# Patient Record
Sex: FEMALE | Race: BLACK OR AFRICAN AMERICAN | Employment: OTHER | URBAN - METROPOLITAN AREA
[De-identification: names, ages, dates, MRNs, and addresses within clinical notes are randomized per-mention and may not be internally consistent; named-entity substitution may affect disease eponyms.]

---

## 2017-03-31 RX ORDER — CHLORTHALIDONE 25 MG/1
25 TABLET ORAL DAILY
Qty: 90 TAB | Refills: 3 | Status: SHIPPED | OUTPATIENT
Start: 2017-03-31 | End: 2019-01-31 | Stop reason: ALTCHOICE

## 2017-04-04 ENCOUNTER — OFFICE VISIT (OUTPATIENT)
Dept: INTERNAL MEDICINE CLINIC | Age: 62
End: 2017-04-04

## 2017-04-04 VITALS
WEIGHT: 218 LBS | HEIGHT: 63 IN | HEART RATE: 58 BPM | BODY MASS INDEX: 38.62 KG/M2 | SYSTOLIC BLOOD PRESSURE: 146 MMHG | RESPIRATION RATE: 17 BRPM | OXYGEN SATURATION: 98 % | DIASTOLIC BLOOD PRESSURE: 74 MMHG | TEMPERATURE: 98.2 F

## 2017-04-04 DIAGNOSIS — Z11.59 NEED FOR HEPATITIS C SCREENING TEST: ICD-10-CM

## 2017-04-04 DIAGNOSIS — E03.9 ACQUIRED HYPOTHYROIDISM: ICD-10-CM

## 2017-04-04 DIAGNOSIS — Z71.89 ADVANCE DIRECTIVE DISCUSSED WITH PATIENT: ICD-10-CM

## 2017-04-04 DIAGNOSIS — I10 ESSENTIAL HYPERTENSION: Primary | ICD-10-CM

## 2017-04-04 DIAGNOSIS — S80.01XA CONTUSION OF RIGHT KNEE, INITIAL ENCOUNTER: ICD-10-CM

## 2017-04-04 DIAGNOSIS — K51.90 ULCERATIVE COLITIS WITHOUT COMPLICATIONS, UNSPECIFIED LOCATION (HCC): ICD-10-CM

## 2017-04-04 RX ORDER — CYCLOBENZAPRINE HCL 10 MG
10 TABLET ORAL
Qty: 30 TAB | Refills: 2 | Status: CANCELLED | OUTPATIENT
Start: 2017-04-04

## 2017-04-04 NOTE — PROGRESS NOTES
Subjective:     Morrison Curling is a 57 y.o. female who presents for follow up of hypertension and hypothyroidism. .    Diet and Lifestyle: generally follows a low sodium diet  Home BP Monitoring: is well controlled at home, ranging 120's/70-80's    Cardiovascular ROS: taking medications as instructed, no medication side effects noted, no TIA's, no chest pain on exertion, no dyspnea on exertion, no swelling of ankles, no orthostatic dizziness or lightheadedness, no palpitations. New concerns:   Tripped in [employer] lot 4/1 fell onto left hand, right knee and right foot. Has been icing and taking advil - 2 tabs every 4-6 hours. Right knee is swollen and sore to touch but slightly improved. UC is pretty well controlled. Still with some diarrhea. Has run out of her Colazol and diarrhea has been worse since. Sees Dr. Jude Shah. Had Odansetron to take as needed for vomiting spells. Increased allergy symptoms. Not taking any medications. Bladder has improved. Had been seeing Dr. Donald Brown. Stopped the [facility]. Currently no urgency or frequency. Has f/u appointment with Dr. Jeny Mason for m spasm in back and left knee oa. Has upcoming left TKR on 5/31. Continues on tramadol for the back, hip, and left knee pain. Had an injection in back by Dr. Jeny Mason a couple of weeks ago. Also taking diclofenac as needed as well. GERD symptoms with chest discomfort post eaying. Occurring 2 times a week. Taking tums without improvement. Toenail fungus - using otc terbanifine cream.      Current Outpatient Prescriptions   Medication Sig Dispense Refill    chlorthalidone (HYGROTEN) 25 mg tablet Take 1 Tab by mouth daily. 90 Tab 3    clotrimazole-betamethasone (LOTRISONE) topical cream Apply  to affected area two (2) times a day.  30 g 1    valsartan (DIOVAN) 160 mg tablet TAKE ONE TABLET BY MOUTH EVERY DAY 90 Tab 3    diclofenac EC (VOLTAREN) 50 mg EC tablet Take 50 mg by mouth as needed for Pain.      estradiol (ESTROGEL) 1.25 gram/actuation glpm Apply 1 pump (0.75mg estradiol). daily 50 g 6    levothyroxine (SYNTHROID) 125 mcg tablet Take 1 Tab by mouth Daily (before breakfast). 90 Tab 3    hydrocortisone (PROCTOZONE-HC) 2.5 % rectal cream Insert  into rectum four (4) times daily. As needed for hemorrhoids 30 g 2    cyclobenzaprine (FLEXERIL) 10 mg tablet Take 1 Tab by mouth nightly as needed for Muscle Spasm(s). 30 Tab 2    ALPRAZolam (XANAX) 0.5 mg tablet Take 1 Tab by mouth two (2) times daily as needed. 30 Tab 2    balsalazide (COLAZAL) 750 mg capsule Take 3 Caps by mouth three (3) times daily. 270 Cap 1    ketorolac (ACULAR) 0.5 % ophthalmic solution Administer 1 Drop to both eyes four (4) times daily as needed. 1 Bottle 0    traMADol-acetaminophen (ULTRACET) 37.5-325 mg per tablet Take 1 Tab by mouth every eight (8) hours as needed for Pain. 60 Tab 2    Blood Pressure Kit-Extra Large kit 1 Each by Does Not Apply route daily. Dx: 401.9 1 Each 0    loperamide (IMODIUM) 2 mg capsule Take 2 mg by mouth as needed.  fluticasone (FLONASE) 50 mcg/actuation nasal spray 2 Sprays by Both Nostrils route daily. 1 Bottle 6        Lab Results  Component Value Date/Time   GFR est AA 83 06/03/2015 09:57 AM   GFR est non-AA 72 06/03/2015 09:57 AM   Creatinine 0.87 06/03/2015 09:57 AM   BUN 16 06/03/2015 09:57 AM   Sodium 140 06/03/2015 09:57 AM   Potassium 3.7 06/03/2015 09:57 AM   Chloride 99 06/03/2015 09:57 AM   CO2 28 06/03/2015 09:57 AM      Lab Results  Component Value Date/Time   TSH 1.370 06/03/2015 09:57 AM   T4, Free 1.2 11/11/2010 04:10 PM         Review of Systems, additional:  Pertinent items are noted in HPI.     Objective:     Visit Vitals    /74 (BP 1 Location: Left arm, BP Patient Position: Sitting)    Pulse (!) 58    Temp 98.2 °F (36.8 °C) (Oral)    Resp 17    Ht 5' 3\" (1.6 m)    Wt 218 lb (98.9 kg)    SpO2 98%    BMI 38.62 kg/m2     Appearance: alert, well appearing, and in no distress and oriented to person, place, and time. General exam:   . NECK: supple, no lad, no bruit, no tm  LUNGS: cta bilat  CV rrr, no m/g/r  ABD: soft, nt, nd, nabs  EXT: no c/c/e  Left hand - mild tenderness cmc joint but full rom. Right knee - tenderness and mild superficial swelling. No effusion. Ok rom  Left knee - + crepitance and limited rom  Right foot - mild tenderness laterally but no swelling and full rom at ankle. Toenails thickened rajesh bilat great toes    Assessment/Plan:     hypertension well controlled. current treatment plan is effective, no change in therapy. Hypothyroid - clinically euthyroid  Continue same    Contusion left hand, right knee and right foot - elevation and ice prn. Continue diclofenac.      gerd - increased. Trial zantac    OA left knee - agree with TKR end of May. UC - controlled, f/u with GI    HM - screen hep C, advanced directive discussed with pt and written materials provided. Orders Placed This Encounter    METABOLIC PANEL, COMPREHENSIVE    LIPID PANEL    TSH 3RD GENERATION    T4, FREE    HEPATITIS C AB     Follow-up Disposition:  Return in about 4 weeks (around 5/2/2017) for preop left TKR.

## 2017-04-04 NOTE — PROGRESS NOTES
Reviewed record in preparation for visit and have obtained necessary documentation. Identified pt with two pt identifiers(name and ). Health Maintenance Due   Topic    Hepatitis C Screening     PAP AKA CERVICAL CYTOLOGY     INFLUENZA AGE 9 TO ADULT          Chief Complaint   Patient presents with    Medication Evaluation     fell the other night and hit knees and left hand    Nail Problem     ? fungus        Wt Readings from Last 3 Encounters:   17 218 lb (98.9 kg)   16 227 lb (103 kg)   16 227 lb 6.4 oz (103.1 kg)     Temp Readings from Last 3 Encounters:   17 98.2 °F (36.8 °C) (Oral)   16 97.9 °F (36.6 °C)   16 98.2 °F (36.8 °C) (Oral)     BP Readings from Last 3 Encounters:   17 146/74   16 150/65   16 132/82     Pulse Readings from Last 3 Encounters:   17 (!) 58   16 67   16 (!) 53           Learning Assessment:  :     Learning Assessment 2017   PRIMARY LEARNER Patient Patient   HIGHEST LEVEL OF EDUCATION - PRIMARY LEARNER  SOME COLLEGE 2 YEARS OF COLLEGE   BARRIERS PRIMARY LEARNER NONE NONE   CO-LEARNER CAREGIVER No No   PRIMARY LANGUAGE ENGLISH ENGLISH    NEED No No   LEARNER PREFERENCE PRIMARY LISTENING LISTENING   ANSWERED BY patient patient   RELATIONSHIP SELF SELF       Depression Screening:  :     PHQ 2 / 9, over the last two weeks 2017   Little interest or pleasure in doing things Not at all   Feeling down, depressed or hopeless Not at all   Total Score PHQ 2 0       Fall Risk Assessment:  :     No flowsheet data found. Abuse Screening:  :     Abuse Screening Questionnaire 2017 6/3/2015 2014   Do you ever feel afraid of your partner? N N N   Are you in a relationship with someone who physically or mentally threatens you? N N N   Is it safe for you to go home?  Marlyn Rizo       Coordination of Care Questionnaire:  :     1) Have you been to an emergency room, urgent care clinic since your last visit? no   Hospitalized since your last visit? no             2) Have you seen or consulted any other health care providers outside of [address] [place] [address] since your last visit? no  (Include any pap smears or colon screenings in this section.)    3) Do you have an Advance Directive on file? no    4) Are you interested in receiving information on Advance Directives? YES      Patient is accompanied by self I have received verbal consent from Mariam Turcios to discuss any/all medical information while they are present in the room.

## 2017-04-04 NOTE — MR AVS SNAPSHOT
Visit Information Date & Time Provider Department Dept. Phone Encounter #  
 4/4/2017 10:20 AM Steve Green MD [facility] [phone] [id number] Follow-up Instructions Return in about 4 weeks (around 5/2/2017) for preop left TKR. Upcoming Health Maintenance Date Due Hepatitis C Screening 1955 PAP AKA CERVICAL CYTOLOGY 5/16/1975 BREAST CANCER SCRN MAMMOGRAM 12/21/2018 COLONOSCOPY 5/25/2019 DTaP/Tdap/Td series (2 - Td) 1/16/2025 Allergies as of 4/4/2017  Review Complete On: 4/4/2017 By: Steve Green MD  
  
 Severity Noted Reaction Type Reactions Adhesive Tape-silicones  65/50/9055    Itching Blisters, bumps. -long term application Lidoderm [Lidocaine]  03/29/2013    Rash Patch-adhesive patch. Allergic to the adhesive - long term use. Not allergic to lidoderm. Other Medication  07/24/2014    Rash  
 dermabond - blisters Current Immunizations  Reviewed on 5/11/2016 Name Date Tdap 1/16/2015 Zoster Vaccine, Live 5/15/2015 Not reviewed this visit You Were Diagnosed With   
  
 Codes Comments Essential hypertension    -  Primary ICD-10-CM: I10 
ICD-9-CM: 401.9 Acquired hypothyroidism     ICD-10-CM: E03.9 ICD-9-CM: 244.9 Ulcerative colitis without complications, unspecified location [facility])     ICD-10-CM: K51.90 ICD-9-CM: 556.9 Contusion of right knee, initial encounter     ICD-10-CM: S80.01XA ICD-9-CM: 924.11 Need for hepatitis C screening test     ICD-10-CM: Z11.59 
ICD-9-CM: V73.89 Advance directive discussed with patient     ICD-10-CM: Z71.89 ICD-9-CM: V65.49 Vitals BP Pulse Temp Resp Height(growth percentile) Weight(growth percentile) 146/74 (BP 1 Location: Left arm, BP Patient Position: Sitting) (!) 58 98.2 °F (36.8 °C) (Oral) 17 5' 3\" (1.6 m) 218 lb (98.9 kg) SpO2 BMI OB Status Smoking Status 98% 38.62 kg/m2 Hysterectomy Never Smoker BMI and BSA Data Body Mass Index Body Surface Area  
 38.62 kg/m 2 2.1 m 2 [facility] [address] [phone] Your Updated Medication List  
  
   
This list is accurate as of: 4/4/17 10:56 AM.  Always use your most recent med list.  
  
  
  
  
 ALPRAZolam 0.5 mg tablet Commonly known as:  Kathlyne Medley Take 1 Tab by mouth two (2) times daily as needed. balsalazide 750 mg capsule Commonly known as:  Sukumar Seats Take 3 Caps by mouth three (3) times daily. Blood Pressure Kit-Extra Large Kit 1 Each by Does Not Apply route daily. Dx: 401.9 chlorthalidone 25 mg tablet Commonly known as:  Cristela Garcia Take 1 Tab by mouth daily. clotrimazole-betamethasone topical cream  
Commonly known as:  Dave Loudsharon Apply  to affected area two (2) times a day. cyclobenzaprine 10 mg tablet Commonly known as:  FLEXERIL Take 1 Tab by mouth nightly as needed for Muscle Spasm(s). diclofenac EC 50 mg EC tablet Commonly known as:  VOLTAREN Take 50 mg by mouth as needed for Pain.  
  
 estradiol 1.25 gram/actuation Glpm  
Commonly known as:  ESTROGEL Apply 1 pump (0.75mg estradiol). daily  
  
 fluticasone 50 mcg/actuation nasal spray Commonly known as:  Ruperto Bumpers 2 Sprays by Both Nostrils route daily. hydrocortisone 2.5 % rectal cream  
Commonly known as:  PROCTOZONE-HC Insert  into rectum four (4) times daily. As needed for hemorrhoids  
  
 ketorolac 0.5 % ophthalmic solution Commonly known as:  Orpha Ee Administer 1 Drop to both eyes four (4) times daily as needed. levothyroxine 125 mcg tablet Commonly known as:  SYNTHROID Take 1 Tab by mouth Daily (before breakfast). loperamide 2 mg capsule Commonly known as:  IMODIUM Take 2 mg by mouth as needed. traMADol-acetaminophen 37.5-325 mg per tablet Commonly known as:  ULTRACET  
 Take 1 Tab by mouth every eight (8) hours as needed for Pain.  
  
 valsartan 160 mg tablet Commonly known as:  DIOVAN  
TAKE ONE TABLET BY MOUTH EVERY DAY We Performed the Following HEPATITIS C AB [29867 CPT(R)] LIPID PANEL [92513 CPT(R)] METABOLIC PANEL, COMPREHENSIVE [41142 CPT(R)] T4, FREE [id number] CPT(R)] TSH 3RD GENERATION [23911 CPT(R)] Follow-up Instructions Return in about 4 weeks (around 5/2/2017) for preop left TKR. Patient Instructions For allergies: Zyrtec and Flonase nasal spray For toenails - VICKS Vaporub nightly for 6-9 months. Introducing [facility] SERVICES! Dear prettysecrets: Thank you for requesting a Torbit account. Our records indicate that you already have an active Torbit account. You can access your account anytime at [email or web address] Did you know that you can access your hospital and ER discharge instructions at any time in Torbit? You can also review all of your test results from your hospital stay or ER visit. Additional Information If you have questions, please visit the Frequently Asked Questions section of the Torbit website at [email or web address] Remember, Torbit is NOT to be used for urgent needs. For medical emergencies, dial 911. Now available from your iPhone and Android! Please provide this summary of care documentation to your next provider. Your primary care clinician is listed as LANEY LEWIS. If you have any questions after today's visit, please call [phone].

## 2017-05-02 ENCOUNTER — HOSPITAL ENCOUNTER (EMERGENCY)
Age: 62
Discharge: HOME | End: 2017-05-02
Attending: EMERGENCY MEDICINE
Payer: COMMERCIAL

## 2017-05-02 VITALS
BODY MASS INDEX: 37.05 KG/M2 | DIASTOLIC BLOOD PRESSURE: 67 MMHG | OXYGEN SATURATION: 99 % | RESPIRATION RATE: 18 BRPM | TEMPERATURE: 98.3 F | HEIGHT: 64 IN | HEART RATE: 62 BPM | SYSTOLIC BLOOD PRESSURE: 135 MMHG | WEIGHT: 217 LBS

## 2017-05-02 DIAGNOSIS — R00.2 PALPITATIONS: Primary | ICD-10-CM

## 2017-05-02 LAB
ALBUMIN SERPL BCP-MCNC: 3.6 G/DL (ref 3.5–5)
ALBUMIN/GLOB SERPL: 1.1 {RATIO} (ref 1.1–2.2)
ALP SERPL-CCNC: 101 U/L (ref 45–117)
ALT SERPL-CCNC: 24 U/L (ref 12–78)
ANION GAP BLD CALC-SCNC: 7 MMOL/L (ref 5–15)
AST SERPL W P-5'-P-CCNC: 15 U/L (ref 15–37)
BASOPHILS # BLD AUTO: 0.1 K/UL (ref 0–0.1)
BASOPHILS # BLD: 1 % (ref 0–1)
BILIRUB SERPL-MCNC: 0.6 MG/DL (ref 0.2–1)
BUN SERPL-MCNC: 23 MG/DL (ref 6–20)
BUN/CREAT SERPL: 30 (ref 12–20)
CALCIUM SERPL-MCNC: 8.8 MG/DL (ref 8.5–10.1)
CHLORIDE SERPL-SCNC: 106 MMOL/L (ref 97–108)
CK MB CFR SERPL CALC: 1 % (ref 0–2.5)
CK MB SERPL-MCNC: 1.1 NG/ML (ref 5–25)
CK SERPL-CCNC: 114 U/L (ref 26–192)
CO2 SERPL-SCNC: 29 MMOL/L (ref 21–32)
CREAT SERPL-MCNC: 0.77 MG/DL (ref 0.55–1.02)
EOSINOPHIL # BLD: 0.2 K/UL (ref 0–0.4)
EOSINOPHIL NFR BLD: 4 % (ref 0–7)
ERYTHROCYTE [DISTWIDTH] IN BLOOD BY AUTOMATED COUNT: 13.4 % (ref 11.5–14.5)
GLOBULIN SER CALC-MCNC: 3.3 G/DL (ref 2–4)
GLUCOSE SERPL-MCNC: 86 MG/DL (ref 65–100)
HCT VFR BLD AUTO: 40.7 % (ref 35–47)
HGB BLD-MCNC: 13.1 G/DL (ref 11.5–16)
LYMPHOCYTES # BLD AUTO: 40 % (ref 12–49)
LYMPHOCYTES # BLD: 2.3 K/UL (ref 0.8–3.5)
MCH RBC QN AUTO: 29.8 PG (ref 26–34)
MCHC RBC AUTO-ENTMCNC: 32.2 G/DL (ref 30–36.5)
MCV RBC AUTO: 92.7 FL (ref 80–99)
MONOCYTES # BLD: 0.6 K/UL (ref 0–1)
MONOCYTES NFR BLD AUTO: 10 % (ref 5–13)
NEUTS SEG # BLD: 2.5 K/UL (ref 1.8–8)
NEUTS SEG NFR BLD AUTO: 45 % (ref 32–75)
PLATELET # BLD AUTO: 232 K/UL (ref 150–400)
POTASSIUM SERPL-SCNC: 4.3 MMOL/L (ref 3.5–5.1)
PROT SERPL-MCNC: 6.9 G/DL (ref 6.4–8.2)
RBC # BLD AUTO: 4.39 M/UL (ref 3.8–5.2)
SODIUM SERPL-SCNC: 142 MMOL/L (ref 136–145)
TROPONIN I SERPL-MCNC: <0.04 NG/ML
WBC # BLD AUTO: 5.6 K/UL (ref 3.6–11)

## 2017-05-02 PROCEDURE — 99285 EMERGENCY DEPT VISIT HI MDM: CPT

## 2017-05-02 PROCEDURE — 93005 ELECTROCARDIOGRAM TRACING: CPT

## 2017-05-02 PROCEDURE — 85025 COMPLETE CBC W/AUTO DIFF WBC: CPT | Performed by: EMERGENCY MEDICINE

## 2017-05-02 PROCEDURE — 36415 COLL VENOUS BLD VENIPUNCTURE: CPT | Performed by: EMERGENCY MEDICINE

## 2017-05-02 PROCEDURE — 82550 ASSAY OF CK (CPK): CPT | Performed by: EMERGENCY MEDICINE

## 2017-05-02 PROCEDURE — 80053 COMPREHEN METABOLIC PANEL: CPT | Performed by: EMERGENCY MEDICINE

## 2017-05-02 PROCEDURE — 84484 ASSAY OF TROPONIN QUANT: CPT | Performed by: EMERGENCY MEDICINE

## 2017-05-02 NOTE — ED PROVIDER NOTES
HPI Comments: 57 y.o. female with past medical history significant for ulcerative colitis, HTN, GERD, duodenal ulcer, hypothyroid, iritis, arthritis, blood clots in right eye, arrhythmia, and is s/p cholecystectomy who presents from home via private vehicle with chief complaint of palpitations. Pt reports that she has been having irregular heartbeats for the last 3 - 4 months. Pt reports lightheadedness and dizziness associated with these \"extra beats\", \"skipping beats\", and \"long pauses between beats\". Pt had a miocardial profusion imaging treadmill test performed earlier today. She called the cardiologist office to obtain the results and also inform them that she was having the arrhythmia again, but \"heavier today\", and she was referred to the ED for further evaluation. Pt is not currently having sx. The episodes typically last between 15 and 20 minutes. Pt denies any nausea or vomiting. There are no other acute medical concerns at this time. Social hx: Never smoker. Alcohol use. PCP: Maru Petit MD  Cardiology: Denilson Torres MD and Radha Rosario NP [facility])      Note written by Barbara Chu, as dictated by Aletha Tolliver MD 7:53 PM     The history is provided by the patient.         Past Medical History:   Diagnosis Date    Arrhythmia     \"PALPATATIONS, FLUTTERING, POSSIBLE MEDS PER DR. Giles Somers"    Arthritis     knees, back    Duodenal ulcer 2002    GERD (gastroesophageal reflux disease)     H/O blood clots 2007    TO RIGHT EYE    Hypertension     Iritis 2007    both eyes    Nausea & vomiting     Other ill-defined conditions     back pain/spasm    Thyroid disease     hypothroid    Ulcerative colitis     Ulcerative colitis 2002       Past Surgical History:   Procedure Laterality Date    COLONOSCOPY N/A 5/25/2016    COLONOSCOPY performed by Adam Barrientos MD at [facility]. [address], COLON, DIAGNOSTIC      HX ACL RECONSTRUCTION  2003 right    HX CHOLECYSTECTOMY      HX GI      COLONOSCOPY    HX HEENT  2003    benign mass removed from uvula    HX ORTHOPAEDIC  2009    exc mass right elbow - benign    HX ORTHOPAEDIC  5/1/13    RIGHT TOTAL KNEE REPLPACEMENT    HX OTHER SURGICAL  2003    growth removed from back of leg - benign    HX ISABELL AND BSO  2003    HX TONSILLECTOMY  2003    done at the time mass removed from uvula    LAP, PLACE ADJUST GASTR BAND  2008    fluid removed/band removed         Family History:   Problem Relation Age of Onset    Stroke Mother      cerebral hemorrhage    Post-op Nausea/Vomiting Mother     Heart Disease Father     Cancer Paternal Grandmother      colon    Post-op Nausea/Vomiting Daughter        Social History     Social History    Marital status: SINGLE     Spouse name: N/A    Number of children: N/A    Years of education: N/A     Occupational History    Not on file. Social History Main Topics    Smoking status: Never Smoker    Smokeless tobacco: Never Used    Alcohol use 0.0 - 0.6 oz/week     0 - 1 Standard drinks or equivalent per week      Comment: 2 drinks per month    Drug use: No    Sexual activity: No     Other Topics Concern    Not on file     Social History Narrative         ALLERGIES: Adhesive tape-silicones; Lidoderm [lidocaine]; and Other medication    Review of Systems   Constitutional: Negative for activity change, appetite change and fatigue. HENT: Negative for ear pain, facial swelling, sore throat and trouble swallowing. Eyes: Negative for pain, discharge and visual disturbance. Respiratory: Negative for chest tightness and wheezing. Cardiovascular: Positive for palpitations. Negative for chest pain. Gastrointestinal: Negative for blood in stool, nausea and vomiting. Genitourinary: Negative for difficulty urinating, flank pain and hematuria. Musculoskeletal: Negative for arthralgias, joint swelling, myalgias and neck pain.    Skin: Negative for color change and rash.   Neurological: Positive for dizziness and light-headedness. Hematological: Negative for adenopathy. Does not bruise/bleed easily. Psychiatric/Behavioral: Negative for behavioral problems, confusion and sleep disturbance. All other systems reviewed and are negative. Vitals:    05/02/17 1845   BP: 147/81   Pulse: 66   Resp: 20   Temp: 98.1 °F (36.7 °C)   SpO2: 99%   Weight: 98.4 kg (217 lb)   Height: 5' 4\" (1.626 m)            Physical Exam   Constitutional: She is oriented to person, place, and time. She appears well-developed and well-nourished. No distress. HENT:   Head: Normocephalic and atraumatic. Nose: Nose normal.   Mouth/Throat: Oropharynx is clear and moist.   Eyes: Conjunctivae and EOM are normal. Pupils are equal, round, and reactive to light. No scleral icterus. Neck: Normal range of motion. Neck supple. No JVD present. No tracheal deviation present. No thyromegaly present. Cardiovascular: Normal rate, regular rhythm, normal heart sounds and intact distal pulses. Exam reveals no gallop and no friction rub. No murmur heard. No ectopy noted. Pulmonary/Chest: Effort normal and breath sounds normal. No respiratory distress. She has no wheezes. She has no rales. She exhibits no tenderness. Abdominal: Soft. Bowel sounds are normal. She exhibits no distension and no mass. There is no tenderness. There is no rebound and no guarding. Musculoskeletal: Normal range of motion. She exhibits no edema or tenderness. Lymphadenopathy:     She has no cervical adenopathy. Neurological: She is alert and oriented to person, place, and time. She has normal reflexes. No cranial nerve deficit. Coordination normal.   Skin: Skin is warm and dry. No rash noted. No erythema. Psychiatric: She has a normal mood and affect. Her behavior is normal. Judgment and thought content normal.   Nursing note and vitals reviewed.        MDM  Number of Diagnoses or Management Options     Amount and/or Complexity of Data Reviewed  Clinical lab tests: ordered and reviewed  Decide to obtain previous medical records or to obtain history from someone other than the patient: yes  Review and summarize past medical records: yes  Discuss the patient with other providers: yes    Risk of Complications, Morbidity, and/or Mortality  Presenting problems: moderate  Diagnostic procedures: moderate  Management options: moderate    Patient Progress  Patient progress: stable    ED Course       Procedures  ED MD EKG interpretation : NSR ith rate 62 BPM. NO ectopy noted. No irregularity in beat is noted. LAD noted. Intervals are normal. Shey Coello MD    Will check labs and contact Dr. Trevor Castillo. The patients symptoms are not significantly different from what she has had for the past 3-4 months. With the myocardial perfusion treadmill study today, and her lack of symptoms tonight, the patient does not appear in any acute distress. Will check labs and orthostatics. Patients labs appear normal. Will consult cardiology and likely discharge home to follow up with Dr. Etelvina Covarrubias tomorrow. Discussed with Dr. Trevor Castillo and he agrees with discharge and to continue current meds and follow up with Dr. Etelvina Covarrubias tomorrow.

## 2017-05-02 NOTE — ED TRIAGE NOTES
She has been having irregular hearbeat for several months. Today she had a stress test. She called the doctors office to find out the results and told them she was having the symptoms again and a feeling as if she was going to pass out. They told her to come to the ED. She says this is the same as she usually has and the episodes last about 10-15 minutes. No complaints of chest pain. The symptoms have now resolved.

## 2017-05-03 ENCOUNTER — PATIENT OUTREACH (OUTPATIENT)
Dept: INTERNAL MEDICINE CLINIC | Age: 62
End: 2017-05-03

## 2017-05-03 ENCOUNTER — PATIENT OUTREACH (OUTPATIENT)
Dept: OTHER | Age: 62
End: 2017-05-03

## 2017-05-03 LAB
ATRIAL RATE: 62 BPM
CALCULATED P AXIS, ECG09: 66 DEGREES
CALCULATED R AXIS, ECG10: -9 DEGREES
CALCULATED T AXIS, ECG11: 17 DEGREES
DIAGNOSIS, 93000: NORMAL
P-R INTERVAL, ECG05: 150 MS
Q-T INTERVAL, ECG07: 410 MS
QRS DURATION, ECG06: 78 MS
QTC CALCULATION (BEZET), ECG08: 416 MS
VENTRICULAR RATE, ECG03: 62 BPM

## 2017-05-03 NOTE — ED NOTES
Patient received discharge instructions by MD and nurse. Patient verbalized understanding of medication use and other discharge instructions. Updated vitals, IV DC and patient ambulated out of ED with steady gait, showing no signs of distress. Pt reports relief of most intense pain. All questions answered.

## 2017-05-03 NOTE — PROGRESS NOTES
Patient on report as with discharge from  ED visit for palpitations on 05/02/2017. Communication with NN , Ms Ibeth Dev, prior to outreach. Initial attempt to contact patient for transitions of care. Palpitations potential barrier for upcoming surgery. Left discreet message on voicemail with this CM contact information. Will attempt to contact again.

## 2017-05-03 NOTE — PROGRESS NOTES
Patient on [facility] (discharge) report for visit to [facility] ED on 5/2/17,  c/o palpitations/irregular heartbeat. Per notes this has been going on for several months. Patient followed by cardiology and had stress test preformed on 5/2/17. Patient call office to get results of stress test and advised them of symptoms and was sent to the ED for further eval.    Patient has an OV scheduled with Dr. Naomi Camara-cardiology on 5/3/17 and Celine Gamino on 5/16/17. NN reviewed chart and no further follow up needed at this time.

## 2017-05-04 ENCOUNTER — PATIENT OUTREACH (OUTPATIENT)
Dept: OTHER | Age: 62
End: 2017-05-04

## 2017-05-04 NOTE — LETTER
5/4/2017 3:16 PM 
 
Ms. John [address] [address] Dear Ms. Pollackmidamien Dorado, My name is Maryann Elizabeth , Employee Care Manager for [employer], and I have been trying to reach you. The Employee Care  is a free-of-charge, confidential service provided to our employees and their family members covered by the [employer]. Part of my job is to follow up with members who have recently been in the hospital or emergency room, to help them coordinate their care and answer questions they may have about their visit. I am able to provide assistance with medication questions, scheduling needed follow-up appointments, and arranging services like home health or home medical equipment. I can also provide education regarding your hospital or ER visit as well as your medical conditions. As healthcare providers, we know that patients do better when they have close follow up with a primary care provider (PCP), especially after a hospital or emergency department visit. If you do not have a PCP, I can help you find one that is convenient to you and covered by your insurance. I can also help you understand any after visit instructions, such as what symptoms to watch out for, or any new or changed medications. Remember that you can access your After Visit Summary by logging into your [employer] account. If you do not have a [employer] account, I can help you request access. Our program is designed to provide you with the opportunity to have a [employer] care manager partner with you for your healthcare needs. Please contact me at the below number if I can provide you with assistance for any of the above services.  
 
 
Sincerely, 
 
Maryann Elizabeth RN, [place] [address], [address] [address].  
Phone:  [phone]     Fax:  [phone]    [email or web address]

## 2017-05-04 NOTE — PROGRESS NOTES
Patient on report as with discharge from ED visit for palpitations. Second attempt to contact patient for transitions of care. Left discreet message on voicemail with this CM contact information. Unable to reach letter sent via My Chart. Will follow for one month for transitions of care needs.

## 2017-05-08 DIAGNOSIS — F41.9 ANXIETY: ICD-10-CM

## 2017-05-08 RX ORDER — ALPRAZOLAM 0.5 MG/1
0.5 TABLET ORAL
Qty: 30 TAB | Refills: 2 | OUTPATIENT
Start: 2017-05-08 | End: 2017-05-16 | Stop reason: SDUPTHER

## 2017-05-08 NOTE — TELEPHONE ENCOUNTER
Pt states that the doctor was suppose to send her RX for Aprazalam to [address] on her last visit on 4/4/2017 and it has not been sent.              From answering service

## 2017-05-15 ENCOUNTER — HOSPITAL ENCOUNTER (OUTPATIENT)
Dept: PREADMISSION TESTING | Age: 62
Discharge: HOME | End: 2017-05-15
Payer: COMMERCIAL

## 2017-05-15 ENCOUNTER — APPOINTMENT (OUTPATIENT)
Dept: PREADMISSION TESTING | Age: 62
End: 2017-05-15
Payer: COMMERCIAL

## 2017-05-15 VITALS
TEMPERATURE: 97.4 F | SYSTOLIC BLOOD PRESSURE: 119 MMHG | WEIGHT: 206 LBS | HEIGHT: 65 IN | BODY MASS INDEX: 34.32 KG/M2 | HEART RATE: 66 BPM | DIASTOLIC BLOOD PRESSURE: 73 MMHG

## 2017-05-15 LAB
ABO + RH BLD: NORMAL
APPEARANCE UR: CLEAR
BACTERIA URNS QL MICRO: ABNORMAL /HPF
BILIRUB UR QL CFM: POSITIVE
BLOOD GROUP ANTIBODIES SERPL: NORMAL
COLOR UR: ABNORMAL
EPITH CASTS URNS QL MICRO: ABNORMAL /LPF
EST. AVERAGE GLUCOSE BLD GHB EST-MCNC: 100 MG/DL
GLUCOSE UR STRIP.AUTO-MCNC: NEGATIVE MG/DL
HBA1C MFR BLD: 5.1 % (ref 4.2–6.3)
HGB UR QL STRIP: NEGATIVE
INR PPP: 1.1 (ref 0.9–1.1)
KETONES UR QL STRIP.AUTO: ABNORMAL MG/DL
LEUKOCYTE ESTERASE UR QL STRIP.AUTO: ABNORMAL
MUCOUS THREADS URNS QL MICRO: ABNORMAL /LPF
NITRITE UR QL STRIP.AUTO: NEGATIVE
PH UR STRIP: 5 [PH] (ref 5–8)
PROT UR STRIP-MCNC: ABNORMAL MG/DL
PROTHROMBIN TIME: 11.1 SEC (ref 9–11.1)
RBC #/AREA URNS HPF: ABNORMAL /HPF (ref 0–5)
SP GR UR REFRACTOMETRY: >1.03 (ref 1–1.03)
SPECIMEN EXP DATE BLD: NORMAL
T4 FREE SERPL-MCNC: 1.7 NG/DL (ref 0.8–1.5)
TSH SERPL DL<=0.05 MIU/L-ACNC: 0.06 UIU/ML (ref 0.36–3.74)
UA: UC IF INDICATED,UAUC: ABNORMAL
UROBILINOGEN UR QL STRIP.AUTO: 0.2 EU/DL (ref 0.2–1)
WBC URNS QL MICRO: ABNORMAL /HPF (ref 0–4)

## 2017-05-15 PROCEDURE — 84443 ASSAY THYROID STIM HORMONE: CPT | Performed by: ORTHOPAEDIC SURGERY

## 2017-05-15 PROCEDURE — 36415 COLL VENOUS BLD VENIPUNCTURE: CPT | Performed by: ORTHOPAEDIC SURGERY

## 2017-05-15 PROCEDURE — 81001 URINALYSIS AUTO W/SCOPE: CPT | Performed by: ORTHOPAEDIC SURGERY

## 2017-05-15 PROCEDURE — 84439 ASSAY OF FREE THYROXINE: CPT | Performed by: ORTHOPAEDIC SURGERY

## 2017-05-15 PROCEDURE — 87086 URINE CULTURE/COLONY COUNT: CPT | Performed by: ORTHOPAEDIC SURGERY

## 2017-05-15 PROCEDURE — 86900 BLOOD TYPING SEROLOGIC ABO: CPT | Performed by: ORTHOPAEDIC SURGERY

## 2017-05-15 PROCEDURE — 87147 CULTURE TYPE IMMUNOLOGIC: CPT | Performed by: ORTHOPAEDIC SURGERY

## 2017-05-15 PROCEDURE — 85610 PROTHROMBIN TIME: CPT | Performed by: ORTHOPAEDIC SURGERY

## 2017-05-15 PROCEDURE — 83036 HEMOGLOBIN GLYCOSYLATED A1C: CPT | Performed by: ORTHOPAEDIC SURGERY

## 2017-05-15 NOTE — ADVANCED PRACTICE NURSE
Preoperative instructions reviewed with patient. Patient given 2-6 packs of CHG wipes. Instructions to be reviewed in class on use of CHG wipes. Patient given SSI infection FAQS sheet, as well as a  MRSA/MSSA treatment instruction sheet  With an explanation to patient that they will be notified if treatment instructions need to be initiated. Patient was given the opportunity to ask questions on the information provided.

## 2017-05-16 ENCOUNTER — OFFICE VISIT (OUTPATIENT)
Dept: INTERNAL MEDICINE CLINIC | Age: 62
End: 2017-05-16

## 2017-05-16 ENCOUNTER — TELEPHONE (OUTPATIENT)
Dept: INTERNAL MEDICINE CLINIC | Age: 62
End: 2017-05-16

## 2017-05-16 VITALS
HEART RATE: 68 BPM | RESPIRATION RATE: 16 BRPM | SYSTOLIC BLOOD PRESSURE: 140 MMHG | HEIGHT: 64 IN | TEMPERATURE: 98.3 F | BODY MASS INDEX: 35.29 KG/M2 | WEIGHT: 206.7 LBS | OXYGEN SATURATION: 94 % | DIASTOLIC BLOOD PRESSURE: 72 MMHG

## 2017-05-16 DIAGNOSIS — M17.12 PRIMARY OSTEOARTHRITIS OF LEFT KNEE: ICD-10-CM

## 2017-05-16 DIAGNOSIS — E03.9 ACQUIRED HYPOTHYROIDISM: Primary | ICD-10-CM

## 2017-05-16 DIAGNOSIS — F41.9 ANXIETY: ICD-10-CM

## 2017-05-16 DIAGNOSIS — Z01.818 PRE-OP EXAM: ICD-10-CM

## 2017-05-16 LAB
BACTERIA SPEC CULT: ABNORMAL
BACTERIA SPEC CULT: ABNORMAL
BACTERIA SPEC CULT: NORMAL
BACTERIA SPEC CULT: NORMAL
CC UR VC: ABNORMAL
SERVICE CMNT-IMP: ABNORMAL
SERVICE CMNT-IMP: NORMAL

## 2017-05-16 RX ORDER — ALPRAZOLAM 0.5 MG/1
0.5 TABLET ORAL
Qty: 30 TAB | Refills: 2 | Status: SHIPPED | OUTPATIENT
Start: 2017-05-16 | End: 2018-02-14 | Stop reason: SDUPTHER

## 2017-05-16 RX ORDER — LEVOTHYROXINE SODIUM 112 UG/1
112 TABLET ORAL
Qty: 30 TAB | Refills: 11 | Status: SHIPPED | OUTPATIENT
Start: 2017-05-16 | End: 2018-06-29 | Stop reason: SDUPTHER

## 2017-05-16 NOTE — TELEPHONE ENCOUNTER
Pt calling for Praveen Villegas, to give fax number to Dr Hudson Main office [phone].  Best contact number [phone].              From answering service

## 2017-05-16 NOTE — MR AVS SNAPSHOT
Visit Information Date & Time Provider Department Dept. Phone Encounter #  
 5/16/2017  9:00 AM Felicity Rivera MD [facility] [phone] [id number] Follow-up Instructions Return if symptoms worsen or fail to improve. Follow-up and Disposition History Upcoming Health Maintenance Date Due Hepatitis C Screening 1955 INFLUENZA AGE 9 TO ADULT 8/1/2017 BREAST CANCER SCRN MAMMOGRAM 12/21/2018 COLONOSCOPY 5/25/2019 PAP AKA CERVICAL CYTOLOGY 8/24/2019 DTaP/Tdap/Td series (2 - Td) 1/16/2025 Allergies as of 5/16/2017  Review Complete On: 5/16/2017 By: Therese SALGADO Minor Severity Noted Reaction Type Reactions Adhesive Tape-silicones  86/25/1639    Itching Blisters, bumps. -long term application Lidoderm [Lidocaine]  03/29/2013    Rash Patch-adhesive patch. Allergic to the adhesive - long term use. Not allergic to lidoderm. Other Medication  07/24/2014    Rash  
 dermabond - blisters Current Immunizations  Reviewed on 5/11/2016 Name Date Tdap 1/16/2015 Zoster Vaccine, Live 5/15/2015 Not reviewed this visit You Were Diagnosed With   
  
 Codes Comments Acquired hypothyroidism    -  Primary ICD-10-CM: E03.9 ICD-9-CM: 244.9 Anxiety     ICD-10-CM: F41.9 ICD-9-CM: 300.00 Primary osteoarthritis of left knee     ICD-10-CM: M17.12 
ICD-9-CM: 715.16 Pre-op exam     ICD-10-CM: E35.684 ICD-9-CM: V72.84 Vitals BP Pulse Temp Resp Height(growth percentile) Weight(growth percentile) 140/72 68 98.3 °F (36.8 °C) (Oral) 16 5' 4\" (1.626 m) 206 lb 11.2 oz (93.8 kg) SpO2 BMI OB Status Smoking Status 94% 35.48 kg/m2 Hysterectomy Never Smoker Vitals History BMI and BSA Data Body Mass Index Body Surface Area  
 35.48 kg/m 2 2.06 m 2 Preferred Pharmacy Pharmacy Name Phone [facility] [address] [phone] Your Updated Medication List  
  
   
This list is accurate as of: 5/16/17  9:54 AM.  Always use your most recent med list.  
  
  
  
  
 ALPRAZolam 0.5 mg tablet Commonly known as:  Brigitte Butterfield Take 1 Tab by mouth two (2) times daily as needed. chlorthalidone 25 mg tablet Commonly known as:  Maribel Ysabel Take 1 Tab by mouth daily. clotrimazole-betamethasone topical cream  
Commonly known as:  Clarine Klippel Apply  to affected area two (2) times a day. cyclobenzaprine 10 mg tablet Commonly known as:  FLEXERIL Take 1 Tab by mouth nightly as needed for Muscle Spasm(s). diclofenac EC 50 mg EC tablet Commonly known as:  VOLTAREN Take 50 mg by mouth as needed for Pain.  
  
 hydrocortisone 2.5 % rectal cream  
Commonly known as:  PROCTOZONE-HC Insert  into rectum four (4) times daily. As needed for hemorrhoids  
  
 ketorolac 0.5 % ophthalmic solution Commonly known as:  Josiane Sawyer Administer 1 Drop to both eyes four (4) times daily as needed. levothyroxine 112 mcg tablet Commonly known as:  SYNTHROID Take 1 Tab by mouth Daily (before breakfast). loperamide 2 mg capsule Commonly known as:  IMODIUM Take 2 mg by mouth as needed. traMADol-acetaminophen 37.5-325 mg per tablet Commonly known as:  ULTRACET Take 1 Tab by mouth every eight (8) hours as needed for Pain.  
  
 valsartan 160 mg tablet Commonly known as:  DIOVAN  
TAKE ONE TABLET BY MOUTH EVERY DAY Prescriptions Printed Refills ALPRAZolam (XANAX) 0.5 mg tablet 2 Sig: Take 1 Tab by mouth two (2) times daily as needed. Class: Print Route: Oral  
  
Prescriptions Sent to Pharmacy Refills  
 levothyroxine (SYNTHROID) 112 mcg tablet 11 Sig: Take 1 Tab by mouth Daily (before breakfast). Class: Normal  
 Pharmacy: [place], [address] [facility] #: [phone] Route: Oral  
  
Follow-up Instructions Return if symptoms worsen or fail to improve. To-Do List   
 06/27/2017 Lab:  T4, FREE   
  
 06/27/2017 Lab:  TSH 3RD GENERATION Introducing [facility]! Dear Micah Finn: Thank you for requesting a MedTest DX account. Our records indicate that you already have an active MedTest DX account. You can access your account anytime at [email or web address]. Notify [email or web address] Did you know that you can access your hospital and ER discharge instructions at any time in MedTest DX? You can also review all of your test results from your hospital stay or ER visit. Additional Information If you have questions, please visit the Frequently Asked Questions section of the MedTest DX website at [email or web address] Remember, MedTest DX is NOT to be used for urgent needs. For medical emergencies, dial 911. Now available from your iPhone and Android! Please provide this summary of care documentation to your next provider. Your primary care clinician is listed as LANEY LEWIS. If you have any questions after today's visit, please call [phone].

## 2017-05-16 NOTE — PROGRESS NOTES
Preoperative Evaluation    Date of Exam: 5/16/2017    Ruiz Moreno is a 60 y.o. female ([phone]) who presents for preoperative evaluation. Left TKR on May 31, 2017 with Dr. Feliciano Ocasio at [facility]. Has been seeing Dr. Hildred Cockayne for palpitations. Turned in Holter yesterday. Had nuclear stress test on 5/2 without ischemia seen. Latex Allergy: no    Problem List:     Patient Active Problem List    Diagnosis Date Noted    Advance directive discussed with patient 04/04/2017    Iritis 06/03/2015    UC (ulcerative colitis) ([facility] [address] 01/16/2015    Hypothyroid 05/15/2014    Morbid obesity ([facility] [address] 04/07/2014    Abdominal pain, left upper quadrant 04/07/2014    Dysphagia 04/07/2014    GERD (gastroesophageal reflux disease) 04/07/2014    HTN (hypertension) 04/07/2014    Right knee DJD 04/30/2013    Blisters of multiple sites 10/25/2011     Medical History:     Past Medical History:   Diagnosis Date    Arrhythmia     \"PALPATATIONS, FLUTTERING, POSSIBLE MEDS PER DR. Oskar Kent"    Arthritis     knees, back    Chronic pain     LOWER BACK, R HIP    Duodenal ulcer 2002    GERD (gastroesophageal reflux disease)     H/O blood clots 2007    TO RIGHT EYE    Hypertension     Iritis 2007    both eyes    Nausea & vomiting     Other ill-defined conditions     back pain/spasm    PONV (postoperative nausea and vomiting)     Thyroid disease     hypothroid    Ulcerative colitis 2002     Allergies: Allergies   Allergen Reactions    Adhesive Tape-Silicones Itching     Blisters, bumps. -long term application    Lidoderm [Lidocaine] Rash     Patch-adhesive patch. Allergic to the adhesive - long term use. Not allergic to lidoderm.  Other Medication Rash     dermabond - blisters      Medications:     Current Outpatient Prescriptions   Medication Sig    ALPRAZolam (XANAX) 0.5 mg tablet Take 1 Tab by mouth two (2) times daily as needed.     levothyroxine (SYNTHROID) 112 mcg tablet Take 1 Tab by mouth Daily (before breakfast).  chlorthalidone (HYGROTEN) 25 mg tablet Take 1 Tab by mouth daily.  clotrimazole-betamethasone (LOTRISONE) topical cream Apply  to affected area two (2) times a day.  valsartan (DIOVAN) 160 mg tablet TAKE ONE TABLET BY MOUTH EVERY DAY    diclofenac EC (VOLTAREN) 50 mg EC tablet Take 50 mg by mouth as needed for Pain.  hydrocortisone (PROCTOZONE-HC) 2.5 % rectal cream Insert  into rectum four (4) times daily. As needed for hemorrhoids    cyclobenzaprine (FLEXERIL) 10 mg tablet Take 1 Tab by mouth nightly as needed for Muscle Spasm(s).  ketorolac (ACULAR) 0.5 % ophthalmic solution Administer 1 Drop to both eyes four (4) times daily as needed.  traMADol-acetaminophen (ULTRACET) 37.5-325 mg per tablet Take 1 Tab by mouth every eight (8) hours as needed for Pain.  loperamide (IMODIUM) 2 mg capsule Take 2 mg by mouth as needed. No current facility-administered medications for this visit.       Surgical History:     Past Surgical History:   Procedure Laterality Date    COLONOSCOPY N/A 5/25/2016    COLON[facility] performed [facility]mid Glez [facility] at [facility] ENDOSCOPY    HX ACL RECONSTRUCTION  2003    right    HX BREAST BIOPSY Left 12/30/2016    MARKER     HX CHOLECYSTECTOMY  2008    HX HEENT  2003    benign mass removed from uvula    HX KNEE REPLACEMENT Right 05/01/2013    HX ORTHOPAEDIC  2009    exc mass right elbow - benign    HX OTHER SURGICAL  2003    growth removed from back of leg - benign    HX ISABELL AND BSO  2004    HX TONSILLECTOMY  2003    done at the time mass removed from uvula    LAP, PLACE ADJUST GASTR BAND  2008    fluid removed/band removed     Social History:     Social History     Social History    Marital status: SINGLE     Spouse name: N/A    Number of children: N/A    Years of education: N/A     Social History Main Topics    Smoking status: Never Smoker    Smokeless tobacco: Never Used    Alcohol use 0.0 - 0.6 oz/week     0 - 1 Standard drinks or equivalent per week      Comment: 2 drinks per month    Drug use: No    Sexual activity: No     Other Topics Concern    None     Social History Narrative       Anesthesia Complications: Yes: Personal Hx nausea and vomiting with aneasthesia  History of abnormal bleeding : None  History of Blood Transfusions: no  Health Care Directive or Living Will: no.  Has copy at home. Objective:     ROS:   Feeling well. No recent fevers or chills. No dyspnea or chest pain on exertion. Palpitations irregularly  No abdominal pain, change in bowel habits, black or bloody stools. Some recent GERD symptoms. No urinary tract symptoms. No neurological complaints. OBJECTIVE:   The patient appears well, alert, oriented x 3, in no distress. Visit Vitals    /72    Pulse 68    Temp 98.3 °F (36.8 °C) (Oral)    Resp 16    Ht 5' 4\" (1.626 m)    Wt 206 lb 11.2 oz (93.8 kg)    SpO2 94%    BMI 35.48 kg/m2     HEENT:ENT normal.  Neck supple. No adenopathy or thyromegaly. MADDISON. Chest: Lungs are clear, good air entry, no wheezes, rhonchi or rales. Cardiovascular: S1 and S2 normal, no murmurs, regular rate and rhythm. Abdomen: soft without tenderness, guarding, mass or organomegaly. Extremities: show no edema, normal peripheral pulses. Neurological: is normal, no focal findings. DIAGNOSTICS:   1. EKG: EKG FINDINGS - normal EKG, normal sinus rhythm  2. Labs: TSH elevated. See attached sheet    IMPRESSION:   Low risk for planned surgery  No contraindications to planned surgery  Decreased Levothyroxine to 112mcg daily. Will repeat labs in 6 weeks.       Jc Layne MD   5/16/2017

## 2017-05-16 NOTE — ADVANCED PRACTICE NURSE
Faxed PAT testing reports (and fax confirmation received) to Dr. Carlos Carver office. Called at [phone] on 5/16, spoke to Geronimo RE: abnormal T4 and TSH, will follow up with patient and advise to follow up with PCP. Geronimo also advised of abnormal urine.

## 2017-05-17 RX ORDER — CEPHALEXIN 250 MG/1
500 CAPSULE ORAL 2 TIMES DAILY
Qty: 28 CAP | Refills: 0 | Status: SHIPPED | OUTPATIENT
Start: 2017-05-17 | End: 2017-05-24

## 2017-05-17 NOTE — ADVANCED PRACTICE NURSE
Patient is a 59 y/o female who was seen as a standard pre-op appointment on 5/15/17. Reviewed culture results on 5/17/17 and results were >80,000 GBS. Patient c/o urinary frequency and urgency. Prescription for keflex 500 mg bid x 7 days called into [facility]. Patient notified of prescription and possible side effects, and verbalized understanding of treatment regimen, goals of therapy, and UTI preventive measures. Provided contact info for further questions and reasons to follow up with PCP/surgeon, if needed.     David Stuart, YINP-C

## 2017-05-30 ENCOUNTER — ANESTHESIA EVENT (OUTPATIENT)
Dept: SURGERY | Age: 62
DRG: 470 | End: 2017-05-30
Payer: COMMERCIAL

## 2017-05-30 PROBLEM — M17.12 LEFT KNEE DJD: Status: ACTIVE | Noted: 2017-05-30

## 2017-05-30 NOTE — H&P
HPI:   Lauryn Escobedo comes in for evaluation of her knees. She tells me she was at the [facility] on 4/1/17. She fell and showed me a photograph of the object she fell one. She basically fell onto the anterior aspect of her right knee. She has anterior pain in the knee, but is able to walk on the knee. She is scheduled to undergo left total knee replacement on 5/31/17. She feels like she has a lot of pain in the left knee to begin with and feels it has been exacerbated by the fall. She did not have any other injuries. PAST MEDICAL HISTORY:  Remarkable for allergies to Dura-Bond. PAST MEDICAL HISTORY:  Remarkable for arthritis, hypertension, bowel problems and thyroid dysfunction. SURGICAL HISTORY:  Remarkable for knee arthroscopy and ACL reconstruction, right total knee replacement, hysterectomy and mass from the neck excision. FAMILY HISTORY:  Remarkable for arthritis, organic heart disease, hypertension, and cerebral vascular disease. SOCIAL HISTORY:  Is remarkable in that she has never smoked. PHYSICAL EXAMINATION:     The patient is alert and oriented X 3. She ambulates favoring both knees because of pain. On the right knee, she has a resolving ecchymosis. She has full extension and flexes 120 degrees. The collateral ligaments are stable. The extensor mechanism is intact. No obvious deformity. There is mild patellofemoral crepitus to ROM of the right knee. On the left knee, she has marked patellofemoral crepitus. She has diffuse crepitus of the left knee. She has a varus deformity. She lacks 8 degrees full extension and flexes to 111 degrees. The knee is mostly stable. RADIOGRAPHS:    I ordered and interpreted x-rays of the right knee. The total components on the right are in good position without loosening or complicating features. I ordered and interpreted x-rays of the left knee.     They note marked degenerative changes of the medial, lateral, and patellofemoral joints. No evidence of fracture. IMPRESSION:    1. S/P right total knee replacement with sprain and contusion. 2. Degenerative arthritis of left knee with sprain. PLAN: We discussed treatment options. She understands the procedure, understands the hospitalization. She understands the postop course. She understands all potential complications and issues of infection. We discussed the use of Coumadin for DVT and pulmonary embolus prophylaxis. We discussed the small potential for requiring blood transfusion. She understands the postop rehab. PROCEDURE: Left total knee replacement. Date of Surgery Update:  Neel Rae was seen and examined. Past Medical History:   Diagnosis Date    Arrhythmia     \"PALPATATIONS, FLUTTERING, POSSIBLE MEDS PER DR. Liliane Brito"    Arthritis     knees, back    Chronic pain     LOWER BACK, R HIP    Duodenal ulcer 2002    GERD (gastroesophageal reflux disease)     H/O blood clots 2007    TO RIGHT EYE    Hypertension     Iritis 2007    both eyes    Nausea & vomiting     Other ill-defined conditions     back pain/spasm    PONV (postoperative nausea and vomiting)     Thyroid disease     hypothroid    Ulcerative colitis 2002     Prior to Admission Medications   Prescriptions Last Dose Informant Patient Reported? Taking? ALPRAZolam (XANAX) 0.5 mg tablet 5/24/2017 at Unknown time  No Yes   Sig: Take 1 Tab by mouth two (2) times daily as needed. chlorthalidone (HYGROTEN) 25 mg tablet 5/24/2017 at Unknown time  No Yes   Sig: Take 1 Tab by mouth daily. clotrimazole-betamethasone (LOTRISONE) topical cream Not Taking at Unknown time  No No   Sig: Apply  to affected area two (2) times a day. cyclobenzaprine (FLEXERIL) 10 mg tablet Not Taking at Unknown time  No No   Sig: Take 1 Tab by mouth nightly as needed for Muscle Spasm(s).    diclofenac EC (VOLTAREN) 50 mg EC tablet 5/24/2017 at Unknown time  Yes Yes   Sig: Take 50 mg by mouth as needed for Pain.   hydrocortisone (PROCTOZONE-HC) 2.5 % rectal cream Not Taking at Unknown time  No No   Sig: Insert  into rectum four (4) times daily. As needed for hemorrhoids   ketorolac (ACULAR) 0.5 % ophthalmic solution 5/30/2017 at Unknown time  Yes Yes   Sig: Administer 1 Drop to both eyes four (4) times daily as needed. levothyroxine (SYNTHROID) 112 mcg tablet 5/31/2017 at 0530  No Yes   Sig: Take 1 Tab by mouth Daily (before breakfast). loperamide (IMODIUM) 2 mg capsule Not Taking at Unknown time  Yes No   Sig: Take 2 mg by mouth as needed. traMADol-acetaminophen (ULTRACET) 37.5-325 mg per tablet 5/31/2017 at 0330  No Yes   Sig: Take 1 Tab by mouth every eight (8) hours as needed for Pain.   valsartan (DIOVAN) 160 mg tablet 5/30/2017 at Unknown time  No Yes   Sig: TAKE ONE TABLET BY MOUTH EVERY DAY      Facility-Administered Medications: None      Allergy to: Adhesive tape-silicones; Lidoderm [lidocaine]; and Other medication  Physical Examination: General appearance - alert, well appearing, and in no distress  History and physical has been reviewed. The patient has been examined.  There have been no significant clinical changes since the completion of the originally dated History and Physical.    Signed By: Jamee Samuel PA-C     May 31, 2017 7:28 AM

## 2017-05-31 ENCOUNTER — ANESTHESIA (OUTPATIENT)
Dept: SURGERY | Age: 62
DRG: 470 | End: 2017-05-31
Payer: COMMERCIAL

## 2017-05-31 ENCOUNTER — HOSPITAL ENCOUNTER (INPATIENT)
Age: 62
LOS: 3 days | Discharge: HOME HEALTH | DRG: 470 | End: 2017-06-03
Attending: ORTHOPAEDIC SURGERY | Admitting: ORTHOPAEDIC SURGERY
Payer: COMMERCIAL

## 2017-05-31 PROBLEM — M17.12 PRIMARY OSTEOARTHRITIS OF LEFT KNEE: Status: ACTIVE | Noted: 2017-05-31

## 2017-05-31 LAB
ABO + RH BLD: NORMAL
BLOOD GROUP ANTIBODIES SERPL: NORMAL
GLUCOSE BLD STRIP.AUTO-MCNC: 81 MG/DL (ref 65–100)
SERVICE CMNT-IMP: NORMAL
SPECIMEN EXP DATE BLD: NORMAL

## 2017-05-31 PROCEDURE — 77030012935 HC DRSG AQUACEL BMS -B: Performed by: ORTHOPAEDIC SURGERY

## 2017-05-31 PROCEDURE — 74011250636 HC RX REV CODE- 250/636

## 2017-05-31 PROCEDURE — 64450 NJX AA&/STRD OTHER PN/BRANCH: CPT

## 2017-05-31 PROCEDURE — 97116 GAIT TRAINING THERAPY: CPT

## 2017-05-31 PROCEDURE — 3E0T3CZ INTRODUCE REGIONAL ANESTH IN PERIPH NRV, PLEXI, PERC: ICD-10-PCS | Performed by: ANESTHESIOLOGY

## 2017-05-31 PROCEDURE — 0SRD0J9 REPLACEMENT OF LEFT KNEE JOINT WITH SYNTHETIC SUBSTITUTE, CEMENTED, OPEN APPROACH: ICD-10-PCS | Performed by: ORTHOPAEDIC SURGERY

## 2017-05-31 PROCEDURE — 74011000250 HC RX REV CODE- 250: Performed by: ORTHOPAEDIC SURGERY

## 2017-05-31 PROCEDURE — C1713 ANCHOR/SCREW BN/BN,TIS/BN: HCPCS | Performed by: ORTHOPAEDIC SURGERY

## 2017-05-31 PROCEDURE — G8980 MOBILITY D/C STATUS: HCPCS

## 2017-05-31 PROCEDURE — 77030000032 HC CUF TRNQT ZIMM -B: Performed by: ORTHOPAEDIC SURGERY

## 2017-05-31 PROCEDURE — 76210000003 HC OR PH I REC 3.5 TO 4 HR: Performed by: ORTHOPAEDIC SURGERY

## 2017-05-31 PROCEDURE — 74011000250 HC RX REV CODE- 250

## 2017-05-31 PROCEDURE — 76060000036 HC ANESTHESIA 2.5 TO 3 HR: Performed by: ORTHOPAEDIC SURGERY

## 2017-05-31 PROCEDURE — C9290 INJ, BUPIVACAINE LIPOSOME: HCPCS | Performed by: ORTHOPAEDIC SURGERY

## 2017-05-31 PROCEDURE — 77030013079 HC BLNKT BAIR HGGR 3M -A: Performed by: ANESTHESIOLOGY

## 2017-05-31 PROCEDURE — 77030034850: Performed by: ORTHOPAEDIC SURGERY

## 2017-05-31 PROCEDURE — 97161 PT EVAL LOW COMPLEX 20 MIN: CPT

## 2017-05-31 PROCEDURE — 82962 GLUCOSE BLOOD TEST: CPT

## 2017-05-31 PROCEDURE — 77030031139 HC SUT VCRL2 J&J -A: Performed by: ORTHOPAEDIC SURGERY

## 2017-05-31 PROCEDURE — 74011250637 HC RX REV CODE- 250/637: Performed by: PHYSICIAN ASSISTANT

## 2017-05-31 PROCEDURE — 77030018846 HC SOL IRR STRL H20 ICUM -A: Performed by: ORTHOPAEDIC SURGERY

## 2017-05-31 PROCEDURE — 74011000258 HC RX REV CODE- 258

## 2017-05-31 PROCEDURE — 77030033269 HC SLV COMPR SCD KNE2 CARD -B: Performed by: ORTHOPAEDIC SURGERY

## 2017-05-31 PROCEDURE — 74011250636 HC RX REV CODE- 250/636: Performed by: ORTHOPAEDIC SURGERY

## 2017-05-31 PROCEDURE — 74011000250 HC RX REV CODE- 250: Performed by: ANESTHESIOLOGY

## 2017-05-31 PROCEDURE — 74011250636 HC RX REV CODE- 250/636: Performed by: PHYSICIAN ASSISTANT

## 2017-05-31 PROCEDURE — 77030008467 HC STPLR SKN COVD -B: Performed by: ORTHOPAEDIC SURGERY

## 2017-05-31 PROCEDURE — 76010000172 HC OR TIME 2.5 TO 3 HR INTENSV-TIER 1: Performed by: ORTHOPAEDIC SURGERY

## 2017-05-31 PROCEDURE — 86900 BLOOD TYPING SEROLOGIC ABO: CPT | Performed by: NURSE PRACTITIONER

## 2017-05-31 PROCEDURE — C1776 JOINT DEVICE (IMPLANTABLE): HCPCS | Performed by: ORTHOPAEDIC SURGERY

## 2017-05-31 PROCEDURE — 77030014077 HC TOWER MX CEM J&J -C: Performed by: ORTHOPAEDIC SURGERY

## 2017-05-31 PROCEDURE — 74011000250 HC RX REV CODE- 250: Performed by: PHYSICIAN ASSISTANT

## 2017-05-31 PROCEDURE — G8979 MOBILITY GOAL STATUS: HCPCS

## 2017-05-31 PROCEDURE — 77030003601 HC NDL NRV BLK BBMI -A

## 2017-05-31 PROCEDURE — 65270000029 HC RM PRIVATE

## 2017-05-31 PROCEDURE — 77030026438 HC STYL ET INTUB CARD -A: Performed by: ANESTHESIOLOGY

## 2017-05-31 PROCEDURE — 36415 COLL VENOUS BLD VENIPUNCTURE: CPT | Performed by: NURSE PRACTITIONER

## 2017-05-31 PROCEDURE — 77030008684 HC TU ET CUF COVD -B: Performed by: ANESTHESIOLOGY

## 2017-05-31 PROCEDURE — 77030011640 HC PAD GRND REM COVD -A: Performed by: ORTHOPAEDIC SURGERY

## 2017-05-31 PROCEDURE — 74011000258 HC RX REV CODE- 258: Performed by: ORTHOPAEDIC SURGERY

## 2017-05-31 PROCEDURE — 74011250636 HC RX REV CODE- 250/636: Performed by: ANESTHESIOLOGY

## 2017-05-31 PROCEDURE — 77030035236 HC SUT PDS STRATFX BARB J&J -B: Performed by: ORTHOPAEDIC SURGERY

## 2017-05-31 PROCEDURE — 77030012890

## 2017-05-31 PROCEDURE — 77030018836 HC SOL IRR NACL ICUM -A: Performed by: ORTHOPAEDIC SURGERY

## 2017-05-31 PROCEDURE — G8978 MOBILITY CURRENT STATUS: HCPCS

## 2017-05-31 PROCEDURE — 77030016547 HC BLD SAW SAG1 STRY -B: Performed by: ORTHOPAEDIC SURGERY

## 2017-05-31 PROCEDURE — 77030007866 HC KT SPN ANES BBMI -B: Performed by: ANESTHESIOLOGY

## 2017-05-31 PROCEDURE — 77030020788: Performed by: ORTHOPAEDIC SURGERY

## 2017-05-31 PROCEDURE — 77030020753 HC CUF TRNQT 1BLA STRY -B: Performed by: ORTHOPAEDIC SURGERY

## 2017-05-31 DEVICE — COMPONENT FEM SZ 6 L KNEE NAR POST STBL CEM ATTUNE: Type: IMPLANTABLE DEVICE | Site: KNEE | Status: FUNCTIONAL

## 2017-05-31 DEVICE — IMPLANTABLE DEVICE: Type: IMPLANTABLE DEVICE | Site: KNEE | Status: FUNCTIONAL

## 2017-05-31 DEVICE — COMPONENT PAT DIA35MM KNEE POLY DOME CEM MEDIALIZED ATTUNE: Type: IMPLANTABLE DEVICE | Site: KNEE | Status: FUNCTIONAL

## 2017-05-31 DEVICE — CEMENT BNE 40GM FULL DOSE PMMA W/ GENT HI VISC RADPQ LNG: Type: IMPLANTABLE DEVICE | Site: KNEE | Status: FUNCTIONAL

## 2017-05-31 RX ORDER — NALOXONE HYDROCHLORIDE 0.4 MG/ML
0.4 INJECTION, SOLUTION INTRAMUSCULAR; INTRAVENOUS; SUBCUTANEOUS AS NEEDED
Status: DISCONTINUED | OUTPATIENT
Start: 2017-05-31 | End: 2017-06-03 | Stop reason: HOSPADM

## 2017-05-31 RX ORDER — SODIUM CHLORIDE 9 MG/ML
1000 INJECTION, SOLUTION INTRAVENOUS CONTINUOUS
Status: DISCONTINUED | OUTPATIENT
Start: 2017-05-31 | End: 2017-05-31 | Stop reason: HOSPADM

## 2017-05-31 RX ORDER — CEFAZOLIN SODIUM IN 0.9 % NACL 2 G/100 ML
PLASTIC BAG, INJECTION (ML) INTRAVENOUS AS NEEDED
Status: DISCONTINUED | OUTPATIENT
Start: 2017-05-31 | End: 2017-05-31 | Stop reason: HOSPADM

## 2017-05-31 RX ORDER — SODIUM CHLORIDE, SODIUM LACTATE, POTASSIUM CHLORIDE, CALCIUM CHLORIDE 600; 310; 30; 20 MG/100ML; MG/100ML; MG/100ML; MG/100ML
25 INJECTION, SOLUTION INTRAVENOUS CONTINUOUS
Status: DISCONTINUED | OUTPATIENT
Start: 2017-05-31 | End: 2017-05-31 | Stop reason: HOSPADM

## 2017-05-31 RX ORDER — LIDOCAINE HYDROCHLORIDE 10 MG/ML
0.1 INJECTION, SOLUTION EPIDURAL; INFILTRATION; INTRACAUDAL; PERINEURAL AS NEEDED
Status: DISCONTINUED | OUTPATIENT
Start: 2017-05-31 | End: 2017-05-31 | Stop reason: HOSPADM

## 2017-05-31 RX ORDER — CYCLOBENZAPRINE HCL 10 MG
10 TABLET ORAL
Status: DISCONTINUED | OUTPATIENT
Start: 2017-05-31 | End: 2017-05-31

## 2017-05-31 RX ORDER — CELECOXIB 200 MG/1
200 CAPSULE ORAL 2 TIMES DAILY
Status: DISCONTINUED | OUTPATIENT
Start: 2017-05-31 | End: 2017-06-03 | Stop reason: HOSPADM

## 2017-05-31 RX ORDER — SODIUM CHLORIDE 9 MG/ML
125 INJECTION, SOLUTION INTRAVENOUS CONTINUOUS
Status: DISPENSED | OUTPATIENT
Start: 2017-05-31 | End: 2017-06-01

## 2017-05-31 RX ORDER — SODIUM CHLORIDE 0.9 % (FLUSH) 0.9 %
5-10 SYRINGE (ML) INJECTION AS NEEDED
Status: DISCONTINUED | OUTPATIENT
Start: 2017-05-31 | End: 2017-05-31 | Stop reason: HOSPADM

## 2017-05-31 RX ORDER — HYDROMORPHONE HYDROCHLORIDE 1 MG/ML
0.2 INJECTION, SOLUTION INTRAMUSCULAR; INTRAVENOUS; SUBCUTANEOUS
Status: DISCONTINUED | OUTPATIENT
Start: 2017-05-31 | End: 2017-05-31 | Stop reason: HOSPADM

## 2017-05-31 RX ORDER — ONDANSETRON 2 MG/ML
4 INJECTION INTRAMUSCULAR; INTRAVENOUS
Status: ACTIVE | OUTPATIENT
Start: 2017-05-31 | End: 2017-06-01

## 2017-05-31 RX ORDER — ONDANSETRON 2 MG/ML
INJECTION INTRAMUSCULAR; INTRAVENOUS AS NEEDED
Status: DISCONTINUED | OUTPATIENT
Start: 2017-05-31 | End: 2017-05-31 | Stop reason: HOSPADM

## 2017-05-31 RX ORDER — SODIUM CHLORIDE 9 MG/ML
25 INJECTION, SOLUTION INTRAVENOUS CONTINUOUS
Status: DISCONTINUED | OUTPATIENT
Start: 2017-05-31 | End: 2017-05-31 | Stop reason: HOSPADM

## 2017-05-31 RX ORDER — DEXAMETHASONE SODIUM PHOSPHATE 10 MG/ML
10 INJECTION INTRAMUSCULAR; INTRAVENOUS ONCE
Status: COMPLETED | OUTPATIENT
Start: 2017-06-01 | End: 2017-06-01

## 2017-05-31 RX ORDER — CEFAZOLIN SODIUM IN 0.9 % NACL 2 G/50 ML
2 INTRAVENOUS SOLUTION, PIGGYBACK (ML) INTRAVENOUS EVERY 8 HOURS
Status: COMPLETED | OUTPATIENT
Start: 2017-05-31 | End: 2017-06-01

## 2017-05-31 RX ORDER — HYDROMORPHONE HYDROCHLORIDE 1 MG/ML
0.5 INJECTION, SOLUTION INTRAMUSCULAR; INTRAVENOUS; SUBCUTANEOUS
Status: DISCONTINUED | OUTPATIENT
Start: 2017-05-31 | End: 2017-05-31 | Stop reason: HOSPADM

## 2017-05-31 RX ORDER — FENTANYL CITRATE 50 UG/ML
50 INJECTION, SOLUTION INTRAMUSCULAR; INTRAVENOUS AS NEEDED
Status: DISCONTINUED | OUTPATIENT
Start: 2017-05-31 | End: 2017-05-31 | Stop reason: HOSPADM

## 2017-05-31 RX ORDER — FAMOTIDINE 20 MG/1
20 TABLET, FILM COATED ORAL
Status: DISCONTINUED | OUTPATIENT
Start: 2017-05-31 | End: 2017-06-03 | Stop reason: HOSPADM

## 2017-05-31 RX ORDER — MIDAZOLAM HYDROCHLORIDE 1 MG/ML
1 INJECTION, SOLUTION INTRAMUSCULAR; INTRAVENOUS AS NEEDED
Status: DISCONTINUED | OUTPATIENT
Start: 2017-05-31 | End: 2017-05-31 | Stop reason: HOSPADM

## 2017-05-31 RX ORDER — CHLORTHALIDONE 25 MG/1
25 TABLET ORAL DAILY
Status: DISCONTINUED | OUTPATIENT
Start: 2017-06-01 | End: 2017-06-01

## 2017-05-31 RX ORDER — MORPHINE SULFATE 4 MG/ML
INJECTION, SOLUTION INTRAMUSCULAR; INTRAVENOUS AS NEEDED
Status: DISCONTINUED | OUTPATIENT
Start: 2017-05-31 | End: 2017-05-31 | Stop reason: HOSPADM

## 2017-05-31 RX ORDER — VALSARTAN 80 MG/1
160 TABLET ORAL DAILY
Status: DISCONTINUED | OUTPATIENT
Start: 2017-06-01 | End: 2017-06-03 | Stop reason: HOSPADM

## 2017-05-31 RX ORDER — OXYCODONE HYDROCHLORIDE 5 MG/1
5 TABLET ORAL
Status: DISCONTINUED | OUTPATIENT
Start: 2017-05-31 | End: 2017-06-03 | Stop reason: HOSPADM

## 2017-05-31 RX ORDER — FACIAL-BODY WIPES
10 EACH TOPICAL DAILY PRN
Status: DISCONTINUED | OUTPATIENT
Start: 2017-06-02 | End: 2017-06-03 | Stop reason: HOSPADM

## 2017-05-31 RX ORDER — DEXAMETHASONE SODIUM PHOSPHATE 10 MG/ML
10 INJECTION INTRAMUSCULAR; INTRAVENOUS ONCE
Status: DISCONTINUED | OUTPATIENT
Start: 2017-05-31 | End: 2017-05-31 | Stop reason: HOSPADM

## 2017-05-31 RX ORDER — GLYCOPYRROLATE 0.2 MG/ML
INJECTION INTRAMUSCULAR; INTRAVENOUS AS NEEDED
Status: DISCONTINUED | OUTPATIENT
Start: 2017-05-31 | End: 2017-05-31 | Stop reason: HOSPADM

## 2017-05-31 RX ORDER — ALPRAZOLAM 0.5 MG/1
0.5 TABLET ORAL
Status: DISCONTINUED | OUTPATIENT
Start: 2017-05-31 | End: 2017-06-03 | Stop reason: HOSPADM

## 2017-05-31 RX ORDER — LEVOTHYROXINE SODIUM 112 UG/1
112 TABLET ORAL
Status: DISCONTINUED | OUTPATIENT
Start: 2017-06-01 | End: 2017-06-03 | Stop reason: HOSPADM

## 2017-05-31 RX ORDER — AMOXICILLIN 250 MG
1 CAPSULE ORAL 2 TIMES DAILY
Status: DISCONTINUED | OUTPATIENT
Start: 2017-05-31 | End: 2017-06-03 | Stop reason: HOSPADM

## 2017-05-31 RX ORDER — SODIUM CHLORIDE 0.9 % (FLUSH) 0.9 %
5-10 SYRINGE (ML) INJECTION AS NEEDED
Status: DISCONTINUED | OUTPATIENT
Start: 2017-05-31 | End: 2017-06-03 | Stop reason: HOSPADM

## 2017-05-31 RX ORDER — KETAMINE HYDROCHLORIDE 50 MG/ML
INJECTION, SOLUTION INTRAMUSCULAR; INTRAVENOUS AS NEEDED
Status: DISCONTINUED | OUTPATIENT
Start: 2017-05-31 | End: 2017-05-31 | Stop reason: HOSPADM

## 2017-05-31 RX ORDER — HYDROXYZINE HYDROCHLORIDE 10 MG/1
10 TABLET, FILM COATED ORAL
Status: DISCONTINUED | OUTPATIENT
Start: 2017-05-31 | End: 2017-06-03 | Stop reason: HOSPADM

## 2017-05-31 RX ORDER — SUCCINYLCHOLINE CHLORIDE 20 MG/ML
INJECTION INTRAMUSCULAR; INTRAVENOUS AS NEEDED
Status: DISCONTINUED | OUTPATIENT
Start: 2017-05-31 | End: 2017-05-31 | Stop reason: HOSPADM

## 2017-05-31 RX ORDER — MORPHINE SULFATE 10 MG/ML
2 INJECTION, SOLUTION INTRAMUSCULAR; INTRAVENOUS
Status: DISCONTINUED | OUTPATIENT
Start: 2017-05-31 | End: 2017-05-31 | Stop reason: HOSPADM

## 2017-05-31 RX ORDER — KETOROLAC TROMETHAMINE 5 MG/ML
1 SOLUTION OPHTHALMIC
Status: DISCONTINUED | OUTPATIENT
Start: 2017-05-31 | End: 2017-06-03 | Stop reason: HOSPADM

## 2017-05-31 RX ORDER — CELECOXIB 200 MG/1
200 CAPSULE ORAL ONCE
Status: COMPLETED | OUTPATIENT
Start: 2017-05-31 | End: 2017-05-31

## 2017-05-31 RX ORDER — HYDROMORPHONE HYDROCHLORIDE 1 MG/ML
0.5 INJECTION, SOLUTION INTRAMUSCULAR; INTRAVENOUS; SUBCUTANEOUS
Status: ACTIVE | OUTPATIENT
Start: 2017-05-31 | End: 2017-06-01

## 2017-05-31 RX ORDER — ONDANSETRON 2 MG/ML
4 INJECTION INTRAMUSCULAR; INTRAVENOUS AS NEEDED
Status: DISCONTINUED | OUTPATIENT
Start: 2017-05-31 | End: 2017-05-31 | Stop reason: HOSPADM

## 2017-05-31 RX ORDER — OXYCODONE HYDROCHLORIDE 5 MG/1
10 TABLET ORAL
Status: DISCONTINUED | OUTPATIENT
Start: 2017-05-31 | End: 2017-06-03 | Stop reason: HOSPADM

## 2017-05-31 RX ORDER — DEXTROSE, SODIUM CHLORIDE, SODIUM LACTATE, POTASSIUM CHLORIDE, AND CALCIUM CHLORIDE 5; .6; .31; .03; .02 G/100ML; G/100ML; G/100ML; G/100ML; G/100ML
25 INJECTION, SOLUTION INTRAVENOUS CONTINUOUS
Status: DISCONTINUED | OUTPATIENT
Start: 2017-05-31 | End: 2017-05-31 | Stop reason: HOSPADM

## 2017-05-31 RX ORDER — FENTANYL CITRATE 50 UG/ML
25 INJECTION, SOLUTION INTRAMUSCULAR; INTRAVENOUS
Status: COMPLETED | OUTPATIENT
Start: 2017-05-31 | End: 2017-05-31

## 2017-05-31 RX ORDER — FENTANYL CITRATE 50 UG/ML
INJECTION, SOLUTION INTRAMUSCULAR; INTRAVENOUS
Status: DISPENSED
Start: 2017-05-31 | End: 2017-05-31

## 2017-05-31 RX ORDER — PROPOFOL 10 MG/ML
INJECTION, EMULSION INTRAVENOUS AS NEEDED
Status: DISCONTINUED | OUTPATIENT
Start: 2017-05-31 | End: 2017-05-31 | Stop reason: HOSPADM

## 2017-05-31 RX ORDER — SODIUM CHLORIDE 0.9 % (FLUSH) 0.9 %
5-10 SYRINGE (ML) INJECTION EVERY 8 HOURS
Status: DISCONTINUED | OUTPATIENT
Start: 2017-06-01 | End: 2017-06-03 | Stop reason: HOSPADM

## 2017-05-31 RX ORDER — DEXAMETHASONE SODIUM PHOSPHATE 4 MG/ML
INJECTION, SOLUTION INTRA-ARTICULAR; INTRALESIONAL; INTRAMUSCULAR; INTRAVENOUS; SOFT TISSUE AS NEEDED
Status: DISCONTINUED | OUTPATIENT
Start: 2017-05-31 | End: 2017-05-31 | Stop reason: HOSPADM

## 2017-05-31 RX ORDER — WARFARIN 10 MG/1
10 TABLET ORAL ONCE
Status: COMPLETED | OUTPATIENT
Start: 2017-05-31 | End: 2017-05-31

## 2017-05-31 RX ORDER — LOPERAMIDE HYDROCHLORIDE 2 MG/1
2 CAPSULE ORAL AS NEEDED
Status: DISCONTINUED | OUTPATIENT
Start: 2017-05-31 | End: 2017-05-31

## 2017-05-31 RX ORDER — FAMOTIDINE 10 MG/ML
INJECTION INTRAVENOUS AS NEEDED
Status: DISCONTINUED | OUTPATIENT
Start: 2017-05-31 | End: 2017-05-31 | Stop reason: HOSPADM

## 2017-05-31 RX ORDER — SODIUM CHLORIDE, SODIUM LACTATE, POTASSIUM CHLORIDE, CALCIUM CHLORIDE 600; 310; 30; 20 MG/100ML; MG/100ML; MG/100ML; MG/100ML
INJECTION, SOLUTION INTRAVENOUS
Status: DISCONTINUED | OUTPATIENT
Start: 2017-05-31 | End: 2017-05-31 | Stop reason: HOSPADM

## 2017-05-31 RX ORDER — LIDOCAINE HYDROCHLORIDE 20 MG/ML
INJECTION, SOLUTION EPIDURAL; INFILTRATION; INTRACAUDAL; PERINEURAL AS NEEDED
Status: DISCONTINUED | OUTPATIENT
Start: 2017-05-31 | End: 2017-05-31 | Stop reason: HOSPADM

## 2017-05-31 RX ORDER — FENTANYL CITRATE 50 UG/ML
INJECTION, SOLUTION INTRAMUSCULAR; INTRAVENOUS AS NEEDED
Status: DISCONTINUED | OUTPATIENT
Start: 2017-05-31 | End: 2017-05-31 | Stop reason: HOSPADM

## 2017-05-31 RX ORDER — MIDAZOLAM HYDROCHLORIDE 1 MG/ML
0.5 INJECTION, SOLUTION INTRAMUSCULAR; INTRAVENOUS
Status: DISCONTINUED | OUTPATIENT
Start: 2017-05-31 | End: 2017-05-31 | Stop reason: HOSPADM

## 2017-05-31 RX ORDER — SODIUM CHLORIDE 0.9 % (FLUSH) 0.9 %
5-10 SYRINGE (ML) INJECTION EVERY 8 HOURS
Status: DISCONTINUED | OUTPATIENT
Start: 2017-05-31 | End: 2017-05-31 | Stop reason: HOSPADM

## 2017-05-31 RX ORDER — ACETAMINOPHEN 325 MG/1
650 TABLET ORAL EVERY 6 HOURS
Status: DISCONTINUED | OUTPATIENT
Start: 2017-05-31 | End: 2017-06-03 | Stop reason: HOSPADM

## 2017-05-31 RX ORDER — POLYETHYLENE GLYCOL 3350 17 G/17G
17 POWDER, FOR SOLUTION ORAL DAILY
Status: DISCONTINUED | OUTPATIENT
Start: 2017-06-01 | End: 2017-06-03 | Stop reason: HOSPADM

## 2017-05-31 RX ORDER — DIPHENHYDRAMINE HYDROCHLORIDE 50 MG/ML
12.5 INJECTION, SOLUTION INTRAMUSCULAR; INTRAVENOUS AS NEEDED
Status: DISCONTINUED | OUTPATIENT
Start: 2017-05-31 | End: 2017-05-31 | Stop reason: HOSPADM

## 2017-05-31 RX ORDER — ROCURONIUM BROMIDE 10 MG/ML
INJECTION, SOLUTION INTRAVENOUS AS NEEDED
Status: DISCONTINUED | OUTPATIENT
Start: 2017-05-31 | End: 2017-05-31 | Stop reason: HOSPADM

## 2017-05-31 RX ORDER — CEFAZOLIN SODIUM IN 0.9 % NACL 2 G/50 ML
2 INTRAVENOUS SOLUTION, PIGGYBACK (ML) INTRAVENOUS EVERY 8 HOURS
Status: DISCONTINUED | OUTPATIENT
Start: 2017-05-31 | End: 2017-05-31 | Stop reason: HOSPADM

## 2017-05-31 RX ORDER — FENTANYL CITRATE 50 UG/ML
25 INJECTION, SOLUTION INTRAMUSCULAR; INTRAVENOUS
Status: DISCONTINUED | OUTPATIENT
Start: 2017-05-31 | End: 2017-05-31 | Stop reason: HOSPADM

## 2017-05-31 RX ADMIN — WARFARIN SODIUM 10 MG: 10 TABLET ORAL at 16:38

## 2017-05-31 RX ADMIN — OXYCODONE HYDROCHLORIDE 5 MG: 5 TABLET ORAL at 16:24

## 2017-05-31 RX ADMIN — OXYCODONE HYDROCHLORIDE 5 MG: 5 TABLET ORAL at 18:00

## 2017-05-31 RX ADMIN — FENTANYL CITRATE 25 MCG: 50 INJECTION, SOLUTION INTRAMUSCULAR; INTRAVENOUS at 11:38

## 2017-05-31 RX ADMIN — FENTANYL CITRATE 25 MCG: 50 INJECTION, SOLUTION INTRAMUSCULAR; INTRAVENOUS at 10:40

## 2017-05-31 RX ADMIN — ONDANSETRON 4 MG: 2 INJECTION INTRAMUSCULAR; INTRAVENOUS at 09:51

## 2017-05-31 RX ADMIN — HYDROMORPHONE HYDROCHLORIDE 0.3 MG: 1 INJECTION, SOLUTION INTRAMUSCULAR; INTRAVENOUS; SUBCUTANEOUS at 11:00

## 2017-05-31 RX ADMIN — FENTANYL CITRATE 50 MCG: 50 INJECTION, SOLUTION INTRAMUSCULAR; INTRAVENOUS at 08:27

## 2017-05-31 RX ADMIN — HYDROXYZINE HYDROCHLORIDE 10 MG: 10 TABLET ORAL at 16:24

## 2017-05-31 RX ADMIN — MORPHINE SULFATE 5 MG: 10 INJECTION, SOLUTION INTRAVENOUS at 11:00

## 2017-05-31 RX ADMIN — FENTANYL CITRATE 50 MCG: 50 INJECTION, SOLUTION INTRAMUSCULAR; INTRAVENOUS at 08:52

## 2017-05-31 RX ADMIN — ACETAMINOPHEN 650 MG: 325 TABLET ORAL at 21:15

## 2017-05-31 RX ADMIN — MIDAZOLAM HYDROCHLORIDE 0.5 MG: 1 INJECTION, SOLUTION INTRAMUSCULAR; INTRAVENOUS at 10:30

## 2017-05-31 RX ADMIN — FENTANYL CITRATE 50 MCG: 50 INJECTION, SOLUTION INTRAMUSCULAR; INTRAVENOUS at 08:32

## 2017-05-31 RX ADMIN — SODIUM CHLORIDE, SODIUM LACTATE, POTASSIUM CHLORIDE, AND CALCIUM CHLORIDE 25 ML/HR: 600; 310; 30; 20 INJECTION, SOLUTION INTRAVENOUS at 07:07

## 2017-05-31 RX ADMIN — ROCURONIUM BROMIDE 5 MG: 10 INJECTION, SOLUTION INTRAVENOUS at 07:58

## 2017-05-31 RX ADMIN — SODIUM CHLORIDE 125 ML/HR: 900 INJECTION, SOLUTION INTRAVENOUS at 10:40

## 2017-05-31 RX ADMIN — SENNOSIDES AND DOCUSATE SODIUM 1 TABLET: 8.6; 5 TABLET ORAL at 18:00

## 2017-05-31 RX ADMIN — MORPHINE SULFATE 2 MG: 4 INJECTION, SOLUTION INTRAMUSCULAR; INTRAVENOUS at 09:56

## 2017-05-31 RX ADMIN — FENTANYL CITRATE 100 MCG: 50 INJECTION, SOLUTION INTRAMUSCULAR; INTRAVENOUS at 07:28

## 2017-05-31 RX ADMIN — Medication 2 G: at 08:06

## 2017-05-31 RX ADMIN — FENTANYL CITRATE 100 MCG: 50 INJECTION, SOLUTION INTRAMUSCULAR; INTRAVENOUS at 08:10

## 2017-05-31 RX ADMIN — ACETAMINOPHEN 650 MG: 325 TABLET ORAL at 16:24

## 2017-05-31 RX ADMIN — PROPOFOL 30 MG: 10 INJECTION, EMULSION INTRAVENOUS at 09:58

## 2017-05-31 RX ADMIN — CEFAZOLIN 2 G: 1 INJECTION, POWDER, FOR SOLUTION INTRAMUSCULAR; INTRAVENOUS; PARENTERAL at 16:24

## 2017-05-31 RX ADMIN — KETAMINE HYDROCHLORIDE 15 MG: 50 INJECTION, SOLUTION INTRAMUSCULAR; INTRAVENOUS at 08:09

## 2017-05-31 RX ADMIN — HYDROMORPHONE HYDROCHLORIDE 0.5 MG: 1 INJECTION, SOLUTION INTRAMUSCULAR; INTRAVENOUS; SUBCUTANEOUS at 10:45

## 2017-05-31 RX ADMIN — OXYCODONE HYDROCHLORIDE 10 MG: 5 TABLET ORAL at 21:15

## 2017-05-31 RX ADMIN — FAMOTIDINE 20 MG: 10 INJECTION INTRAVENOUS at 07:29

## 2017-05-31 RX ADMIN — SODIUM CHLORIDE, SODIUM LACTATE, POTASSIUM CHLORIDE, CALCIUM CHLORIDE: 600; 310; 30; 20 INJECTION, SOLUTION INTRAVENOUS at 07:29

## 2017-05-31 RX ADMIN — DEXAMETHASONE SODIUM PHOSPHATE 8 MG: 4 INJECTION, SOLUTION INTRA-ARTICULAR; INTRALESIONAL; INTRAMUSCULAR; INTRAVENOUS; SOFT TISSUE at 08:14

## 2017-05-31 RX ADMIN — PROPOFOL 20 MG: 10 INJECTION, EMULSION INTRAVENOUS at 07:50

## 2017-05-31 RX ADMIN — FENTANYL CITRATE 25 MCG: 50 INJECTION, SOLUTION INTRAMUSCULAR; INTRAVENOUS at 10:23

## 2017-05-31 RX ADMIN — MORPHINE SULFATE 5 MG: 10 INJECTION, SOLUTION INTRAVENOUS at 10:30

## 2017-05-31 RX ADMIN — SODIUM CHLORIDE, SODIUM LACTATE, POTASSIUM CHLORIDE, CALCIUM CHLORIDE: 600; 310; 30; 20 INJECTION, SOLUTION INTRAVENOUS at 08:52

## 2017-05-31 RX ADMIN — HYDROMORPHONE HYDROCHLORIDE 0.2 MG: 1 INJECTION, SOLUTION INTRAMUSCULAR; INTRAVENOUS; SUBCUTANEOUS at 10:24

## 2017-05-31 RX ADMIN — LIDOCAINE HYDROCHLORIDE 0.1 ML: 10 INJECTION, SOLUTION EPIDURAL; INFILTRATION; INTRACAUDAL; PERINEURAL at 07:07

## 2017-05-31 RX ADMIN — FENTANYL CITRATE 25 MCG: 50 INJECTION, SOLUTION INTRAMUSCULAR; INTRAVENOUS at 10:45

## 2017-05-31 RX ADMIN — FENTANYL CITRATE 25 MCG: 50 INJECTION, SOLUTION INTRAMUSCULAR; INTRAVENOUS at 10:30

## 2017-05-31 RX ADMIN — CELECOXIB 200 MG: 200 CAPSULE ORAL at 18:00

## 2017-05-31 RX ADMIN — PROPOFOL 30 MG: 10 INJECTION, EMULSION INTRAVENOUS at 07:47

## 2017-05-31 RX ADMIN — LIDOCAINE HYDROCHLORIDE 50 MG: 20 INJECTION, SOLUTION EPIDURAL; INFILTRATION; INTRACAUDAL; PERINEURAL at 07:58

## 2017-05-31 RX ADMIN — GLYCOPYRROLATE 0.2 MG: 0.2 INJECTION INTRAMUSCULAR; INTRAVENOUS at 08:20

## 2017-05-31 RX ADMIN — CELECOXIB 200 MG: 200 CAPSULE ORAL at 06:58

## 2017-05-31 RX ADMIN — MIDAZOLAM HYDROCHLORIDE 0.5 MG: 1 INJECTION, SOLUTION INTRAMUSCULAR; INTRAVENOUS at 10:25

## 2017-05-31 RX ADMIN — PROPOFOL 30 MG: 10 INJECTION, EMULSION INTRAVENOUS at 09:53

## 2017-05-31 RX ADMIN — MIDAZOLAM HYDROCHLORIDE 4 MG: 1 INJECTION, SOLUTION INTRAMUSCULAR; INTRAVENOUS at 07:27

## 2017-05-31 RX ADMIN — PROPOFOL 150 MG: 10 INJECTION, EMULSION INTRAVENOUS at 07:58

## 2017-05-31 RX ADMIN — SODIUM CHLORIDE 125 ML/HR: 900 INJECTION, SOLUTION INTRAVENOUS at 13:29

## 2017-05-31 RX ADMIN — DIPHENHYDRAMINE HYDROCHLORIDE 12.5 MG: 50 INJECTION, SOLUTION INTRAMUSCULAR; INTRAVENOUS at 11:47

## 2017-05-31 RX ADMIN — SUCCINYLCHOLINE CHLORIDE 120 MG: 20 INJECTION INTRAMUSCULAR; INTRAVENOUS at 07:59

## 2017-05-31 NOTE — ANESTHESIA PROCEDURE NOTES
Peripheral Block    Start time: 5/31/2017 7:15 AM  End time: 5/31/2017 7:28 AM  Performed by: Robel Sterling  Authorized by: Robel Sterling       Pre-procedure: Indications: at surgeon's request and post-op pain management    Preanesthetic Checklist: patient identified, risks and benefits discussed, site marked, timeout performed and patient being monitored      Block Type:   Block Type:   Adductor canal  Laterality:  Left  Monitoring:  Standard ASA monitoring, continuous pulse ox, frequent vital sign checks, heart rate, responsive to questions and oxygen  Injection Technique:  Single shot  Procedures: ultrasound guided    Patient Position: supine  Prep: chlorhexidine    Location:  Mid thigh  Needle Type:  Stimuplex  Needle Gauge:  22 G  Needle Localization:  Ultrasound guidance  Medication Injected:  0.5%  ropivacaine  Volume (mL):  30    Assessment:  Number of attempts:  1  Injection Assessment:  Incremental injection every 5 mL, local visualized surrounding nerve on ultrasound, negative aspiration for blood, no paresthesia, no intravascular symptoms and ultrasound image on chart  Patient tolerance:  Patient tolerated the procedure well with no immediate complications

## 2017-05-31 NOTE — IP AVS SNAPSHOT
Current Discharge Medication List  
  
START taking these medications Dose & Instructions Dispensing Information Comments Morning Noon Evening Bedtime  
 celecoxib 200 mg capsule Commonly known as:  CeleBREX Your last dose was: Your next dose is:    
   
   
 Dose:  200 mg Take 1 Cap by mouth daily for 30 days. Quantity:  30 Cap Refills:  0  
     
   
   
   
  
 oxyCODONE IR 5 mg immediate release tablet Commonly known as:  Tia Flatter Your last dose was: Your next dose is:    
   
   
 Dose:  5-10 mg Take 1-2 Tabs by mouth every four (4) hours as needed for Pain. Max Daily Amount: 60 mg.  
 Quantity:  80 Tab Refills:  0  
     
   
   
   
  
 warfarin 2.5 mg tablet Commonly known as:  COUMADIN Your last dose was: Your next dose is:    
   
   
 Dose:  2.5 mg Take 1 Tab by mouth daily. Quantity:  90 Tab Refills:  1 CONTINUE these medications which have NOT CHANGED Dose & Instructions Dispensing Information Comments Morning Noon Evening Bedtime ALPRAZolam 0.5 mg tablet Commonly known as:  kristyn Oreilly Your last dose was: Your next dose is:    
   
   
 Dose:  0.5 mg Take 1 Tab by mouth two (2) times daily as needed. Quantity:  30 Tab Refills:  2  
     
   
   
   
  
 chlorthalidone 25 mg tablet Commonly known as:  Chloe Christopher Your last dose was: Your next dose is:    
   
   
 Dose:  25 mg Take 1 Tab by mouth daily. Quantity:  90 Tab Refills:  3  
     
   
   
   
  
 ketorolac 0.5 % ophthalmic solution Commonly known as:  Melinda Solian Your last dose was: Your next dose is:    
   
   
 Dose:  1 Drop Administer 1 Drop to both eyes four (4) times daily as needed. Quantity:  1 Bottle Refills:  0  
     
   
   
   
  
 levothyroxine 112 mcg tablet Commonly known as:  SYNTHROID Your last dose was: Your next dose is: Dose:  112 mcg Take 1 Tab by mouth Daily (before breakfast). Quantity:  30 Tab Refills:  11  
     
   
   
   
  
 valsartan 160 mg tablet Commonly known as:  DIOVAN Your last dose was: Your next dose is: TAKE ONE TABLET BY MOUTH EVERY DAY Quantity:  90 Tab Refills:  3 STOP taking these medications   
 diclofenac EC 50 mg EC tablet Commonly known as:  VOLTAREN  
   
  
 traMADol-acetaminophen 37.5-325 mg per tablet Commonly known as:  ULTRACET Where to Get Your Medications Information on where to get these meds will be given to you by the nurse or doctor. ! Ask your nurse or doctor about these medications  
  celecoxib 200 mg capsule  
 oxyCODONE IR 5 mg immediate release tablet  
 warfarin 2.5 mg tablet

## 2017-05-31 NOTE — IP AVS SNAPSHOT
[address] 
[phone] Patient: Pebbles Alvares MRN: [id number] VJI:0/72/2034 You are allergic to the following Allergen Reactions Adhesive Tape-Silicones Itching Blisters, bumps. -long term application Lidoderm (Lidocaine) Rash Patch-adhesive patch. Allergic to the adhesive - long term use. Not allergic to lidoderm. Other Medication Rash  
 dermabond - blisters Recent Documentation Height Weight BMI OB Status Smoking Status 1.638 m 101 kg 37.63 kg/m2 Hysterectomy Never Smoker Emergency Contacts Name Discharge Info Relation Home Work Mobile Ketty Matthews DISCHARGE CAREGIVER [3] Child [2] [phone] About your hospitalization You were admitted on:  May 31, 2017 You last received care in the:  [address] [facility] You were discharged on:  Jo 3, 2017 Unit phone number:  [phone] Why you were hospitalized Your primary diagnosis was:  Not on File Your diagnoses also included:  Primary Osteoarthritis Of Left Knee, Obesity Providers Seen During Your Hospitalizations Provider Role Specialty Primary office phone Rickford Severance, MD Attending Provider Orthopedic Surgery [phone] Your Primary Care Physician (PCP) Primary Care Physician Office Phone Office Fax [phone] [facility] [place] [address] [phone] Follow-up Information Follow up With Details Comments Contact Info Maru Petit MD   [address] [place] [address] 
[phone] Professor Thomas Ramey, please call office if you have not heard from staff member by 12 noon first full day after discharge  [address] [facility] [address] [phone] Current Discharge Medication List  
  
START taking these medications Dose & Instructions Dispensing Information Comments Morning Noon Evening Bedtime  
 celecoxib 200 mg capsule Commonly known as:  CeleBREX Your last dose was: Your next dose is:    
   
   
 Dose:  200 mg Take 1 Cap by mouth daily for 30 days. Quantity:  30 Cap Refills:  0  
     
   
   
   
  
 oxyCODONE IR 5 mg immediate release tablet Commonly known as:  Angelito Andino Your last dose was: Your next dose is:    
   
   
 Dose:  5-10 mg Take 1-2 Tabs by mouth every four (4) hours as needed for Pain. Max Daily Amount: 60 mg.  
 Quantity:  80 Tab Refills:  0  
     
   
   
   
  
 warfarin 2.5 mg tablet Commonly known as:  COUMADIN Your last dose was: Your next dose is:    
   
   
 Dose:  2.5 mg Take 1 Tab by mouth daily. Quantity:  90 Tab Refills:  1 CONTINUE these medications which have NOT CHANGED Dose & Instructions Dispensing Information Comments Morning Noon Evening Bedtime ALPRAZolam 0.5 mg tablet Commonly known as:  Goran Inch Your last dose was: Your next dose is:    
   
   
 Dose:  0.5 mg Take 1 Tab by mouth two (2) times daily as needed. Quantity:  30 Tab Refills:  2  
     
   
   
   
  
 chlorthalidone 25 mg tablet Commonly known as:  Ana Dura Your last dose was: Your next dose is:    
   
   
 Dose:  25 mg Take 1 Tab by mouth daily. Quantity:  90 Tab Refills:  3  
     
   
   
   
  
 ketorolac 0.5 % ophthalmic solution Commonly known as:  Dorleigh Potters Your last dose was: Your next dose is:    
   
   
 Dose:  1 Drop Administer 1 Drop to both eyes four (4) times daily as needed. Quantity:  1 Bottle Refills:  0  
     
   
   
   
  
 levothyroxine 112 mcg tablet Commonly known as:  SYNTHROID Your last dose was: Your next dose is:    
   
   
 Dose:  112 mcg Take 1 Tab by mouth Daily (before breakfast). Quantity:  30 Tab Refills:  11  
     
   
   
   
  
 valsartan 160 mg tablet Commonly known as:  DIOVAN Your last dose was: Your next dose is: TAKE ONE TABLET BY MOUTH EVERY DAY Quantity:  90 Tab Refills:  3 STOP taking these medications   
 diclofenac EC 50 mg EC tablet Commonly known as:  VOLTAREN  
   
  
 traMADol-acetaminophen 37.5-325 mg per tablet Commonly known as:  ULTRACET Where to Get Your Medications Information on where to get these meds will be given to you by the nurse or doctor. ! Ask your nurse or doctor about these medications  
  celecoxib 200 mg capsule  
 oxyCODONE IR 5 mg immediate release tablet  
 warfarin 2.5 mg tablet Discharge Instructions DC Orders All Total Knees Case Management for DC planning to Home Hc . - PT 3 times a week for 2 weeks; WBAT. - PT/INR Every Monday/Thursday for 2 weeks, Call Dr. Mary Jane Fine with results ([phone]). - Remove staples at 2 weeks post-op; 6/14/17 . - Follow up in Office in 2 1/2 weeks. COUMADIN (warfarin) ORDERS: Take 5 mg on Saturday (6/3/17) and take 5 mg on Sunday (6/4/17); Recheck INR on Sunday. After Hospital Care Plan:  Discharge Instructions Knee Replacement-Dr. Mary Jane Fine Patient Name: Neel Rae Date of procedure: 5/31/2017 Procedure: Procedure(s): LEFT TOTAL KNEE REPLACEMENT Surgeon: Magdalene Rios) and Role: 
   * Inna Ibarra MD - Primary PCP: Eileen Lyon MD 
Date of discharge: No discharge date for patient encounter. Follow up appointments ? Follow up with Dr. Mary Jane Fine in 2 ½ weeks. Call [phone] to make an appointment. ? If home health has been arranged for you the agency will contact you to arrange dates/times for visits. Please call them if you do not hear from them within 24 hours after you are discharged When to call your Orthopaedic Surgeon: Call [phone].  If you call after 5pm or on a weekend, the on call physician will be contacted ? Unrelieved pain ? Signs of infection-if your incision is red; continues to have drainage; drainage has a foul odor or if you have a persistent fever over 101 degrees ? Signs of a blood clot in your leg-calf pain, tenderness, redness, swelling of lower leg When to call your Primary Care Physician: 
? Concerns about medical conditions such as diabetes, high blood pressure, asthma, congestive heart failure ? Call if blood sugars are elevated, persistent headache or dizziness, coughing or congestion, constipation or diarrhea, burning with urination, abnormal heart rate When to call 911and go to the nearest emergency room ? Acute onset of chest pain, shortness of breath, difficulty breathing Activity ? Weight bearing as tolerated with walker or crutches. Refer to pages 23-31 of your handbook for instructions and pictures ? Complete your Home Exercise Program daily as instructed by your therapist.  Refer to pages 33-41 of your handbook for instructions and pictures ? Get up every one hour and walk (except at night when sleeping) ? Do not drive or operate heavy machinery Incision Care ? The Aquacel (brown, waterproof) surgical dressing is to remain on your knee for 7 days. On the 7th day have someone gently peel the dressing off by carefully lifting the edge and stretching it slightly to break the adhesive seal 
? You will have staples in your knee incision. They will be removed by the home health agency staff ? If your Aquacel dressing comes loose/off before the 7th day, you may replace it with a dry sterile gauze dressing; change it daily. Once your incision is not draining, you may leave it open to air ? You may take a shower with the Aquacel dressing in place.   Once the Aquacel is removed, you may shower and get your incision wet but do not submerge your incision under water in a bath tub, hot tub or swimming pool for 6 weeks after surgery. Preventing blood clots ? Take Coumadin as prescribed by Dr. Roverto Diamond for one month following surgery*   See instructions above. ? Wear elastic stockings (TEDS) for 4 weeks. You should remove them for approximately 1 hour daily for showering/sponge bathing Pain management ? Take pain medication as prescribed; decrease the amount you use as your pain lessens ? Avoid alcoholic beverages while taking pain medication ? We recommend that you continue taking Tylenol (acetaminophen) for pain control. Do not take any other over-the-counter medication for pain. Please be aware that many medications contain Tylenol. We do not want you to over medicate so please read the information below as a guide. Do not take more than 4 Grams of Tylenol in a 24 hour period. (There are 1000 milligrams in one Gram) 
o 325 mg of Tylenol per tablet (do not take more than 12 tablets in 24 hours) 
o 500 mg of Tylenol per tablet (do not take more than 8 tablets in 24 hours) ? Elevate your leg & place ice packs on your knee for 15-20 minutes after exercising and as needed for comfort Diet ? Resume usual diet; drink plenty of fluids; eat foods high in fiber ? You may want to take a stool softener (such as Senokot-S or Colace) to prevent constipation while you are taking pain medication. If constipation occurs, take a laxative (such as Dulcolax tablets, Milk of Magnesia, or a suppository) Home Health Care Protocol (to be followed by [address] [place] [address]) Nursing ? Draw a PT/INR per physicians orders. Call results to Dr. Roverto Diamond at [phone] ? Remove staples per physicians order ? Complete head to toe assessment, vital signs ? Medication reconciliation ? Review pain management ? Manage chronic medical conditions Physical Therapy-per physicians orders Weight bearing status: 
  
Left Side Weight Bearing: As tolerated Right Side Weight Bearing: Full Mobility Status: Supine to Sit: Stand-by asssistance, Minimum assistance (LLE only) Sit to Stand: Contact guard assistance Sit to Supine:  (pt returned to chair) Gait: 
Distance (ft): 100 Feet (ft) (50 FT x2) Ambulation - Level of Assistance: Contact guard assistance Assistive Device: Gait belt, Walker, rolling Gait Abnormalities: Antalgic, Decreased step clearance, Step to gait ADL status overall composite: 
  
  
  
  
  
 
Physical Therapy ? Assessment and evaluation-bed mobility; functional transfers (bed, chair, bathroom, stairs); ambulation with equipment, car transfers, shower transfers, safety and ability to get out of house in the event of an emergency ? Review weight bearing as tolerated, wean from walker or crutches as tolerated ? Discuss pain management ? Review how to do ADLs. Refer to page 42 of patient handbook Home Exercise Program-refer to pages 33-41 of patient handbook for exercises Discharge Orders None Introducing [facility] [place] [facility]! Dear Nehal Helton: Thank you for requesting a [employer] account. Our records indicate that you already have an active [employer] account. You can access your account anytime at [email or web address] Did you know that you can access your hospital and ER discharge instructions at any time in [employer]? You can also review all of your test results from your hospital stay or ER visit. Additional Information If you have questions, please visit the Frequently Asked Questions section of the [employer] website at [email or web address] Remember, [employer] is NOT to be used for urgent needs. For medical emergencies, dial 911. Now available from your iPhone and Android! General Information Please provide this summary of care documentation to your next provider. Patient Signature:  ____________________________________________________________ Date:  ____________________________________________________________  
  
Johnson Joseph Provider Signature:  ____________________________________________________________ Date:  ____________________________________________________________

## 2017-05-31 NOTE — CONSULTS
[address]   [address]   [address]   [address] [facility]       Name:  Angely Rivera   MR#:  [id number]   :  1955   Account #:  [de-identified]    Date of Consultation:  2017   Date of Adm:  2017       I arrived to see the patient at 7:18. I signed her operative site and a   consent. She was in the process at that time of receiving a regional   block. DAVID Munoz MD      Author Jeevan / Pricilla Martinez   D:  2017   07:54   T:  2017   08:06   Job #:  [id number]

## 2017-05-31 NOTE — ANESTHESIA POSTPROCEDURE EVALUATION
Post-Anesthesia Evaluation and Assessment    Patient: Jose León MRN: [id number]  SSN: [id number]    YOB: 1955  Age: 60 y.o. Sex: female       Cardiovascular Function/Vital Signs  Visit Vitals    /87    Pulse 86    Temp 36.4 °C (97.6 °F)    Resp 15    Ht 5' 4.5\" (1.638 m)    Wt 93.4 kg (205 lb 14.6 oz)    SpO2 100%    BMI 34.8 kg/m2       Patient is status post spinal anesthesia for Procedure(s):  LEFT TOTAL KNEE REPLACEMENT. Nausea/Vomiting: None    Postoperative hydration reviewed and adequate. Pain:  Pain Scale 1: Numeric (0 - 10) (05/31/17 8221)  Pain Intensity 1: 7 (05/31/17 1711)   Managed    Neurological Status:   Neuro (WDL): Within Defined Limits (05/31/17 0645)   At baseline    Mental Status and Level of Consciousness: Arousable    Pulmonary Status:   O2 Device: Nasal cannula (05/31/17 1010)   Adequate oxygenation and airway patent    Complications related to anesthesia: None    Post-anesthesia assessment completed.  No concerns    Signed By: Jane Cardona MD     May 31, 2017

## 2017-05-31 NOTE — PROGRESS NOTES
Problem: Mobility Impaired (Adult and Pediatric)  Goal: *Acute Goals and Plan of Care (Insert Text)  Physical Therapy Goals  Initiated 5/31/2017    1. Patient will move from supine to sit and sit to supine and scoot up and down in bed with modified independence within 4 days. 2. Patient will perform sit to stand with contact guard assist within 4 days. 3. Patient will ambulate with contact guard assist for 150 feet with the least restrictive device within 4 days. 4. Patient will ascend/descend 3 stairs with 1 handrail(s) with minimal assistance/contact guard assist within 4 days. 5. Patient will perform home exercise program per protocol with modified independence within 4 days. 6. Patient will demonstrate AROM 0-90 degrees in operative joint within 4 days. PHYSICAL THERAPY KNEE EVALUATION  Patient: Ludmila Samuel (89 y.o. female)  Date: 5/31/2017  Primary Diagnosis: DJD LEFT KNEE  Primary osteoarthritis of left knee  Procedure(s) (LRB):  LEFT TOTAL KNEE REPLACEMENT (Left) Day of Surgery   Precautions: falls, WBAT LLE         ASSESSMENT :  Based on the objective data described below, the patient presents with chief complaint of pain (8/10), decreased functional mobility from her normal baseline of independence, decreased balance (Tinetti Balance Assessment indicates that pt is at high risk for falls at present), with generally high BPs,  pt not complaining of any dizziness. Pt is POD 0 for a left total knee replacement. She required min/moderate assist for transfers and gait using a rolling walker, WBAT LLE, tolerating ambulating 10' with slow, generally steady gait this afternoon. Anticipate that pt will be able to continue to progress with her mobility with PT BID and  return home with family/friends support and follow up Home Health PT. She should not require any DME. Patient will benefit from skilled intervention to address the above impairments.   Patients rehabilitation potential is considered to be Good  Factors which may influence rehabilitation potential include:   [ ]         None noted  [ ]         Mental ability/status  [X]         Medical condition  [ ]         Home/family situation and support systems  [ ]         Safety awareness  [ ]         Pain tolerance/management  [ ]         Other:        PLAN :  Recommendations and Planned Interventions:  [X]           Bed Mobility Training             [ ]    Neuromuscular Re-Education  [X]           Transfer Training                   [ ]    Orthotic/Prosthetic Training  [X]           Gait Training                         [ ]    Modalities  [X]           Therapeutic Exercises           [ ]    Edema Management/Control  [ ]           Therapeutic Activities            [X]    Patient and Family Training/Education  [ ]           Other (comment):     Frequency/Duration: Patient will be followed by physical therapy twice daily to address goals. Discharge Recommendations: Home Health  Further Equipment Recommendations for Discharge: none       SUBJECTIVE:   Patient rated pain at 8/10, but anxious to get up to work with PT. Daughter arrived during treatment session. Pt stated that she lives alone but her daughter was going to stay with her and she had friends to help, also. She plans to stay on her first floor initially.       OBJECTIVE DATA SUMMARY:   HISTORY:    Past Medical History:   Diagnosis Date    Arrhythmia       \"PALPATATIONS, FLUTTERING, POSSIBLE MEDS PER DR. Zhao November"    Arthritis       knees, back    Chronic pain       LOWER BACK, R HIP    Duodenal ulcer 2002    GERD (gastroesophageal reflux disease)      H/O blood clots 2007     TO RIGHT EYE    Hypertension      Iritis 2007     both eyes    Nausea & vomiting      Other ill-defined conditions       back pain/spasm    PONV (postoperative nausea and vomiting)      Thyroid disease       hypothroid    Ulcerative colitis 2002     Past Surgical History:   Procedure Laterality Date    COLONOSCOPY N/A 5/25/2016     [facility] ENDOSCOPY    HX ACL RECONSTRUCTION   2003     right    HX BREAST BIOPSY Left 12/30/2016     MARKER     HX CHOLECYSTECTOMY   2008    HX HEENT   2003     benign mass removed from uvula    HX KNEE REPLACEMENT Right 05/01/2013    HX ORTHOPAEDIC   2009     exc mass right elbow - benign    HX OTHER SURGICAL   2003     growth removed from back of leg - benign    HX ISABELL AND BSO   2004    HX TONSILLECTOMY   2003     done at the time mass removed from uvula    LAP, PLACE ADJUST GASTR BAND   2008     fluid removed/band removed     Prior Level of Function/Home Situation: independent        Home Situation  Home Environment: Private residence  # Steps to Enter: 3  Rails to Enter: Yes  One/Two Story Residence: Two story (plans to stay downstairs initially)  Living Alone: Yes (daughter to stay with her/has friends to help also)  Support Systems: Child(marni), Friends \ neighbors  Patient Expects to be Discharged to[de-identified] Private residence  Current DME Used/Available at Home: Safety frame [employer], [employer], [employer], straight, Walker, rolling, Grab bars  Tub or Shower Type: Tub/Shower combination     EXAMINATION/PRESENTATION/DECISION MAKING:   Critical Behavior:  Neurologic State: Alert, Appropriate for age  Orientation Level: Appropriate for age  Cognition: Follows commands  Safety/Judgement: Awareness of environment  Hearing: Auditory  Auditory Impairment: None  Skin:  Ace wraps LLE  Range Of Motion:  AROM: Within functional limits (uninvolved joints)                       Strength:    Strength: Within functional limits (uninvolved joints)                    Tone & Sensation:   Tone: Normal              Sensation: Intact                           Functional Mobility:  Bed Mobility:     Supine to Sit: Moderate assistance;Assist x1  Sit to Supine: Moderate assistance;Assist x1  Scooting:  Additional time  Transfers:  Sit to Stand: Minimum assistance;Assist x2  Stand to Sit: Minimum assistance;Assist x1                       Balance:   Sitting: Intact  Standing: Intact; With support  Ambulation/Gait Training:  Distance (ft): 10 Feet (ft)  Assistive Device: Gait belt;Walker, rolling  Ambulation - Level of Assistance: Minimal assistance;Assist x1 (+ assist for IV pole)        Gait Abnormalities: Antalgic;Decreased step clearance  Right Side Weight Bearing: Full  Left Side Weight Bearing: As tolerated  Base of Support: Narrowed     Speed/Jocelyne: Slow  Step Length: Right shortened;Left shortened                                                                     Therapeutic Exercises:   B active ankle pumps, 10 reps each     Functional Measure:  Tinetti test:      Sitting Balance: 1  Arises: 0  Attempts to Rise: 2  Immediate Standing Balance: 1  Standing Balance: 1  Nudged: 2  Eyes Closed: 1  Turn 360 Degrees - Continuous/Discontinuous: 0  Turn 360 Degrees - Steady/Unsteady: 1  Sitting Down: 1  Balance Score: 10  Indication of Gait: 0  R Step Length/Height: 1  L Step Length/Height: 1  R Foot Clearance: 1  L Foot Clearance: 1  Step Symmetry: 0  Step Continuity: 0  Path: 1  Trunk: 0  Walking Time: 1  Gait Score: 6  Total Score: 16         Tinetti Test and G-code impairment scale:  Percentage of Impairment CH     0%    CI     1-19% CJ     20-39% CK     40-59% CL     60-79% CM     80-99% CN      100%   Tinetti  Score 0-28 28 23-27 17-22 12-16 6-11 1-5 0          Tinetti Tool Score Risk of Falls  <19 = High Fall Risk  19-24 = Moderate Fall Risk  25-28 = Low Fall Risk  Tinetti ME. Performance-Oriented Assessment of Mobility Problems in Elderly Patients. Chicas 66; [id number].  (Scoring Description: PT Bulletin Feb. 10, 1993)     Older adults: Jose Eduardo Tobias et al, 2009; n = [address] elderly evaluated with ABC, RENO, ADL, and IADL)  · Mean RENO score for males aged 69-68 years = 26.21(3.40)  · Mean RENO score for females age 69-68 years = 25.16(4.30)  · Mean RENO score for males over 80 years = 23.29(6.02)  · Mean RENO score for females over 80 years = 17.20(8.32)         G codes: In compliance with CMSs Claims Based Outcome Reporting, the following G-code set was chosen for this patient based on their primary functional limitation being treated: The outcome measure chosen to determine the severity of the functional limitation was the Tinetti Balance Assessment Tool with a score of 16/28 which was correlated with the impairment scale. · Mobility - Walking and Moving Around:               - CURRENT STATUS:    CK - 40%-59% impaired, limited or restricted               - GOAL STATUS:           CJ - 20%-39% impaired, limited or restricted               - D/C STATUS:                       CJ - 20%-39% impaired, limited or restricted      Pain:  Pain Scale 1: Numeric (0 - 10)  Pain Intensity 1: 4  Pain Location 1: Knee  Pain Orientation 1: Left  Pain Description 1: Aching  Pain Intervention(s) 1: Cold pack  Activity Tolerance:   Good - vitals WNL, chief complaint was pain (8/10)  Please refer to the flowsheet for vital signs taken during this treatment. After treatment:   [ ]         Patient left in no apparent distress sitting up in chair  [X]         Patient left in no apparent distress in bed  [X]         Call bell left within reach  [X]         Nursing notified  [X]         Caregiver present  [ ]         Bed alarm activated      COMMUNICATION/EDUCATION:   The patients plan of care was discussed with: Registered Nurse. [ ]         Fall prevention education was provided and the patient/caregiver indicated understanding. [ ]         Patient/family have participated as able in goal setting and plan of care. [ ]         Patient/family agree to work toward stated goals and plan of care. [ ]         Patient understands intent and goals of therapy, but is neutral about his/her participation. [ ]         Patient is unable to participate in goal setting and plan of care.      Thank you for this referral.  Shiraz Weathers, PT   Time Calculation: 25 mins

## 2017-05-31 NOTE — PERIOP NOTES
TRANSFER - OUT REPORT:    Verbal report given to Tiny Marie on Mariam Turcios  being transferred to  for routine post - op       Report consisted of patients Situation, Background, Assessment and   Recommendations(SBAR). Time Pre op antibiotic given:0806  Anesthesia Stop time: 6373  Martínez Present on Transfer to floor:Yes  Order for Martínez on Chart:Yes    Information from the following report(s) SBAR, OR Summary, Procedure Summary, Intake/Output and MAR was reviewed with the receiving nurse. Opportunity for questions and clarification was provided. Is the patient on 02? YES       L/Min 2       Other N/A    Is the patient on a monitor? NO    Is the nurse transporting with the patient? NO    Surgical Waiting Area notified of patient's transfer from PACU?  YES      The following personal items collected during your admission accompanied patient upon transfer:   Dental Appliance: Dental Appliances: None  Vision:    Hearing Aid:    Jewelry:    Clothing: Clothing:  (clothing bag, glasses to pacu)  Other Valuables:    Valuables sent to safe:

## 2017-05-31 NOTE — BRIEF OP NOTE
BRIEF OPERATIVE NOTE    Date of Procedure: 5/31/2017   Preoperative Diagnosis: DJD LEFT KNEE  Postoperative Diagnosis: DEGENERATIVE JOINT DISEASE LEFT KNEE    Procedure(s):  LEFT TOTAL KNEE REPLACEMENT  Surgeon(s) and Role:     * Jean Claude Moreno MD - Primary         Assistant Staff:  Physician Assistant: Renetta Yadav PA-C    Surgical Staff:  Circ-1: Lorene Daugherty RN  Circ-Relief: Law Uribe RN  Physician Assistant: Renetta Yadav PA-C  Scrub Tech-1: [address] [facility] RN - Intern: Jacy Barger RN  Surg Asst-1: [address] Kam Staff: Maciel Merrill, ED; Rosy uQreshi RN  Event Time In   Incision Start 0820   Incision Close 3922     Anesthesia: General   Estimated Blood Loss: 200 mL  Specimens:   ID Type Source Tests Collected by Time Destination   1 : LEFT KNEE BONE AND TISSUE    Jean Claude Moreno MD 5/31/2017 8190 Discarded      Findings: DJD left knee   Complications: None. Implants:   Implant Name Type Inv.  Item Serial No.  Lot No. LRB No. Used Action   CEMENT BNE GENTAMC GHV 40GM -- SMARTSET - SN/A  CEMENT BNE GENTAMC GHV 40GM -- SMARTSET N/A [facility] [employer] [id number] Left 2 Implanted   TIBIAL STEM   N/A [employer] [id number] Left 1 Implanted   TIBIAL COMPONENT   N/A [employer] [id number] Left 1 Implanted   FEM PS STEVE FREEDOM LT SZ 6 -- ATTUNE - SN/A  FEM PS STEVE FREEDOM LT SZ 6 -- ATTUNE N/A [facility] [employer] [id number] Left 1 Implanted   PAT FREEDOM DOME MEDIAL 35MM -- ATTUNE - SN/A  PAT FREEDOM DOME MEDIAL 35MM -- ATTUNE N/A [facility] [employer] [id number] Left 1 Implanted   INSERT TIB FB PS SZ 6 12MM -- ATTUNE - SN/A   INSERT TIB FB PS SZ 6 12MM -- ATTUNE N/A [facility] [employer] [id number] Left 1 Implanted

## 2017-05-31 NOTE — OP NOTES
[address] [facility] [address] [place] [address], [address]   OP NOTE       Name:  Jaime Key   MR#:  [id number]   :  1955   Account #:  [de-identified]    Surgery Date:  2017   Date of Adm:  2017       PREOPERATIVE DIAGNOSES   1. Advanced degenerative arthritis of left knee. 2. Status post right total knee replacement. POSTOPERATIVE DIAGNOS:      PROCEDURES PERFORMED:  Left total knee replacement. SURGEON: Pham Ramos MD    ASSISTANT: Sukhwinder Alonso PA-C    ESTIMATED BLOOD LOSS: 200 mL. SPECIMENS REMOVED: Tibial and femoral bone fragments. ANESTHESIA:  Attempted spinal with general.     DRAIN: None. COMPLICATIONS: None. INDICATIONS: The patient has previously undergone right total knee   replacement. She now presents for left total knee replacement. DESCRIPTION OF PROCEDURE: On the day of operation, the patient   was taken to the operating room. Spinal anesthesia was attempted, but   not successful. General anesthesia administered. The left lower   extremity was prepped and draped in the usual fashion. After   exsanguination with an Esmarch, the tourniquet inflated to 290 mmHg. A midline longitudinal incision was made over the knee. It was carried   through subcutaneous tissue. A medial parapatellar capsular incision   made. Advanced degenerative changes were noted throughout the   knee. The knee was debrided of osteophytes and soft tissue. A drill   was used to gain access to the femoral canal. Distal femoral cutting   guide was assembled with a 5-degree valgus cut. Distal femoral cut   was made with an oscillating saw. The femur was sized and felt to be a   #6 narrow in the Attune system. Anterior and posterior cuts were   made, chamfer cuts were made, and the box cut for the posterior   stabilized prosthesis made. External tibial alignment guide assembled. Tibial plateau cut was made with an oscillating saw.  The flexion and extension gaps were evaluated and felt to be appropriate. The tibia   was sized and felt to be a #5 in the Attune system. It was also felt that   a stemmed prosthesis would be appropriate. The tibial canal was then reamed to accommodate a stem. The canal   was prepared with a broach. An oscillating saw was used to prepare   the patella and sized for 35 mm. Trial components were put into place,   12 mm insert gave the best fit, range of motion, with proper alignment   and proper tracking of the patella. The ligaments were felt to be   properly balanced. The trial components were then removed. The knee   was thoroughly irrigated with pulse irrigation as well as antibiotic   solution. The components, including the long stemmed tibia were   cemented into place. The 12 mm trial insert was put into place. The   soft tissues were infiltrated with EXPAREL local anesthetic. After the   cement had matured, the 12 mm insert was put into place. The   tourniquet released, coagulation achieved with electrocautery. The   capsule repaired with #1 Vicryl suture supplemented with #2 PDS, 2-0   Vicryl was used to close subcutaneous tissue and staples used to   close the skin. Sterile dressings applied. The patient was taken to the recovery room in satisfactory condition. The assistant, Austin Izaguirre PA-C assisted me with positioning,   retraction, implant instillation and incision closure. MD Lisa Moreno   D:  05/31/2017   14:56   T:  05/31/2017   15:14   Job #:  [id number]

## 2017-05-31 NOTE — PROGRESS NOTES
Primary Nurse Spike Castro and Fatou RN performed a dual skin assessment on this patient No impairment noted  Trevor score is 20

## 2017-05-31 NOTE — ANESTHESIA PREPROCEDURE EVALUATION
Anesthetic History   No history of anesthetic complications  PONV          Review of Systems / Medical History  Patient summary reviewed, nursing notes reviewed and pertinent labs reviewed    Pulmonary  Within defined limits                 Neuro/Psych   Within defined limits           Cardiovascular  Within defined limits  Hypertension        Dysrhythmias            GI/Hepatic/Renal  Within defined limits   GERD           Endo/Other  Within defined limits    Hypothyroidism  Obesity     Other Findings              Physical Exam    Airway  Mallampati: II  TM Distance: > 6 cm  Neck ROM: normal range of motion   Mouth opening: Normal     Cardiovascular  Regular rate and rhythm,  S1 and S2 normal,  no murmur, click, rub, or gallop             Dental  No notable dental hx       Pulmonary  Breath sounds clear to auscultation               Abdominal  GI exam deferred       Other Findings            Anesthetic Plan    ASA: 3  Anesthesia type: spinal      Post-op pain plan if not by surgeon: peripheral nerve block single    Induction: Intravenous  Anesthetic plan and risks discussed with: Patient

## 2017-05-31 NOTE — PERIOP NOTES
3142: LT leg adductor canal nerve block done by Dr Reyes Hoehn using 30cc of 0.5% ropivicaine with US guidance, pt monitored,  O2 @ 2l/m/nc, &  IV sedation. Tolerated procedure fairly well. VSS post block: 688/84-78-06, QdX2=831% on 2l/m/nc. Arouses to verbal stimuli but falls asleep easily. See anesthesia note.

## 2017-05-31 NOTE — PERIOP NOTES
DAUGHTER UPDATED BY Smiley Cornell RN AT 0826--INFORMED OF SURGERY START TIME AND PATIENT'S WELL-BEING.

## 2017-05-31 NOTE — ROUTINE PROCESS
PACU HANDOFF:    Patient: Jose León MRN: [id number]  SSN: [id number]   YOB: 1955  Age: 60 y.o. Sex: female     Patient is status post Procedure(s):  LEFT TOTAL KNEE REPLACEMENT. Surgeon(s) and Role:     * Allyson Pinto MD - Primary    Local/Dose/Irrigation: LOCAL MIX: 10 MG MORPHINE, 20 ML EXPAREL, 30 ML 0.5% BUPIVACAINE PLAIN AND 30 ML NACL                Peripheral IV 05/31/17 Right Arm (Active)   Dressing Status Clean, dry, & intact 5/31/2017  7:07 AM   Dressing Type Transparent 5/31/2017  7:07 AM   Hub Color/Line Status Infusing 5/31/2017  7:07 AM            Airway - Endotracheal Tube 05/31/17 Oral (Active)             Dressing/Packing:  Wound Knee Left-DRESSING TYPE: Cast padding;Elastic bandage;Silver products; Staples (AQUACEL) (05/31/17 0700)  Splint/Cast:  ]    Other: 12 FR MCGARRY CATHETER AND SCD ON RIGHT LEG

## 2017-06-01 PROBLEM — E66.9 OBESITY: Chronic | Status: ACTIVE | Noted: 2017-06-01

## 2017-06-01 LAB
ANION GAP BLD CALC-SCNC: 8 MMOL/L (ref 5–15)
BUN SERPL-MCNC: 16 MG/DL (ref 6–20)
BUN/CREAT SERPL: 20 (ref 12–20)
CALCIUM SERPL-MCNC: 8.7 MG/DL (ref 8.5–10.1)
CHLORIDE SERPL-SCNC: 105 MMOL/L (ref 97–108)
CO2 SERPL-SCNC: 29 MMOL/L (ref 21–32)
CREAT SERPL-MCNC: 0.79 MG/DL (ref 0.55–1.02)
GLUCOSE SERPL-MCNC: 103 MG/DL (ref 65–100)
HGB BLD-MCNC: 11.1 G/DL (ref 11.5–16)
INR PPP: 1.1 (ref 0.9–1.1)
POTASSIUM SERPL-SCNC: 3.9 MMOL/L (ref 3.5–5.1)
PROTHROMBIN TIME: 11.7 SEC (ref 9–11.1)
SODIUM SERPL-SCNC: 142 MMOL/L (ref 136–145)

## 2017-06-01 PROCEDURE — 74011250637 HC RX REV CODE- 250/637: Performed by: PHYSICIAN ASSISTANT

## 2017-06-01 PROCEDURE — 36415 COLL VENOUS BLD VENIPUNCTURE: CPT | Performed by: PHYSICIAN ASSISTANT

## 2017-06-01 PROCEDURE — 74011250636 HC RX REV CODE- 250/636: Performed by: PHYSICIAN ASSISTANT

## 2017-06-01 PROCEDURE — 85018 HEMOGLOBIN: CPT | Performed by: PHYSICIAN ASSISTANT

## 2017-06-01 PROCEDURE — 97530 THERAPEUTIC ACTIVITIES: CPT

## 2017-06-01 PROCEDURE — 97116 GAIT TRAINING THERAPY: CPT

## 2017-06-01 PROCEDURE — 80048 BASIC METABOLIC PNL TOTAL CA: CPT | Performed by: PHYSICIAN ASSISTANT

## 2017-06-01 PROCEDURE — 85610 PROTHROMBIN TIME: CPT | Performed by: PHYSICIAN ASSISTANT

## 2017-06-01 PROCEDURE — 65270000029 HC RM PRIVATE

## 2017-06-01 RX ADMIN — ALPRAZOLAM 0.5 MG: 0.5 TABLET ORAL at 08:49

## 2017-06-01 RX ADMIN — CELECOXIB 200 MG: 200 CAPSULE ORAL at 19:01

## 2017-06-01 RX ADMIN — CEFAZOLIN 2 G: 1 INJECTION, POWDER, FOR SOLUTION INTRAMUSCULAR; INTRAVENOUS; PARENTERAL at 00:17

## 2017-06-01 RX ADMIN — CELECOXIB 200 MG: 200 CAPSULE ORAL at 08:49

## 2017-06-01 RX ADMIN — OXYCODONE HYDROCHLORIDE 10 MG: 5 TABLET ORAL at 03:51

## 2017-06-01 RX ADMIN — Medication 10 ML: at 22:00

## 2017-06-01 RX ADMIN — OXYCODONE HYDROCHLORIDE 10 MG: 5 TABLET ORAL at 07:01

## 2017-06-01 RX ADMIN — ACETAMINOPHEN 650 MG: 325 TABLET ORAL at 19:01

## 2017-06-01 RX ADMIN — SENNOSIDES AND DOCUSATE SODIUM 1 TABLET: 8.6; 5 TABLET ORAL at 19:01

## 2017-06-01 RX ADMIN — DEXAMETHASONE SODIUM PHOSPHATE 10 MG: 10 INJECTION, SOLUTION INTRAMUSCULAR; INTRAVENOUS at 11:25

## 2017-06-01 RX ADMIN — OXYCODONE HYDROCHLORIDE 10 MG: 5 TABLET ORAL at 00:24

## 2017-06-01 RX ADMIN — SENNOSIDES AND DOCUSATE SODIUM 1 TABLET: 8.6; 5 TABLET ORAL at 08:49

## 2017-06-01 RX ADMIN — Medication 6 ML: at 14:00

## 2017-06-01 RX ADMIN — WARFARIN SODIUM 6 MG: 5 TABLET ORAL at 08:49

## 2017-06-01 RX ADMIN — OXYCODONE HYDROCHLORIDE 10 MG: 5 TABLET ORAL at 15:12

## 2017-06-01 RX ADMIN — ACETAMINOPHEN 650 MG: 325 TABLET ORAL at 07:01

## 2017-06-01 RX ADMIN — OXYCODONE HYDROCHLORIDE 10 MG: 5 TABLET ORAL at 19:01

## 2017-06-01 RX ADMIN — OXYCODONE HYDROCHLORIDE 10 MG: 5 TABLET ORAL at 11:25

## 2017-06-01 RX ADMIN — Medication 10 ML: at 06:00

## 2017-06-01 RX ADMIN — ACETAMINOPHEN 650 MG: 325 TABLET ORAL at 13:31

## 2017-06-01 RX ADMIN — LEVOTHYROXINE SODIUM 112 MCG: 112 TABLET ORAL at 07:01

## 2017-06-01 RX ADMIN — ACETAMINOPHEN 650 MG: 325 TABLET ORAL at 03:51

## 2017-06-01 RX ADMIN — POLYETHYLENE GLYCOL 3350 17 G: 17 POWDER, FOR SOLUTION ORAL at 08:50

## 2017-06-01 NOTE — PROGRESS NOTES
Bedside shift change report given to [address] [place] [address] Leonor (oncoming nurse) by Sergio Blackwell (offgoing nurse). Report included the following information SBAR, Kardex, Intake/Output, MAR and Recent Results.

## 2017-06-01 NOTE — PROGRESS NOTES
Care Management Interventions  PCP Verified by CM: Yes  Palliative Care Consult (Criteria: CHF and RRAT>21): No  Reason for No Palliative Care Consult: Other (see comment) (no needs)  Mode of Transport at Discharge: Self (daughter/car)  Transition of Care Consult (CM Consult): [address]: Yes  Discharge Durable Medical Equipment: Yes (owns RW, but needs elavated toilet seat)  Physical Therapy Consult: Yes  Occupational Therapy Consult: Yes  Speech Therapy Consult: No  Current Support Network: Own Home, Lives Alone  Confirm Follow Up Transport: Family  Plan discussed with Pt/Family/Caregiver: Yes  Freedom of Choice Offered: Yes  Discharge Location  Discharge Placement: Home with home health    Home care orders noted. CRM met with the patient to offer agency choice. The patient chose [facility]. Referral sent via [facility]. The plan is for home tomorrow. The patient is requesting an elevated toilet seat. CRM will talk with therapy about what they would recommend. The patient has spoken with her insurance CRM about this , Chelsey Gallo [phone].  YOAN

## 2017-06-01 NOTE — PROGRESS NOTES
Problem: Mobility Impaired (Adult and Pediatric)  Goal: *Acute Goals and Plan of Care (Insert Text)  Physical Therapy Goals  Initiated 5/31/2017    1. Patient will move from supine to sit and sit to supine and scoot up and down in bed with modified independence within 4 days. 2. Patient will perform sit to stand with contact guard assist within 4 days. 3. Patient will ambulate with contact guard assist for 150 feet with the least restrictive device within 4 days. 4. Patient will ascend/descend 3 stairs with 1 handrail(s) with minimal assistance/contact guard assist within 4 days. 5. Patient will perform home exercise program per protocol with modified independence within 4 days. 6. Patient will demonstrate AROM 0-90 degrees in operative joint within 4 days. PHYSICAL THERAPY TREATMENT  Patient: Dionisio Heimlich (58 y.o. female)  Date: 6/1/2017  Diagnosis: DJD LEFT KNEE  Primary osteoarthritis of left knee <principal problem not specified>  Procedure(s) (LRB):  LEFT TOTAL KNEE REPLACEMENT (Left) 1 Day Post-Op  Precautions:    Chart, physical therapy assessment, plan of care and goals were reviewed. ASSESSMENT:  Pt received supine in bed, HOB elevated and family member present. Pt agreeable to PT, reports 4/10 pain at rest. Pt w/ SBA-Min A for LLE during bed mobility, pt using gait belt for LE assist; CGA for transfers, cues for proper foot/hand placement (7/10 pain w/ standing). Pt improved gait distance this afternoon, amb approx 100 FT (50FT x2) w/ RW, CGA. Pt reports 8/10 pain, states \"it hurts, but I'm going to do it\" repeatedly. Pt still w/ slow gait but somewhat improved this afternoon. Verbal cues given for heel/toe pattern w/ gait and to keep RW close to pt; short step length and decreased stance on LLE. Pt returned to chair at bedside, encouraged to sit for minimum of 30-45 minutes (no longer than 1hr). Pt reported pain \"a little over 8\" after gait. Ice packs refilled and applied to pt knee.  Needs met, items in reach. Progression toward goals:  [X]      Improving appropriately and progressing toward goals  [ ]      Improving slowly and progressing toward goals  [ ]      Not making progress toward goals and plan of care will be adjusted       PLAN:  Patient continues to benefit from skilled intervention to address the above impairments. Continue treatment per established plan of care. Discharge Recommendations:  Home Health  Further Equipment Recommendations for Discharge: Owns RW       SUBJECTIVE:   \"It's hurting, baby, but I'm going to do it. \" Pt reported 4/10 pain at rest, then 7-8/10 pain w/ standing and w/gait training. Pt reports pain in entire leg (from upper thigh to lower leg, L). OBJECTIVE DATA SUMMARY:                              Functional Mobility Training:  Bed Mobility:     Supine to Sit: Stand-by asssistance;Minimum assistance (LLE only)  Sit to Supine:  (pt returned to chair)                       Transfers:  Sit to Stand: Contact guard assistance  Stand to Sit: Stand-by asssistance; Additional time                             Ambulation/Gait Training:  Distance (ft): 100 Feet (ft) (50 FT x2)  Assistive Device: Gait belt;Walker, rolling  Ambulation - Level of Assistance: Contact guard assistance        Gait Abnormalities: Antalgic;Decreased step clearance; Step to gait     Left Side Weight Bearing: As tolerated        Speed/Jocelyne: Pace decreased (<100 feet/min); Slow  Step Length: Left shortened;Right shortened                               Stairs: Will plan to assess tomorrow morning                          Therapeutic Exercises:   Exercises performed per protocol. See morning treatment note for description.   Pain:  Pain Scale 1: Numeric (0 - 10)  Pain Intensity 1: 7  Pain Location 1: Knee  Pain Orientation 1: Left  Pain Description 1: Aching  Pain Intervention(s) 1: Medication (see MAR)  Activity Tolerance:   Good  Please refer to the flowsheet for vital signs taken during this treatment.   After treatment:   [X] Patient left in no apparent distress sitting up in chair  [ ] Patient left in no apparent distress in bed  [X] Call bell left within reach  [X] Nursing notified  [X] Caregiver present (pt daughters present)  [ ] Bed alarm activated      COMMUNICATION/COLLABORATION:   The patients plan of care was discussed with: Registered Nurse Lynell Kanner A Means,PTA   Time Calculation: 24 mins

## 2017-06-01 NOTE — PROGRESS NOTES
Problem: Discharge Planning  Goal: *Discharge to safe environment  Outcome: [address] [facility]

## 2017-06-01 NOTE — PROGRESS NOTES
Bedside shift change report given to Chayo Barton (oncoming nurse) by Tiny Marie (offgoing nurse). Report included the following information SBAR, Kardex, Intake/Output, MAR and Recent Results.

## 2017-06-01 NOTE — PROGRESS NOTES
Problem: Mobility Impaired (Adult and Pediatric)  Goal: *Acute Goals and Plan of Care (Insert Text)  Physical Therapy Goals  Initiated 5/31/2017    1. Patient will move from supine to sit and sit to supine and scoot up and down in bed with modified independence within 4 days. 2. Patient will perform sit to stand with contact guard assist within 4 days. 3. Patient will ambulate with contact guard assist for 150 feet with the least restrictive device within 4 days. 4. Patient will ascend/descend 3 stairs with 1 handrail(s) with minimal assistance/contact guard assist within 4 days. 5. Patient will perform home exercise program per protocol with modified independence within 4 days. 6. Patient will demonstrate AROM 0-90 degrees in operative joint within 4 days. PHYSICAL THERAPY TREATMENT  Patient: Segundo Smith (69 y.o. female)  Date: 6/1/2017  Diagnosis: DJD LEFT KNEE  Primary osteoarthritis of left knee <principal problem not specified>  Procedure(s) (LRB):  LEFT TOTAL KNEE REPLACEMENT (Left) 1 Day Post-Op  Precautions:    Chart, physical therapy assessment, plan of care and goals were reviewed. ASSESSMENT:  Pt received sitting in chair, reports 7/10 pain w/ sitting however appeared in NAD. Pt also reported she had been sitting up in chair for about an hour and a half to 2hrs. Instructed pt to sit no longer than 1hr. Pt then reports 8/10 pain w/ first 3 steps of gait training. Pt w/ limited gait d/t pain as pt amb just outside room door (approx 20 FT total). Anticipate pt gait improving w/ better pain management. Pt reports pain meds \"make her sleepy, but do nothing for the pain\". Pt returned to supine to bed (Min A for LLE), w/ LLE elevated and ice applied. Will continue to follow for afternoon PT. RN made aware of pt pain report.   Progression toward goals:  [ ]      Improving appropriately and progressing toward goals  [X]      Improving slowly and progressing toward goals  [ ]      Not making progress toward goals and plan of care will be adjusted       PLAN:  Patient continues to benefit from skilled intervention to address the above impairments. Continue treatment per established plan of care. Discharge Recommendations:  Home Health  Further Equipment Recommendations for Discharge: Owns RW       SUBJECTIVE:   Patient stated Oh it hurts so bad, baby.        OBJECTIVE DATA SUMMARY:   Critical Behavior:  Neurologic State: Alert  Orientation Level: Oriented X4  Cognition: Appropriate decision making, Appropriate for age attention/concentration, Appropriate safety awareness, Follows commands  Safety/Judgement: Awareness of environment  Range of Motion:      Active knee flexion: 63 (w/ knee flexion stretch)                    Functional Mobility Training:  Bed Mobility:     Supine to Sit:  (pt received in chair)  Sit to Supine: Minimum assistance (LLE into bed only)                       Transfers:  Sit to Stand: Contact guard assistance; Adaptive equipment; Additional time  Stand to Sit: Contact guard assistance                             Balance:  Sitting: Intact  Standing: Intact; With support  Ambulation/Gait Training:  Distance (ft): 20 Feet (ft)  Assistive Device: Gait belt;Walker, rolling  Ambulation - Level of Assistance: Contact guard assistance        Gait Abnormalities: Antalgic;Decreased step clearance; Step to gait (pt reports 8/10 pain)     Left Side Weight Bearing: As tolerated        Speed/Jocelyne: Slow  Step Length: Left shortened;Right shortened                               Stairs:            Therapeutic Exercises:     EXERCISE   Sets   Reps   Active Active Assist   Passive Self ROM   Comments   Ankle Pumps     [ ]                                [ ]                                [ ]                                [ ]                                    Quad Sets     [ ]                                [ ]                                [ ]                                [ ] Hamstring Sets     [ ]                                [ ]                                [ ]                                [ ]                                    Naveed Machuca     [ ]                                [ ]                                [ ]                                [ ]                                    Teri Concepcion       [ ]                                  [ ]                                  [ ]                                  [ ]                                    Chiara Carrasco     [ ]                                [ ]                                [ ]                                [ ]                                    Alden Steven   10 [X]                                [ ]                                [ ]                                [ ]                                    Knee Flexion Stretch   10 [X]                                [ ]                                [ ]                                [ ]                                    Straight Leg Raises     [ ]                                [ ]                                [ ]                                [ ]                                          Pain:  Pain Scale 1: Numeric (0 - 10)  Pain Intensity 1: 7  Pain Location 1: Knee  Pain Orientation 1: Left  Pain Description 1: Aching  Pain Intervention(s) 1: Medication (see MAR)  Activity Tolerance:   Limited d/t increased pain (pt report 8/10)  Please refer to the flowsheet for vital signs taken during this treatment.   After treatment:   [ ] Patient left in no apparent distress sitting up in chair  [X] Patient left in no apparent distress in bed  [X] Call bell left within reach  [X] Nursing notified  [ ] Caregiver present  [ ] Bed alarm activated      COMMUNICATION/COLLABORATION:   The patients plan of care was discussed with: Registered Nurse Anderson Lyn,PTA   Time Calculation: 25 mins

## 2017-06-01 NOTE — PROGRESS NOTES
Spiritual Care Partner Volunteer visited patient in [address] [place] [address] [address] on 6/1/17  Documented by:  Salome Damon M.Div.    Paging Service [phone])

## 2017-06-01 NOTE — PROGRESS NOTES
NP ORTHO PROGRESS NOTE  Post Op day: 1 Day Post-Op    June 1, 2017 8:18 AM     Chiqui Mott  [id number]  female  60 y.o.   1955    Admit date: 5/31/2017  Date of Surgery: 5/31/2017   Procedures: Procedure(s):  LEFT TOTAL KNEE REPLACEMENT  Admitting Physician: Pepe Dsouza MD   Surgeon: Anand Hale) and Role:     Pao Villarreal MD - Primary    Chart/Meds/Labs Reviewed  Current Facility-Administered Medications   Medication Dose Route Frequency    warfarin (COUMADIN) tablet 6 mg  6 mg Oral NOW    0.9% sodium chloride infusion  125 mL/hr IntraVENous CONTINUOUS    sodium chloride 0.9 % bolus infusion 500 mL  500 mL IntraVENous ONCE PRN    sodium chloride (NS) flush 5-10 mL  5-10 mL IntraVENous Q8H    sodium chloride (NS) flush 5-10 mL  5-10 mL IntraVENous PRN    acetaminophen (TYLENOL) tablet 650 mg  650 mg Oral Q6H    oxyCODONE IR (ROXICODONE) tablet 5 mg  5 mg Oral Q3H PRN    oxyCODONE IR (ROXICODONE) tablet 10 mg  10 mg Oral Q3H PRN    celecoxib (CELEBREX) capsule 200 mg  200 mg Oral BID    HYDROmorphone (PF) (DILAUDID) injection 0.5 mg  0.5 mg IntraVENous Q4H PRN    naloxone (NARCAN) injection 0.4 mg  0.4 mg IntraVENous PRN    dexamethasone (PF) (DECADRON) injection 10 mg  10 mg IntraVENous ONCE    ondansetron (ZOFRAN) injection 4 mg  4 mg IntraVENous Q4H PRN    hydrOXYzine HCl (ATARAX) tablet 10 mg  10 mg Oral Q8H PRN    famotidine (PEPCID) tablet 20 mg  20 mg Oral BID PRN    senna-docusate (PERICOLACE) 8.6-50 mg per tablet 1 Tab  1 Tab Oral BID    polyethylene glycol (MIRALAX) packet 17 g  17 g Oral DAILY    [START ON 6/2/2017] bisacodyl (DULCOLAX) suppository 10 mg  10 mg Rectal DAILY PRN    ALPRAZolam (XANAX) tablet 0.5 mg  0.5 mg Oral BID PRN    ketorolac (ACULAR) 0.5 % ophthalmic solution 1 Drop  1 Drop Both Eyes QID PRN    levothyroxine (SYNTHROID) tablet 112 mcg  112 mcg Oral ACB    valsartan (DIOVAN) tablet 160 mg  160 mg Oral DAILY    Warfarin - MD to Dose   Other Rx Dosing/Monitoring       Subjective:    Complaints: Incisional pain--by end of encounter, much relieved by ice packs. See below  Denies Dizziness, CP, SOB, N/V, Abdominal pain, numbness or tingling of extremities. Able to perform ankle pumps easily. Progressing with mobility. Incisional pain control: As noted. Pain Control:   Pain Assessment  Pain Scale 1: Numeric (0 - 10)  Pain Intensity 1: 7  Pain Onset 1: Post op  Pain Location 1: Knee  Pain Orientation 1: Left  Pain Description 1: Aching  Pain Intervention(s) 1: Medication (see MAR)    Oral diet: Tolerating diet well    Objective:  General: Alert,Ox4, cooperative, NAD. Sitting at EOB at time of exam.    Very little assist to lie back in bed for exam.  HEENT: Atraumatic, PERRL, anicteric sclerae  Lungs: Bilateral expansion. Equal excursion. No accessory muscle use. Gastrointestinal:  Soft, non-tender, non-distended  Extremities:  Neurovasc exam WDL. + DP pulses. Sensation intact to light touch. Motor: + DF/PF          Calves non-tender upon palpation or with passive stretch. No significant erythema or swelling. Removed bulky ace wrapped Dressing:   Aquacel below with scant contained sanginous along incision line (expected) and intact. Patient able to Lie back in bed & ice packs applied.       Vital Signs:   Visit Vitals    /63 (BP 1 Location: Left arm, BP Patient Position: At rest)    Pulse (!) 57    Temp 97.7 °F (36.5 °C)    Resp 16    Ht 5' 4.5\" (1.638 m)    Wt 93.4 kg (205 lb 14.6 oz)    SpO2 100%    BMI 34.8 kg/m2    O2 Flow Rate (L/min): 2 l/min O2 Device: Room air   Patient Vitals for the past 24 hrs:   BP Temp Pulse Resp SpO2   06/01/17 0306 126/63 97.7 °F (36.5 °C) (!) 57 16 100 %   05/31/17 2349 130/70 98.2 °F (36.8 °C) 67 15 97 %   05/31/17 2021 127/58 97.5 °F (36.4 °C) (!) 55 14 100 %   05/31/17 1716 164/74 - 64 - -   05/31/17 1711 143/84 - (!) 55 - 100 %   05/31/17 1607 132/70 97.7 °F (36.5 °C) 67 14 100 %   17 1500 122/76 97.4 °F (36.3 °C) (!) 56 13 100 %   17 1353 162/86 96.7 °F (35.9 °C) 70 13 100 %   17 1330 159/81 - 76 14 100 %   17 1315 149/87 - (!) 57 12 100 %   17 1300 158/85 - 76 17 100 %   17 1245 153/86 97.4 °F (36.3 °C) 63 12 100 %   17 1230 171/89 - 74 10 100 %   17 1215 169/85 - 68 (!) 6 100 %   17 1200 154/80 - 76 9 100 %   17 1145 158/84 - 85 15 99 %   17 1130 (!) 177/91 - 65 9 100 %   17 1115 - - 68 9 100 %   17 1100 (!) 158/91 - 69 8 100 %   17 1045 - - 75 12 100 %   17 1030 162/72 - 81 14 100 %   17 1020 (!) 173/105 - 85 13 100 %   17 1015 176/90 - 90 11 100 %   17 1010 157/88 97.6 °F (36.4 °C) 86 15 100 %   17 1008 160/87 - 86 15 100 %     Temp (24hrs), Av.5 °F (36.4 °C), Min:96.7 °F (35.9 °C), Max:98.2 °F (36.8 °C)      Intake/output-last 8 hours:        Intake/output- 24 hours:  1901 -  0700  In: 1920 [P.O.:120;  I.V.:1800]  Out:  [Urine:1800]    LAB:   Recent Results (from the past 24 hour(s))   HEMOGLOBIN    Collection Time: 17  3:14 AM   Result Value Ref Range    HGB 11.1 (L) 11.5 - 16.0 g/dL   PROTHROMBIN TIME + INR    Collection Time: 17  3:14 AM   Result Value Ref Range    INR 1.1 0.9 - 1.1      Prothrombin time 11.7 (H) 9.0 - 06.1 sec   METABOLIC PANEL, BASIC    Collection Time: 17  3:14 AM   Result Value Ref Range    Sodium 142 136 - 145 mmol/L    Potassium 3.9 3.5 - 5.1 mmol/L    Chloride 105 97 - 108 mmol/L    CO2 29 21 - 32 mmol/L    Anion gap 8 5 - 15 mmol/L    Glucose 103 (H) 65 - 100 mg/dL    BUN 16 6 - 20 MG/DL    Creatinine 0.79 0.55 - 1.02 MG/DL    BUN/Creatinine ratio 20 12 - 20      GFR est AA >60 >60 ml/min/1.73m2    GFR est non-AA >60 >60 ml/min/1.73m2    Calcium 8.7 8.5 - 10.1 MG/DL      Lab Results   Component Value Date/Time    INR 1.1 2017 03:14 AM    INR 1.1 05/15/2017 08:49 AM    INR 1.6 2013 04:50 AM    INR 1.6 05/03/2013 05:20 AM    INR (POC) 1.3 05/04/2013 06:09 AM     Lab Results   Component Value Date/Time    HGB 11.1 06/01/2017 03:14 AM    HGB 13.1 05/02/2017 08:03 PM    HGB 13.2 06/03/2015 09:57 AM    HGB 12.6 07/09/2014 09:09 AM     Recent Labs      06/01/17   0314   NA  142   K  3.9   CL  105   BUN  16   CREA  0.79   GLU  103*   CA  8.7       PT/OT:   Gait:  Gait  Base of Support: Narrowed  Speed/Jocelyne: Slow  Step Length: Right shortened, Left shortened  Gait Abnormalities: Antalgic, Decreased step clearance  Ambulation - Level of Assistance: Minimal assistance, Assist x1 (+ assist for IV pole)  Distance (ft): 10 Feet (ft)  Assistive Device: Gait belt, Walker, rolling                 PATIENT MOBILITY  Bed Mobility Training  Supine to Sit: Moderate assistance, Assist x1  Sit to Supine: Moderate assistance, Assist x1  Scooting: Additional time  Transfer Training  Sit to Stand: Minimum assistance, Assist x2  Stand to Sit: Minimum assistance, Assist x1      Gait Training  Assistive Device: Gait belt, Walker, rolling  Ambulation - Level of Assistance: Minimal assistance, Assist x1 (+ assist for IV pole)  Distance (ft): 10 Feet (ft)   Weight Bearing Status  Right Side Weight Bearing: Full  Left Side Weight Bearing: As tolerated        Assessment and Plan    Active Problems:    Primary osteoarthritis of left knee (5/31/2017)      Obesity (6/1/2017)      Overview:  Ht 5' 4.5\" (1.638 m)        Wt 93.4 kg (205 lb 14.6 oz)        BMI 34.8 kg/m2                 POD#1 Procedure(s):  LEFT TOTAL KNEE REPLACEMENT  Mild expected acute blood loss anemia. No indications of bleeding. VSSAF. Labs trending as expected. VTE prophylaxis: see above for medication, Mobilization, Ankle pumping exercises, SCDs per order if not able to exercise or mobilize well.    Weight bearing:  WBAT  Pain management:  Improving pain control with removal of bulky ace-wrapped dressing & Ice packs & elevation of extremity per orders,  Continue medications as listed above. active gentle ROM & mobilize frequently for short periods of time. PT  Wound benign. Neurovascularly intact. Progressing well. No indications of concerns. Continue present plans per orthopedic attending surgeon, Dr. Leroy Todd,  & interdisciplinary team for joint replacement.         Signed By: Carlos García NP    RN, MSN, MA, Adult NP-BC

## 2017-06-02 ENCOUNTER — HOME HEALTH ADMISSION (OUTPATIENT)
Dept: HOME HEALTH SERVICES | Facility: HOME HEALTH | Age: 62
End: 2017-06-02
Payer: COMMERCIAL

## 2017-06-02 LAB
HGB BLD-MCNC: 10.8 G/DL (ref 11.5–16)
INR PPP: 1.8 (ref 0.9–1.1)
PROTHROMBIN TIME: 18.6 SEC (ref 9–11.1)

## 2017-06-02 PROCEDURE — 97530 THERAPEUTIC ACTIVITIES: CPT

## 2017-06-02 PROCEDURE — 77030012890

## 2017-06-02 PROCEDURE — 85018 HEMOGLOBIN: CPT | Performed by: PHYSICIAN ASSISTANT

## 2017-06-02 PROCEDURE — 97116 GAIT TRAINING THERAPY: CPT

## 2017-06-02 PROCEDURE — 65270000029 HC RM PRIVATE

## 2017-06-02 PROCEDURE — 85610 PROTHROMBIN TIME: CPT | Performed by: PHYSICIAN ASSISTANT

## 2017-06-02 PROCEDURE — 74011250637 HC RX REV CODE- 250/637: Performed by: PHYSICIAN ASSISTANT

## 2017-06-02 PROCEDURE — 77030027138 HC INCENT SPIROMETER -A

## 2017-06-02 PROCEDURE — 36415 COLL VENOUS BLD VENIPUNCTURE: CPT | Performed by: PHYSICIAN ASSISTANT

## 2017-06-02 RX ORDER — OXYCODONE HYDROCHLORIDE 5 MG/1
5-10 TABLET ORAL
Qty: 80 TAB | Refills: 0 | Status: SHIPPED | OUTPATIENT
Start: 2017-06-02 | End: 2017-07-27

## 2017-06-02 RX ORDER — CELECOXIB 200 MG/1
200 CAPSULE ORAL DAILY
Qty: 30 CAP | Refills: 0 | Status: SHIPPED | OUTPATIENT
Start: 2017-06-02 | End: 2017-07-02

## 2017-06-02 RX ORDER — WARFARIN 2.5 MG/1
2.5 TABLET ORAL DAILY
Qty: 90 TAB | Refills: 1 | Status: SHIPPED | OUTPATIENT
Start: 2017-06-02 | End: 2017-07-27

## 2017-06-02 RX ORDER — WARFARIN 4 MG/1
4 TABLET ORAL
Status: COMPLETED | OUTPATIENT
Start: 2017-06-02 | End: 2017-06-02

## 2017-06-02 RX ADMIN — OXYCODONE HYDROCHLORIDE 10 MG: 5 TABLET ORAL at 23:37

## 2017-06-02 RX ADMIN — SENNOSIDES AND DOCUSATE SODIUM 1 TABLET: 8.6; 5 TABLET ORAL at 19:16

## 2017-06-02 RX ADMIN — SENNOSIDES AND DOCUSATE SODIUM 1 TABLET: 8.6; 5 TABLET ORAL at 09:48

## 2017-06-02 RX ADMIN — CELECOXIB 200 MG: 200 CAPSULE ORAL at 19:16

## 2017-06-02 RX ADMIN — Medication 10 ML: at 23:50

## 2017-06-02 RX ADMIN — LEVOTHYROXINE SODIUM 112 MCG: 112 TABLET ORAL at 06:47

## 2017-06-02 RX ADMIN — POLYETHYLENE GLYCOL 3350 17 G: 17 POWDER, FOR SOLUTION ORAL at 09:48

## 2017-06-02 RX ADMIN — ACETAMINOPHEN 650 MG: 325 TABLET ORAL at 02:51

## 2017-06-02 RX ADMIN — OXYCODONE HYDROCHLORIDE 10 MG: 5 TABLET ORAL at 09:48

## 2017-06-02 RX ADMIN — OXYCODONE HYDROCHLORIDE 10 MG: 5 TABLET ORAL at 13:14

## 2017-06-02 RX ADMIN — OXYCODONE HYDROCHLORIDE 5 MG: 5 TABLET ORAL at 06:47

## 2017-06-02 RX ADMIN — FAMOTIDINE 20 MG: 20 TABLET ORAL at 09:48

## 2017-06-02 RX ADMIN — ACETAMINOPHEN 650 MG: 325 TABLET ORAL at 13:14

## 2017-06-02 RX ADMIN — ACETAMINOPHEN 650 MG: 325 TABLET ORAL at 07:13

## 2017-06-02 RX ADMIN — Medication 10 ML: at 06:00

## 2017-06-02 RX ADMIN — ACETAMINOPHEN 650 MG: 325 TABLET ORAL at 19:16

## 2017-06-02 RX ADMIN — OXYCODONE HYDROCHLORIDE 10 MG: 5 TABLET ORAL at 02:51

## 2017-06-02 RX ADMIN — WARFARIN SODIUM 4 MG: 4 TABLET ORAL at 09:48

## 2017-06-02 RX ADMIN — FAMOTIDINE 20 MG: 20 TABLET ORAL at 16:13

## 2017-06-02 RX ADMIN — OXYCODONE HYDROCHLORIDE 5 MG: 5 TABLET ORAL at 19:16

## 2017-06-02 RX ADMIN — CELECOXIB 200 MG: 200 CAPSULE ORAL at 09:48

## 2017-06-02 RX ADMIN — VALSARTAN 160 MG: 80 TABLET ORAL at 09:49

## 2017-06-02 RX ADMIN — OXYCODONE HYDROCHLORIDE 10 MG: 5 TABLET ORAL at 16:10

## 2017-06-02 NOTE — PROGRESS NOTES
Bedside shift change report given to Coral Bryan (oncoming nurse) by La Myles (offgoing nurse). Report included the following information SBAR, Kardex, Intake/Output, MAR and Recent Results.

## 2017-06-02 NOTE — PROGRESS NOTES
TOTAL KNEE PROGRESS NOTE      Subjective:     Post-Operative Day: 2 Status Post left Total Knee Arthroplasty  Systemic or Specific Complaints:No Complaints    Objective:     Patient Vitals for the past 24 hrs:   BP Temp Pulse Resp SpO2   06/02/17 0242 126/55 98 °F (36.7 °C) 68 14 99 %   06/01/17 2020 144/70 98.4 °F (36.9 °C) (!) 59 14 100 %   06/01/17 1417 139/83 97.4 °F (36.3 °C) 72 14 99 %   06/01/17 1240 140/80 - 69 - -   06/01/17 0845 123/61 97.8 °F (36.6 °C) 84 16 100 %       General: alert, cooperative, no distress, appears stated age   Wound: Wound clean and dry no evidence of infection. , No Erythema, No Edema, No Drainage and Wound Intact   Motion: Extension: Full Extension   DVT Exam: No evidence of DVT seen on physical exam.  Negative Neil's sign. No cords or calf tenderness. No significant calf/ankle edema. Data Review:    Recent Results (from the past 24 hour(s))   HEMOGLOBIN    Collection Time: 06/02/17  3:03 AM   Result Value Ref Range    HGB 10.8 (L) 11.5 - 16.0 g/dL   PROTHROMBIN TIME + INR    Collection Time: 06/02/17  3:03 AM   Result Value Ref Range    INR 1.8 (H) 0.9 - 1.1      Prothrombin time 18.6 (H) 9.0 - 11.1 sec         Assessment:     Status Post left Total Knee Arthroplasty. Doing well postoperatively. . Bandage aquacell, clean/dry. Labs stable. PT progressing well. Pain control doing better this AM.     Plan:     Continues current post-op course  Continue PT per protocol  Plan to discharge to [facility]. today versus tomorrow based on PT progression.

## 2017-06-02 NOTE — PROGRESS NOTES
Bedside shift change report given to Raymund Habermann (oncoming nurse) by Octavia Alicea (offgoing nurse). Report included the following information SBAR, Kardex, Intake/Output and MAR.

## 2017-06-02 NOTE — PROGRESS NOTES
Bedside and Verbal shift change report given to [employer], RN (oncoming nurse) by Gaurav Mayorga RN (offgoing nurse). Report included the following information Procedure Summary, Intake/Output, MAR, Recent Results and Med Rec Status.

## 2017-06-02 NOTE — PROGRESS NOTES
Problem: Mobility Impaired (Adult and Pediatric)  Goal: *Acute Goals and Plan of Care (Insert Text)  Physical Therapy Goals  Initiated 5/31/2017    1. Patient will move from supine to sit and sit to supine and scoot up and down in bed with modified independence within 4 days. 2. Patient will perform sit to stand with contact guard assist within 4 days. 3. Patient will ambulate with contact guard assist for 150 feet with the least restrictive device within 4 days. 4. Patient will ascend/descend 3 stairs with 1 handrail(s) with minimal assistance/contact guard assist within 4 days. 5. Patient will perform home exercise program per protocol with modified independence within 4 days. 6. Patient will demonstrate AROM 0-90 degrees in operative joint within 4 days. PHYSICAL THERAPY TREATMENT  Patient: John Shaver (82 y.o. female)  Date: 6/2/2017  Diagnosis: DJD LEFT KNEE  Primary osteoarthritis of left knee <principal problem not specified>  Procedure(s) (LRB):  LEFT TOTAL KNEE REPLACEMENT (Left) 2 Days Post-Op  Precautions:    Chart, physical therapy assessment, plan of care and goals were reviewed. ASSESSMENT:  Pt received supine in bed, agreeable to PT. Pt reports pain is better today but reports pain as 6/10 w/ rest (pt in NAD). Pt bed mobility improving as pt required SBA (additional time), using right leg linked under Left to transfer OOB. Pt requires verbal cues/reminders for hand placement on bed vs RW to stand from elevated bed (one hand on RW ,other on bed). Pt gait speed/mo w/steady improvement from yesterday, able to increase WBing through LLE. Pt also demonstrates reciprocal nitesh although w/ short step length bilaterally (cues provided to keep head up and to relax shoulders w/ gait). Pt practiced 8 (4 steps x2) steps this morning using both rails (Left only for ascend w/ trial 1 and Min A; both rails for trial 2,CGA).  Pt will benefit from additional stair training this afternoon prior to d/c as pt reports \"she needs to practice more to feel more confident\". Noted pt w/knee buckle x1 w/ return gait to room however no LOB noted. Pt returned to chair at bedside, pt friend present at this time. Pt w/ needs met, items in reach. Ice packs refilled and applied to pt knee. Will follow up w/ pt once more this afternoon for stair training, anticipate pt clearing therapy then. Progression toward goals:  [X]      Improving appropriately and progressing toward goals  [ ]      Improving slowly and progressing toward goals  [ ]      Not making progress toward goals and plan of care will be adjusted       PLAN:  Patient continues to benefit from skilled intervention to address the above impairments. Continue treatment per established plan of care. Discharge Recommendations:  Home Health  Further Equipment Recommendations for Discharge: Owns RW       SUBJECTIVE:   Patient stated The pain is better today, yet pt reports pain as 6/10 w/ rest and pt in NAD. OBJECTIVE DATA SUMMARY:   Critical Behavior:  Neurologic State: Alert  Orientation Level: Oriented X4  Cognition: Appropriate decision making, Appropriate safety awareness, Appropriate for age attention/concentration, Follows commands  Safety/Judgement: Awareness of environment                             Functional Mobility Training:  Bed Mobility:     Supine to Sit: Stand-by asssistance; Additional time (pt using linking LLE over right)  Sit to Supine:  (pt returned to chair)                       Transfers:  Sit to Stand: Contact guard assistance (slightly elevated bed)  Stand to Sit: Stand-by asssistance                             Balance:  Sitting: Intact  Standing: Intact; With support  Ambulation/Gait Training:  Distance (ft): 50 Feet (ft)  Assistive Device: Gait belt;Walker, rolling  Ambulation - Level of Assistance: Contact guard assistance        Gait Abnormalities: Antalgic;Decreased step clearance     Left Side Weight Bearing: As tolerated  Base of Support: Narrowed     Speed/Jocelyne: Pace decreased (<100 feet/min); Slow  Step Length: Left shortened;Right shortened        Interventions: Verbal cues                      Stairs:  Number of Stairs Trained: 8  Stairs - Level of Assistance: Contact guard assistance (Min A to ascend first step)  Rail Use: Both  Therapeutic Exercises: Will plan for exercises w/ PM session        Pain:  Pain Scale 1: Numeric (0 - 10)  Pain Intensity 1: 6 (at rest)  Pain Location 1: Knee  Pain Orientation 1: Left  Pain Description 1: Aching  Pain Intervention(s) 1: Medication (see MAR)  Activity Tolerance:   Good. Please refer to the flowsheet for vital signs taken during this treatment.   After treatment:   [X] Patient left in no apparent distress sitting up in chair  [ ] Patient left in no apparent distress in bed  [X] Call bell left within reach  [X] Nursing notified  [ ] Caregiver present  [ ] Bed alarm activated      COMMUNICATION/COLLABORATION:   The patients plan of care was discussed with: Registered Nurse Cecelia ORTIZ Means,PTA   Time Calculation: 39 mins

## 2017-06-02 NOTE — PROGRESS NOTES
Problem: Mobility Impaired (Adult and Pediatric)  Goal: *Acute Goals and Plan of Care (Insert Text)  Physical Therapy Goals  Initiated 5/31/2017    1. Patient will move from supine to sit and sit to supine and scoot up and down in bed with modified independence within 4 days. 2. Patient will perform sit to stand with contact guard assist within 4 days. 3. Patient will ambulate with contact guard assist for 150 feet with the least restrictive device within 4 days. 4. Patient will ascend/descend 3 stairs with 1 handrail(s) with minimal assistance/contact guard assist within 4 days. 5. Patient will perform home exercise program per protocol with modified independence within 4 days. 6. Patient will demonstrate AROM 0-90 degrees in operative joint within 4 days. PHYSICAL THERAPY TREATMENT  Patient: Libertad Carreon (18 y.o. female)  Date: 6/2/2017  Diagnosis: DJD LEFT KNEE  Primary osteoarthritis of left knee <principal problem not specified>  Procedure(s) (LRB):  LEFT TOTAL KNEE REPLACEMENT (Left) 2 Days Post-Op  Precautions:    Chart, physical therapy assessment, plan of care and goals were reviewed. ASSESSMENT:  Pt received supine in bed, agreeable to PT. Pt using gait belt assist for LLE in/OOB, able to complete bed mobility w/ SBA-Supervision. PT practiced (cleared) stairs once more this afternoon (4 steps using both rails,CGA). Pt reported increased in confidence w/ stair training, however reported d/c for tomorrow morning. Pt returned supine to bed, needs met, items in reach. Ice packs refilled, pt w/ personal belongings on bed and reported she will put ice on when ready. Pt cleared from PT standpoint (RN aware). Will follow up w/pt for additional questions.    [X]      Improving appropriately and progressing toward goals  [ ]      Improving slowly and progressing toward goals  [ ]      Not making progress toward goals and plan of care will be adjusted       PLAN:  Patient continues to benefit from skilled intervention to address the above impairments. Continue treatment per established plan of care. Discharge Recommendations:  Home Health  Further Equipment Recommendations for Discharge: Owns RW       SUBJECTIVE:   \"Oh Ankur Roque,  [address] [facility]" as pt transferring to EOB to sitting, reported 8/10. OBJECTIVE DATA SUMMARY:          Functional Mobility Training:  Bed Mobility:     Supine to Sit: Supervision (pt using gait belt for LLE assist in /OOB)  Sit to Supine: Supervision                       Transfers:  Sit to Stand: Contact guard assistance  Stand to Sit: Stand-by asssistance                             Ambulation/Gait Training:  Distance (ft): 50 Feet (ft)  Assistive Device: Gait belt;Walker, rolling  Ambulation - Level of Assistance: Contact guard assistance        Gait Abnormalities: Antalgic;Decreased step clearance     Left Side Weight Bearing: As tolerated  Base of Support: Narrowed     Speed/Jocelyne: Pace decreased (<100 feet/min); Slow  Step Length: Left shortened;Right shortened        Interventions: Verbal cues           Stairs:  Number of Stairs Trained: 4  Stairs - Level of Assistance: Contact guard assistance              Rail Use: Both  Therapeutic Exercises:   Exercises performed per protocol. See morning treatment note for description. Pain:  Pain Scale 1: Numeric (0 - 10)  Pain Intensity 1: 7  Pain Location 1: Knee  Pain Orientation 1: Left  Pain Description 1: Aching  Pain Intervention(s) 1: Medication (see MAR)  Activity Tolerance:   Good  Please refer to the flowsheet for vital signs taken during this treatment.   After treatment:   [ ] Patient left in no apparent distress sitting up in chair  [X] Patient left in no apparent distress in bed  [X] Call bell left within reach  [X] Nursing notified  [ ] Caregiver present  [ ] Bed alarm activated      COMMUNICATION/COLLABORATION:   The patients plan of care was discussed with: Registered Nurse  Karla Dial Time Calculation: 24 mins

## 2017-06-02 NOTE — DISCHARGE INSTRUCTIONS
DC Orders All Total Knees    Case Management for DC planning to Home Hc .   - PT 3 times a week for 2 weeks; WBAT. - PT/INR Every Monday/Thursday for 2 weeks, Call Dr. Siddhartha Vital with results ([phone]). - Remove staples at 2 weeks post-op; 6/14/17 .   - Follow up in Office in 2 1/2 weeks. COUMADIN (warfarin) ORDERS: Take 5 mg on Saturday (6/3/17) and take 5 mg on Sunday (6/4/17); Recheck INR on Sunday. After Hospital Care Plan:  Discharge Instructions Knee Replacement-Dr. Siddhartha Vital    Patient Name: Ashley Marroquin  Date of procedure: 5/31/2017   Procedure: Procedure(s):  LEFT TOTAL KNEE REPLACEMENT  Surgeon: Jc Morales) and Role:     * Pham Ramos MD - Primary    PCP: Khoa Kraus MD  Date of discharge: No discharge date for patient encounter. Follow up appointments   Follow up with Dr. Siddhartha Vital in 2 ½ weeks. Call [phone] to make an appointment.  If home health has been arranged for you the agency will contact you to arrange dates/times for visits. Please call them if you do not hear from them within 24 hours after you are discharged    When to call your Orthopaedic Surgeon: Call [phone].  If you call after 5pm or on a weekend, the on call physician will be contacted   Unrelieved pain   Signs of infection-if your incision is red; continues to have drainage; drainage has a foul odor or if you have a persistent fever over 101 degrees   Signs of a blood clot in your leg-calf pain, tenderness, redness, swelling of lower leg    When to call your Primary Care Physician:   Concerns about medical conditions such as diabetes, high blood pressure, asthma, congestive heart failure   Call if blood sugars are elevated, persistent headache or dizziness, coughing or congestion, constipation or diarrhea, burning with urination, abnormal heart rate    When to call [id number] go to the nearest emergency room   Acute onset of chest pain, shortness of breath, difficulty breathing      Activity   Weight bearing as tolerated with walker or crutches. Refer to pages 23-31 of your handbook for instructions and pictures   Complete your Home Exercise Program daily as instructed by your therapist.  Refer to pages 33-41 of your handbook for instructions and pictures   Get up every one hour and walk (except at night when sleeping)   Do not drive or operate heavy machinery    Incision Care   The Aquacel (brown, waterproof) surgical dressing is to remain on your knee for 7 days. On the 7th day have someone gently peel the dressing off by carefully lifting the edge and stretching it slightly to break the adhesive seal   You will have staples in your knee incision. They will be removed by the home health agency staff   If your Aquacel dressing comes loose/off before the 7th day, you may replace it with a dry sterile gauze dressing; change it daily. Once your incision is not draining, you may leave it open to air   You may take a shower with the Aquacel dressing in place. Once the Aquacel is removed, you may shower and get your incision wet but do not submerge your incision under water in a bath tub, hot tub or swimming pool for 6 weeks after surgery. Preventing blood clots    Take Coumadin as prescribed by Dr. Chhaya Stephenson for one month following surgery*   See instructions above.  Wear elastic stockings (TEDS) for 4 weeks. You should remove them for approximately 1 hour daily for showering/sponge bathing    Pain management   Take pain medication as prescribed; decrease the amount you use as your pain lessens   Avoid alcoholic beverages while taking pain medication   We recommend that you continue taking Tylenol (acetaminophen) for pain control. Do not take any other over-the-counter medication for pain. Please be aware that many medications contain Tylenol. We do not want you to over medicate so please read the information below as a guide.   Do not take more than 4 Grams of Tylenol in a 24 hour period. (There are 1000 milligrams in one Gram)  o 325 mg of Tylenol per tablet (do not take more than 12 tablets in 24 hours)  o 500 mg of Tylenol per tablet (do not take more than 8 tablets in 24 hours)   Elevate your leg & place ice packs on your knee for 15-20 minutes after exercising and as needed for comfort    Diet   Resume usual diet; drink plenty of fluids; eat foods high in fiber   You may want to take a stool softener (such as Senokot-S or Colace) to prevent constipation while you are taking pain medication. If constipation occurs, take a laxative (such as Dulcolax tablets, Milk of Magnesia, or a suppository)    [facility] Protocol (to be followed by [address] [place] [address])  Nursing   Draw a PT/INR per physicians orders.  Call results to Dr. Leroy Todd at [phone]  [facility] Remove staples per physicians order   Complete head to toe assessment, vital signs   Medication reconciliation   Review pain management   Manage chronic medical conditions    Physical Therapy-per physicians orders     Weight bearing status:     Left Side Weight Bearing: As tolerated  Right Side Weight Bearing: Full    Mobility Status:  Supine to Sit: Stand-by asssistance, Minimum assistance (LLE only)  Sit to Stand: Contact guard assistance  Sit to Supine:  (pt returned to chair)       Gait:  Distance (ft): 100 Feet (ft) (50 FT x2)  Ambulation - Level of Assistance: Contact guard assistance  Assistive Device: Gait belt, Walker, rolling  Gait Abnormalities: Antalgic, Decreased step clearance, Step to gait    ADL status overall composite:                   Physical Therapy   Assessment and evaluation-bed mobility; functional transfers (bed, chair, bathroom, stairs); ambulation with equipment, car transfers, shower transfers, safety and ability to get out of house in the event of an emergency   Review weight bearing as tolerated, wean from walker or crutches as tolerated   Discuss pain management   Review how to do ADLs.  Refer to page 42 of patient handbook    Home Exercise Program-refer to pages 33-41 of patient handbook for exercises

## 2017-06-03 VITALS
DIASTOLIC BLOOD PRESSURE: 81 MMHG | WEIGHT: 222.66 LBS | HEART RATE: 64 BPM | SYSTOLIC BLOOD PRESSURE: 135 MMHG | RESPIRATION RATE: 18 BRPM | TEMPERATURE: 97.5 F | HEIGHT: 65 IN | OXYGEN SATURATION: 100 % | BODY MASS INDEX: 37.1 KG/M2

## 2017-06-03 LAB
HGB BLD-MCNC: 10.5 G/DL (ref 11.5–16)
INR PPP: 1.8 (ref 0.9–1.1)
PROTHROMBIN TIME: 18.5 SEC (ref 9–11.1)

## 2017-06-03 PROCEDURE — 36415 COLL VENOUS BLD VENIPUNCTURE: CPT | Performed by: PHYSICIAN ASSISTANT

## 2017-06-03 PROCEDURE — 74011250637 HC RX REV CODE- 250/637: Performed by: PHYSICIAN ASSISTANT

## 2017-06-03 PROCEDURE — 85610 PROTHROMBIN TIME: CPT | Performed by: PHYSICIAN ASSISTANT

## 2017-06-03 PROCEDURE — 77030012890

## 2017-06-03 PROCEDURE — 77030012893

## 2017-06-03 PROCEDURE — 74011250637 HC RX REV CODE- 250/637: Performed by: ORTHOPAEDIC SURGERY

## 2017-06-03 PROCEDURE — 85018 HEMOGLOBIN: CPT | Performed by: PHYSICIAN ASSISTANT

## 2017-06-03 RX ORDER — WARFARIN SODIUM 5 MG/1
5 TABLET ORAL
Status: COMPLETED | OUTPATIENT
Start: 2017-06-03 | End: 2017-06-03

## 2017-06-03 RX ADMIN — ACETAMINOPHEN 650 MG: 325 TABLET ORAL at 03:31

## 2017-06-03 RX ADMIN — CELECOXIB 200 MG: 200 CAPSULE ORAL at 09:26

## 2017-06-03 RX ADMIN — VALSARTAN 160 MG: 80 TABLET ORAL at 09:26

## 2017-06-03 RX ADMIN — POLYETHYLENE GLYCOL 3350 17 G: 17 POWDER, FOR SOLUTION ORAL at 09:28

## 2017-06-03 RX ADMIN — OXYCODONE HYDROCHLORIDE 10 MG: 5 TABLET ORAL at 03:32

## 2017-06-03 RX ADMIN — WARFARIN SODIUM 5 MG: 5 TABLET ORAL at 09:27

## 2017-06-03 RX ADMIN — OXYCODONE HYDROCHLORIDE 10 MG: 5 TABLET ORAL at 07:15

## 2017-06-03 RX ADMIN — LEVOTHYROXINE SODIUM 112 MCG: 112 TABLET ORAL at 07:15

## 2017-06-03 RX ADMIN — SENNOSIDES AND DOCUSATE SODIUM 1 TABLET: 8.6; 5 TABLET ORAL at 09:26

## 2017-06-03 RX ADMIN — ACETAMINOPHEN 650 MG: 325 TABLET ORAL at 09:26

## 2017-06-03 RX ADMIN — Medication 10 ML: at 03:32

## 2017-06-03 NOTE — PROGRESS NOTES
Patient requested to be discharge now. She stated her daughter is here to pick her up. Day shift nurse was not able to assess patient prior to discharge. night shift nurse reassessed patient; patient stable and ready for discharge. Opportunity for questions and clarifications was provided to patient and she stated no questions. Charge nurse and night shift nurse discharged patient. Patient was accompanied by volunteer and daughter.

## 2017-06-03 NOTE — PROGRESS NOTES
Unable to assess patient before she was discharged. Night and Day charge nurse assisted with discharging patient.

## 2017-06-03 NOTE — PROGRESS NOTES
Call received from Dr. Owen Esposito to given mg coumadin now and to instruct patient to take mg tomorrow; no labs needed.

## 2017-06-03 NOTE — PROGRESS NOTES
I have reviewed discharge instructions with the patient. The patient verbalized understanding. Patient filled prescriptions using [facility].

## 2017-06-03 NOTE — PROGRESS NOTES
Bedside and Verbal shift change report given to Kevin Meng (oncoming nurse) by Nataliia Song RN (offgoing nurse). Report given with Fransico MARQUEZ and MAR.

## 2017-06-04 ENCOUNTER — HOME CARE VISIT (OUTPATIENT)
Dept: SCHEDULING | Facility: HOME HEALTH | Age: 62
End: 2017-06-04
Payer: COMMERCIAL

## 2017-06-04 VITALS
RESPIRATION RATE: 18 BRPM | BODY MASS INDEX: 33.66 KG/M2 | SYSTOLIC BLOOD PRESSURE: 130 MMHG | OXYGEN SATURATION: 98 % | HEIGHT: 65 IN | TEMPERATURE: 98.4 F | HEART RATE: 76 BPM | DIASTOLIC BLOOD PRESSURE: 76 MMHG | WEIGHT: 202 LBS

## 2017-06-04 VITALS
SYSTOLIC BLOOD PRESSURE: 130 MMHG | DIASTOLIC BLOOD PRESSURE: 76 MMHG | HEART RATE: 66 BPM | OXYGEN SATURATION: 98 % | TEMPERATURE: 98.5 F

## 2017-06-04 PROCEDURE — G0151 HHCP-SERV OF PT,EA 15 MIN: HCPCS

## 2017-06-04 PROCEDURE — G0299 HHS/HOSPICE OF RN EA 15 MIN: HCPCS

## 2017-06-05 ENCOUNTER — HOME CARE VISIT (OUTPATIENT)
Dept: SCHEDULING | Facility: HOME HEALTH | Age: 62
End: 2017-06-05
Payer: COMMERCIAL

## 2017-06-05 ENCOUNTER — HOME CARE VISIT (OUTPATIENT)
Dept: HOME HEALTH SERVICES | Facility: HOME HEALTH | Age: 62
End: 2017-06-05
Payer: COMMERCIAL

## 2017-06-05 ENCOUNTER — PATIENT OUTREACH (OUTPATIENT)
Dept: OTHER | Age: 62
End: 2017-06-05

## 2017-06-05 PROCEDURE — G0300 HHS/HOSPICE OF LPN EA 15 MIN: HCPCS

## 2017-06-05 NOTE — PROGRESS NOTES
ALESSIO NOTE:     Ms. Rogelio Green  has been contacted regarding recent inpatient stay. Verified  and address for HIPAA security. Explained role and verified PCP. Discharge medications were reviewed with the patient by telephone. Ms. Rogelio Green is home s/p TKA and is feeling better. She tried to not take pain medication, with fear of addiction. Home Health has been out and she was encouraged to take pain medications as prescribed for this week. She is not hydrating well and shared with me that she needed to disimpact herself yesterday after failed medication treatment. She had a run of palpitations yesterday - when asked she did report straining for BM. Discussed the need for stand by in the bathroom and high fall risk with palpitations/ TKA. Also encouraged increased liquid foods/ soups/ stews and prune juice. She will alter her diet to include more liquids. She understood that her palpitations were due to the need to adjust her thyroid. We talked about the stress of pain and straining for BM as additional stress on the heart. Stand by assistance was stressed as important to avoid fall risk. She is active with her home therapy exercises. Home health has been out and DME services are in place. Her daughter is staying with her this week for assistance. Med review for taking synthroid prior to eating/ coumadin daily. Date of IP Admission:    -2017   Facility patient visited:   [facility] [place]   Reason for Visit:   Left TKA /  Hx of palpations   Reviewed scripts given by ED? Yes      Able to obtain prescribed medication? Yes   How is the patient feeling? Pt stated she is feeling better today. Had a 3-5 min run of palpitations yesterday with elevated BP. Constipation with self disimpaction. Does the patient have any questions or problems? Not at this time - Daughter is staying with her postop. Any barriers with transportation?  no   Follow-up Appointment date: Yes       Reviewed Discharge instructions Red Flags:      Provided patient with my name and contact information and instructed patient if there are any question to call. No further needs at this time. FU later this week. START taking these medications     Details   oxyCODONE IR (ROXICODONE) 5 mg immediate release tablet Take 1-2 Tabs by mouth every four (4) hours as needed for Pain. Max Daily Amount: 60 mg., Print, Disp-80 Tab, R-0       celecoxib (CELEBREX) 200 mg capsule Take 1 Cap by mouth daily for 30 days. , Print, Disp-30 Cap, R-0       warfarin (COUMADIN) 2.5 mg tablet Take 1 Tab by mouth daily. , Print, Disp-90 Tab, R-1

## 2017-06-05 NOTE — DISCHARGE SUMMARY
Discharge Summary    Physician: Ulysses Rodas MD    Physician Assistant: Clotilde Foote PA-C    Admit Diagnosis:  DJD LEFT KNEE  Primary osteoarthritis of left knee    Final Diagnosis:   1. Advanced degenerative arthritis of left knee . Procedure: Left total knee replacement    Complications: None. History of Present Illness: The patient has a long-standing history of pain within the left knee . The patient has severe degenerative arthritis of the left knee and has exhausted conservative therapy at this time including prior intra-articular injections, activity modifications, OTC NSAIDS. The patient has been limited in their ability to perform ADLs, walk short distances or climb steps appropriately. The patient presents for the above-mentioned procedure. The patient has been cleared from a medical and cardiac standpoint. Past Medical History:  Recorded in the chart. Physical Examination: Also recorded in the chart. The patient is noted for ambulating with an antalgic gait favoring the left side. Examination of the left knee reveals marked patellofemoral crepitus. She has diffuse crepitus of the left knee. She has a varus deformity. She lacks 8 degrees full extension and flexes to 111 degrees. The knee is mostly stable . X-rays reveal marked degenerative changes of the medial, lateral, and patellofemoral joints. No evidence of fracture. Course in the Hospital:  The patient was admitted to the hospital.  The Pt. Underwent a Left total knee replacement . Postoperatively, the pt. did well. The pt. Remained stable from a medical standpoint. The patient ambulated, WABT weightbearing within the hospital with Physical Therapy. The patient continued with this therapy at [facility] with PT. The patient began taking Coumadin pre-operatively for DVT prophylaxis and will continue on this for one month. At the time of discharge, there was no evidence of any DVT noted.   The patient had negative Homans signs bilateral lower extremities. At the time of discharge, the patient's left knee incision appeared with staples intact. No signs of infection or inflammation noted. The patient will follow up in our office in 2-1/2 weeks. DC med list:  Discharge Medication List as of 6/3/2017  9:25 AM      START taking these medications    Details   oxyCODONE IR (ROXICODONE) 5 mg immediate release tablet Take 1-2 Tabs by mouth every four (4) hours as needed for Pain. Max Daily Amount: 60 mg., Print, Disp-80 Tab, R-0      celecoxib (CELEBREX) 200 mg capsule Take 1 Cap by mouth daily for 30 days. , Print, Disp-30 Cap, R-0      warfarin (COUMADIN) 2.5 mg tablet Take 1 Tab by mouth daily. , Print, Disp-90 Tab, R-1         CONTINUE these medications which have NOT CHANGED    Details   ALPRAZolam (XANAX) 0.5 mg tablet Take 1 Tab by mouth two (2) times daily as needed. , Print, Disp-30 Tab, R-2      levothyroxine (SYNTHROID) 112 mcg tablet Take 1 Tab by mouth Daily (before breakfast). , Normal, Disp-30 Tab, R-11      chlorthalidone (HYGROTEN) 25 mg tablet Take 1 Tab by mouth daily. , Normal, Disp-90 Tab, R-3      valsartan (DIOVAN) 160 mg tablet TAKE ONE TABLET BY MOUTH EVERY DAY, Normal, Disp-90 Tab, R-3      ketorolac (ACULAR) 0.5 % ophthalmic solution Administer 1 Drop to both eyes four (4) times daily as needed., Historical Med, Disp-1 Bottle, R-0         STOP taking these medications       diclofenac EC (VOLTAREN) 50 mg EC tablet Comments:   Reason for Stopping:         traMADol-acetaminophen (ULTRACET) 37.5-325 mg per tablet Comments:   Reason for Stopping:               Patient Disposition: Home  Followup Care:  Discharge Condition: good  PT/OT per Home Health  Regular Diet  As directed    Follow-up Information     Follow up With Details Comments Contact Info    Geronimo Rodriguez MD   [address] [facility]  [address], please call office if you have not heard from staff member by 12 noon first full day after discharge  [facility] [address]                  Yaima Tellez PA-C

## 2017-06-06 ENCOUNTER — HOME CARE VISIT (OUTPATIENT)
Dept: SCHEDULING | Facility: HOME HEALTH | Age: 62
End: 2017-06-06
Payer: COMMERCIAL

## 2017-06-06 ENCOUNTER — HOME CARE VISIT (OUTPATIENT)
Dept: HOME HEALTH SERVICES | Facility: HOME HEALTH | Age: 62
End: 2017-06-06
Payer: COMMERCIAL

## 2017-06-06 VITALS
RESPIRATION RATE: 17 BRPM | DIASTOLIC BLOOD PRESSURE: 82 MMHG | TEMPERATURE: 99.1 F | HEART RATE: 66 BPM | OXYGEN SATURATION: 99 % | SYSTOLIC BLOOD PRESSURE: 138 MMHG

## 2017-06-06 VITALS
TEMPERATURE: 98 F | RESPIRATION RATE: 16 BRPM | OXYGEN SATURATION: 99 % | DIASTOLIC BLOOD PRESSURE: 72 MMHG | SYSTOLIC BLOOD PRESSURE: 131 MMHG | HEART RATE: 62 BPM

## 2017-06-06 PROCEDURE — G0157 HHC PT ASSISTANT EA 15: HCPCS

## 2017-06-08 ENCOUNTER — PATIENT OUTREACH (OUTPATIENT)
Dept: OTHER | Age: 62
End: 2017-06-08

## 2017-06-08 ENCOUNTER — HOME CARE VISIT (OUTPATIENT)
Dept: SCHEDULING | Facility: HOME HEALTH | Age: 62
End: 2017-06-08
Payer: COMMERCIAL

## 2017-06-08 VITALS
SYSTOLIC BLOOD PRESSURE: 124 MMHG | DIASTOLIC BLOOD PRESSURE: 78 MMHG | HEART RATE: 67 BPM | RESPIRATION RATE: 17 BRPM | OXYGEN SATURATION: 99 %

## 2017-06-08 VITALS
RESPIRATION RATE: 18 BRPM | SYSTOLIC BLOOD PRESSURE: 120 MMHG | HEART RATE: 76 BPM | DIASTOLIC BLOOD PRESSURE: 78 MMHG | TEMPERATURE: 98.7 F | OXYGEN SATURATION: 98 %

## 2017-06-08 LAB
INR BLD: 1.2 (ref 0.9–1.1)
PT POC: 14.9 SECONDS (ref 11.8–14.9)

## 2017-06-08 PROCEDURE — G0300 HHS/HOSPICE OF LPN EA 15 MIN: HCPCS

## 2017-06-08 PROCEDURE — G0157 HHC PT ASSISTANT EA 15: HCPCS

## 2017-06-08 NOTE — PROGRESS NOTES
ALESSIO follow up. Patient on with surgical Inpatient stay for  TKA  DC . Contacted patient for transitions of care services. Verified  and address for HIPAA security. Ms. Leslie Olivas is doing well at 2 weeks post op. Home health and therapy going well. Still using BSC as riser on toilet. Looking at options for outpt therapy as transition with home health. Tolerating diet well with moderate constipation. Taking stool softener BID now. Using prune juice as well. Pain is managed with hydrocodone, but she doesn't like how is makes her tired and sleepy. Talked about more inflammation needs. She will address doctor and ask for alterative like Toradol. HTN monitored by home health and she has her own BP cuff at home. BP goes up with pain, but not at rest. - She does not recall the specific reading. Monitoring salt intake. FU appt with surgeon in 2 weeks. Will follow for IGGY ENCARNACION services.

## 2017-06-12 ENCOUNTER — HOME CARE VISIT (OUTPATIENT)
Dept: SCHEDULING | Facility: HOME HEALTH | Age: 62
End: 2017-06-12
Payer: COMMERCIAL

## 2017-06-12 LAB
INR BLD: 1.2 (ref 0.9–1.1)
PT POC: 14.3 SECONDS (ref 11.8–14.9)

## 2017-06-12 PROCEDURE — G0299 HHS/HOSPICE OF RN EA 15 MIN: HCPCS

## 2017-06-13 ENCOUNTER — HOME CARE VISIT (OUTPATIENT)
Dept: SCHEDULING | Facility: HOME HEALTH | Age: 62
End: 2017-06-13
Payer: COMMERCIAL

## 2017-06-13 VITALS
HEART RATE: 78 BPM | SYSTOLIC BLOOD PRESSURE: 118 MMHG | RESPIRATION RATE: 18 BRPM | OXYGEN SATURATION: 98 % | DIASTOLIC BLOOD PRESSURE: 56 MMHG

## 2017-06-13 PROCEDURE — G0157 HHC PT ASSISTANT EA 15: HCPCS

## 2017-06-15 ENCOUNTER — HOME CARE VISIT (OUTPATIENT)
Dept: SCHEDULING | Facility: HOME HEALTH | Age: 62
End: 2017-06-15
Payer: COMMERCIAL

## 2017-06-15 VITALS
DIASTOLIC BLOOD PRESSURE: 78 MMHG | TEMPERATURE: 98 F | HEART RATE: 73 BPM | SYSTOLIC BLOOD PRESSURE: 112 MMHG | OXYGEN SATURATION: 91 % | RESPIRATION RATE: 18 BRPM

## 2017-06-15 VITALS
DIASTOLIC BLOOD PRESSURE: 60 MMHG | SYSTOLIC BLOOD PRESSURE: 117 MMHG | RESPIRATION RATE: 17 BRPM | HEART RATE: 69 BPM | OXYGEN SATURATION: 99 %

## 2017-06-15 VITALS
DIASTOLIC BLOOD PRESSURE: 68 MMHG | OXYGEN SATURATION: 99 % | RESPIRATION RATE: 18 BRPM | TEMPERATURE: 98.7 F | HEART RATE: 60 BPM | SYSTOLIC BLOOD PRESSURE: 140 MMHG

## 2017-06-15 LAB
INR BLD: 1.1 (ref 0.9–1.1)
PT POC: 13.2 SECONDS (ref 11.8–14.9)

## 2017-06-15 PROCEDURE — G0299 HHS/HOSPICE OF RN EA 15 MIN: HCPCS

## 2017-06-15 PROCEDURE — G0157 HHC PT ASSISTANT EA 15: HCPCS

## 2017-06-16 ENCOUNTER — HOME CARE VISIT (OUTPATIENT)
Dept: SCHEDULING | Facility: HOME HEALTH | Age: 62
End: 2017-06-16
Payer: COMMERCIAL

## 2017-06-16 PROCEDURE — G0151 HHCP-SERV OF PT,EA 15 MIN: HCPCS

## 2017-06-17 VITALS
SYSTOLIC BLOOD PRESSURE: 128 MMHG | RESPIRATION RATE: 16 BRPM | OXYGEN SATURATION: 98 % | HEART RATE: 60 BPM | DIASTOLIC BLOOD PRESSURE: 62 MMHG | TEMPERATURE: 98.5 F

## 2017-06-22 ENCOUNTER — PATIENT OUTREACH (OUTPATIENT)
Dept: OTHER | Age: 62
End: 2017-06-22

## 2017-06-22 NOTE — PROGRESS NOTES
Telephone outreach to patient attempted for [facility]NC[facility] follow-up for 05/31/17 admission for left TKA. This care manager is covering for Maryann Elizabeth RN. Left discreet VM with my contact information. Chart was reviewed. Ms. Natalia Dorado has completed home health therapy. She has a 06/23/17 appointment to begin outpatient therapy. We will continue to follow.

## 2017-06-23 ENCOUNTER — TELEPHONE (OUTPATIENT)
Dept: INTERNAL MEDICINE CLINIC | Age: 62
End: 2017-06-23

## 2017-06-23 ENCOUNTER — HOSPITAL ENCOUNTER (OUTPATIENT)
Dept: PHYSICAL THERAPY | Age: 62
Discharge: HOME | End: 2017-06-23
Payer: COMMERCIAL

## 2017-06-23 DIAGNOSIS — E03.9 ACQUIRED HYPOTHYROIDISM: Primary | ICD-10-CM

## 2017-06-23 PROCEDURE — 97110 THERAPEUTIC EXERCISES: CPT | Performed by: PHYSICAL THERAPIST

## 2017-06-23 PROCEDURE — 97161 PT EVAL LOW COMPLEX 20 MIN: CPT | Performed by: PHYSICAL THERAPIST

## 2017-06-23 PROCEDURE — 97016 VASOPNEUMATIC DEVICE THERAPY: CPT | Performed by: PHYSICAL THERAPIST

## 2017-06-23 NOTE — PROGRESS NOTES
[facility] [place] [facility]  [address] [facility], [address] [place] [address]  Phone: [phone]  Fax: [phone]    Plan of Care/Statement of Necessity for Physical Therapy Services  2-15    Patient name: Tarsha Ibanez  : [birth year]  Provider#: [id number]  Referral source: Justus Melendez MD      Medical/Treatment Diagnosis: Left knee pain [M25.562]     Prior Hospitalization: see medical history     Comorbidities: see evaluation  Prior Level of Function: see evaluation  Medications: Verified on Patient Summary List    Start of Care: 17      Onset Date: 17       The Plan of Care and following information is based on the information from the initial evaluation. Assessment/ key information: Pt is a 60year old female s/p L TKA on 17 and will benefit from PT to address deficits listed below.     Evaluation Complexity History HIGH Complexity :3+ comorbidities / personal factors will impact the outcome/ POC ; Examination LOW Complexity : 1-2 Standardized tests and measures addressing body structure, function, activity limitation and / or participation in recreation  ;Presentation LOW Complexity : Stable, uncomplicated  ;Clinical Decision Making MEDIUM Complexity : FOTO score of 26-74  Overall Complexity Rating: LOW     Problem List: pain affecting function, decrease ROM, decrease strength, edema affecting function, impaired gait/ balance, decrease ADL/ functional abilitiies, decrease activity tolerance, decrease flexibility/ joint mobility and decrease transfer abilities   Treatment Plan may include any combination of the following: Therapeutic exercise, Therapeutic activities, Neuromuscular re-education, Physical agent/modality, Gait/balance training, Manual therapy, Patient education, Self Care training, Functional mobility training, Home safety training and Stair training  Patient / Family readiness to learn indicated by: asking questions, trying to perform skills and interest  Persons(s) to be included in education: patient (P)  Barriers to Learning/Limitations: None  Patient Goal (s): see evaluation  Patient Self Reported Health Status: good  Rehabilitation Potential: good    Short Term Goals: To be accomplished in 2-3 weeks:  1) Pt will be independent in initial HEP  2) Pt will report > or =25% decrease in exacerbation of symptoms  3) Pt will report being able to sleep through night without being awoken from high pain levels     Long Term Goals: To be accomplished in 6-8 weeks:  1) Pt will increase L knee AROM from 0-120 deg in order to gain full ROM and perform ADL's.  2) Pt will increase L knee flex/ext strength to 5/5 without pain in order to exercise  3) Pt will ambulate in clinic independently with proper gait mechanics  4) Pt will increase FOTO score to at least 71/100 in order to demonstrate increase in functional mobility    Frequency / Duration: Patient to be seen 1-2 times per week for 6-8 weeks. Patient/ Caregiver education and instruction: self care, activity modification and exercises    [x]  Plan of care has been reviewed with SABINO Al, PT 6/23/2017 9:04 AM    ________________________________________________________________________    I certify that the above Therapy Services are being furnished while the patient is under my care. I agree with the treatment plan and certify that this therapy is necessary.     [de-identified] Signature:____________________  Date:____________Time: _________

## 2017-06-23 NOTE — TELEPHONE ENCOUNTER
Pt said she is experiencing irregular heartbeats The best contact number [phone].              From answering service

## 2017-06-23 NOTE — PROGRESS NOTES
[facility]  [address], [place], [address]  Phone: [phone]  Fax: [phone]      PT INITIAL EVALUATION NOTE - [facility] 2-15    Patient Name: Mariam Turcios  Date:2017  : 1955  [x]  Patient  Verified  Payor: Shar Dos Santos / Plan: Aliya Martines / Product Type: PPO /    In time:9:05 AM  Out time: 10:10 AM  Total Treatment Time (min): 65  Total Timed Codes (min): 15  1:1 Treatment Time (MC only): --   Visit #: 1     Treatment Area: Left knee pain [M25.562]    SUBJECTIVE  Any medication changes, allergies to medications, adverse drug reactions, diagnosis change, or new procedure performed?: [] No    [x] Yes (see summary sheet for update)    Current symptoms/chief complaint:   Pt presents s/p Left TKA on 17. Spent three days in hospital-- \"I didn't quite have my balance, the therapist worked with me some extra days. \" She was using rolling walker- switched to [address] approx 1 week ago. Has had HHPT since returning home- nursing and PT, saw PT 2x/wk. She states she is still having a moderate amount of pain and has trouble sleeping at night. Exercises include- quad sets, SLR, knee flexion in chair. She has been icing 2x/day. She states long history of OA of both knees. Had R knee replacement approx 4 years. She saw Dr. Leroy Todd earlier this week for f/u- \"he said everything looked good\". Next f/u in approx 3-4 weeks. Date of onset/injury:  L TKA 17    Aggravated by: walking, prolonged standing    Eased by:  Ice, rest    Pain:   10/10 max 3/10 min 5/10 now       Location and description of symptoms: L knee    Diagnostic Tests: n/a    PMHX: Significant for hypothyroidism- \"lowered doses of my meds because of irregular heart beats, so they stay on top of it.  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 Prior level of function: long history of L knee pain- pain with exercise, standing at work    Patient goal(s): \"strengthen my knee, be able to walk and stand for a longer time, decrease pain and swelling\"    Objective:    Gait:    Pt ambulates with SPC in R UE-- proper sequencing with L LE and good heel stike, mo, and step length. Stairs:  Ascended/descended 4 stairs using unilat HR and SPC and step to gait pattern. Proper LE sequencing while ascending and descending. ROM   Right Left   AROM flex - 104 deg   AROM ext - -4 deg        PROM flex - 108 deg   PROM ext - -       Effusion:  L mid patella: +0.5 cm  L supra patellar: +0.5 cm    Strength- NT at this time. Neurosensory:  Slightly decreased sensation to light touch along distal lateral aspect of incision. Joint Mobility:  L patellar mobs hypomobile- \"strech\" all directions    Outcome Measure:  Using standardized self-reported disability survey (Focus on Therapeutic Outcomes) the patient's perceived disability score is 59/100 - zero is the most disabled and 100 is the least disabled.          OBJECTIVE    15 min Therapeutic Exercise:  [x] See flow sheet : ankle prop, heel slides, quad set, quad set with SLR, sidelying hip abduction   Rationale: increase ROM and increase strength to improve the patients ability to exercise, ambulate independently, return to work    VC- 15 min, L knee          With   [x] TE   [] TA   [] neuro   [] other: Patient Education: [x] Review HEP    [] Progressed/Changed HEP based on:   [x] positioning   [] body mechanics   [] transfers   [x] heat/ice application    [x] other: renee/phys; importance of continued use of ice, rajesh with increased activity levels, cross friction massage over incision for proper healing and decrease sensitivity     Pain Level (0-10 scale) post treatment: not reported    Assessment: See POC    Plan: See Jose Luis Campbell PT, DPT    6/23/2017

## 2017-06-23 NOTE — TELEPHONE ENCOUNTER
Patient states that she started with palpitations again on/off on yesterday and wonders if it could be her thyroid levels again.  Will watch this over the weekend, go to ER if it gets worse and also contact cardiologist on Monday but wants to know what acm thinks about the thyroid issue since this was a problem in the past.

## 2017-06-24 NOTE — TELEPHONE ENCOUNTER
Thyroid levels ordered. Please elt her know.   Does she want us to mail order or will she come and pick it up

## 2017-06-26 ENCOUNTER — HOSPITAL ENCOUNTER (OUTPATIENT)
Dept: PHYSICAL THERAPY | Age: 62
Discharge: HOME | End: 2017-06-26
Payer: COMMERCIAL

## 2017-06-26 PROCEDURE — 97110 THERAPEUTIC EXERCISES: CPT | Performed by: PHYSICAL THERAPIST

## 2017-06-26 PROCEDURE — 97016 VASOPNEUMATIC DEVICE THERAPY: CPT | Performed by: PHYSICAL THERAPIST

## 2017-06-26 PROCEDURE — 97140 MANUAL THERAPY 1/> REGIONS: CPT | Performed by: PHYSICAL THERAPIST

## 2017-06-26 NOTE — TELEPHONE ENCOUNTER
Spoke with the patient using 2 identifiers. Notified her per ACM's recommendations. Mailed the lab slip to her home as requested.

## 2017-06-26 NOTE — PROGRESS NOTES
PT DAILY TREATMENT NOTE 2-15    Patient Name: Libertad Carreon  Date:2017  : 1955  [x]  Patient  Verified  Payor: Michel Roman / Plan: Jenn Thomas / Product Type: PPO /    In time:9:35 AM  Out time:10:50 AM  Total Treatment Time (min): 75 (65 timed)  Visit #: 2     Treatment Area: Left knee pain [M25.562]    SUBJECTIVE  Pain Level (0-10 scale): \"sore\"/10  Any medication changes, allergies to medications, adverse drug reactions, diagnosis change, or new procedure performed?: [x] No    [] Yes (see summary sheet for update)  Subjective functional status/changes:   [] No changes reported  Pt reports feeling \"sore\" today and first visit was \"challenging\". She has been performing HEP- \"I don't know if I was able to get to 2 minutes with having my ankle under that towel but I tried it! \"    OBJECTIVE    Modality rationale: decrease edema, decrease inflammation and decrease pain to improve the patients ability to ambulate independently   Min Type Additional Details    [] Estim: []Att   []Unatt        []TENS instruct                  []IFC  []Premod   []NMES                     []Other:  []w/US   []w/ice   []w/heat  Position:  Location:    []  Traction: [] Cervical       []Lumbar                       [] Prone          []Supine                       []Intermittent   []Continuous Lbs:  [] before manual  [] after manual  []w/heat    []  Ultrasound: []Continuous   [] Pulsed at:                            []1MHz   []3MHz Location:  W/cm2:    []  Paraffin         Location:  []w/heat    []  Ice     []  Heat  []  Ice massage Position:  Location:    []  Laser  []  Other: Position:  Location:   10 [x]  Vasopneumatic Device Pressure:       [] lo [x] med [] hi   Temperature: 34   [x] Skin assessment post-treatment:  [x]intact []redness- no adverse reaction    []redness  adverse reaction:     50 min Therapeutic Exercise:  [x] See flow sheet : added recumbent elliptical, TG squat, fwd/lat step ups Rationale: increase ROM, increase strength and improve balance to improve the patients ability to exercise    15 min Manual Therapy:  L Patellar mobs all planes  Posterior glide of femur in extension to increase ROM  Rectus stretch off table   Rationale: decrease pain, increase ROM and increase tissue extensibility  to improve the patients ability to walk up/down stairs     min Gait Training:  ___ feet with ___ device on level surfaces with ___ level of assist   Rationale: With   [x] TE   [] TA   [] neuro   [] other: Patient Education: [x] Review HEP    [] Progressed/Changed HEP based on:   [] positioning   [] body mechanics   [] transfers   [] heat/ice application    [x] other:      Other Objective/Functional Measures: n/a     Pain Level (0-10 scale) post treatment: not reported    ASSESSMENT/Changes in Function:     Progressing well with ROM, strengthening. Reinforced importance of continued heel slides and ankle prop at home to increase ROM. Patient will continue to benefit from skilled PT services to modify and progress therapeutic interventions, address functional mobility deficits, address ROM deficits, address strength deficits, analyze and address soft tissue restrictions, analyze and cue movement patterns and analyze and modify body mechanics/ergonomics to attain remaining goals.      [x]  See Plan of Care  []  See progress note/recertification  []  See Discharge Summary         Progress towards goals / Updated goals:  nt    PLAN  []  Upgrade activities as tolerated     []  Continue plan of care  []  Update interventions per flow sheet       []  Discharge due to:_  []  Other:_      Jesus Carpio, PT 6/26/2017  9:38 AM

## 2017-06-27 DIAGNOSIS — E03.9 ACQUIRED HYPOTHYROIDISM: ICD-10-CM

## 2017-06-28 ENCOUNTER — HOSPITAL ENCOUNTER (OUTPATIENT)
Dept: PHYSICAL THERAPY | Age: 62
Discharge: HOME | End: 2017-06-28
Payer: COMMERCIAL

## 2017-06-28 PROCEDURE — 97140 MANUAL THERAPY 1/> REGIONS: CPT | Performed by: PHYSICAL THERAPIST

## 2017-06-28 PROCEDURE — 97016 VASOPNEUMATIC DEVICE THERAPY: CPT | Performed by: PHYSICAL THERAPIST

## 2017-06-28 PROCEDURE — 97110 THERAPEUTIC EXERCISES: CPT | Performed by: PHYSICAL THERAPIST

## 2017-06-28 NOTE — PROGRESS NOTES
PT DAILY TREATMENT NOTE 2-15    Patient Name: Torie Cason  Date:2017  : 1955  [x]  Patient  Verified  Payor: Chris Cordoba / Plan: Ruperto Corado / Product Type: PPO /    In time: 10:00 AM  Out time: 11:15 AM  Total Treatment Time (min): 75 (65 timed)  Visit #: 3    Treatment Area: Left knee pain [M25.562]    SUBJECTIVE  Pain Level (0-10 scale): \"sore\"6/10  Any medication changes, allergies to medications, adverse drug reactions, diagnosis change, or new procedure performed?: [x] No    [] Yes (see summary sheet for update)  Subjective functional status/changes:   [] No changes reported  Pt reports a lot of soreness through front and side of L hip today. She has been working on doing the ankle prop at home and can get to 2 min.      OBJECTIVE    Modality rationale: decrease edema, decrease inflammation and decrease pain to improve the patients ability to ambulate independently   Min Type Additional Details    [] Estim: []Att   []Unatt        []TENS instruct                  []IFC  []Premod   []NMES                     []Other:  []w/US   []w/ice   []w/heat  Position:  Location:    []  Traction: [] Cervical       []Lumbar                       [] Prone          []Supine                       []Intermittent   []Continuous Lbs:  [] before manual  [] after manual  []w/heat    []  Ultrasound: []Continuous   [] Pulsed at:                            []1MHz   []3MHz Location:  W/cm2:    []  Paraffin         Location:  []w/heat    []  Ice     []  Heat  []  Ice massage Position:  Location:    []  Laser  []  Other: Position:  Location:   10 [x]  Vasopneumatic Device Pressure:       [] lo [x] med [] hi   Temperature: 34   [x] Skin assessment post-treatment:  [x]intact []redness- no adverse reaction    []redness  adverse reaction:     50 min Therapeutic Exercise:  [x] See flow sheet : added sidestepping today   Rationale: increase ROM, increase strength and improve balance to improve the patients ability to exercise    15 min Manual Therapy:  L Patellar mobs all planes  Posterior glide of femur in extension to increase ROM  Rectus stretch off table   Rationale: decrease pain, increase ROM and increase tissue extensibility  to improve the patients ability to walk up/down stairs     min Gait Training:  ___ feet with ___ device on level surfaces with ___ level of assist   Rationale: With   [x] TE   [] TA   [] neuro   [] other: Patient Education: [x] Review HEP    [] Progressed/Changed HEP based on:   [] positioning   [] body mechanics   [] transfers   [] heat/ice application    [x] other:      Other Objective/Functional Measures: n/a     Pain Level (0-10 scale) post treatment: not reported    ASSESSMENT/Changes in Function:     Pt tolerated therapy session well, challenged with strengthening exercises. Tolerated 2 min on ankle prop. Incision observed today which appears to be healing well without keloid formation- pt has been performing self scar tissue massage at home. Updated HEP to include fwd and lateral step ups. Patient will continue to benefit from skilled PT services to modify and progress therapeutic interventions, address functional mobility deficits, address ROM deficits, address strength deficits, analyze and address soft tissue restrictions, analyze and cue movement patterns and analyze and modify body mechanics/ergonomics to attain remaining goals.      [x]  See Plan of Care  []  See progress note/recertification  []  See Discharge Summary         Progress towards goals / Updated goals:  nt    PLAN  []  Upgrade activities as tolerated     []  Continue plan of care  []  Update interventions per flow sheet       []  Discharge due to:_  []  Other:_      Leonidas Mortimer, PT 6/28/2017  10:19 AM

## 2017-07-03 ENCOUNTER — HOSPITAL ENCOUNTER (OUTPATIENT)
Dept: PHYSICAL THERAPY | Age: 62
Discharge: HOME | End: 2017-07-03
Payer: COMMERCIAL

## 2017-07-03 PROCEDURE — 97140 MANUAL THERAPY 1/> REGIONS: CPT | Performed by: PHYSICAL THERAPIST

## 2017-07-03 PROCEDURE — 97016 VASOPNEUMATIC DEVICE THERAPY: CPT | Performed by: PHYSICAL THERAPIST

## 2017-07-03 PROCEDURE — 97110 THERAPEUTIC EXERCISES: CPT | Performed by: PHYSICAL THERAPIST

## 2017-07-03 NOTE — PROGRESS NOTES
PT DAILY TREATMENT NOTE 2-15    Patient Name: Alexandra Frances  Date:7/3/2017  : 1955  [x]  Patient  Verified  Payor: Akua James / Plan: Alejandro Lynch / Product Type: PPO /    In time: 7:30 AM  Out time: 8:50 AM  Total Treatment Time (min): 80 (65 timed)  Visit #: 4    Treatment Area: Left knee pain [M25.562]    SUBJECTIVE  Pain Level (0-10 scale): 2/10  Any medication changes, allergies to medications, adverse drug reactions, diagnosis change, or new procedure performed?: [x] No    [] Yes (see summary sheet for update)  Subjective functional status/changes:   [] No changes reported  Pt reports feeling of nausea and had vomiting over the weekend-- \"I have acid reflux but it scared me I didn't know what was going on. So I called the doctor who told me that it may be the pain meds and to drink some Pepsi. I made an appointment to make sure everything is okay. It's not until the  so I'm going to keep calling to see if I can get a sooner one. \"  She has been performing HEP     OBJECTIVE    Modality rationale: decrease edema, decrease inflammation and decrease pain to improve the patients ability to ambulate independently   Min Type Additional Details    [] Estim: []Att   []Unatt        []TENS instruct                  []IFC  []Premod   []NMES                     []Other:  []w/US   []w/ice   []w/heat  Position:  Location:    []  Traction: [] Cervical       []Lumbar                       [] Prone          []Supine                       []Intermittent   []Continuous Lbs:  [] before manual  [] after manual  []w/heat    []  Ultrasound: []Continuous   [] Pulsed at:                            []1MHz   []3MHz Location:  W/cm2:    []  Paraffin         Location:  []w/heat    []  Ice     []  Heat  []  Ice massage Position:  Location:    []  Laser  []  Other: Position:  Location:   15 [x]  Vasopneumatic Device Pressure:       [] lo [x] med [] hi   Temperature: 34   [x] Skin assessment post-treatment:  [x]intact []redness- no adverse reaction    []redness  adverse reaction:     55 min Therapeutic Exercise:  [x] See flow sheet : added gastroc stretch at incline   Rationale: increase ROM, increase strength and improve balance to improve the patients ability to exercise    10 min Manual Therapy:  L Patellar mobs all planes  Posterior glide of femur in extension to increase ROM   Rationale: decrease pain, increase ROM and increase tissue extensibility  to improve the patients ability to walk up/down stairs     min Gait Training:  ___ feet with ___ device on level surfaces with ___ level of assist   Rationale: With   [x] TE   [] TA   [] neuro   [] other: Patient Education: [x] Review HEP    [] Progressed/Changed HEP based on:   [] positioning   [] body mechanics   [] transfers   [] heat/ice application    [x] other:      Other Objective/Functional Measures: n/a     Pain Level (0-10 scale) post treatment: 4-5/10    ASSESSMENT/Changes in Function:     Pt tolerated 4 min ankle prop today. Encouraged pt to perform all exercises at home to increase ROM, strengthening. Pt progressing well. Patient will continue to benefit from skilled PT services to modify and progress therapeutic interventions, address functional mobility deficits, address ROM deficits, address strength deficits, analyze and address soft tissue restrictions, analyze and cue movement patterns and analyze and modify body mechanics/ergonomics to attain remaining goals.      [x]  See Plan of Care  []  See progress note/recertification  []  See Discharge Summary         Progress towards goals / Updated goals:  nt    PLAN  []  Upgrade activities as tolerated     []  Continue plan of care  []  Update interventions per flow sheet       []  Discharge due to:_  []  Other:_      Richar Tirado PT 7/3/2017  7:36 AM

## 2017-07-06 ENCOUNTER — HOSPITAL ENCOUNTER (OUTPATIENT)
Dept: PHYSICAL THERAPY | Age: 62
Discharge: HOME | End: 2017-07-06
Payer: COMMERCIAL

## 2017-07-06 PROCEDURE — 97140 MANUAL THERAPY 1/> REGIONS: CPT | Performed by: PHYSICAL THERAPIST

## 2017-07-06 PROCEDURE — 97016 VASOPNEUMATIC DEVICE THERAPY: CPT | Performed by: PHYSICAL THERAPIST

## 2017-07-06 PROCEDURE — 97110 THERAPEUTIC EXERCISES: CPT | Performed by: PHYSICAL THERAPIST

## 2017-07-06 NOTE — PROGRESS NOTES
PT DAILY TREATMENT NOTE 2-15    Patient Name: Michael Number  Date:2017  : 1955  [x]  Patient  Verified  Payor: Rui Castanon / Plan: Kenny Lock / Product Type: PPO /    In time: 9:15 AM  Out time: 10:30 AM  Total Treatment Time (min): 75 (60 timed)  Visit #: 5  RTMD: 17    Treatment Area: Left knee pain [M25.562]    SUBJECTIVE  Pain Level (0-10 scale): 10  Any medication changes, allergies to medications, adverse drug reactions, diagnosis change, or new procedure performed?: [x] No    [] Yes (see summary sheet for update)  Subjective functional status/changes:   [] No changes reported  Pt had car trouble this morning and is 15 min late to appointment. She has been doing her HEP and reports that she wants to call Dr. Jacey Babcock office-- she is having increased \"grinding\" in her R knee, \"its been like that for a while but its gotten worse I think. I am also having R hip pain-- I've had a shot in that hip before, maybe I need another one? \"    L knee is feeling good and she has been trying to walk at home without cane.     OBJECTIVE    Modality rationale: decrease edema, decrease inflammation and decrease pain to improve the patients ability to ambulate independently   Min Type Additional Details    [] Estim: []Att   []Unatt        []TENS instruct                  []IFC  []Premod   []NMES                     []Other:  []w/US   []w/ice   []w/heat  Position:  Location:    []  Traction: [] Cervical       []Lumbar                       [] Prone          []Supine                       []Intermittent   []Continuous Lbs:  [] before manual  [] after manual  []w/heat    []  Ultrasound: []Continuous   [] Pulsed at:                            []1MHz   []3MHz Location:  W/cm2:    []  Paraffin         Location:  []w/heat    []  Ice     []  Heat  []  Ice massage Position:  Location:    []  Laser  []  Other: Position:  Location:   15 [x]  Vasopneumatic Device Pressure:       [] lo [x] med [] hi   Temperature: 34   [x] Skin assessment post-treatment:  [x]intact []redness- no adverse reaction    []redness  adverse reaction:     45 min Therapeutic Exercise:  [x] See flow sheet :     Added step drill in // bars-- focus on heel strike on L   Rationale: increase ROM, increase strength and improve balance to improve the patients ability to exercise    15 min Manual Therapy:  L Patellar mobs all planes  Posterior glide of femur in extension to increase ROM  Rectus stretch off table  Prone quad stretch   Rationale: decrease pain, increase ROM and increase tissue extensibility  to improve the patients ability to walk up/down stairs     min Gait Training:  ___ feet with ___ device on level surfaces with ___ level of assist   Rationale: With   [x] TE   [] TA   [] neuro   [] other: Patient Education: [x] Review HEP    [] Progressed/Changed HEP based on:   [] positioning   [] body mechanics   [] transfers   [] heat/ice application    [x] other:      Other Objective/Functional Measures: n/a     Pain Level (0-10 scale) post treatment: 2-3/10    ASSESSMENT/Changes in Function:     Added 1# weight to ankle prop today- able to tolerate 4 min hold. She is progressing well with strengthening and ROM. Performed step drill in //bars-- verbal cueing and visual feedback utilized for proper heel strike and weight shifting. Patient will continue to benefit from skilled PT services to modify and progress therapeutic interventions, address functional mobility deficits, address ROM deficits, address strength deficits, analyze and address soft tissue restrictions, analyze and cue movement patterns and analyze and modify body mechanics/ergonomics to attain remaining goals.      [x]  See Plan of Care  []  See progress note/recertification  []  See Discharge Summary         Progress towards goals / Updated goals:  nt    PLAN  []  Upgrade activities as tolerated     []  Continue plan of care  []  Update interventions per flow sheet       []  Discharge due to:_  []  Other:_      Juana Payan, PT 7/6/2017  9:41 AM

## 2017-07-07 ENCOUNTER — PATIENT OUTREACH (OUTPATIENT)
Dept: OTHER | Age: 62
End: 2017-07-07

## 2017-07-07 NOTE — PROGRESS NOTES
Resolving current episode (Transitions of care complete). No further ED/UC or hospital admissions within 30 days post discharge for TKA. Patient attended follow-up appointments as directed. No outreach from patient to [address] [place] [address]. Previous contacts made and Ms. Kathleen Nicole was open to [address] [place] [address], but did not identify needs for further CM. Left discreet VM to contact me if any continued needs arise.

## 2017-07-10 ENCOUNTER — HOSPITAL ENCOUNTER (OUTPATIENT)
Dept: PHYSICAL THERAPY | Age: 62
Discharge: HOME | End: 2017-07-10
Payer: COMMERCIAL

## 2017-07-10 PROCEDURE — 97140 MANUAL THERAPY 1/> REGIONS: CPT | Performed by: PHYSICAL THERAPIST

## 2017-07-10 PROCEDURE — 97110 THERAPEUTIC EXERCISES: CPT | Performed by: PHYSICAL THERAPIST

## 2017-07-10 PROCEDURE — 97016 VASOPNEUMATIC DEVICE THERAPY: CPT | Performed by: PHYSICAL THERAPIST

## 2017-07-10 NOTE — PROGRESS NOTES
PT DAILY TREATMENT NOTE 2-15    Patient Name: Rocky De Luna  Date:7/10/2017  : 1955  [x]  Patient  Verified  Payor: Alejandrina Cortes / Plan: Alcides De Souza / Product Type: PPO /    In time: 10:30 AM  Out time: 12:05 PM  Total Treatment Time (min): 95 (80 timed)  Visit #: 6  RTMD: 17    Treatment Area: Left knee pain [M25.562]    SUBJECTIVE  Pain Level (0-10 scale): 3/10  Any medication changes, allergies to medications, adverse drug reactions, diagnosis change, or new procedure performed?: [x] No    [] Yes (see summary sheet for update)  Subjective functional status/changes:   [] No changes reported  Pt states increased low back pain today and grinding in her R knee-- \"I need to call the doctor\".  She states her left knee is doing good-- feels like its getting stronger, \"it still gets those shooting pains every once-in-a-while though\"   Pt states she is supposed to return to work at the end of August    OBJECTIVE    Modality rationale: decrease edema, decrease inflammation and decrease pain to improve the patients ability to ambulate independently   Min Type Additional Details    [] Estim: []Att   []Unatt        []TENS instruct                  []IFC  []Premod   []NMES                     []Other:  []w/US   []w/ice   []w/heat  Position:  Location:    []  Traction: [] Cervical       []Lumbar                       [] Prone          []Supine                       []Intermittent   []Continuous Lbs:  [] before manual  [] after manual  []w/heat    []  Ultrasound: []Continuous   [] Pulsed at:                            []1MHz   []3MHz Location:  W/cm2:    []  Paraffin         Location:  []w/heat    []  Ice     []  Heat  []  Ice massage Position:  Location:    []  Laser  []  Other: Position:  Location:   15 [x]  Vasopneumatic Device Pressure:       [] lo [x] med [] hi   Temperature: 34   [x] Skin assessment post-treatment:  [x]intact []redness- no adverse reaction    []redness  adverse reaction:     65 min Therapeutic Exercise:  [x] See flow sheet :     Added tandem stance. Progressed exercises per flow chart   Rationale: increase ROM, increase strength and improve balance to improve the patients ability to exercise    15 min Manual Therapy:  L Patellar mobs all planes  Posterior glide of femur in extension to increase ROM  Rectus stretch off table  Prone quad stretch   Rationale: decrease pain, increase ROM and increase tissue extensibility  to improve the patients ability to walk up/down stairs     min Gait Training:  ___ feet with ___ device on level surfaces with ___ level of assist   Rationale: With   [x] TE   [] TA   [] neuro   [] other: Patient Education: [x] Review HEP    [] Progressed/Changed HEP based on:   [] positioning   [] body mechanics   [] transfers   [] heat/ice application    [x] other:      Other Objective/Functional Measures: n/a     Pain Level (0-10 scale) post treatment: 2/10    ASSESSMENT/Changes in Function:     Cues for decreased trunk lean and symmetrical stance time bilaterally while ambulating independently in clinic today- pt able to correct gait abnormalities with visual feedback and verbal cueing. Progressing well with ROM and strengthening. MD note next visit. Patient will continue to benefit from skilled PT services to modify and progress therapeutic interventions, address functional mobility deficits, address ROM deficits, address strength deficits, analyze and address soft tissue restrictions, analyze and cue movement patterns and analyze and modify body mechanics/ergonomics to attain remaining goals.      [x]  See Plan of Care  []  See progress note/recertification  []  See Discharge Summary         Progress towards goals / Updated goals:  nt    PLAN  []  Upgrade activities as tolerated     []  Continue plan of care  []  Update interventions per flow sheet       []  Discharge due to:_  []  Other:_  MD note next visit    Kashif Cabrera PT 7/10/2017  10:35 AM

## 2017-07-12 ENCOUNTER — HOSPITAL ENCOUNTER (OUTPATIENT)
Dept: PHYSICAL THERAPY | Age: 62
Discharge: HOME | End: 2017-07-12
Payer: COMMERCIAL

## 2017-07-12 PROCEDURE — 97016 VASOPNEUMATIC DEVICE THERAPY: CPT | Performed by: PHYSICAL THERAPIST

## 2017-07-12 PROCEDURE — 97140 MANUAL THERAPY 1/> REGIONS: CPT | Performed by: PHYSICAL THERAPIST

## 2017-07-12 PROCEDURE — 97110 THERAPEUTIC EXERCISES: CPT | Performed by: PHYSICAL THERAPIST

## 2017-07-12 NOTE — PROGRESS NOTES
PT DAILY TREATMENT NOTE 2-15    Patient Name: Torie Cason  Date:2017  : 1955  [x]  Patient  Verified  Payor: Chris Cordoba / Plan: Ruperto Corado / Product Type: PPO /    In time: 8:45 AM  Out time: 9:50 PM  Total Treatment Time (min): 65 (55 timed)  Visit #: 7  RTMD: 17    Treatment Area: Left knee pain [M25.562]    SUBJECTIVE  Pain Level (0-10 scale): 3/10  Any medication changes, allergies to medications, adverse drug reactions, diagnosis change, or new procedure performed?: [x] No    [] Yes (see summary sheet for update)  Subjective functional status/changes:   [] No changes reported  Pt reports mild pain in L knee. She has been compliant with HEP  Pt states that she has had increased \"grinding\" in R knee. \"I feel like its going to pop out of place\". She also reports increased low back pain. OBJECTIVE    Modality rationale: decrease edema, decrease inflammation and decrease pain to improve the patients ability to ambulate independently   Min Type Additional Details    [] Estim: []Att   []Unatt        []TENS instruct                  []IFC  []Premod   []NMES                     []Other:  []w/US   []w/ice   []w/heat  Position:  Location:    []  Traction: [] Cervical       []Lumbar                       [] Prone          []Supine                       []Intermittent   []Continuous Lbs:  [] before manual  [] after manual  []w/heat    []  Ultrasound: []Continuous   [] Pulsed at:                            []1MHz   []3MHz Location:  W/cm2:    []  Paraffin         Location:  []w/heat    []  Ice     []  Heat  []  Ice massage Position:  Location:    []  Laser  []  Other: Position:  Location:   10 [x]  Vasopneumatic Device Pressure:       [] lo [x] med [] hi   Temperature: 34   [x] Skin assessment post-treatment:  [x]intact []redness- no adverse reaction    []redness  adverse reaction:     45 min Therapeutic Exercise:  [x] See flow sheet :     Added prone hang . Progressed exercises per flow chart  Measurements taken for MD note. Rationale: increase ROM, increase strength and improve balance to improve the patients ability to exercise    10 min Manual Therapy:  L Patellar mobs all planes  Posterior glide of femur in extension to increase ROM  Rectus stretch off table   Rationale: decrease pain, increase ROM and increase tissue extensibility  to improve the patients ability to walk up/down stairs     min Gait Training:  ___ feet with ___ device on level surfaces with ___ level of assist   Rationale: With   [x] TE   [] TA   [] neuro   [] other: Patient Education: [x] Review HEP    [] Progressed/Changed HEP based on:   [] positioning   [] body mechanics   [] transfers   [] heat/ice application    [x] other:      Other Objective/Functional Measures:   See MD note. Pain Level (0-10 scale) post treatment: 0/10    ASSESSMENT/Changes in Function:     See MD note. Patient will continue to benefit from skilled PT services to modify and progress therapeutic interventions, address functional mobility deficits, address ROM deficits, address strength deficits, analyze and address soft tissue restrictions, analyze and cue movement patterns and analyze and modify body mechanics/ergonomics to attain remaining goals. [x]  See Plan of Care  []  See progress note/recertification  []  See Discharge Summary         Progress towards goals / Updated goals:  Short Term Goals: To be accomplished in 2-3 weeks:  1) Pt will be independent in initial HEP  2) Pt will report > or =25% decrease in exacerbation of symptoms  3) Pt will report being able to sleep through night without being awoken from high pain levels                        Long Term Goals:  To be accomplished in 6-8 weeks:  1) Pt will increase L knee AROM from 0-120 deg in order to gain full ROM and perform ADL's.  2) Pt will increase L knee flex/ext strength to 5/5 without pain in order to exercise  3) Pt will ambulate in clinic independently with proper gait mechanics  4) Pt will increase FOTO score to at least 71/100 in order to demonstrate increase in functional mobility    PLAN  []  Upgrade activities as tolerated     [x]  Continue plan of care  []  Update interventions per flow sheet       []  Discharge due to:_  []  Other:_      Roberto Carlos Sierra, PT 7/12/2017  9:00 AM

## 2017-07-12 NOTE — PROGRESS NOTES
PT to MD NOTE 2-15    Patient Name: Makayla Campbell  Date:2017  : 1955  Visit #: 7  RTMD: 17    Treatment Area: Left knee pain [M25.562]  L TKA 17    SUBJECTIVE  Pain Level (0-10 scale): 3/10  Pt reports mild pain in L knee. She has been compliant with HEP    Pt states that she has had increased \"grinding\" in R knee. \"I feel like its going to pop out of place\". She also reports increased low back pain. OBJECTIVE    L knee ext AROM= -3 deg  L knee flex AROM= 112 deg  L knee flex PROM= 117 deg     Effusion:  Mid patella: +1 cm  Supra patella: +0.5 cm     Pain Level (0-10 scale) post treatment: 2/10    ASSESSMENT/Changes in Function:     Pt progressing well with L knee ROM. She is transitioning to ambulating independently without SPC-- continues to require cues for decreased trunk lean and symmetrical stance time. Advised pt that abnormal gait could be contributing to low back pain at this time. Pt continuing to progress with strengthening and proprioceptive exercises. Please advise, thank you!     Veronica Morton, PT, DPT 2017  9:14 AM

## 2017-07-19 ENCOUNTER — HOSPITAL ENCOUNTER (OUTPATIENT)
Dept: PHYSICAL THERAPY | Age: 62
Discharge: HOME | End: 2017-07-19
Payer: COMMERCIAL

## 2017-07-19 PROCEDURE — 97110 THERAPEUTIC EXERCISES: CPT | Performed by: PHYSICAL THERAPIST

## 2017-07-19 PROCEDURE — 97140 MANUAL THERAPY 1/> REGIONS: CPT | Performed by: PHYSICAL THERAPIST

## 2017-07-19 NOTE — PROGRESS NOTES
PT DAILY TREATMENT NOTE 2-15    Patient Name: Lisa Gonzales  Date:2017  : 1955  [x]  Patient  Verified  Payor: Mitzi Bain / Plan: Mary García / Product Type: PPO /    In time: 9:30 AM  Out time: 10:45 PM  Total Treatment Time (min): 75 (65 timed)  Visit #: 8    Treatment Area: Left knee pain [M25.562]    SUBJECTIVE  Pain Level (0-10 scale): 2-3/10  Any medication changes, allergies to medications, adverse drug reactions, diagnosis change, or new procedure performed?: [x] No    [] Yes (see summary sheet for update)  Subjective functional status/changes:   [] No changes reported  Pt states she has to have surgery on her R knee-- Dr. Iglesia Ward is going to take out scar tissue-- \"he said he didn't have to redo the whole thing\". Surgery is ; she has a bone scan for R knee on Friday. \"He also looked at my back and told me I have scoliosis. He gave me a shot in my back\" She states her back has been feeling better since the shot.   \"he said the left one is doing good\"     OBJECTIVE    Modality rationale: decrease edema, decrease inflammation and decrease pain to improve the patients ability to ambulate independently   Min Type Additional Details    [] Estim: []Att   []Unatt        []TENS instruct                  []IFC  []Premod   []NMES                     []Other:  []w/US   []w/ice   []w/heat  Position:  Location:    []  Traction: [] Cervical       []Lumbar                       [] Prone          []Supine                       []Intermittent   []Continuous Lbs:  [] before manual  [] after manual  []w/heat    []  Ultrasound: []Continuous   [] Pulsed at:                            []1MHz   []3MHz Location:  W/cm2:    []  Paraffin         Location:  []w/heat   10 [x]  Ice     []  Heat  []  Ice massage Position: supine  Location: L knee    []  Laser  []  Other: Position:  Location:   -- [x]  Vasopneumatic Device Pressure:       [] lo [x] med [] hi   Temperature: 34   [x] Skin assessment post-treatment:  [x]intact []redness- no adverse reaction    []redness  adverse reaction:     50 min Therapeutic Exercise:  [x] See flow sheet :     Added TKE   Rationale: increase ROM, increase strength and improve balance to improve the patients ability to exercise    15 min Manual Therapy:  L Patellar mobs all planes  Posterior glide of femur in extension to increase ext ROM  Rectus stretch off table  Quad stretch in prone for increased L knee flex ROM   Rationale: decrease pain, increase ROM and increase tissue extensibility  to improve the patients ability to walk up/down stairs     min Gait Training:  ___ feet with ___ device on level surfaces with ___ level of assist   Rationale: With   [x] TE   [] TA   [] neuro   [] other: Patient Education: [x] Review HEP    [] Progressed/Changed HEP based on:   [] positioning   [] body mechanics   [] transfers   [] heat/ice application    [x] other:      Other Objective/Functional Measures:   n/a    Pain Level (0-10 scale) post treatment: 3/10    ASSESSMENT/Changes in Function:     Pt continues to ambulate with antalgic gait-- most likely due to pain in bilateral knees. Updated HEP to include sidestepping with tband and TKE. Pt progressing well with strengthening and ROM. Patient will continue to benefit from skilled PT services to modify and progress therapeutic interventions, address functional mobility deficits, address ROM deficits, address strength deficits, analyze and address soft tissue restrictions, analyze and cue movement patterns and analyze and modify body mechanics/ergonomics to attain remaining goals. [x]  See Plan of Care  []  See progress note/recertification  []  See Discharge Summary         Progress towards goals / Updated goals:  Short Term Goals:  To be accomplished in 2-3 weeks:  1) Pt will be independent in initial HEP  2) Pt will report > or =25% decrease in exacerbation of symptoms  3) Pt will report being able to sleep through night without being awoken from high pain levels                        Long Term Goals:  To be accomplished in 6-8 weeks:  1) Pt will increase L knee AROM from 0-120 deg in order to gain full ROM and perform ADL's.  2) Pt will increase L knee flex/ext strength to 5/5 without pain in order to exercise  3) Pt will ambulate in clinic independently with proper gait mechanics  4) Pt will increase FOTO score to at least 71/100 in order to demonstrate increase in functional mobility    PLAN  []  Upgrade activities as tolerated     [x]  Continue plan of care  []  Update interventions per flow sheet       []  Discharge due to:_  []  Other:_      Hanh Merchant PT 7/19/2017  9:38 AM

## 2017-07-21 ENCOUNTER — HOSPITAL ENCOUNTER (OUTPATIENT)
Dept: PHYSICAL THERAPY | Age: 62
Discharge: HOME | End: 2017-07-21
Payer: COMMERCIAL

## 2017-07-21 ENCOUNTER — HOSPITAL ENCOUNTER (OUTPATIENT)
Dept: NUCLEAR MEDICINE | Age: 62
Discharge: HOME | End: 2017-07-21
Attending: ORTHOPAEDIC SURGERY
Payer: COMMERCIAL

## 2017-07-21 DIAGNOSIS — M25.561 RIGHT KNEE PAIN: ICD-10-CM

## 2017-07-21 PROCEDURE — 97110 THERAPEUTIC EXERCISES: CPT | Performed by: PHYSICAL THERAPIST

## 2017-07-21 PROCEDURE — 78315 BONE IMAGING 3 PHASE: CPT

## 2017-07-21 PROCEDURE — 97140 MANUAL THERAPY 1/> REGIONS: CPT | Performed by: PHYSICAL THERAPIST

## 2017-07-21 NOTE — PROGRESS NOTES
PT DAILY TREATMENT NOTE 2-15    Patient Name: Pretty Robins  Date:2017  : 1955  [x]  Patient  Verified  Payor: Rody Navarro / Plan: Moustapha Garcia / Product Type: PPO /    In time: 8:25 AM  Out time: 10:00 AM  Total Treatment Time (min): 95 (85 timed)  Visit #: 9    Treatment Area: Left knee pain [M25.562]    SUBJECTIVE  Pain Level (0-10 scale): 3/10  Any medication changes, allergies to medications, adverse drug reactions, diagnosis change, or new procedure performed?: [x] No    [] Yes (see summary sheet for update)  Subjective functional status/changes:   [] No changes reported  Pt is going for her bone density scan this morning after PT appointment.  \"I'm looking forward to getting this right knee taken care of\"    OBJECTIVE    Modality rationale: decrease edema, decrease inflammation and decrease pain to improve the patients ability to ambulate independently   Min Type Additional Details    [] Estim: []Att   []Unatt        []TENS instruct                  []IFC  []Premod   []NMES                     []Other:  []w/US   []w/ice   []w/heat  Position:  Location:    []  Traction: [] Cervical       []Lumbar                       [] Prone          []Supine                       []Intermittent   []Continuous Lbs:  [] before manual  [] after manual  []w/heat    []  Ultrasound: []Continuous   [] Pulsed at:                            []1MHz   []3MHz Location:  W/cm2:    []  Paraffin         Location:  []w/heat   10 [x]  Ice     []  Heat  []  Ice massage Position: supine  Location: L knee    []  Laser  []  Other: Position:  Location:   -- [x]  Vasopneumatic Device Pressure:       [] lo [x] med [] hi   Temperature: 34   [x] Skin assessment post-treatment:  [x]intact []redness- no adverse reaction    []redness  adverse reaction:     70 min Therapeutic Exercise:  [x] See flow sheet :     Added HS stretch at step, HS curl machine   Rationale: increase ROM, increase strength and improve balance to improve the patients ability to exercise    15 min Manual Therapy:  L Patellar mobs all planes  Posterior glide of femur in extension to increase ext ROM  Rectus stretch off table  Quad stretch in prone for increased L knee flex ROM   Rationale: decrease pain, increase ROM and increase tissue extensibility  to improve the patients ability to walk up/down stairs     min Gait Training:  ___ feet with ___ device on level surfaces with ___ level of assist   Rationale: With   [x] TE   [] TA   [] neuro   [] other: Patient Education: [x] Review HEP    [] Progressed/Changed HEP based on:   [] positioning   [] body mechanics   [] transfers   [] heat/ice application    [x] other:      Other Objective/Functional Measures:   n/a    Pain Level (0-10 scale) post treatment: 4/10    ASSESSMENT/Changes in Function:     Pt progressing well with strengthening and ROM. Tolerated addition of new exercises well without pain. Patient will continue to benefit from skilled PT services to modify and progress therapeutic interventions, address functional mobility deficits, address ROM deficits, address strength deficits, analyze and address soft tissue restrictions, analyze and cue movement patterns and analyze and modify body mechanics/ergonomics to attain remaining goals. [x]  See Plan of Care  []  See progress note/recertification  []  See Discharge Summary         Progress towards goals / Updated goals:  Short Term Goals: To be accomplished in 2-3 weeks:  1) Pt will be independent in initial HEP  2) Pt will report > or =25% decrease in exacerbation of symptoms  3) Pt will report being able to sleep through night without being awoken from high pain levels                        Long Term Goals:  To be accomplished in 6-8 weeks:  1) Pt will increase L knee AROM from 0-120 deg in order to gain full ROM and perform ADL's.  2) Pt will increase L knee flex/ext strength to 5/5 without pain in order to exercise  3) Pt will ambulate in clinic independently with proper gait mechanics  4) Pt will increase FOTO score to at least 71/100 in order to demonstrate increase in functional mobility    PLAN  []  Upgrade activities as tolerated     [x]  Continue plan of care  []  Update interventions per flow sheet       []  Discharge due to:_  []  Other:_      Tarah Roberto, PT 7/21/2017  9:06 AM

## 2017-07-24 ENCOUNTER — HOSPITAL ENCOUNTER (OUTPATIENT)
Dept: PHYSICAL THERAPY | Age: 62
Discharge: HOME | End: 2017-07-24
Payer: COMMERCIAL

## 2017-07-24 PROCEDURE — 97140 MANUAL THERAPY 1/> REGIONS: CPT | Performed by: PHYSICAL THERAPIST

## 2017-07-24 PROCEDURE — 97110 THERAPEUTIC EXERCISES: CPT | Performed by: PHYSICAL THERAPIST

## 2017-07-24 NOTE — PROGRESS NOTES
PT DAILY TREATMENT/Progress NOTE 2-15    Patient Name: Michelle City  Date:2017  : 1955  [x]  Patient  Verified  Payor: Duglas Ndiaye / Plan: Fer Garza / Product Type: PPO /    In time: 9:30 AM  Out time: 11:00 AM  Total Treatment Time (min): 90 (80 timed)  Visit #: 10    Treatment Area: Left knee pain [M25.562]    SUBJECTIVE  Pain Level (0-10 scale): 10  Any medication changes, allergies to medications, adverse drug reactions, diagnosis change, or new procedure performed?: [x] No    [] Yes (see summary sheet for update)  Subjective functional status/changes:   [] No changes reported  Pt states the \"shooting pain\" through her L knee is getting better-- the pain is coming less often.  Pt feels 60% improvement since beginning PT.     OBJECTIVE    Modality rationale: decrease edema, decrease inflammation and decrease pain to improve the patients ability to ambulate independently   Min Type Additional Details    [] Estim: []Att   []Unatt        []TENS instruct                  []IFC  []Premod   []NMES                     []Other:  []w/US   []w/ice   []w/heat  Position:  Location:    []  Traction: [] Cervical       []Lumbar                       [] Prone          []Supine                       []Intermittent   []Continuous Lbs:  [] before manual  [] after manual  []w/heat    []  Ultrasound: []Continuous   [] Pulsed at:                            []1MHz   []3MHz Location:  W/cm2:    []  Paraffin         Location:  []w/heat   10 [x]  Ice     []  Heat  []  Ice massage Position: supine  Location: L knee    []  Laser  []  Other: Position:  Location:   -- [x]  Vasopneumatic Device Pressure:       [] lo [x] med [] hi   Temperature: 34   [x] Skin assessment post-treatment:  [x]intact []redness- no adverse reaction    []redness  adverse reaction:     65 min Therapeutic Exercise:  [x] See flow sheet :    Rationale: increase ROM, increase strength and improve balance to improve the patients ability to exercise    15 min Manual Therapy:  L Patellar mobs all planes  Posterior glide of femur in extension to increase ext ROM  Rectus stretch off table  Quad stretch in prone for increased L knee flex ROM   Rationale: decrease pain, increase ROM and increase tissue extensibility  to improve the patients ability to walk up/down stairs     min Gait Training:  ___ feet with ___ device on level surfaces with ___ level of assist   Rationale: With   [x] TE   [] TA   [] neuro   [] other: Patient Education: [x] Review HEP    [] Progressed/Changed HEP based on:   [] positioning   [] body mechanics   [] transfers   [] heat/ice application    [x] other:      Other Objective/Functional Measures:   AROM L knee ext= -3 deg  AROM L knee flex= 114 deg    PROM L knee flex= 118 deg deg    Pt ambulating independently without AD. Demonstrates slightly antalgic gait. FOTO= 51/100 (59 at eval)    Pain Level (0-10 scale) post treatment: 4/10    ASSESSMENT/Changes in Function:     Pt has completed 10 skilled PT visits. She has met 2/3 STG's and 0/4 LTG's. Pt demonstrates improved L knee AROM, strength, and proprioception. She is currently exhibiting high R knee pain levels due to \"popping and grinding\" from previous R TKA and has surgery scheduled for 8/3/17 to fix repair. Patient will continue to benefit from skilled PT services to modify and progress therapeutic interventions, address functional mobility deficits, address ROM deficits, address strength deficits, analyze and address soft tissue restrictions, analyze and cue movement patterns and analyze and modify body mechanics/ergonomics to attain remaining goals. [x]  See Plan of Care  []  See progress note/recertification  []  See Discharge Summary         Progress towards goals / Updated goals:  Short Term Goals:  To be accomplished in 2-3 weeks:  1) Pt will be independent in initial HEP MET  2) Pt will report > or =25% decrease in exacerbation of symptoms MET  3) Pt will report being able to sleep through night without being awoken from high pain levels Progressing                       Long Term Goals: To be accomplished in 6-8 weeks:  1) Pt will increase L knee AROM from 0-120 deg in order to gain full ROM and perform ADL's.  Progressing  2) Pt will increase L knee flex/ext strength to 5/5 without pain in order to exercise Progressing  3) Pt will ambulate in clinic independently with proper gait mechanics Progressing  4) Pt will increase FOTO score to at least 71/100 in order to demonstrate increase in functional mobility Progressing    PLAN  []  Upgrade activities as tolerated     [x]  Continue plan of care  []  Update interventions per flow sheet       []  Discharge due to:_  []  Other:_      Simon Johnson PT 7/24/2017  9:27 AM

## 2017-07-26 ENCOUNTER — HOSPITAL ENCOUNTER (OUTPATIENT)
Dept: PHYSICAL THERAPY | Age: 62
Discharge: HOME | End: 2017-07-26
Payer: COMMERCIAL

## 2017-07-26 PROCEDURE — 97110 THERAPEUTIC EXERCISES: CPT | Performed by: PHYSICAL THERAPIST

## 2017-07-26 PROCEDURE — 97016 VASOPNEUMATIC DEVICE THERAPY: CPT | Performed by: PHYSICAL THERAPIST

## 2017-07-26 PROCEDURE — 97140 MANUAL THERAPY 1/> REGIONS: CPT | Performed by: PHYSICAL THERAPIST

## 2017-07-26 NOTE — PROGRESS NOTES
PT DAILY TREATMENT NOTE 2-15    Patient Name: Tyrell Quintana  Date:2017  : 1955  [x]  Patient  Verified  Payor: Vincent Larson / Plan: Sohan Fletcher / Product Type: PPO /    In time: 9:45 AM  Out time: 11:00 AM  Total Treatment Time (min): 75 (60 timed)  Visit #: 11    Treatment Area: Left knee pain [M25.562]    SUBJECTIVE  Pain Level (0-10 scale): 4/10  Any medication changes, allergies to medications, adverse drug reactions, diagnosis change, or new procedure performed?: [x] No    [] Yes (see summary sheet for update)  Subjective functional status/changes:   [] No changes reported  Pt states her L knee is still sore, painful.  \"but its getting there I think\"    OBJECTIVE    Modality rationale: decrease edema, decrease inflammation and decrease pain to improve the patients ability to ambulate independently   Min Type Additional Details    [] Estim: []Att   []Unatt        []TENS instruct                  []IFC  []Premod   []NMES                     []Other:  []w/US   []w/ice   []w/heat  Position:  Location:    []  Traction: [] Cervical       []Lumbar                       [] Prone          []Supine                       []Intermittent   []Continuous Lbs:  [] before manual  [] after manual  []w/heat    []  Ultrasound: []Continuous   [] Pulsed at:                            []1MHz   []3MHz Location:  W/cm2:    []  Paraffin         Location:  []w/heat   -- [x]  Ice     []  Heat  []  Ice massage Position: supine  Location: L knee    []  Laser  []  Other: Position:  Location:   15 [x]  Vasopneumatic Device Pressure:       [] lo [x] med [] hi   Temperature: 34   [x] Skin assessment post-treatment:  [x]intact []redness- no adverse reaction    []redness  adverse reaction:     50 min Therapeutic Exercise:  [x] See flow sheet :   Progressed exercises per flow chart   Rationale: increase ROM, increase strength and improve balance to improve the patients ability to exercise    10 min Manual Therapy:  L Patellar mobs all planes  Posterior glide of femur in extension to increase ext ROM  Quad stretch in prone for increased L knee flex ROM   Rationale: decrease pain, increase ROM and increase tissue extensibility  to improve the patients ability to walk up/down stairs     min Gait Training:  ___ feet with ___ device on level surfaces with ___ level of assist   Rationale: With   [x] TE   [] TA   [] neuro   [] other: Patient Education: [x] Review HEP    [] Progressed/Changed HEP based on:   [] positioning   [] body mechanics   [] transfers   [] heat/ice application    [x] other:      Other Objective/Functional Measures:   n/a    Pain Level (0-10 scale) post treatment: 0/10    ASSESSMENT/Changes in Function:     Pt tolerated progression of exercises well today. ROM progressing well. Patient will continue to benefit from skilled PT services to modify and progress therapeutic interventions, address functional mobility deficits, address ROM deficits, address strength deficits, analyze and address soft tissue restrictions, analyze and cue movement patterns and analyze and modify body mechanics/ergonomics to attain remaining goals. [x]  See Plan of Care  []  See progress note/recertification  []  See Discharge Summary         Progress towards goals / Updated goals:  Short Term Goals: To be accomplished in 2-3 weeks:  1) Pt will be independent in initial HEP MET  2) Pt will report > or =25% decrease in exacerbation of symptoms MET  3) Pt will report being able to sleep through night without being awoken from high pain levels Progressing                       Long Term Goals: To be accomplished in 6-8 weeks:  1) Pt will increase L knee AROM from 0-120 deg in order to gain full ROM and perform ADL's.  Progressing  2) Pt will increase L knee flex/ext strength to 5/5 without pain in order to exercise Progressing  3) Pt will ambulate in clinic independently with proper gait mechanics Progressing  4) Pt will increase FOTO score to at least 71/100 in order to demonstrate increase in functional mobility Progressing    PLAN  []  Upgrade activities as tolerated     [x]  Continue plan of care  []  Update interventions per flow sheet       []  Discharge due to:_  []  Other:_      Leigh Chaudhry, PT 7/26/2017  9:53 AM

## 2017-07-27 ENCOUNTER — HOSPITAL ENCOUNTER (OUTPATIENT)
Dept: PREADMISSION TESTING | Age: 62
Discharge: HOME | End: 2017-07-27
Payer: COMMERCIAL

## 2017-07-27 VITALS
HEART RATE: 68 BPM | RESPIRATION RATE: 16 BRPM | WEIGHT: 214.13 LBS | OXYGEN SATURATION: 100 % | DIASTOLIC BLOOD PRESSURE: 76 MMHG | BODY MASS INDEX: 34.41 KG/M2 | SYSTOLIC BLOOD PRESSURE: 122 MMHG | HEIGHT: 66 IN

## 2017-07-27 LAB
ABO + RH BLD: NORMAL
ANION GAP BLD CALC-SCNC: 5 MMOL/L (ref 5–15)
APPEARANCE UR: CLEAR
BACTERIA URNS QL MICRO: NEGATIVE /HPF
BILIRUB UR QL: NEGATIVE
BLOOD GROUP ANTIBODIES SERPL: NORMAL
BUN SERPL-MCNC: 16 MG/DL (ref 6–20)
BUN/CREAT SERPL: 23 (ref 12–20)
CALCIUM SERPL-MCNC: 9.1 MG/DL (ref 8.5–10.1)
CHLORIDE SERPL-SCNC: 99 MMOL/L (ref 97–108)
CO2 SERPL-SCNC: 31 MMOL/L (ref 21–32)
COLOR UR: NORMAL
CREAT SERPL-MCNC: 0.71 MG/DL (ref 0.55–1.02)
CRP SERPL HS-MCNC: 0.2 MG/L
EPITH CASTS URNS QL MICRO: NORMAL /LPF
ERYTHROCYTE [DISTWIDTH] IN BLOOD BY AUTOMATED COUNT: 14 % (ref 11.5–14.5)
ERYTHROCYTE [SEDIMENTATION RATE] IN BLOOD: 8 MM/HR (ref 0–30)
EST. AVERAGE GLUCOSE BLD GHB EST-MCNC: 103 MG/DL
GLUCOSE SERPL-MCNC: 79 MG/DL (ref 65–100)
GLUCOSE UR STRIP.AUTO-MCNC: NEGATIVE MG/DL
HBA1C MFR BLD: 5.2 % (ref 4.2–6.3)
HCT VFR BLD AUTO: 38.1 % (ref 35–47)
HGB BLD-MCNC: 12.1 G/DL (ref 11.5–16)
HGB UR QL STRIP: NEGATIVE
HYALINE CASTS URNS QL MICRO: NORMAL /LPF (ref 0–5)
INR PPP: 1 (ref 0.9–1.1)
KETONES UR QL STRIP.AUTO: NEGATIVE MG/DL
LEUKOCYTE ESTERASE UR QL STRIP.AUTO: NEGATIVE
MCH RBC QN AUTO: 29.4 PG (ref 26–34)
MCHC RBC AUTO-ENTMCNC: 31.8 G/DL (ref 30–36.5)
MCV RBC AUTO: 92.7 FL (ref 80–99)
NITRITE UR QL STRIP.AUTO: NEGATIVE
PH UR STRIP: 6 [PH] (ref 5–8)
PLATELET # BLD AUTO: 271 K/UL (ref 150–400)
POTASSIUM SERPL-SCNC: 3.9 MMOL/L (ref 3.5–5.1)
PROT UR STRIP-MCNC: NEGATIVE MG/DL
PROTHROMBIN TIME: 10.1 SEC (ref 9–11.1)
RBC # BLD AUTO: 4.11 M/UL (ref 3.8–5.2)
RBC #/AREA URNS HPF: NORMAL /HPF (ref 0–5)
SODIUM SERPL-SCNC: 135 MMOL/L (ref 136–145)
SP GR UR REFRACTOMETRY: 1.01 (ref 1–1.03)
SPECIMEN EXP DATE BLD: NORMAL
UA: UC IF INDICATED,UAUC: NORMAL
UROBILINOGEN UR QL STRIP.AUTO: 0.2 EU/DL (ref 0.2–1)
WBC # BLD AUTO: 4.8 K/UL (ref 3.6–11)
WBC URNS QL MICRO: NORMAL /HPF (ref 0–4)

## 2017-07-27 PROCEDURE — 83036 HEMOGLOBIN GLYCOSYLATED A1C: CPT | Performed by: ORTHOPAEDIC SURGERY

## 2017-07-27 PROCEDURE — 81001 URINALYSIS AUTO W/SCOPE: CPT | Performed by: ORTHOPAEDIC SURGERY

## 2017-07-27 PROCEDURE — 86141 C-REACTIVE PROTEIN HS: CPT | Performed by: ORTHOPAEDIC SURGERY

## 2017-07-27 PROCEDURE — 36415 COLL VENOUS BLD VENIPUNCTURE: CPT | Performed by: ORTHOPAEDIC SURGERY

## 2017-07-27 PROCEDURE — 86900 BLOOD TYPING SEROLOGIC ABO: CPT | Performed by: ORTHOPAEDIC SURGERY

## 2017-07-27 PROCEDURE — 80048 BASIC METABOLIC PNL TOTAL CA: CPT | Performed by: ORTHOPAEDIC SURGERY

## 2017-07-27 PROCEDURE — 85652 RBC SED RATE AUTOMATED: CPT | Performed by: ORTHOPAEDIC SURGERY

## 2017-07-27 PROCEDURE — 85027 COMPLETE CBC AUTOMATED: CPT | Performed by: ORTHOPAEDIC SURGERY

## 2017-07-27 PROCEDURE — 85610 PROTHROMBIN TIME: CPT | Performed by: ORTHOPAEDIC SURGERY

## 2017-07-27 RX ORDER — TRAMADOL HYDROCHLORIDE AND ACETAMINOPHEN 37.5; 325 MG/1; MG/1
1 TABLET ORAL
COMMUNITY
End: 2017-08-05

## 2017-07-27 RX ORDER — DICLOFENAC SODIUM 50 MG/1
50 TABLET, DELAYED RELEASE ORAL
COMMUNITY
End: 2017-08-05

## 2017-07-27 NOTE — PERIOP NOTES
PREOPERATIVE INSTRUCTIONS REVIEWED WITH PATIENT. PATIENT GIVEN SIX PACK OF CHG WIPES. INSTRUCTIONS REVIEWED ON USE OF CHG WIPES. PATIENT GIVEN SSI INFECTION SHEET. MRSA/MSSA TREATMENT INSTRUCTION SHEET GIVEN WITH AN EXPLANATION TO PATIENT THAT THEY WILL BE NOTIFIED IF TREATMENT INSTRUCTIONS NEED TO BE INITIATED. PATIENT WAS GIVEN THE  OPPORTUNITY TO ASK QUESTIONS ON THE INFORMATION PROVIDED.

## 2017-07-28 LAB
BACTERIA SPEC CULT: NORMAL
BACTERIA SPEC CULT: NORMAL
SERVICE CMNT-IMP: NORMAL

## 2017-07-31 ENCOUNTER — HOSPITAL ENCOUNTER (OUTPATIENT)
Dept: PHYSICAL THERAPY | Age: 62
Discharge: HOME | End: 2017-07-31
Payer: COMMERCIAL

## 2017-07-31 PROCEDURE — 97016 VASOPNEUMATIC DEVICE THERAPY: CPT | Performed by: PHYSICAL THERAPIST

## 2017-07-31 PROCEDURE — 97110 THERAPEUTIC EXERCISES: CPT | Performed by: PHYSICAL THERAPIST

## 2017-07-31 PROCEDURE — 97140 MANUAL THERAPY 1/> REGIONS: CPT | Performed by: PHYSICAL THERAPIST

## 2017-08-02 ENCOUNTER — HOSPITAL ENCOUNTER (OUTPATIENT)
Dept: PHYSICAL THERAPY | Age: 62
Discharge: HOME | End: 2017-08-02
Payer: COMMERCIAL

## 2017-08-02 ENCOUNTER — ANESTHESIA EVENT (OUTPATIENT)
Dept: SURGERY | Age: 62
DRG: 464 | End: 2017-08-02
Payer: COMMERCIAL

## 2017-08-02 PROBLEM — Z96.659 MECHANICAL COMPLICATION OF KNEE PROSTHESIS (HCC): Status: ACTIVE | Noted: 2017-08-02

## 2017-08-02 PROBLEM — T84.098A MECHANICAL COMPLICATION OF KNEE PROSTHESIS (HCC): Status: ACTIVE | Noted: 2017-08-02

## 2017-08-02 PROCEDURE — 97016 VASOPNEUMATIC DEVICE THERAPY: CPT | Performed by: PHYSICAL THERAPIST

## 2017-08-02 PROCEDURE — 97110 THERAPEUTIC EXERCISES: CPT | Performed by: PHYSICAL THERAPIST

## 2017-08-02 NOTE — PROGRESS NOTES
PT DAILY TREATMENT NOTE 2-15    Patient Name: Estrella Edward  Date:2017  : 1955  [x]  Patient  Verified  Payor: Mati Bass / Plan: Lg Frame / Product Type: PPO /    In time: 9:20 AM  Out time: 10:15 AM  Total Treatment Time (min): 55 (40 timed)  Visit #: 13    Treatment Area: Left knee pain [M25.562]    SUBJECTIVE  Pain Level (0-10 scale): 3/10  Any medication changes, allergies to medications, adverse drug reactions, diagnosis change, or new procedure performed?: [x] No    [] Yes (see summary sheet for update)  Subjective functional status/changes:   [] No changes reported  Pt has surgery on R knee tomorrow to fix R TKA. \"we can just do some light exercises today right? \"    OBJECTIVE    Modality rationale: decrease edema, decrease inflammation and decrease pain to improve the patients ability to ambulate independently   Min Type Additional Details    [] Estim: []Att   []Unatt        []TENS instruct                  []IFC  []Premod   []NMES                     []Other:  []w/US   []w/ice   []w/heat  Position:  Location:    []  Traction: [] Cervical       []Lumbar                       [] Prone          []Supine                       []Intermittent   []Continuous Lbs:  [] before manual  [] after manual  []w/heat    []  Ultrasound: []Continuous   [] Pulsed at:                            []1MHz   []3MHz Location:  W/cm2:    []  Paraffin         Location:  []w/heat   -- [x]  Ice     []  Heat  []  Ice massage Position: supine  Location: L knee    []  Laser  []  Other: Position:  Location:   15 [x]  Vasopneumatic Device Pressure:       [] lo [x] med [] hi   Temperature: 34   [x] Skin assessment post-treatment:  [x]intact []redness- no adverse reaction    []redness  adverse reaction:     40 min Therapeutic Exercise:  [x] See flow sheet :   Reviewed HEP   Rationale: increase ROM, increase strength and improve balance to improve the patients ability to exercise    Held on manual today min Manual Therapy:  L Patellar mobs all planes  Posterior glide of femur in extension to increase ext ROM today  Quad stretch in prone for increased L knee flex ROM   Rationale: decrease pain, increase ROM and increase tissue extensibility  to improve the patients ability to walk up/down stairs     min Gait Training:  ___ feet with ___ device on level surfaces with ___ level of assist   Rationale: With   [x] TE   [] TA   [] neuro   [] other: Patient Education: [x] Review HEP    [] Progressed/Changed HEP based on:   [] positioning   [] body mechanics   [] transfers   [] heat/ice application    [x] other:      Other Objective/Functional Measures:   n/a    Pain Level (0-10 scale) post treatment: 3/10    ASSESSMENT/Changes in Function:     Encouraged pt to continue performing table strengthening exercises, ankle prop, and prone hang for L knee while at home next week-- she can perform standing exercises as tolerated by R knee surgery. Patient will continue to benefit from skilled PT services to modify and progress therapeutic interventions, address functional mobility deficits, address ROM deficits, address strength deficits, analyze and address soft tissue restrictions, analyze and cue movement patterns and analyze and modify body mechanics/ergonomics to attain remaining goals. [x]  See Plan of Care  []  See progress note/recertification  []  See Discharge Summary         Progress towards goals / Updated goals:  Short Term Goals: To be accomplished in 2-3 weeks:  1) Pt will be independent in initial HEP MET  2) Pt will report > or =25% decrease in exacerbation of symptoms MET  3) Pt will report being able to sleep through night without being awoken from high pain levels Progressing                       Long Term Goals: To be accomplished in 6-8 weeks:  1) Pt will increase L knee AROM from 0-120 deg in order to gain full ROM and perform ADL's.  Progressing  2) Pt will increase L knee flex/ext strength to 5/5 without pain in order to exercise Progressing  3) Pt will ambulate in clinic independently with proper gait mechanics Progressing  4) Pt will increase FOTO score to at least 71/100 in order to demonstrate increase in functional mobility Progressing    PLAN  []  Upgrade activities as tolerated     [x]  Continue plan of care  []  Update interventions per flow sheet       []  Discharge due to:_  []  Other:_      Prema Jeffries PT 8/2/2017  9:38 AM

## 2017-08-02 NOTE — H&P
Jose Guardado comes in for post-op evaluation of the left knee. She underwent left total knee replacement on 5/31/17. She is progressing very well with the knee but still complains of some pain symptoms. She also comes in for evaluation of right knee pain symptoms. She underwent right total knee replacement on 5/01/13 and now has a popping sensation in the right knee. She did not have an injury to the right knee. She also comes in today with lower back pain. She does not have hip pain. She has no evidence of nerve impingement. No signs of infection. Objective:   Constitutional:  No acute distress. Well nourished. Well developed. Eyes:  Sclera are nonicteric. Respiratory:  No labored breathing. Cardiovascular:  No marked edema. Skin:  No marked skin ulcers. Neurological:  No marked sensory loss noted. Psychiatric: Alert and oriented x3. Musculoskeletal      Right knee has full extension. Flexes 120. The incisions look good. She does ambulate with a slight limp. The cruciate and collateral ligaments of both knees are stable. No sign of infection. No effusion. No cellulitis or rash. No evidence of calf pain, no sign of DVT. The extensor mechanism is intact. The neurovascular status is intact.      Radiographs:       X-ray Lumbar Spine 2 Or 3 Views (34650)     Result Date: 7/17/2017  AP, Lat. Notes Indication(s): Pain of lower back. Impression: I ordered and interpreted X-rays of the lumbar spine. The X-rays reveal scoliosis of the lumbar spine and diffuse DJD. No fractures, no sign of metastatic disease.      X-ray Knee Left 1 Or 2 Views     Result Date: 6/19/2017  Views: Standing AP. Notes Indication(s): Left knee postop Impression: Two views left knee reveal no obvious acute abnormalities, there is noted total knee prosthetic be in appropriate position with a noted tibial stem in place.      X-ray Knee Right 3 Views     Result Date: 7/17/2017  Views: Standing AP, Lat, [place].  Notes Indication(s): Pain of right knee. Impression: I ordered and interpreted X-rays of the right knee. The X-rays reveal evidence of polyethylene wear. No obvious loosening of prosthesis. No fracture, no evidence of metastatic disease.          Assessment:      1. History of total left knee replacement    2. Status post right knee replacement    3. DDD (degenerative disc disease), lumbar    4. Scoliosis of lumbar spine, unspecified scoliosis type    5. Lumbar strain, initial encounter    6. Right knee pain, unspecified chronicity    7. Myofascitis            Plan:   I believe that the patient is doing well with the left knee. I believe that she will continue to progress and eventually with continued exercises her pain symptoms will improve. I explained the pathophysiology of scar tissue in the right knee behind the patella as a cause for popping sensation in the knee. I explained that I believe she could have some loose ligaments in the right knee and I would like for her to have a bone scan. I described the procedure of polyethylene exchange with the patient in the right knee prosthesis. I also explained that I would remove the scar tissue behind her patella. I explained the hospitalization, post-op and rehabilitation for the procedure. The patient is aware of all complications associated with the surgery, including the chance of infection and blood clots. She understands that she would be on anti-coagulants following surgery to prevent DVT and that she would undergo a course of post-op physical therapy for rehabilitation. PROCEDURE: Excision of fibroma right total knee replacement and polyethylene exchange    Date of Surgery Update:  Alvarado Jacobson was seen and examined.   Past Medical History:   Diagnosis Date    Arrhythmia     \"PALPATATIONS, FLUTTERING, POSSIBLE MEDS PER DR. Justin Rao"    Arthritis     knees, back    Chronic pain     LOWER BACK, R HIP    Duodenal ulcer 2002    GERD (gastroesophageal reflux disease)     H/O blood clots 2007    TO RIGHT EYE    Hypertension     Iritis 2007    both eyes    Nausea & vomiting     Other ill-defined conditions     back pain/spasm    PONV (postoperative nausea and vomiting)     Thyroid disease     hypothroid    Ulcerative colitis 2002     Prior to Admission Medications   Prescriptions Last Dose Informant Patient Reported? Taking? ALPRAZolam (XANAX) 0.5 mg tablet 7/27/2017 at Unknown time  No Yes   Sig: Take 1 Tab by mouth two (2) times daily as needed. acetaminophen (TYLENOL) 500 mg tablet 7/3/2017 at Unknown time  Yes Yes   Sig: Take 500 mg by mouth every six (6) hours as needed for Pain. chlorthalidone (HYGROTEN) 25 mg tablet 7/27/2017 at Unknown time  No Yes   Sig: Take 1 Tab by mouth daily. diclofenac EC (VOLTAREN) 50 mg EC tablet 7/3/2017 at Unknown time  Yes Yes   Sig: Take 50 mg by mouth daily (with breakfast). docusate sodium (COLACE) 100 mg capsule 7/3/2017 at Unknown time  Yes Yes   Sig: Take 100 mg by mouth two (2) times daily as needed for Constipation. 1-2 tabs PRN constipation   hydrocortisone (PROCTOZONE-HC) 2.5 % rectal cream 7/27/2017 at Unknown time  Yes Yes   Sig: Insert 1 g into rectum once as needed. ketorolac (ACULAR) 0.5 % ophthalmic solution 8/3/2017 at Unknown time  Yes Yes   Sig: Administer 2 Drops to both eyes four (4) times daily as needed. levothyroxine (SYNTHROID) 112 mcg tablet 8/2/2017 at Unknown time  No Yes   Sig: Take 1 Tab by mouth Daily (before breakfast). traMADol-acetaminophen (ULTRACET) 37.5-325 mg per tablet 8/2/2017 at Unknown time  Yes Yes   Sig: Take 1 Tab by mouth every eight (8) hours as needed for Pain.   valsartan (DIOVAN) 160 mg tablet 8/3/2017 at 0515  No Yes   Sig: TAKE ONE TABLET BY MOUTH EVERY DAY      Facility-Administered Medications: None      Allergy to: Adhesive tape-silicones;  Lidoderm [lidocaine]; and Other medication  Physical Examination: General appearance - alert, well appearing, and in no distress  History and physical has been reviewed. The patient has been examined.  There have been no significant clinical changes since the completion of the originally dated History and Physical.    Signed By: Gerhardt Heal, PA-C     August 3, 2017 7:42 AM

## 2017-08-03 ENCOUNTER — HOSPITAL ENCOUNTER (INPATIENT)
Age: 62
LOS: 2 days | Discharge: HOME HEALTH | DRG: 464 | End: 2017-08-05
Attending: ORTHOPAEDIC SURGERY | Admitting: ORTHOPAEDIC SURGERY
Payer: COMMERCIAL

## 2017-08-03 ENCOUNTER — ANESTHESIA (OUTPATIENT)
Dept: SURGERY | Age: 62
DRG: 464 | End: 2017-08-03
Payer: COMMERCIAL

## 2017-08-03 PROBLEM — E66.9 OBESITY (BMI 30.0-34.9): Chronic | Status: ACTIVE | Noted: 2017-08-03

## 2017-08-03 LAB
GLUCOSE BLD STRIP.AUTO-MCNC: 86 MG/DL (ref 65–100)
SERVICE CMNT-IMP: NORMAL

## 2017-08-03 PROCEDURE — 74011000250 HC RX REV CODE- 250

## 2017-08-03 PROCEDURE — 74011000258 HC RX REV CODE- 258: Performed by: ORTHOPAEDIC SURGERY

## 2017-08-03 PROCEDURE — 77030018846 HC SOL IRR STRL H20 ICUM -A: Performed by: ORTHOPAEDIC SURGERY

## 2017-08-03 PROCEDURE — 77030035236 HC SUT PDS STRATFX BARB J&J -B: Performed by: ORTHOPAEDIC SURGERY

## 2017-08-03 PROCEDURE — 0SPC09Z REMOVAL OF LINER FROM RIGHT KNEE JOINT, OPEN APPROACH: ICD-10-PCS | Performed by: ORTHOPAEDIC SURGERY

## 2017-08-03 PROCEDURE — 76210000016 HC OR PH I REC 1 TO 1.5 HR: Performed by: ORTHOPAEDIC SURGERY

## 2017-08-03 PROCEDURE — 74011250636 HC RX REV CODE- 250/636: Performed by: PHYSICIAN ASSISTANT

## 2017-08-03 PROCEDURE — C1776 JOINT DEVICE (IMPLANTABLE): HCPCS | Performed by: ORTHOPAEDIC SURGERY

## 2017-08-03 PROCEDURE — 77030020782 HC GWN BAIR PAWS FLX 3M -B

## 2017-08-03 PROCEDURE — 77030000032 HC CUF TRNQT ZIMM -B: Performed by: ORTHOPAEDIC SURGERY

## 2017-08-03 PROCEDURE — 0JBN0ZZ EXCISION OF RIGHT LOWER LEG SUBCUTANEOUS TISSUE AND FASCIA, OPEN APPROACH: ICD-10-PCS | Performed by: MEDICAL GENETICS

## 2017-08-03 PROCEDURE — 77030020788: Performed by: ORTHOPAEDIC SURGERY

## 2017-08-03 PROCEDURE — 0SUC09C SUPPLEMENT RIGHT KNEE JOINT WITH LINER, PATELLAR SURFACE, OPEN APPROACH: ICD-10-PCS | Performed by: ORTHOPAEDIC SURGERY

## 2017-08-03 PROCEDURE — C9290 INJ, BUPIVACAINE LIPOSOME: HCPCS | Performed by: ORTHOPAEDIC SURGERY

## 2017-08-03 PROCEDURE — 97116 GAIT TRAINING THERAPY: CPT

## 2017-08-03 PROCEDURE — 77030007866 HC KT SPN ANES BBMI -B: Performed by: ANESTHESIOLOGY

## 2017-08-03 PROCEDURE — 74011250636 HC RX REV CODE- 250/636

## 2017-08-03 PROCEDURE — 97161 PT EVAL LOW COMPLEX 20 MIN: CPT

## 2017-08-03 PROCEDURE — 77030034850: Performed by: ORTHOPAEDIC SURGERY

## 2017-08-03 PROCEDURE — 76010000171 HC OR TIME 2 TO 2.5 HR INTENSV-TIER 1: Performed by: ORTHOPAEDIC SURGERY

## 2017-08-03 PROCEDURE — 77030003601 HC NDL NRV BLK BBMI -A

## 2017-08-03 PROCEDURE — 87075 CULTR BACTERIA EXCEPT BLOOD: CPT | Performed by: ORTHOPAEDIC SURGERY

## 2017-08-03 PROCEDURE — 74011000250 HC RX REV CODE- 250: Performed by: ORTHOPAEDIC SURGERY

## 2017-08-03 PROCEDURE — 77030012935 HC DRSG AQUACEL BMS -B: Performed by: ORTHOPAEDIC SURGERY

## 2017-08-03 PROCEDURE — 77030012893

## 2017-08-03 PROCEDURE — 77030018836 HC SOL IRR NACL ICUM -A: Performed by: ORTHOPAEDIC SURGERY

## 2017-08-03 PROCEDURE — 82962 GLUCOSE BLOOD TEST: CPT

## 2017-08-03 PROCEDURE — 77030031139 HC SUT VCRL2 J&J -A: Performed by: ORTHOPAEDIC SURGERY

## 2017-08-03 PROCEDURE — 76060000035 HC ANESTHESIA 2 TO 2.5 HR: Performed by: ORTHOPAEDIC SURGERY

## 2017-08-03 PROCEDURE — 77030027138 HC INCENT SPIROMETER -A

## 2017-08-03 PROCEDURE — 74011250636 HC RX REV CODE- 250/636: Performed by: ANESTHESIOLOGY

## 2017-08-03 PROCEDURE — 77030032491 HC SLV COMPR SCD KNE XL COVD -B: Performed by: ORTHOPAEDIC SURGERY

## 2017-08-03 PROCEDURE — 87205 SMEAR GRAM STAIN: CPT | Performed by: ORTHOPAEDIC SURGERY

## 2017-08-03 PROCEDURE — 74011250637 HC RX REV CODE- 250/637: Performed by: PHYSICIAN ASSISTANT

## 2017-08-03 PROCEDURE — 74011250636 HC RX REV CODE- 250/636: Performed by: ORTHOPAEDIC SURGERY

## 2017-08-03 PROCEDURE — 65270000029 HC RM PRIVATE

## 2017-08-03 PROCEDURE — 77030011640 HC PAD GRND REM COVD -A: Performed by: ORTHOPAEDIC SURGERY

## 2017-08-03 PROCEDURE — 74011000250 HC RX REV CODE- 250: Performed by: PHYSICIAN ASSISTANT

## 2017-08-03 DEVICE — IMPLANTABLE DEVICE: Type: IMPLANTABLE DEVICE | Site: KNEE | Status: FUNCTIONAL

## 2017-08-03 RX ORDER — SODIUM CHLORIDE 0.9 % (FLUSH) 0.9 %
5-10 SYRINGE (ML) INJECTION AS NEEDED
Status: DISCONTINUED | OUTPATIENT
Start: 2017-08-03 | End: 2017-08-03 | Stop reason: HOSPADM

## 2017-08-03 RX ORDER — LOSARTAN POTASSIUM 50 MG/1
100 TABLET ORAL DAILY
Status: DISCONTINUED | OUTPATIENT
Start: 2017-08-04 | End: 2017-08-05 | Stop reason: HOSPADM

## 2017-08-03 RX ORDER — PROPOFOL 10 MG/ML
INJECTION, EMULSION INTRAVENOUS
Status: DISCONTINUED | OUTPATIENT
Start: 2017-08-03 | End: 2017-08-03 | Stop reason: HOSPADM

## 2017-08-03 RX ORDER — PROPOFOL 10 MG/ML
INJECTION, EMULSION INTRAVENOUS AS NEEDED
Status: DISCONTINUED | OUTPATIENT
Start: 2017-08-03 | End: 2017-08-03 | Stop reason: HOSPADM

## 2017-08-03 RX ORDER — LIDOCAINE HYDROCHLORIDE 10 MG/ML
0.1 INJECTION, SOLUTION EPIDURAL; INFILTRATION; INTRACAUDAL; PERINEURAL AS NEEDED
Status: DISCONTINUED | OUTPATIENT
Start: 2017-08-03 | End: 2017-08-03 | Stop reason: HOSPADM

## 2017-08-03 RX ORDER — OXYCODONE HYDROCHLORIDE 5 MG/1
5 TABLET ORAL
Status: DISCONTINUED | OUTPATIENT
Start: 2017-08-03 | End: 2017-08-05 | Stop reason: HOSPADM

## 2017-08-03 RX ORDER — OXYCODONE HYDROCHLORIDE 5 MG/1
10 TABLET ORAL
Status: DISCONTINUED | OUTPATIENT
Start: 2017-08-03 | End: 2017-08-05 | Stop reason: HOSPADM

## 2017-08-03 RX ORDER — MORPHINE SULFATE 10 MG/ML
2 INJECTION, SOLUTION INTRAMUSCULAR; INTRAVENOUS
Status: DISCONTINUED | OUTPATIENT
Start: 2017-08-03 | End: 2017-08-03 | Stop reason: HOSPADM

## 2017-08-03 RX ORDER — ROPIVACAINE HYDROCHLORIDE 5 MG/ML
150 INJECTION, SOLUTION EPIDURAL; INFILTRATION; PERINEURAL AS NEEDED
Status: DISCONTINUED | OUTPATIENT
Start: 2017-08-03 | End: 2017-08-03 | Stop reason: HOSPADM

## 2017-08-03 RX ORDER — SODIUM CHLORIDE 0.9 % (FLUSH) 0.9 %
5-10 SYRINGE (ML) INJECTION AS NEEDED
Status: DISCONTINUED | OUTPATIENT
Start: 2017-08-03 | End: 2017-08-05 | Stop reason: HOSPADM

## 2017-08-03 RX ORDER — SODIUM CHLORIDE 0.9 % (FLUSH) 0.9 %
5-10 SYRINGE (ML) INJECTION EVERY 8 HOURS
Status: DISCONTINUED | OUTPATIENT
Start: 2017-08-03 | End: 2017-08-03 | Stop reason: HOSPADM

## 2017-08-03 RX ORDER — FENTANYL CITRATE 50 UG/ML
INJECTION, SOLUTION INTRAMUSCULAR; INTRAVENOUS AS NEEDED
Status: DISCONTINUED | OUTPATIENT
Start: 2017-08-03 | End: 2017-08-03 | Stop reason: HOSPADM

## 2017-08-03 RX ORDER — ACETAMINOPHEN 325 MG/1
650 TABLET ORAL EVERY 6 HOURS
Status: DISCONTINUED | OUTPATIENT
Start: 2017-08-03 | End: 2017-08-05 | Stop reason: HOSPADM

## 2017-08-03 RX ORDER — MIDAZOLAM HYDROCHLORIDE 1 MG/ML
1 INJECTION, SOLUTION INTRAMUSCULAR; INTRAVENOUS AS NEEDED
Status: DISCONTINUED | OUTPATIENT
Start: 2017-08-03 | End: 2017-08-03 | Stop reason: HOSPADM

## 2017-08-03 RX ORDER — CELECOXIB 200 MG/1
200 CAPSULE ORAL ONCE
Status: COMPLETED | OUTPATIENT
Start: 2017-08-03 | End: 2017-08-03

## 2017-08-03 RX ORDER — CELECOXIB 200 MG/1
200 CAPSULE ORAL 2 TIMES DAILY
Status: DISCONTINUED | OUTPATIENT
Start: 2017-08-03 | End: 2017-08-05 | Stop reason: HOSPADM

## 2017-08-03 RX ORDER — FAMOTIDINE 20 MG/1
20 TABLET, FILM COATED ORAL
Status: DISCONTINUED | OUTPATIENT
Start: 2017-08-03 | End: 2017-08-05 | Stop reason: HOSPADM

## 2017-08-03 RX ORDER — SODIUM CHLORIDE 0.9 % (FLUSH) 0.9 %
5-10 SYRINGE (ML) INJECTION EVERY 8 HOURS
Status: DISCONTINUED | OUTPATIENT
Start: 2017-08-04 | End: 2017-08-05 | Stop reason: HOSPADM

## 2017-08-03 RX ORDER — LEVOTHYROXINE SODIUM 112 UG/1
112 TABLET ORAL
Status: DISCONTINUED | OUTPATIENT
Start: 2017-08-04 | End: 2017-08-05 | Stop reason: HOSPADM

## 2017-08-03 RX ORDER — HYDROXYZINE HYDROCHLORIDE 10 MG/1
10 TABLET, FILM COATED ORAL
Status: DISCONTINUED | OUTPATIENT
Start: 2017-08-03 | End: 2017-08-05 | Stop reason: HOSPADM

## 2017-08-03 RX ORDER — ONDANSETRON 2 MG/ML
INJECTION INTRAMUSCULAR; INTRAVENOUS AS NEEDED
Status: DISCONTINUED | OUTPATIENT
Start: 2017-08-03 | End: 2017-08-03 | Stop reason: HOSPADM

## 2017-08-03 RX ORDER — FENTANYL CITRATE 50 UG/ML
50 INJECTION, SOLUTION INTRAMUSCULAR; INTRAVENOUS AS NEEDED
Status: DISCONTINUED | OUTPATIENT
Start: 2017-08-03 | End: 2017-08-03 | Stop reason: HOSPADM

## 2017-08-03 RX ORDER — ALPRAZOLAM 0.25 MG/1
0.5 TABLET ORAL
Status: DISCONTINUED | OUTPATIENT
Start: 2017-08-03 | End: 2017-08-05 | Stop reason: HOSPADM

## 2017-08-03 RX ORDER — DEXAMETHASONE SODIUM PHOSPHATE 10 MG/ML
10 INJECTION INTRAMUSCULAR; INTRAVENOUS ONCE
Status: DISCONTINUED | OUTPATIENT
Start: 2017-08-03 | End: 2017-08-03 | Stop reason: HOSPADM

## 2017-08-03 RX ORDER — CEFAZOLIN SODIUM IN 0.9 % NACL 2 G/50 ML
2 INTRAVENOUS SOLUTION, PIGGYBACK (ML) INTRAVENOUS EVERY 8 HOURS
Status: DISCONTINUED | OUTPATIENT
Start: 2017-08-03 | End: 2017-08-03 | Stop reason: HOSPADM

## 2017-08-03 RX ORDER — HYDROMORPHONE HYDROCHLORIDE 1 MG/ML
INJECTION, SOLUTION INTRAMUSCULAR; INTRAVENOUS; SUBCUTANEOUS AS NEEDED
Status: DISCONTINUED | OUTPATIENT
Start: 2017-08-03 | End: 2017-08-03 | Stop reason: HOSPADM

## 2017-08-03 RX ORDER — CEFAZOLIN SODIUM IN 0.9 % NACL 2 G/50 ML
2 INTRAVENOUS SOLUTION, PIGGYBACK (ML) INTRAVENOUS EVERY 8 HOURS
Status: COMPLETED | OUTPATIENT
Start: 2017-08-03 | End: 2017-08-04

## 2017-08-03 RX ORDER — MAG HYDROX/ALUMINUM HYD/SIMETH 200-200-20
30 SUSPENSION, ORAL (FINAL DOSE FORM) ORAL
Status: DISCONTINUED | OUTPATIENT
Start: 2017-08-03 | End: 2017-08-05 | Stop reason: HOSPADM

## 2017-08-03 RX ORDER — AMOXICILLIN 250 MG
1 CAPSULE ORAL 2 TIMES DAILY
Status: DISCONTINUED | OUTPATIENT
Start: 2017-08-03 | End: 2017-08-04

## 2017-08-03 RX ORDER — FACIAL-BODY WIPES
10 EACH TOPICAL DAILY PRN
Status: DISCONTINUED | OUTPATIENT
Start: 2017-08-05 | End: 2017-08-05 | Stop reason: HOSPADM

## 2017-08-03 RX ORDER — ACETAMINOPHEN 325 MG/1
325-650 TABLET ORAL
Status: DISCONTINUED | OUTPATIENT
Start: 2017-08-03 | End: 2017-08-05 | Stop reason: HOSPADM

## 2017-08-03 RX ORDER — VALSARTAN 80 MG/1
160 TABLET ORAL DAILY
Status: DISCONTINUED | OUTPATIENT
Start: 2017-08-04 | End: 2017-08-03 | Stop reason: CLARIF

## 2017-08-03 RX ORDER — POLYETHYLENE GLYCOL 3350 17 G/17G
17 POWDER, FOR SOLUTION ORAL DAILY
Status: DISCONTINUED | OUTPATIENT
Start: 2017-08-04 | End: 2017-08-05 | Stop reason: HOSPADM

## 2017-08-03 RX ORDER — ONDANSETRON 2 MG/ML
4 INJECTION INTRAMUSCULAR; INTRAVENOUS
Status: DISPENSED | OUTPATIENT
Start: 2017-08-03 | End: 2017-08-04

## 2017-08-03 RX ORDER — DEXAMETHASONE SODIUM PHOSPHATE 10 MG/ML
10 INJECTION INTRAMUSCULAR; INTRAVENOUS ONCE
Status: COMPLETED | OUTPATIENT
Start: 2017-08-04 | End: 2017-08-04

## 2017-08-03 RX ORDER — HYDROMORPHONE HYDROCHLORIDE 1 MG/ML
0.5 INJECTION, SOLUTION INTRAMUSCULAR; INTRAVENOUS; SUBCUTANEOUS
Status: ACTIVE | OUTPATIENT
Start: 2017-08-03 | End: 2017-08-04

## 2017-08-03 RX ORDER — BUPIVACAINE HYDROCHLORIDE 7.5 MG/ML
INJECTION, SOLUTION EPIDURAL; RETROBULBAR AS NEEDED
Status: DISCONTINUED | OUTPATIENT
Start: 2017-08-03 | End: 2017-08-03 | Stop reason: HOSPADM

## 2017-08-03 RX ORDER — DIPHENHYDRAMINE HYDROCHLORIDE 50 MG/ML
12.5 INJECTION, SOLUTION INTRAMUSCULAR; INTRAVENOUS AS NEEDED
Status: DISCONTINUED | OUTPATIENT
Start: 2017-08-03 | End: 2017-08-03 | Stop reason: HOSPADM

## 2017-08-03 RX ORDER — NALOXONE HYDROCHLORIDE 0.4 MG/ML
0.4 INJECTION, SOLUTION INTRAMUSCULAR; INTRAVENOUS; SUBCUTANEOUS AS NEEDED
Status: DISCONTINUED | OUTPATIENT
Start: 2017-08-03 | End: 2017-08-05 | Stop reason: HOSPADM

## 2017-08-03 RX ORDER — MIDAZOLAM HYDROCHLORIDE 1 MG/ML
INJECTION, SOLUTION INTRAMUSCULAR; INTRAVENOUS AS NEEDED
Status: DISCONTINUED | OUTPATIENT
Start: 2017-08-03 | End: 2017-08-03 | Stop reason: HOSPADM

## 2017-08-03 RX ORDER — HYDROMORPHONE HYDROCHLORIDE 1 MG/ML
0.2 INJECTION, SOLUTION INTRAMUSCULAR; INTRAVENOUS; SUBCUTANEOUS
Status: COMPLETED | OUTPATIENT
Start: 2017-08-03 | End: 2017-08-03

## 2017-08-03 RX ORDER — SODIUM CHLORIDE, SODIUM LACTATE, POTASSIUM CHLORIDE, CALCIUM CHLORIDE 600; 310; 30; 20 MG/100ML; MG/100ML; MG/100ML; MG/100ML
100 INJECTION, SOLUTION INTRAVENOUS CONTINUOUS
Status: DISCONTINUED | OUTPATIENT
Start: 2017-08-03 | End: 2017-08-03 | Stop reason: HOSPADM

## 2017-08-03 RX ORDER — MIDAZOLAM HYDROCHLORIDE 1 MG/ML
0.5 INJECTION, SOLUTION INTRAMUSCULAR; INTRAVENOUS
Status: DISCONTINUED | OUTPATIENT
Start: 2017-08-03 | End: 2017-08-03 | Stop reason: HOSPADM

## 2017-08-03 RX ORDER — DEXAMETHASONE SODIUM PHOSPHATE 4 MG/ML
INJECTION, SOLUTION INTRA-ARTICULAR; INTRALESIONAL; INTRAMUSCULAR; INTRAVENOUS; SOFT TISSUE AS NEEDED
Status: DISCONTINUED | OUTPATIENT
Start: 2017-08-03 | End: 2017-08-03 | Stop reason: HOSPADM

## 2017-08-03 RX ORDER — CHLORTHALIDONE 25 MG/1
25 TABLET ORAL DAILY
Status: DISCONTINUED | OUTPATIENT
Start: 2017-08-04 | End: 2017-08-03

## 2017-08-03 RX ORDER — SODIUM CHLORIDE 9 MG/ML
125 INJECTION, SOLUTION INTRAVENOUS CONTINUOUS
Status: DISPENSED | OUTPATIENT
Start: 2017-08-03 | End: 2017-08-04

## 2017-08-03 RX ORDER — WARFARIN 10 MG/1
10 TABLET ORAL ONCE
Status: COMPLETED | OUTPATIENT
Start: 2017-08-03 | End: 2017-08-03

## 2017-08-03 RX ORDER — FENTANYL CITRATE 50 UG/ML
25 INJECTION, SOLUTION INTRAMUSCULAR; INTRAVENOUS
Status: DISCONTINUED | OUTPATIENT
Start: 2017-08-03 | End: 2017-08-03 | Stop reason: HOSPADM

## 2017-08-03 RX ORDER — CHLORTHALIDONE 25 MG/1
12.5 TABLET ORAL DAILY
Status: DISCONTINUED | OUTPATIENT
Start: 2017-08-04 | End: 2017-08-05 | Stop reason: HOSPADM

## 2017-08-03 RX ADMIN — FENTANYL CITRATE 25 MCG: 50 INJECTION, SOLUTION INTRAMUSCULAR; INTRAVENOUS at 10:13

## 2017-08-03 RX ADMIN — OXYCODONE HYDROCHLORIDE 5 MG: 5 TABLET ORAL at 18:41

## 2017-08-03 RX ADMIN — DOCUSATE SODIUM AND SENNOSIDES 1 TABLET: 8.6; 5 TABLET, FILM COATED ORAL at 13:56

## 2017-08-03 RX ADMIN — CELECOXIB 200 MG: 200 CAPSULE ORAL at 18:41

## 2017-08-03 RX ADMIN — MIDAZOLAM HYDROCHLORIDE 0.5 MG: 1 INJECTION, SOLUTION INTRAMUSCULAR; INTRAVENOUS at 10:15

## 2017-08-03 RX ADMIN — PROPOFOL 50 MCG/KG/MIN: 10 INJECTION, EMULSION INTRAVENOUS at 07:56

## 2017-08-03 RX ADMIN — DOCUSATE SODIUM AND SENNOSIDES 1 TABLET: 8.6; 5 TABLET, FILM COATED ORAL at 18:40

## 2017-08-03 RX ADMIN — CEFAZOLIN 2 G: 1 INJECTION, POWDER, FOR SOLUTION INTRAMUSCULAR; INTRAVENOUS; PARENTERAL at 07:59

## 2017-08-03 RX ADMIN — SODIUM CHLORIDE 125 ML/HR: 900 INJECTION, SOLUTION INTRAVENOUS at 11:11

## 2017-08-03 RX ADMIN — HYDROMORPHONE HYDROCHLORIDE 0.2 MG: 1 INJECTION, SOLUTION INTRAMUSCULAR; INTRAVENOUS; SUBCUTANEOUS at 12:00

## 2017-08-03 RX ADMIN — WARFARIN SODIUM 10 MG: 10 TABLET ORAL at 13:56

## 2017-08-03 RX ADMIN — MIDAZOLAM HYDROCHLORIDE 2 MG: 1 INJECTION, SOLUTION INTRAMUSCULAR; INTRAVENOUS at 07:54

## 2017-08-03 RX ADMIN — CELECOXIB 200 MG: 200 CAPSULE ORAL at 07:20

## 2017-08-03 RX ADMIN — HYDROMORPHONE HYDROCHLORIDE 0.2 MG: 1 INJECTION, SOLUTION INTRAMUSCULAR; INTRAVENOUS; SUBCUTANEOUS at 10:31

## 2017-08-03 RX ADMIN — ACETAMINOPHEN 650 MG: 325 TABLET, FILM COATED ORAL at 18:40

## 2017-08-03 RX ADMIN — PROPOFOL 30 MG: 10 INJECTION, EMULSION INTRAVENOUS at 07:56

## 2017-08-03 RX ADMIN — HYDROMORPHONE HYDROCHLORIDE 0.2 MG: 1 INJECTION, SOLUTION INTRAMUSCULAR; INTRAVENOUS; SUBCUTANEOUS at 10:47

## 2017-08-03 RX ADMIN — DEXAMETHASONE SODIUM PHOSPHATE 4 MG: 4 INJECTION, SOLUTION INTRA-ARTICULAR; INTRALESIONAL; INTRAMUSCULAR; INTRAVENOUS; SOFT TISSUE at 08:00

## 2017-08-03 RX ADMIN — FENTANYL CITRATE 25 MCG: 50 INJECTION, SOLUTION INTRAMUSCULAR; INTRAVENOUS at 10:42

## 2017-08-03 RX ADMIN — PROPOFOL 20 MG: 10 INJECTION, EMULSION INTRAVENOUS at 07:57

## 2017-08-03 RX ADMIN — HYDROMORPHONE HYDROCHLORIDE 0.5 MG: 1 INJECTION, SOLUTION INTRAMUSCULAR; INTRAVENOUS; SUBCUTANEOUS at 09:08

## 2017-08-03 RX ADMIN — SODIUM CHLORIDE, SODIUM LACTATE, POTASSIUM CHLORIDE, AND CALCIUM CHLORIDE: 600; 310; 30; 20 INJECTION, SOLUTION INTRAVENOUS at 08:58

## 2017-08-03 RX ADMIN — CEFAZOLIN 2 G: 1 INJECTION, POWDER, FOR SOLUTION INTRAMUSCULAR; INTRAVENOUS; PARENTERAL at 16:02

## 2017-08-03 RX ADMIN — ACETAMINOPHEN 650 MG: 325 TABLET, FILM COATED ORAL at 13:56

## 2017-08-03 RX ADMIN — MIDAZOLAM HYDROCHLORIDE 2 MG: 1 INJECTION, SOLUTION INTRAMUSCULAR; INTRAVENOUS at 08:56

## 2017-08-03 RX ADMIN — ONDANSETRON 4 MG: 2 INJECTION INTRAMUSCULAR; INTRAVENOUS at 13:18

## 2017-08-03 RX ADMIN — ONDANSETRON 4 MG: 2 INJECTION INTRAMUSCULAR; INTRAVENOUS at 08:00

## 2017-08-03 RX ADMIN — HYDROMORPHONE HYDROCHLORIDE 0.5 MG: 1 INJECTION, SOLUTION INTRAMUSCULAR; INTRAVENOUS; SUBCUTANEOUS at 09:56

## 2017-08-03 RX ADMIN — FENTANYL CITRATE 25 MCG: 50 INJECTION, SOLUTION INTRAMUSCULAR; INTRAVENOUS at 10:18

## 2017-08-03 RX ADMIN — MIDAZOLAM HYDROCHLORIDE 2 MG: 1 INJECTION, SOLUTION INTRAMUSCULAR; INTRAVENOUS at 07:40

## 2017-08-03 RX ADMIN — SODIUM CHLORIDE, SODIUM LACTATE, POTASSIUM CHLORIDE, AND CALCIUM CHLORIDE 100 ML/HR: 600; 310; 30; 20 INJECTION, SOLUTION INTRAVENOUS at 07:13

## 2017-08-03 RX ADMIN — HYDROMORPHONE HYDROCHLORIDE 0.2 MG: 1 INJECTION, SOLUTION INTRAMUSCULAR; INTRAVENOUS; SUBCUTANEOUS at 11:11

## 2017-08-03 RX ADMIN — FENTANYL CITRATE 50 MCG: 50 INJECTION, SOLUTION INTRAMUSCULAR; INTRAVENOUS at 07:41

## 2017-08-03 RX ADMIN — BUPIVACAINE HYDROCHLORIDE 12.5 MG: 7.5 INJECTION, SOLUTION EPIDURAL; RETROBULBAR at 08:01

## 2017-08-03 RX ADMIN — OXYCODONE HYDROCHLORIDE 5 MG: 5 TABLET ORAL at 22:04

## 2017-08-03 RX ADMIN — MIDAZOLAM HYDROCHLORIDE 1 MG: 1 INJECTION, SOLUTION INTRAMUSCULAR; INTRAVENOUS at 09:08

## 2017-08-03 RX ADMIN — FENTANYL CITRATE 50 MCG: 50 INJECTION, SOLUTION INTRAMUSCULAR; INTRAVENOUS at 08:56

## 2017-08-03 RX ADMIN — SODIUM CHLORIDE 125 ML/HR: 900 INJECTION, SOLUTION INTRAVENOUS at 19:40

## 2017-08-03 RX ADMIN — HYDROMORPHONE HYDROCHLORIDE 0.2 MG: 1 INJECTION, SOLUTION INTRAMUSCULAR; INTRAVENOUS; SUBCUTANEOUS at 12:16

## 2017-08-03 RX ADMIN — FENTANYL CITRATE 50 MCG: 50 INJECTION, SOLUTION INTRAMUSCULAR; INTRAVENOUS at 09:08

## 2017-08-03 RX ADMIN — CEFAZOLIN 2 G: 1 INJECTION, POWDER, FOR SOLUTION INTRAMUSCULAR; INTRAVENOUS; PARENTERAL at 23:32

## 2017-08-03 NOTE — PROGRESS NOTES
Primary Nurse Lesli Nettles RN and Flaco Garza RN performed a dual skin assessment on this patient No impairment noted  Trevor score is 21

## 2017-08-03 NOTE — BRIEF OP NOTE
BRIEF OPERATIVE NOTE    Date of Procedure: 8/3/2017   Preoperative Diagnosis: S/P RIGHT TOTAL KNEE WITH FIBROMA, MECHANICAL COMPLICATION OF INITIAL TOTAL KNEE ARTHROPLASTY  Postoperative Diagnosis: S/P RIGHT TOTAL KNEE WITH FIBROMA, MECHANICAL COMPLICATION OF INITIAL TOTAL KNEE ARTHROPLASTY    Procedure(s):  EXCISION RIGHT TOTAL KNEE FIBROMA WITH POLYETHELENE EXCHANGE  Surgeon(s) and Role:     * Paola Adamson MD - Primary         Assistant Staff:  Physician Assistant: Gerhardt Heal, PA-C    Surgical Staff:  Circ-1: Gisella Carranza RN  Circ-Relief: Sangeeta Benson RN  Physician Assistant: Gerhardt Heal, PA-C  Scrub RN-1: Leandra Parry RN  Surg Asst-1: Jameson Middleton  Surg Asst-2: Balbina Tripp  Event Time In   Incision Start 8061   Incision Close 5612     Anesthesia: Spinal   Estimated Blood Loss: 50 mL  Specimens:   ID Type Source Tests Collected by Time Destination   1 : RIGHT KNEE JOINT FLUID Joint Fluid Joint, Knee AEROBIC/ANAEROBIC CULTURE Paola Adamson MD 8/3/2017 7662 Microbiology      Findings: Right knee patellofemoral fibroma, loose stability associated with poly   Complications: None. Implants:   Implant Name Type Inv.  Item Serial No.  Lot No. LRB No. Used Action   INSERT XLK STAB SIGMA 3 15MM --  - SN/A   INSERT XLK STAB SIGMA 3 15MM --  N/A [facility] [id number] Right 1 Implanted

## 2017-08-03 NOTE — ANESTHESIA PROCEDURE NOTES
Peripheral Block    Start time: 8/3/2017 7:30 AM  End time: 8/3/2017 7:40 AM  Performed by: Mario Sheehan  Authorized by: Mario Sheehan       Pre-procedure: Indications: at surgeon's request and post-op pain management    Preanesthetic Checklist: patient identified, risks and benefits discussed, site marked, timeout performed, anesthesia consent given and patient being monitored      Block Type:   Block Type:   Adductor canal  Laterality:  Right  Monitoring:  Standard ASA monitoring, continuous pulse ox, frequent vital sign checks, heart rate, responsive to questions and oxygen  Injection Technique:  Single shot  Procedures: ultrasound guided    Patient Position: supine  Prep: chlorhexidine    Location:  Mid thigh  Needle Type:  Stimuplex  Needle Gauge:  22 G  Needle Localization:  Ultrasound guidance  Medication Injected:  0.5%  ropivacaine  Volume (mL):  30    Assessment:  Number of attempts:  1  Injection Assessment:  Incremental injection every 5 mL, local visualized surrounding nerve on ultrasound, negative aspiration for blood, no paresthesia and no intravascular symptoms  Patient tolerance:  Patient tolerated the procedure well with no immediate complications

## 2017-08-03 NOTE — IP AVS SNAPSHOT
[address] 
[phone] Patient: Torie Cason MRN: [id number] RTE:2/58/2434 You are allergic to the following Allergen Reactions Adhesive Tape-Silicones Itching Blisters, bumps. -long term application Lidoderm (Lidocaine) Rash Patch-adhesive patch. Allergic to the adhesive - long term use. Not allergic to lidoderm. Other Medication Rash  
 dermabond - blisters Recent Documentation Height Weight BMI OB Status Smoking Status 1.676 m 95.3 kg 33.89 kg/m2 Hysterectomy Never Smoker Unresulted Labs Order Current Status CULTURE, ANAEROBIC Preliminary result CULTURE, BODY FLUID W GRAM STAIN Preliminary result Emergency Contacts Name Discharge Info Relation Home Work Mobile MatthewsKetty DISCHARGE CAREGIVER [3] Child [2] [phone] About your hospitalization You were admitted on:  August 3, 2017 You last received care in the:  [address] [place] [address] You were discharged on:  August 5, 2017 Unit phone number:  [phone] Why you were hospitalized Your primary diagnosis was:  Mechanical Complication Of Knee Prosthesis (Hcc) Your diagnoses also included:  Obesity (Bmi 30.0-34. 9) Providers Seen During Your Hospitalizations Provider Role Specialty Primary office phone Madhuri Arnett MD Attending Provider Orthopedic Surgery [phone] Your Primary Care Physician (PCP) Primary Care Physician Office Phone Office Fax [address] [facility] [place] [address] [phone] Follow-up Information Follow up With Details Comments Contact Info Chanelle Quijano MD On 8/8/2017 For discharge follow up at 11:40AM  [address] [place] [address] 
[phone]  [address] [place] [address], please call office if you have not heard from staff member by 12noon first full day after discharge  [address] 
1st Floor [place] [address] 
[phone] Your Appointments Tuesday August 08, 2017 11:40 AM EDT Office Visit with Kaila Baker MD  
[facility] [address] [facility]) [facility] [address] [facility] [address]  
[phone] Monday August 14, 2017  9:30 AM EDT  
PT GENERAL F/U with Shellia Birch Creek, PT  
[facility] ([address] [place] [address]) [facility] [address] [phone]  
[phone] Wednesday August 16, 2017  9:30 AM EDT  
PT GENERAL F/U with Shellia Birch Creek, PT  
[facility] ([address] [place] [address]) [facility] [address] [phone]  
[phone] Monday August 21, 2017  9:30 AM EDT  
PT GENERAL F/U with Shellia Birch Creek, PT  
[facility] ([address] [place] [address]) [facility] [address] [phone]  
[phone] Wednesday August 23, 2017  9:30 AM EDT  
PT GENERAL F/U with Shellia Birch Creek, PT  
[facility] ([address] [place] [address]) [facility] [address] [phone]  
[phone] Monday August 28, 2017  9:30 AM EDT  
PT GENERAL F/U with Shellia Birch Creek, PT  
[facility] ([address] [place] [address]) [facility] [address] [phone]  
[phone] Tuesday August 29, 2017 ESOPHAGOGASTRODUODENOSCOPY (EGD) with Donald Mesa MD  
[facility] ENDOSCOPY (RI OR PRE ASSESSMENT) [address] [place] [address]  
[phone] OP Registration: JOSE Wilson 78 Telephone: [phone] Fax: [phone] ** Pt will need to arrive 30 min prior unless appointment prep instructions indicate otherwise** **** Send All ENDO pt to the MAIN Admitting Department, off the hospitals main lobby at [facility]. ****  
  
    
 OP Registration: JOSE Wilson 78 Telephone: [phone] Fax: [phone] ** Pt will need to arrive 30 min prior unless appointment prep instructions indicate otherwise** **** Send All ENDO pt to the MAIN Admitting Department, off the hospitals main lobby at [facility]. **** Wednesday August 30, 2017  9:30 AM EDT  
PT GENERAL F/U with Geno Alexandra, PT  
[facility] ([address] [place] [address]) [id number] [place] [address] [address] [phone] Current Discharge Medication List  
  
START taking these medications Dose & Instructions Dispensing Information Comments Morning Noon Evening Bedtime  
 celecoxib 200 mg capsule Commonly known as:  CeleBREX Your last dose was: Your next dose is:    
   
   
 Dose:  200 mg Take 1 Cap by mouth daily for 30 days. Quantity:  30 Cap Refills:  0  
     
   
   
   
  
 oxyCODONE IR 5 mg immediate release tablet Commonly known as:  Katelin Maya Your last dose was: Your next dose is:    
   
   
 Dose:  5 mg Take 1 Tab by mouth every four (4) hours as needed for Pain. Max Daily Amount: 30 mg.  
 Quantity:  70 Tab Refills:  0  
     
   
   
   
  
 warfarin 2.5 mg tablet Commonly known as:  COUMADIN Your last dose was: Your next dose is:    
   
   
 Dose:  2.5 mg Take 1 Tab by mouth daily. Quantity:  90 Tab Refills:  1 CONTINUE these medications which have NOT CHANGED Dose & Instructions Dispensing Information Comments Morning Noon Evening Bedtime  
 acetaminophen 500 mg tablet Commonly known as:  TYLENOL Your last dose was: Your next dose is:    
   
   
 Dose:  500 mg Take 500 mg by mouth every six (6) hours as needed for Pain. Refills:  0 ALPRAZolam 0.5 mg tablet Commonly known as:  Laymond Hungerford Your last dose was: Your next dose is: Dose:  0.5 mg Take 1 Tab by mouth two (2) times daily as needed. Quantity:  30 Tab Refills:  2  
     
   
   
   
  
 chlorthalidone 25 mg tablet Commonly known as:  Ernestine Gaytan Your last dose was: Your next dose is:    
   
   
 Dose:  25 mg Take 1 Tab by mouth daily. Quantity:  90 Tab Refills:  3  
     
   
   
   
  
 docusate sodium 100 mg capsule Commonly known as:  Glenny Heladio Your last dose was: Your next dose is:    
   
   
 Dose:  100 mg Take 100 mg by mouth two (2) times daily as needed for Constipation. 1-2 tabs PRN constipation Refills:  0  
     
   
   
   
  
 ketorolac 0.5 % ophthalmic solution Commonly known as:  Edgar Tamar Your last dose was: Your next dose is:    
   
   
 Dose:  2 Drop Administer 2 Drops to both eyes four (4) times daily as needed. Quantity:  1 Bottle Refills:  0  
     
   
   
   
  
 levothyroxine 112 mcg tablet Commonly known as:  SYNTHROID Your last dose was: Your next dose is:    
   
   
 Dose:  112 mcg Take 1 Tab by mouth Daily (before breakfast). Quantity:  30 Tab Refills:  11 PROCTOZONE-HC 2.5 % rectal cream  
Generic drug:  hydrocortisone Your last dose was: Your next dose is:    
   
   
 Dose:  1 g Insert 1 g into rectum once as needed. Refills:  0  
     
   
   
   
  
 valsartan 160 mg tablet Commonly known as:  DIOVAN Your last dose was: Your next dose is: TAKE ONE TABLET BY MOUTH EVERY DAY Quantity:  90 Tab Refills:  3 STOP taking these medications   
 diclofenac EC 50 mg EC tablet Commonly known as:  VOLTAREN  
   
  
 traMADol-acetaminophen 37.5-325 mg per tablet Commonly known as:  ULTRACET Where to Get Your Medications Information on where to get these meds will be given to you by the nurse or doctor. ! Ask your nurse or doctor about these medications  
  celecoxib 200 mg capsule  
 oxyCODONE IR 5 mg immediate release tablet  
 warfarin 2.5 mg tablet Discharge Instructions DC Orders All Total Knees Case Management for DC planning to Home HC . - PT 3 times a week for 2 weeks; WBAT. - PT/INR Every Monday/Thursday for 2 weeks, Call Dr. Michael with results ([phone]). - Remove staples at 2 weeks post-op; 8/17/17 . - Follow up in Office in 2 1/2 weeks. After Hospital Care Plan:  Discharge Instructions Knee Replacement-Dr. Michael Patient Name: Chris Gonzales Date of procedure: 8/3/2017 Procedure: Procedure(s): EXCISION RIGHT TOTAL KNEE FIBROMA WITH POLYETHELENE EXCHANGE Surgeon: Kulwant Ribeiro) and Role: 
   * Donald Lobo MD - Primary PCP: Hannah Kim MD 
Date of discharge: No discharge date for patient encounter. Follow up appointments ? Follow up with Dr. Michael in 2 ½ weeks. Call [phone] to make an appointment. ? If home health has been arranged for you the agency will contact you to arrange dates/times for visits. Please call them if you do not hear from them within 24 hours after you are discharged When to call your Orthopaedic Surgeon: Call [phone]. If you call after 5pm or on a weekend, the on call physician will be contacted ? Unrelieved pain ? Signs of infection-if your incision is red; continues to have drainage; drainage has a foul odor or if you have a persistent fever over 101 degrees ? Signs of a blood clot in your leg-calf pain, tenderness, redness, swelling of lower leg When to call your Primary Care Physician: 
? Concerns about medical conditions such as diabetes, high blood pressure, asthma, congestive heart failure ? Call if blood sugars are elevated, persistent headache or dizziness, coughing or congestion, constipation or diarrhea, burning with urination, abnormal heart rate When to call 911and go to the nearest emergency room ? Acute onset of chest pain, shortness of breath, difficulty breathing Activity ? Weight bearing as tolerated with walker or crutches. Refer to pages 23-31 of your handbook for instructions and pictures ? Complete your Home Exercise Program daily as instructed by your therapist.  Refer to pages 33-41 of your handbook for instructions and pictures ? Get up every one hour and walk (except at night when sleeping) ? Do not drive or operate heavy machinery Incision Care ? The Aquacel (brown, waterproof) surgical dressing is to remain on your knee for 7 days. On the 7th day have someone gently peel the dressing off by carefully lifting the edge and stretching it slightly to break the adhesive seal 
? You will have staples in your knee incision. They will be removed by the home health agency staff ? If your Aquacel dressing comes loose/off before the 7th day, you may replace it with a dry sterile gauze dressing; change it daily. Once your incision is not draining, you may leave it open to air ? You may take a shower with the Aquacel dressing in place. Once the Aquacel is removed, you may shower and get your incision wet but do not submerge your incision under water in a bath tub, hot tub or swimming pool for 6 weeks after surgery. Preventing blood clots ? Take Coumadin as prescribed by Dr. Michael for one month following surgery ? Wear elastic stockings (TEDS) for 4 weeks. You should remove them for approximately 1 hour daily for showering/sponge bathing Pain management ? Take pain medication as prescribed; decrease the amount you use as your pain lessens ? Avoid alcoholic beverages while taking pain medication ? Please be aware that many medications contain Tylenol. We do not want you to over medicate so please read the information below as a guide. Do not take more than 4 Grams of Tylenol in a 24 hour period.   (There are 1000 milligrams in one Gram) 
o 325 mg of Tylenol per tablet (do not take more than 12 tablets in 24 hours) 
o 500 mg of Tylenol per tablet (do not take more than 8 tablets in 24 hours) ? You may place an ice bag on your knee for 15-20 minutes after exercising Diet ? Resume usual diet; drink plenty of fluids; eat foods high in fiber ? You may want to take a stool softener (such as Senokot-S or Colace) to prevent constipation while you are taking pain medication. If constipation occurs, take a laxative (such as Dulcolax tablets, Milk of Magnesia, or a suppository) Home Health Care Protocol (to be followed by [address] [place] [address] Nursing ? Draw a PT/INR per physicians orders. Call results to Dr. Ger Mae at [phone] ? Remove staples per physicians order ? Complete head to toe assessment, vital signs ? Medication reconciliation ? Review pain management ? Manage chronic medical conditions Physical Therapy-per physicians orders Weight bearing status: 
Precautions at Admission: Fall, WBAT Left Side Weight Bearing: Full Right Side Weight Bearing: As tolerated Mobility Status: 
Supine to Sit: Contact guard assistance Sit to Stand: Contact guard assistance Sit to Supine: Contact guard assistance Gait: 
Distance (ft): 30 Feet (ft) Ambulation - Level of Assistance: Contact guard assistance Assistive Device: Walker, rolling, Gait belt Gait Abnormalities: Antalgic, Decreased step clearance, Trunk sway increased, Step to gait ADL status overall composite: 
  
  
  
  
  
 
Physical Therapy ? Assessment and evaluation-bed mobility; functional transfers (bed, chair, bathroom, stairs); ambulation with equipment, car transfers, shower transfers, safety and ability to get out of house in the event of an emergency ? Review weight bearing as tolerated, wean from walker or crutches as tolerated ? Discuss pain management ? Review how to do ADLs. Refer to page 42 of patient handbook Home Exercise Program-refer to pages 33-41 of patient handbook for exercises Discharge Orders None Introducing [facility]! Dear Jose Pierson: Thank you for requesting a [employer] account. Our records indicate that you already have an active [employer] account. You can access your account anytime at [email or web address] Did you know that you can access your hospital and ER discharge instructions at any time in [employer]? You can also review all of your test results from your hospital stay or ER visit. Additional Information If you have questions, please visit the Frequently Asked Questions section of the [employer] website at [email or web address] Remember, [employer] is NOT to be used for urgent needs. For medical emergencies, dial 911. Now available from your iPhone and Android! General Information Please provide this summary of care documentation to your next provider. Patient Signature:  ____________________________________________________________ Date:  ____________________________________________________________  
  
Hua Ortega Provider Signature:  ____________________________________________________________ Date:  ____________________________________________________________

## 2017-08-03 NOTE — PERIOP NOTES
Lashell Lei TRANSFER - OUT REPORT:    Verbal report given to Kacie(name) on [place]  being transferred to 566(unit) for routine progression of care       Report consisted of patients Situation, Background, Assessment and   Recommendations(SBAR). Time Pre op antibiotic given:0759  Anesthesia Stop time: 1010  Martínez Present on Transfer to floor:y  Order for Martínez on Chart:y    Information from the following report(s) SBAR, OR Summary, Intake/Output and MAR was reviewed with the receiving nurse. Opportunity for questions and clarification was provided. Is the patient on 02? YES       L/Min 2       Other     Is the patient on a monitor? NO    Is the nurse transporting with the patient? NO    Surgical Waiting Area notified of patient's transfer from PACU? YES      The following personal items collected during your admission accompanied patient upon transfer:   Dental Appliance: Dental Appliances: None  Vision: Visual Aid: Glasses  Hearing Aid:    Jewelry: Jewelry: None  Clothing: Clothing: Other (comment) (1 bag of clothes returned to pt in pacu)  Other Valuables:  Other Valuables: Eyeglasses (and case placed in clothes bag in pacu)  Valuables sent to safe:

## 2017-08-03 NOTE — ANESTHESIA PROCEDURE NOTES
Spinal Block    Performed by: Rajinder Mckeon  Authorized by: Rajinder Mckeon     Pre-procedure:   Indications: primary anesthetic  Preanesthetic Checklist: patient identified, risks and benefits discussed, anesthesia consent, site marked, patient being monitored and timeout performed      Spinal Block:   Patient Position:  Seated    Prep: Betadine      Location:  L3-4  Technique:  Single shot        Needle:   Needle Type:  Pencil-tip  Needle Gauge:  25 G  Attempts:  1      Events: CSF confirmed, no blood with aspiration and no paresthesia        Assessment:  Insertion:  Uncomplicated  Patient tolerance:  Patient tolerated the procedure well with no immediate complications  84.8 mg bupiv

## 2017-08-03 NOTE — ROUTINE PROCESS
PACU HANDOFF:    Patient: Kim Guerrero MRN: [id number]  SSN: [id number]   YOB: 1955  Age: 60 y.o. Sex: female     Patient is status post Procedure(s):  EXCISION RIGHT TOTAL KNEE FIBROMA WITH POLYETHELENE EXCHANGE. Surgeon(s) and Role:     * Leander Rivers MD - Primary    Local/Dose/Irrigation: LOCAL MIX: 10 MG MORPHINE, 20 ML EXPAREL, 20 ML NACL AND 30 ML 0.5% BUPIVACAINE PLAIN                Peripheral IV 08/03/17 Left Forearm (Active)   Dressing Status Clean, dry, & intact; New 8/3/2017  7:00 AM   Dressing Type Tape;Transparent 8/3/2017  7:00 AM   Hub Color/Line Status Pink; Infusing 8/3/2017  7:00 AM                     Dressing/Packing:  Wound Knee Anterior;Right-DRESSING TYPE: Cast padding;Elastic bandage;Silver products; Staples (AQUACEL) (08/03/17 0900)  Splint/Cast:  ]    Other: 16, MCGARRY CATHETER, CARLOS HOSE AND SCD ON LEFT LEG

## 2017-08-03 NOTE — IP AVS SNAPSHOT
[address] 
[phone] Patient: Kapil Carey MRN: [id number] NDT:0/32/7882 Current Discharge Medication List  
  
START taking these medications Dose & Instructions Dispensing Information Comments Morning Noon Evening Bedtime  
 celecoxib 200 mg capsule Commonly known as:  CeleBREX Your last dose was: Your next dose is:    
   
   
 Dose:  200 mg Take 1 Cap by mouth daily for 30 days. Quantity:  30 Cap Refills:  0  
     
   
   
   
  
 oxyCODONE IR 5 mg immediate release tablet Commonly known as:  Janell Clay Your last dose was: Your next dose is:    
   
   
 Dose:  5 mg Take 1 Tab by mouth every four (4) hours as needed for Pain. Max Daily Amount: 30 mg.  
 Quantity:  70 Tab Refills:  0  
     
   
   
   
  
 warfarin 2.5 mg tablet Commonly known as:  COUMADIN Your last dose was: Your next dose is:    
   
   
 Dose:  2.5 mg Take 1 Tab by mouth daily. Quantity:  90 Tab Refills:  1 CONTINUE these medications which have NOT CHANGED Dose & Instructions Dispensing Information Comments Morning Noon Evening Bedtime  
 acetaminophen 500 mg tablet Commonly known as:  TYLENOL Your last dose was: Your next dose is:    
   
   
 Dose:  500 mg Take 500 mg by mouth every six (6) hours as needed for Pain. Refills:  0 ALPRAZolam 0.5 mg tablet Commonly known as:  Rebecca Calvo Your last dose was: Your next dose is:    
   
   
 Dose:  0.5 mg Take 1 Tab by mouth two (2) times daily as needed. Quantity:  30 Tab Refills:  2  
     
   
   
   
  
 chlorthalidone 25 mg tablet Commonly known as:  Verlena Akanksha Your last dose was: Your next dose is:    
   
   
 Dose:  25 mg Take 1 Tab by mouth daily. Quantity:  90 Tab Refills:  3 docusate sodium 100 mg capsule Commonly known as:  Reyna Ribera Your last dose was: Your next dose is:    
   
   
 Dose:  100 mg Take 100 mg by mouth two (2) times daily as needed for Constipation. 1-2 tabs PRN constipation Refills:  0  
     
   
   
   
  
 ketorolac 0.5 % ophthalmic solution Commonly known as:  Naheed Shaylee Your last dose was: Your next dose is:    
   
   
 Dose:  2 Drop Administer 2 Drops to both eyes four (4) times daily as needed. Quantity:  1 Bottle Refills:  0  
     
   
   
   
  
 levothyroxine 112 mcg tablet Commonly known as:  SYNTHROID Your last dose was: Your next dose is:    
   
   
 Dose:  112 mcg Take 1 Tab by mouth Daily (before breakfast). Quantity:  30 Tab Refills:  11 PROCTOZONE-HC 2.5 % rectal cream  
Generic drug:  hydrocortisone Your last dose was: Your next dose is:    
   
   
 Dose:  1 g Insert 1 g into rectum once as needed. Refills:  0  
     
   
   
   
  
 valsartan 160 mg tablet Commonly known as:  DIOVAN Your last dose was: Your next dose is: TAKE ONE TABLET BY MOUTH EVERY DAY Quantity:  90 Tab Refills:  3 STOP taking these medications   
 diclofenac EC 50 mg EC tablet Commonly known as:  VOLTAREN  
   
  
 traMADol-acetaminophen 37.5-325 mg per tablet Commonly known as:  ULTRACET Where to Get Your Medications Information on where to get these meds will be given to you by the nurse or doctor. ! Ask your nurse or doctor about these medications  
  celecoxib 200 mg capsule  
 oxyCODONE IR 5 mg immediate release tablet  
 warfarin 2.5 mg tablet

## 2017-08-03 NOTE — PROGRESS NOTES
TRANSFER - IN REPORT:    Verbal report received from Declan RN(name) on Michelle Rogers  being received from [facility]) for routine post - op      Report consisted of patients Situation, Background, Assessment and   Recommendations(SBAR). Information from the following report(s) SBAR, Kardex, OR Summary, Intake/Output, MAR and Recent Results was reviewed with the receiving nurse. Opportunity for questions and clarification was provided. Assessment completed upon patients arrival to unit and care assumed.

## 2017-08-03 NOTE — ANESTHESIA POSTPROCEDURE EVALUATION
Post-Anesthesia Evaluation and Assessment    Patient: Michael Sanchez MRN: [id number]  SSN: [id number]    YOB: 1955  Age: 60 y.o. Sex: female       Cardiovascular Function/Vital Signs  Visit Vitals    /79    Pulse 72    Temp 36.5 °C (97.7 °F)    Resp 13    Ht 5' 6\" (1.676 m)    Wt 95.3 kg (210 lb)    SpO2 100%    BMI 33.89 kg/m2       Patient is status post spinal anesthesia for Procedure(s):  EXCISION RIGHT TOTAL KNEE FIBROMA WITH POLYETHELENE EXCHANGE. Nausea/Vomiting: None    Postoperative hydration reviewed and adequate. Pain:  Pain Scale 1: Numeric (0 - 10) (08/03/17 1018)  Pain Intensity 1: 6 (08/03/17 1018)   Managed    Neurological Status:   Neuro (WDL): (P) Exceptions to WDL (08/03/17 1007)   At baseline    Mental Status and Level of Consciousness: Arousable    Pulmonary Status:   O2 Device: Nasal cannula (08/03/17 1008)   Adequate oxygenation and airway patent    Complications related to anesthesia: None    Post-anesthesia assessment completed.  No concerns    Signed By: Keyona Duke MD     August 3, 2017

## 2017-08-03 NOTE — PROGRESS NOTES
NP ORTHO PROGRESS NOTE  Post Op day: Day of Surgery    August 3, 2017 2:54 PM     Admit date: 8/3/2017  Date of Surgery: 8/3/2017   Procedures: Procedure(s):  EXCISION RIGHT TOTAL KNEE FIBROMA WITH POLYETHELENE EXCHANGE  Admitting Physician: Erica Rodriguez MD   Surgeon: Alexa Obregon) and Role:     Weston Oakley MD - Primary    Chart/Meds/Labs Reviewed  Current Facility-Administered Medications   Medication Dose Route Frequency    0.9% sodium chloride infusion  125 mL/hr IntraVENous CONTINUOUS    sodium chloride 0.9 % bolus infusion 500 mL  500 mL IntraVENous ONCE PRN    [START ON 8/4/2017] sodium chloride (NS) flush 5-10 mL  5-10 mL IntraVENous Q8H    sodium chloride (NS) flush 5-10 mL  5-10 mL IntraVENous PRN    acetaminophen (TYLENOL) tablet 650 mg  650 mg Oral Q6H    oxyCODONE IR (ROXICODONE) tablet 5 mg  5 mg Oral Q3H PRN    oxyCODONE IR (ROXICODONE) tablet 10 mg  10 mg Oral Q3H PRN    celecoxib (CELEBREX) capsule 200 mg  200 mg Oral BID    HYDROmorphone (PF) (DILAUDID) injection 0.5 mg  0.5 mg IntraVENous Q4H PRN    naloxone (NARCAN) injection 0.4 mg  0.4 mg IntraVENous PRN    ceFAZolin in 0.9% NS (ANCEF) IVPB soln 2 g  2 g IntraVENous Q8H    [START ON 8/4/2017] dexamethasone (PF) (DECADRON) injection 10 mg  10 mg IntraVENous ONCE    ondansetron (ZOFRAN) injection 4 mg  4 mg IntraVENous Q4H PRN    hydrOXYzine HCl (ATARAX) tablet 10 mg  10 mg Oral Q8H PRN    famotidine (PEPCID) tablet 20 mg  20 mg Oral BID PRN    senna-docusate (PERICOLACE) 8.6-50 mg per tablet 1 Tab  1 Tab Oral BID    [START ON 8/4/2017] polyethylene glycol (MIRALAX) packet 17 g  17 g Oral DAILY    [START ON 8/5/2017] bisacodyl (DULCOLAX) suppository 10 mg  10 mg Rectal DAILY PRN    ALPRAZolam (XANAX) tablet 0.5 mg  0.5 mg Oral BID PRN    [START ON 8/4/2017] levothyroxine (SYNTHROID) tablet 112 mcg  112 mcg Oral ACB    [START ON 8/4/2017] losartan (COZAAR) tablet 100 mg  100 mg Oral DAILY    warfarin MD to dose Other PRN    [START ON 8/4/2017] chlorthalidone (HYGROTEN) tablet 12.5 mg  12.5 mg Oral DAILY    alum-mag hydroxide-simeth (MYLANTA) oral suspension 30 mL  30 mL Oral QID PRN    acetaminophen (TYLENOL) tablet 325-650 mg  325-650 mg Oral Q8H PRN    prochlorperazine (COMPAZINE) with saline injection 5 mg  5 mg IntraVENous Q8H PRN       Subjective:    Complaints: None presently but previously with some nausea  (relief with Zofran)  Denies Dizziness, CP, SOB, N/V, Abdominal pain, numbness or tingling of extremities. Able to perform ankle pumps easily. Has not been to EOB or seen by PT yet. Normal BM this morning. Incisional pain control: well controlled. Ultracet every 8 hours PTA  Pain Control:   Pain Assessment  Pain Scale 1: Numeric (0 - 10)  Pain Intensity 1: 2  Pain Onset 1:  (post op)  Pain Location 1: Knee  Pain Orientation 1: Right  Pain Description 1: Aching  Pain Intervention(s) 1: Ice    Objective:  General: Drowsy to readily Alert,Ox4, cooperative, NAD. Daughter at [facility]: Atraumatic, PERRL, anicteric sclerae  Lungs: Bilateral expansion. Equal excursion. No accessory muscle use. Gastrointestinal:  Soft, non-tender, non-distended, obese  Extremities:  Neurovasc exam WDL. + DP pulses. Sensation intact to light touch. Motor: + DF/PF          Calves non-tender upon palpation or with passive stretch. No significant erythema or swelling in exposed areas. Ace-wrapped over Bulky Dressing: clean, dry and intact.  Ice Packs to site    Vital Signs:   Visit Vitals    /84 (BP 1 Location: Right arm, BP Patient Position: At rest)    Pulse 62    Temp 97.3 °F (36.3 °C)    Resp 16    Ht 5' 6\" (1.676 m)    Wt 95.3 kg (210 lb)    SpO2 100%    BMI 33.89 kg/m2    O2 Flow Rate (L/min): 1 l/min O2 Device: Room air   Patient Vitals for the past 24 hrs:   BP Temp Pulse Resp SpO2 Height Weight   08/03/17 1451 137/84 97.3 °F (36.3 °C) 62 16 100 % - -   08/03/17 1348 111/59 97.6 °F (36.4 °C) (!) 55 14 95 % - -   17 1250 137/89 97.6 °F (36.4 °C) (!) 59 16 96 % - -   17 1215 139/75 - 63 17 100 % - -   17 1200 (!) 146/93 - 61 16 100 % - -   17 1145 142/69 - (!) 55 - 100 % - -   17 1130 127/69 - (!) 54 11 99 % - -   17 1121 - 97.7 °F (36.5 °C) - - - - -   17 1115 150/73 - (!) 51 13 100 % - -   17 1100 151/76 - (!) 55 10 100 % - -   17 1045 155/84 - (!) 53 12 100 % - -   17 1030 156/83 - 66 13 99 % - -   17 1020 (!) 147/94 - 63 8 100 % - -   17 1015 150/79 - 72 13 100 % - -   17 1010 - - 76 13 100 % - -   17 1009 - - 79 11 99 % - -   17 1008 154/85 97.7 °F (36.5 °C) 74 (!) 5 100 % - -   17 1007 154/85 - - - - - -   17 0650 123/68 98.4 °F (36.9 °C) 62 18 100 % 5' 6\" (1.676 m) 95.3 kg (210 lb)     Temp (24hrs), Av.7 °F (36.5 °C), Min:97.3 °F (36.3 °C), Max:98.4 °F (36.9 °C)      Intake/output-last 8 hours:    07 -  1900  In:  [P.O.:360; I.V.:1700]  Out: 825 [Urine:775]    Intake/output- 24 hours:       LAB:   Recent Results (from the past 24 hour(s))   GLUCOSE, POC    Collection Time: 17  7:10 AM   Result Value Ref Range    Glucose (POC) 86 65 - 100 mg/dL    Performed by Vivienne Saenz    CULTURE, BODY FLUID Gram[place] Citron STAIN    Collection Time: 17  9:03 AM   Result Value Ref Range    Special Requests: RIGHT KNEE FLUID     GRAM STAIN NO WBC'S SEEN      GRAM STAIN NO DEFINITE ORGANISM SEEN      Culture result: PENDING       Lab Results   Component Value Date/Time    INR 1.0 2017 02:35 PM    INR 1.8 2017 03:35 AM    INR 1.8 2017 03:03 AM    INR 1.1 2017 03:14 AM    INR POC 1.1 06/15/2017 05:24 PM    INR POC 1.2 2017 10:40 AM    INR POC 1.2 2017 10:34 AM     Lab Results   Component Value Date/Time    HGB 12.1 2017 02:35 PM    HGB 10.5 2017 03:35 AM    HGB 10.8 2017 03:03 AM    HGB 11.1 2017 03:14 AM     No results for input(s): NA, K, CL, BUN, CREA, GLU, CA, MG, PHOS, ALB, TBIL, TBILI, SGOT in the last 72 hours. No lab exists for component: ALT;3      Assessment and Plan    Principal Problem:    Mechanical complication of knee prosthesis ([address] (8/2/2017)    Active Problems:    Obesity (BMI 30.0-34.9) (8/3/2017)          POD#1 Procedure(s):  EXCISION RIGHT TOTAL KNEE FIBROMA WITH POLYETHELENE EXCHANGE  Some mild ESTER but otherwise stable postop. Labs trending as expected. VTE prophylaxis: Warfarin, Mobilization, Ankle pumping exercises, SCDs per order if not able to exercise or mobilize well. Weight bearing:  WBAT  Pain management:  Multi-modal pain plan, see above for medication,  Ice packs & elevation of extremity per orders, active gentle ROM & mobilize frequently for short periods of time. PT& OT  Wound benign. Neurovascularly intact. Progressing well. No indications of concerns. Continue present plans per orthopedic attending surgeon, Dr. Sabra Shelton,  & interdisciplinary team for joint replacement.         Signed By: Jayme Crook NP    RN, MSN, MA, Adult NP-BC

## 2017-08-03 NOTE — ANESTHESIA PREPROCEDURE EVALUATION
Anesthetic History     PONV          Review of Systems / Medical History  Patient summary reviewed, nursing notes reviewed and pertinent labs reviewed    Pulmonary  Within defined limits                 Neuro/Psych   Within defined limits           Cardiovascular    Hypertension        Dysrhythmias            GI/Hepatic/Renal     GERD      PUD     Endo/Other      Hypothyroidism  Morbid obesity and arthritis     Other Findings              Physical Exam    Airway  Mallampati: II  TM Distance: > 6 cm  Neck ROM: normal range of motion   Mouth opening: Normal     Cardiovascular  Regular rate and rhythm,  S1 and S2 normal,  no murmur, click, rub, or gallop             Dental  No notable dental hx       Pulmonary  Breath sounds clear to auscultation               Abdominal  GI exam deferred       Other Findings            Anesthetic Plan    ASA: 2  Anesthesia type: spinal      Post-op pain plan if not by surgeon: peripheral nerve block single    Induction: Intravenous  Anesthetic plan and risks discussed with: Patient

## 2017-08-03 NOTE — OP NOTES
[address]   [address] [place] [address], [address]   OP NOTE       Name:  Melvinia Epley   MR#:  [id number]   :  1955   Account #:  [de-identified]    Surgery Date:  2017   Date of Adm:  2017       PREOPERATIVE DIAGNOSES   1. Status post right total knee replacement with patella clunk. 2. Instability right knee. 3. Status post left total knee replacement. POSTOPERATIVE DIAGNOSES   1. Status post right total knee replacement with patella clunk. 2. Instability right knee. 3. Status post left total knee replacement. PROCEDURES PERFORMED    1. Right knee polyethylene exchange, with ligamentous balancing. 2. Excision of fibroma superior patella, right knee. ANESTHESIA: Spinal.    ESTIMATED BLOOD LOSS: 200 mL. DRAINS: None. COMPLICATIONS: None. SPECIMENS REMOVED: Culture of right knee. ASSISTANT: Jeff Thomas PA-C    INDICATIONS: The patient has previously undergone right total knee   replacement. 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 She presents for the above   procedure. It is of note that she has some varus-valgus laxity, and it   was felt that it would be reasonable to change her polyethylene to a   thicker polyethylene. She is also status post left total knee   replacement. DESCRIPTION OF PROCEDURE: On the day of operation, the patient   was taken to the operating room, spinal anesthesia administered, she   was placed in supine position. The right lower extremity was prepped   and draped in the usual fashion. After exsanguination with an   Esmarch, tourniquet inflated to 315 mmHg. The anterior scar was   excised, and dissection carried through subcutaneous tissue. A medial   parapatellar capsular incision made. A small amount of clear vinay-  colored fluid was encountered. There was no evidence of infection, but   cultures were sent.  Upon examination, there was some medial-lateral instability. It was felt that a thicker polyethylene would be appropriate. The #10 mm polyethylene was then removed. The femoral, tibial and   patellar components were evaluated and felt to be stable. It was felt   that a 15 mm insert would be appropriate, and therefore inserted. Some   medial release was performed. After doing this, it was felt the knee   was quite stable. There was a large fibroma just superior to the patella. This was excised as well. The knee was put through a range of motion,   and there was no clunking. The tourniquet released. Coagulation   achieved with electrocautery. The soft tissues were infiltrated with   Exparel local anesthetic. The capsule repaired with #1 Vicryl suture,   supplemented with #2 PDS, 2-0 Vicryl was used to close   subcutaneous tissue and staples used to close the skin. Sterile   dressings were applied. The patient was taken to the recovery room in   satisfactory condition. The assistant is Jeff Thomas PA-C, who   assisted me with positioning, retraction, implant instillation and incision   closure. MD Ariel Kevin   D:  08/03/2017   10:06   T:  08/03/2017   10:42   Job #:  [id number]

## 2017-08-03 NOTE — PROGRESS NOTES
Problem: Mobility Impaired (Adult and Pediatric)  Goal: *Acute Goals and Plan of Care (Insert Text)  Physical Therapy Goals  Initiated 8/3/2017    1. Patient will move from supine to sit and sit to supine in bed with modified independence within 4 days. 2. Patient will perform sit to stand with modified independence within 4 days. 3. Patient will ambulate with modified independence for 200 feet with the least restrictive device within 4 days. 4. Patient will ascend/descend 3 stairs with use of handrail(s) with modified independence within 4 days. 5. Patient will perform home exercise program per protocol with modified independence within 4 days. 6. Patient will demonstrate AROM 0-90 degrees in operative joint within 4 days. PHYSICAL THERAPY KNEE EVALUATION  Patient: Grant Chan (38 y.o. female)  Date: 8/3/2017  Primary Diagnosis: S/P RIGHT TOTAL KNEE WITH FIBROMA  Mechanical complication of knee prosthesis (HCC)  Procedure(s) (LRB):  EXCISION RIGHT TOTAL KNEE FIBROMA WITH POLYETHELENE EXCHANGE (Right) Day of Surgery   Precautions:   Fall, WBAT      ASSESSMENT :  Based on the objective data described below, the patient presents with decreased strength, decreased ROM, impaired balance, increased pain and decreased independent mobility s/p R TKR. She was received in bed and was agreeable to working with PT this afternoon for POD 0 mobilization. She was educated on the role of PT in the acute environment and verbalized understanding. She reported that she would have adequate assistance at home and has previously had her L knee replaced and that it still was giving her some pain. She transferred to [facility] with min a for LE management and sat with good sitting balance. VSS throughout transfers (see VS flowsheet for values). She stood with min VC for hand placement and then ambulated 15 ft demonstrateing decreased step clearance, decreased step length, increased trunk sway and increased WB through UE.  Pt returned to bed with min a foe LE management and was complaining of pain. She was positioned for comfort and ice put on knee; she was left eith all needs met. She was agreeable to working with PT 2x daily. Patient will benefit from skilled intervention to address the above impairments. Patients rehabilitation potential is considered to be Good  Factors which may influence rehabilitation potential include:   [X]         None noted  [ ]         Mental ability/status  [ ]         Medical condition  [ ]         Home/family situation and support systems  [ ]         Safety awareness  [ ]         Pain tolerance/management  [ ]         Other:        PLAN :  Recommendations and Planned Interventions:  [X]           Bed Mobility Training             [X]    Neuromuscular Re-Education  [X]           Transfer Training                   [ ]    Orthotic/Prosthetic Training  [X]           Gait Training                         [ ]    Modalities  [X]           Therapeutic Exercises           [X]    Edema Management/Control  [X]           Therapeutic Activities            [X]    Patient and Family Training/Education  [ ]           Other (comment):     Frequency/Duration: Patient will be followed by physical therapy twice daily to address goals. Discharge Recommendations: Home Health  Further Equipment Recommendations for Discharge: none this admission        SUBJECTIVE:   Patient stated Im gonna try.       OBJECTIVE DATA SUMMARY:   HISTORY:    Past Medical History:   Diagnosis Date    Arrhythmia       \"PALPATATIONS, FLUTTERING, POSSIBLE MEDS PER DR. Justin Rao"    Arthritis       knees, back    Chronic pain       LOWER BACK, R HIP    Duodenal ulcer 2002    GERD (gastroesophageal reflux disease)      H/O blood clots 2007     TO RIGHT EYE    Hypertension      Iritis 2007     both eyes    Nausea & vomiting      Other ill-defined conditions       back pain/spasm    PONV (postoperative nausea and vomiting)      Thyroid disease hypothroid    Ulcerative colitis 2002     Past Surgical History:   Procedure Laterality Date    COLONOSCOPY N/A 5/25/2016     COLONOSCOPY performed by Conrado [facility] at [facility] ENDOSCOPY    HX ACL RECONSTRUCTION   2003     right    HX BREAST BIOPSY Left 12/30/2016     MARKER     HX CHOLECYSTECTOMY   2008    HX HEENT   2003     benign mass removed from uvula    HX KNEE REPLACEMENT Right 05/01/2013    HX ORTHOPAEDIC   2009     exc mass right elbow - benign    HX ORTHOPAEDIC   05/31/2017     LEFT KNEE ARTHROSPLASTY    HX OTHER SURGICAL   2003     growth removed from back of leg - benign    HX ISABELL AND BSO   2004    HX TONSILLECTOMY   2003     done at the time mass removed from uvula    LAP, PLACE ADJUST GASTR BAND   2008     fluid removed/band removed     Prior Level of Function/Home Situation: independent with assistance   Personal factors and/or comorbidities impacting plan of care:      Home Situation  Home Environment: Private residence  # Steps to Enter: 3  Rails to Enter: Yes  Hand Rails : Right  One/Two Story Residence: Two story, live on 1st floor  # of Interior Steps: 14  Living Alone: Yes (daughter plans to live with her for seveal days)  Support Systems: Family member(s)  Patient Expects to be Discharged to[de-identified] Private residence  Current DME Used/Available at Home: [employer] Hurst, bob, Walker, rolling     EXAMINATION/PRESENTATION/DECISION MAKING:   Critical Behavior:  Neurologic State: Alert  Orientation Level: Oriented X4  Cognition: Appropriate decision making, Appropriate for age attention/concentration, Appropriate safety awareness  Safety/Judgement: Awareness of environment  Hearing:   Auditory  Auditory Impairment: None  Skin:  Appears intact   Range Of Motion:  AROM: Generally decreased, functional           PROM: Generally decreased, functional           Strength:    Strength: Generally decreased, functional                    Tone & Sensation:   Tone: Normal              Sensation: Intact Coordination:  Coordination: Within functional limits  Vision:      Functional Mobility:  Bed Mobility:  Rolling: Contact guard assistance  Supine to Sit: Minimum assistance;Assist x1 (for LE )  Sit to Supine: Minimum assistance;Assist x1;Additional time  Scooting: Contact guard assistance  Transfers:  Sit to Stand: Minimum assistance;Assist x1;Additional time; Adaptive equipment  Stand to Sit: Minimum assistance;Assist x1;Additional time; Adaptive equipment                       Balance:   Sitting: Intact  Standing: Impaired; With support  Standing - Static: Good  Standing - Dynamic : Fair (constant support)  Ambulation/Gait Training:              Gait Description (WDL): Within defined limits                                                                Stairs:                             Pain:  Pain Scale 1: Numeric (0 - 10)  Pain Intensity 1: 2  Pain Location 1: Knee  Pain Orientation 1: Right  Pain Description 1: Aching  Pain Intervention(s) 1: Ice  Activity Tolerance:   No apparent distress      Please refer to the flowsheet for vital signs taken during this treatment. After treatment:   [ ]         Patient left in no apparent distress sitting up in chair  [X]         Patient left in no apparent distress in bed  [X]         Call bell left within reach  [X]         Nursing notified  [ ]         Caregiver present  [ ]         Bed alarm activated      COMMUNICATION/EDUCATION:   The patients plan of care was discussed with: Registered Nurse.  [X]         Fall prevention education was provided and the patient/caregiver indicated understanding. [X]         Patient/family have participated as able in goal setting and plan of care. [X]         Patient/family agree to work toward stated goals and plan of care. [ ]         Patient understands intent and goals of therapy, but is neutral about his/her participation. [ ]         Patient is unable to participate in goal setting and plan of care.      Thank you for this referral.  Alec Heck, SPT    Time Calculation: 22 mins

## 2017-08-04 ENCOUNTER — HOME HEALTH ADMISSION (OUTPATIENT)
Dept: HOME HEALTH SERVICES | Facility: HOME HEALTH | Age: 62
End: 2017-08-04
Payer: COMMERCIAL

## 2017-08-04 LAB
ANION GAP BLD CALC-SCNC: 8 MMOL/L (ref 5–15)
BUN SERPL-MCNC: 11 MG/DL (ref 6–20)
BUN/CREAT SERPL: 16 (ref 12–20)
CALCIUM SERPL-MCNC: 8.7 MG/DL (ref 8.5–10.1)
CHLORIDE SERPL-SCNC: 109 MMOL/L (ref 97–108)
CO2 SERPL-SCNC: 26 MMOL/L (ref 21–32)
CREAT SERPL-MCNC: 0.69 MG/DL (ref 0.55–1.02)
GLUCOSE SERPL-MCNC: 97 MG/DL (ref 65–100)
HGB BLD-MCNC: 11.1 G/DL (ref 11.5–16)
INR PPP: 1.1 (ref 0.9–1.1)
POTASSIUM SERPL-SCNC: 3.5 MMOL/L (ref 3.5–5.1)
PROTHROMBIN TIME: 11 SEC (ref 9–11.1)
SODIUM SERPL-SCNC: 143 MMOL/L (ref 136–145)

## 2017-08-04 PROCEDURE — 97535 SELF CARE MNGMENT TRAINING: CPT

## 2017-08-04 PROCEDURE — 97116 GAIT TRAINING THERAPY: CPT

## 2017-08-04 PROCEDURE — 97165 OT EVAL LOW COMPLEX 30 MIN: CPT

## 2017-08-04 PROCEDURE — 65270000029 HC RM PRIVATE

## 2017-08-04 PROCEDURE — 85610 PROTHROMBIN TIME: CPT | Performed by: PHYSICIAN ASSISTANT

## 2017-08-04 PROCEDURE — 36415 COLL VENOUS BLD VENIPUNCTURE: CPT | Performed by: PHYSICIAN ASSISTANT

## 2017-08-04 PROCEDURE — 74011250636 HC RX REV CODE- 250/636: Performed by: PHYSICIAN ASSISTANT

## 2017-08-04 PROCEDURE — 80048 BASIC METABOLIC PNL TOTAL CA: CPT | Performed by: PHYSICIAN ASSISTANT

## 2017-08-04 PROCEDURE — 74011250637 HC RX REV CODE- 250/637: Performed by: NURSE PRACTITIONER

## 2017-08-04 PROCEDURE — 74011250637 HC RX REV CODE- 250/637: Performed by: PHYSICIAN ASSISTANT

## 2017-08-04 PROCEDURE — 85018 HEMOGLOBIN: CPT | Performed by: PHYSICIAN ASSISTANT

## 2017-08-04 RX ORDER — AMOXICILLIN 250 MG
2 CAPSULE ORAL 2 TIMES DAILY
Status: DISCONTINUED | OUTPATIENT
Start: 2017-08-04 | End: 2017-08-05 | Stop reason: HOSPADM

## 2017-08-04 RX ORDER — WARFARIN 2.5 MG/1
2.5 TABLET ORAL DAILY
Qty: 90 TAB | Refills: 1 | Status: SHIPPED | OUTPATIENT
Start: 2017-08-04 | End: 2018-03-16

## 2017-08-04 RX ORDER — CELECOXIB 200 MG/1
200 CAPSULE ORAL DAILY
Qty: 30 CAP | Refills: 0 | Status: SHIPPED | OUTPATIENT
Start: 2017-08-04 | End: 2017-09-03

## 2017-08-04 RX ORDER — OXYCODONE HYDROCHLORIDE 5 MG/1
5 TABLET ORAL
Qty: 70 TAB | Refills: 0 | Status: SHIPPED | OUTPATIENT
Start: 2017-08-04 | End: 2018-03-16

## 2017-08-04 RX ADMIN — ACETAMINOPHEN 650 MG: 325 TABLET, FILM COATED ORAL at 20:23

## 2017-08-04 RX ADMIN — OXYCODONE HYDROCHLORIDE 10 MG: 5 TABLET ORAL at 17:34

## 2017-08-04 RX ADMIN — ACETAMINOPHEN 650 MG: 325 TABLET, FILM COATED ORAL at 01:29

## 2017-08-04 RX ADMIN — CELECOXIB 200 MG: 200 CAPSULE ORAL at 08:35

## 2017-08-04 RX ADMIN — POLYETHYLENE GLYCOL 3350 17 G: 17 POWDER, FOR SOLUTION ORAL at 08:34

## 2017-08-04 RX ADMIN — SODIUM CHLORIDE 125 ML/HR: 900 INJECTION, SOLUTION INTRAVENOUS at 04:18

## 2017-08-04 RX ADMIN — OXYCODONE HYDROCHLORIDE 5 MG: 5 TABLET ORAL at 04:18

## 2017-08-04 RX ADMIN — DEXAMETHASONE SODIUM PHOSPHATE 10 MG: 10 INJECTION, SOLUTION INTRAMUSCULAR; INTRAVENOUS at 08:34

## 2017-08-04 RX ADMIN — WARFARIN SODIUM 6 MG: 5 TABLET ORAL at 11:35

## 2017-08-04 RX ADMIN — LEVOTHYROXINE SODIUM 112 MCG: 112 TABLET ORAL at 06:41

## 2017-08-04 RX ADMIN — OXYCODONE HYDROCHLORIDE 10 MG: 5 TABLET ORAL at 11:34

## 2017-08-04 RX ADMIN — OXYCODONE HYDROCHLORIDE 10 MG: 5 TABLET ORAL at 20:25

## 2017-08-04 RX ADMIN — CELECOXIB 200 MG: 200 CAPSULE ORAL at 20:25

## 2017-08-04 RX ADMIN — Medication 10 ML: at 22:55

## 2017-08-04 RX ADMIN — ACETAMINOPHEN 650 MG: 325 TABLET, FILM COATED ORAL at 14:07

## 2017-08-04 RX ADMIN — OXYCODONE HYDROCHLORIDE 5 MG: 5 TABLET ORAL at 08:35

## 2017-08-04 RX ADMIN — OXYCODONE HYDROCHLORIDE 5 MG: 5 TABLET ORAL at 14:07

## 2017-08-04 RX ADMIN — OXYCODONE HYDROCHLORIDE 10 MG: 5 TABLET ORAL at 23:46

## 2017-08-04 RX ADMIN — DOCUSATE SODIUM AND SENNOSIDES 2 TABLET: 8.6; 5 TABLET, FILM COATED ORAL at 17:34

## 2017-08-04 RX ADMIN — OXYCODONE HYDROCHLORIDE 5 MG: 5 TABLET ORAL at 01:29

## 2017-08-04 RX ADMIN — LOSARTAN POTASSIUM 100 MG: 50 TABLET ORAL at 08:35

## 2017-08-04 RX ADMIN — DOCUSATE SODIUM AND SENNOSIDES 2 TABLET: 8.6; 5 TABLET, FILM COATED ORAL at 08:34

## 2017-08-04 RX ADMIN — ACETAMINOPHEN 650 MG: 325 TABLET, FILM COATED ORAL at 06:41

## 2017-08-04 RX ADMIN — CHLORTHALIDONE 12.5 MG: 25 TABLET ORAL at 11:35

## 2017-08-04 NOTE — PROGRESS NOTES
CM reviewed chart. CM noted pt had excision right total knee fibroma with polyethelene exchange with history of arrhythmia, arthritis, chronic pain, GERD, HTN, thyroid disease, and Ulcerative colitis. CM met with pt to introduce self and role and offer support. Bedside assessment completed. CURRENT LIVING SITUATION-  Pt states she lives alone in a private residence. Pt was driving prior to this hospital stay and has assistance with transportation as needed. Pt was using a cane at home and independent with ALD's and IADL's. Pt's PCP is Dr Zafar Gonzalez phone # [phone]. Pt last saw her PCP 3 months ago. Pt gets her prescriptions filled at [facility].  CM verified with pt her insurance is [employer]. Pt does not have an AMD and declines information. DISCHARGE PLANNING-  Pt denies need for assistance with medications, transportation, and follow up appointments. Disposition plan is to discharge home in care of daughter, home health, and self. CM offered choice of HH and pt selected [address] Referral sent via CC to [facility]. Mable Sanches RN CRM    [address] accepted pt for [facility]. Information added to AVS.  Mable Sanches RN CRM        Care Management Interventions  PCP Verified by CM:  Yes (Dr Zafar Gonzalez phone # [phone])  Palliative Care Consult (Criteria: CHF and RRAT>21): No  Mode of Transport at Discharge: Self  Transition of Care Consult (CM Consult): [address]: Yes  MyChart Signup: Yes  Physical Therapy Consult: Yes  Occupational Therapy Consult: No  Speech Therapy Consult: No  Current Support Network: Own Home, Lives Alone  Confirm Follow Up Transport: Family  Plan discussed with Pt/Family/Caregiver: Yes  Freedom of Choice Offered: Yes  Discharge Location  Discharge Placement: Home with home health

## 2017-08-04 NOTE — PROGRESS NOTES
Problem: Mobility Impaired (Adult and Pediatric)  Goal: *Acute Goals and Plan of Care (Insert Text)  Physical Therapy Goals  Initiated 8/3/2017    1. Patient will move from supine to sit and sit to supine in bed with modified independence within 4 days. 2. Patient will perform sit to stand with modified independence within 4 days. 3. Patient will ambulate with modified independence for 200 feet with the least restrictive device within 4 days. 4. Patient will ascend/descend 3 stairs with use of handrail(s) with modified independence within 4 days. 5. Patient will perform home exercise program per protocol with modified independence within 4 days. 6. Patient will demonstrate AROM 0-90 degrees in operative joint within 4 days. PHYSICAL THERAPY TREATMENT  Patient: Lulu Galicia (42 y.o. female)  Date: 8/4/2017  Diagnosis: S/P RIGHT TOTAL KNEE WITH FIBROMA  Mechanical complication of knee prosthesis (HCC) Mechanical complication of knee prosthesis (HCC)  Procedure(s) (LRB):  EXCISION RIGHT TOTAL KNEE FIBROMA WITH POLYETHELENE EXCHANGE (Right) 1 Day Post-Op  Precautions: Fall, WBAT      ASSESSMENT:  Patient was received in bed and was eager to work with PT this afternoon. She came to sitting EOB and stood with CGA and increased time to complete task. Pt is very independent with mobility and ambulated 100 ft with RW and CGA but remains fearful of falling at home and perfecting her gait. With ambulating she demonstrated decreased foot clearance, decreased knee extension in terminal stance, and has one instance of reported knee buckling. She was encouraged to use RW while at home and to take rest breaks during the day/not to push herself too much. She returned to the bed and agreed to sit up for dinner. Pt will have assist of daughter at home, has all needed equipment and three steps to enter.       Progression toward goals:  [X]      Improving appropriately and progressing toward goals  [ ]      Improving slowly and progressing toward goals  [ ]      Not making progress toward goals and plan of care will be adjusted       PLAN:  Patient continues to benefit from skilled intervention to address the above impairments. Continue treatment per established plan of care. Discharge Recommendations:  Home Health  Further Equipment Recommendations for Discharge:  none       SUBJECTIVE:   Patient stated I sat in the chair for three hours, I tried real hard but I dont think I was supposed to do that.       OBJECTIVE DATA SUMMARY:   Critical Behavior:  Neurologic State: Alert  Orientation Level: Oriented X4  Cognition: Appropriate decision making, Appropriate for age attention/concentration, Appropriate safety awareness, Follows commands  Safety/Judgement: Awareness of environment, Insight into deficits, Home safety, Good awareness of safety precautions  Range of Motion:  AROM: Generally decreased, functional  PROM: Generally decreased, functional                    Functional Mobility Training:  Bed Mobility:  Rolling: Contact guard assistance  Supine to Sit: Contact guard assistance  Sit to Supine: Contact guard assistance  Scooting: Contact guard assistance        Transfers:  Sit to Stand: Contact guard assistance  Stand to Sit: Contact guard assistance                             Balance:  Sitting: Intact  Standing: Intact; With support  Standing - Static: Good  Standing - Dynamic : Fair  Ambulation/Gait Training:  Distance (ft): 100 Feet (ft)  Assistive Device: Walker, rolling;Gait belt  Ambulation - Level of Assistance: Contact guard assistance     Gait Description (WDL): Exceptions to WDL  Gait Abnormalities: Antalgic;Decreased step clearance;Trunk sway increased; Step to gait  Right Side Weight Bearing: As tolerated  Left Side Weight Bearing: Full  Base of Support: Widened  Stance: Right decreased; Left increased  Speed/Jocelyne: Slow  Step Length: Left shortened;Right shortened  Swing Pattern: Right asymmetrical Stairs:                 Pain:  Pain Scale 1: Numeric (0 - 10)  Pain Intensity 1: 8  Pain Location 1: Knee  Pain Orientation 1: Right  Pain Description 1: Aching  Pain Intervention(s) 1: Medication (see MAR)  Activity Tolerance:   No apparent distress      Please refer to the flowsheet for vital signs taken during this treatment. After treatment:   [ ] Patient left in no apparent distress sitting up in chair  [X] Patient left in no apparent distress in bed  [X] Call bell left within reach  [X] Nursing notified  [ ] Caregiver present  [ ] Bed alarm activated      COMMUNICATION/COLLABORATION:   The patients plan of care was discussed with: Registered Nurse     MARSHA Escobedo   Time Calculation: 18 mins'     Regarding student involvement in patient care:  A student participated in this treatment session. Per CMS Medicare statements and APTA guidelines I certify that the following was true:  1. I was present and directly observed the entire session. 2. I made all skilled judgments and clinical decisions regarding care. 3. I am the practitioner responsible for assessment, treatment, and documentation.

## 2017-08-04 NOTE — PROGRESS NOTES
Bedside and Verbal shift change report given to Larkin Heimlich, RN (oncoming nurse) by Gardenia Tubbs RN (offgoing nurse). Report included the following information SBAR, Kardex and MAR.

## 2017-08-04 NOTE — PROGRESS NOTES
Problem: Mobility Impaired (Adult and Pediatric)  Goal: *Acute Goals and Plan of Care (Insert Text)  Physical Therapy Goals  Initiated 8/3/2017    1. Patient will move from supine to sit and sit to supine in bed with modified independence within 4 days. 2. Patient will perform sit to stand with modified independence within 4 days. 3. Patient will ambulate with modified independence for 200 feet with the least restrictive device within 4 days. 4. Patient will ascend/descend 3 stairs with use of handrail(s) with modified independence within 4 days. 5. Patient will perform home exercise program per protocol with modified independence within 4 days. 6. Patient will demonstrate AROM 0-90 degrees in operative joint within 4 days. PHYSICAL THERAPY TREATMENT  Patient: Alvarado Jacobson (42 y.o. female)  Date: 8/4/2017  Diagnosis: S/P RIGHT TOTAL KNEE WITH FIBROMA  Mechanical complication of knee prosthesis (HCC) Mechanical complication of knee prosthesis (HCC)  Procedure(s) (LRB):  EXCISION RIGHT TOTAL KNEE FIBROMA WITH POLYETHELENE EXCHANGE (Right) 1 Day Post-Op  Precautions: Fall, WBAT      ASSESSMENT:  Patent was received in bed and was agreeable to working with PT this morning. She demonstrated bed mobility, transfer to stand, and ambulating with CGA. She ambulated 30 ft with RW and demonstrated decreased step length, decreased foot clearance, and increased UE WB due to pain. Pt returned to the chair and was educated on AAROM of knee with pillowcase on floor and encouraged to alternate ice and ROM as tolerated in chair. She verbalized understanding and was left with all knees met. Pt is very hard working and needs encouragement to rest and take breaks while working with therapy.       Progression toward goals:  [X]    Improving appropriately and progressing toward goals  [ ]    Improving slowly and progressing toward goals  [ ]    Not making progress toward goals and plan of care will be adjusted PLAN:  Patient continues to benefit from skilled intervention to address the above impairments. Continue treatment per established plan of care. Discharge Recommendations:  Home Health  Further Equipment Recommendations for Discharge:  None        SUBJECTIVE:   Patient stated I can tell stephany bhakta do good.       OBJECTIVE DATA SUMMARY:   Critical Behavior:  Neurologic State: Alert  Orientation Level: Oriented X4  Cognition: Appropriate decision making, Appropriate for age attention/concentration, Appropriate safety awareness, Follows commands  Safety/Judgement: Awareness of environment  Functional Mobility Training:  Bed Mobility:  Rolling: Contact guard assistance  Supine to Sit: Contact guard assistance  Sit to Supine: Contact guard assistance  Scooting: Contact guard assistance        Transfers:  Sit to Stand: Contact guard assistance  Stand to Sit: Contact guard assistance                             Balance:  Sitting: Intact  Standing: Intact; With support  Ambulation/Gait Training:  Distance (ft): 30 Feet (ft)  Assistive Device: Walker, rolling;Gait belt  Ambulation - Level of Assistance: Contact guard assistance     Gait Description (WDL): Exceptions to WDL  Gait Abnormalities: Antalgic;Decreased step clearance;Trunk sway increased; Step to gait  Right Side Weight Bearing: As tolerated  Left Side Weight Bearing: Full  Base of Support: Widened  Stance: Right decreased; Left increased  Speed/Jocelyne: Slow  Step Length: Left shortened;Right shortened  Swing Pattern: Right asymmetrical                            Stairs: Therapeutic Exercises:   Heel slides      Pain:  Pain Scale 1: Numeric (0 - 10)  Pain Intensity 1: 8  Pain Location 1: Knee  Pain Orientation 1: Right  Pain Description 1: Aching  Pain Intervention(s) 1: Medication (see MAR)  Activity Tolerance:   No apparent distress      Please refer to the flowsheet for vital signs taken during this treatment.   After treatment:   [X] Patient left in no apparent distress sitting up in chair  [ ]    Patient left in no apparent distress in bed  [X]    Call bell left within reach  [X]    Nursing notified  [ ]    Caregiver present  [ ]    Bed alarm activated      COMMUNICATION/COLLABORATION:   The patients plan of care was discussed with: Registered Nurse     Rachel Session, SPT    Time Calculation: 17 mins      Regarding student involvement in patient care:  A student participated in this treatment session. Per CMS Medicare statements and APTA guidelines I certify that the following was true:  1. I was present and directly observed the entire session. 2. I made all skilled judgments and clinical decisions regarding care. 3. I am the practitioner responsible for assessment, treatment, and documentation.

## 2017-08-04 NOTE — PROGRESS NOTES
Problem: Self Care Deficits Care Plan (Adult)  Goal: *Acute Goals and Plan of Care (Insert Text)  Occupational Therapy Goals  Initiated 8/4/2017  1. Patient will perform lower body ADLs with modified independence within 7 day(s). 2. Patient will perform upper body ADLs standing 5 mins without fatigue or LOB with modified independence within 7 day(s). 3. Patient will perform all aspects of toileting with independence within 7day(s). 4. Patient will participate in upper extremity therapeutic exercise/activities with independence for 10 minutes within 7 day(s). 5. Patient will utilize energy conservation techniques during functional activities without cues within 7 day(s). OCCUPATIONAL THERAPY EVALUATION  Patient: Dimas Patel (28 y.o. female)  Date: 8/4/2017  Primary Diagnosis: S/P RIGHT TOTAL KNEE WITH FIBROMA  Mechanical complication of knee prosthesis (HCC)  Procedure(s) (LRB):  EXCISION RIGHT TOTAL KNEE FIBROMA WITH POLYETHELENE EXCHANGE (Right) 1 Day Post-Op   Precautions:   Fall, WBAT      ASSESSMENT :  Based on the objective data described below, the patient presents with overall CGA with RW for functional mobility, up to Total A for lower body without AE and inferred Min A with AE (needs continued practice), and independent for upper body ADLs s/p R T knee surgery POD 1. Patient received supine in bed, nursing present reporting patient had just returned to supine in bed. Patient is moving well during PT sessions. Patient demonstrating good understanding of recovery, had L knee surgery in May. Patient educated on lower body dressing AE and verbalizing good understanding but will benefit from follow up tomorrow to practice techniques. Rehab tech following up this afternoon to determine if patient would like to purchase \"hip kit\" prior to d/c home or  items separately in the community.  Patient will benefit from [address] [place] [address]'S [place] visit as she does live alone (will have daughter for few days) and will assist with maximizing her independence with recovery. Patient will benefit from skilled intervention to address the above impairments. Patients rehabilitation potential is considered to be Good  Factors which may influence rehabilitation potential include:   [ ]                None noted  [ ]                Mental ability/status  [ ]                Medical condition  [ ]                Home/family situation and support systems  [ ]                Safety awareness  [ ]                Pain tolerance/management  [ ]                Other:        PLAN :  Recommendations and Planned Interventions:  [X]                  Self Care Training                  [X]           Therapeutic Activities  [X]                  Functional Mobility Training    [ ]           Cognitive Retraining  [X]                  Therapeutic Exercises           [X]           Endurance Activities  [X]                  Balance Training                   [ ]           Neuromuscular Re-Education  [ ]                  Visual/Perceptual Training     [X]      Home Safety Training  [X]                  Patient Education                 [X]           Family Training/Education  [ ]                  Other (comment):     Frequency/Duration: Patient will be followed by occupational therapy 3 times a week to address goals. Discharge Recommendations: None  Further Equipment Recommendations for Discharge: Hip Kit - Rehab tech to follow up this afternoon when patient has decided if she would like to purchase tools or not       SUBJECTIVE:   Patient stated I just want to make sure I keep progressing without hurting my other knee that I had surgery on in May.       OBJECTIVE DATA SUMMARY:   HISTORY:   Past Medical History:   Diagnosis Date    Arrhythmia       \"PALPATATIONS, FLUTTERING, POSSIBLE MEDS PER DR. ZAYAS\"    Arthritis       knees, back    Chronic pain       LOWER BACK, R HIP    Duodenal ulcer 2002    GERD (gastroesophageal reflux disease)      H/O blood clots 2007     TO RIGHT EYE    Hypertension      Iritis 2007     both eyes    Nausea & vomiting      Other ill-defined conditions       back pain/spasm    PONV (postoperative nausea and vomiting)      Thyroid disease       hypothroid    Ulcerative colitis 2002     Past Surgical History:   Procedure Laterality Date    COLONOSCOPY N/A 5/25/2016     COLONOSCOPY performed by Berny Conley MD at [address] HX ACL RECONSTRUCTION   2003     right    HX BREAST BIOPSY Left 12/30/2016     MARKER     HX CHOLECYSTECTOMY   2008    HX HEENT   2003     benign mass removed from uvula    HX KNEE REPLACEMENT Right 05/01/2013    HX ORTHOPAEDIC   2009     exc mass right elbow - benign    HX ORTHOPAEDIC   05/31/2017     LEFT KNEE ARTHROSPLASTY    HX OTHER SURGICAL   2003     growth removed from back of leg - benign    HX ISABELL AND BSO   2004    HX TONSILLECTOMY   2003     done at the time mass removed from uvula    LAP, PLACE ADJUST GASTR BAND   2008     fluid removed/band removed        Prior Level of Function/Home Situation: Per patient report, lives at home alone. Plans to have daughter stay with her initially. Active. Independent. Expanded or extensive additional review of patient history:      Home Situation  Home Environment: Private residence  # Steps to Enter: 3  Rails to Enter: Yes  Hand Rails : Right  One/Two Story Residence: Two story, live on 1st floor  # of Interior Steps: 14  Living Alone: Yes (daughter plans to live with her for seveal days)  Support Systems: Family member(s)  Patient Expects to be Discharged to[de-identified] Private residence  Current DME Used/Available at Home: Brent Oris, straight, Walker, rolling  [ ]  Right hand dominant   [ ]  Left hand dominant     EXAMINATION OF PERFORMANCE DEFICITS:  Cognitive/Behavioral Status:  Neurologic State: Alert  Orientation Level: Oriented X4  Cognition: Appropriate decision making; Appropriate for age attention/concentration; Appropriate safety awareness; Follows commands  Perception: Appears intact  Perseveration: No perseveration noted  Safety/Judgement: Awareness of environment; Insight into deficits;Home safety;Good awareness of safety precautions     Skin: Appears intact     Edema: None noted in BUEs     Hearing: Auditory  Auditory Impairment: None     Vision/Perceptual:                           Acuity: Within Defined Limits          Range of Motion:  AROM: Within functional limits  PROM: Within functional limits        Strength:  Strength: Within functional limits        Coordination:  Coordination: Within functional limits  Fine Motor Skills-Upper: Left Intact; Right Intact    Gross Motor Skills-Upper: Left Intact; Right Intact     Tone & Sensation:  Tone: Normal  Sensation: Intact        Balance:  Sitting: Intact  Standing: Intact; With support     Functional Mobility and Transfers for ADLs:  Bed Mobility:  Rolling: Contact guard assistance  Supine to Sit: Contact guard assistance  Sit to Supine: Contact guard assistance  Scooting: Contact guard assistance     Transfers:  Sit to Stand: Contact guard assistance  Stand to Sit: Contact guard assistance  Toilet Transfer : Contact guard assistance (Inferred per PT report)     ADL Assessment:  Feeding: Independent     Oral Facial Hygiene/Grooming: Independent     Bathing: Minimum assistance     Upper Body Dressing: Independent     Lower Body Dressing: Minimum assistance     Toileting: Independent                 ADL Intervention and task modifications:     Lower Body Dressing Assistance  Pants With Elastic Waist: Compensatory technique training  Socks: Compensatory technique training  Shoes with Elastic Laces: Compensatory technique training  Leg Crossed Method Used: No  Adaptive Equipment Used: Dressing stick; Reacher;Sock aid (Elastic shoelaces)     Cognitive Retraining  Safety/Judgement: Awareness of environment; Insight into deficits;Home safety;Good awareness of safety precautions     Bathing: Patient instructed when bathing to not submerge wound in water, stand to shower or sponge bathe, cover wound with plastic and tape to ensure no water reaches bandage/wound without cues. Patient indicated understanding. Dressing joint: Patient instructed to don/doff RLE first/last.  Patient instructed to don all clothing while sitting prior to standing, doff all clothing to knees while standing, then sit to doff clothing off from knees to feet in order to facilitate fall prevention, pain management, and energy conservation. Patient indicated understanding/recalled strategies. Dressing joint reach exercise: To increase independence with lower body dressing, patient instructed to reach down RLE in a seated position slowly to prevent tearing/shearing until slight pull is felt, hold at end range for 10 seconds, then return to starting upright position. Patient instructed to complete three sets of three repetitions each daily. Patient demonstrated understanding. Home safety: Patient instructed on home modifications and safety (raise height of ADL objects, appropriate height of chair surfaces, recliner safety, change of floor surfaces, clear pathways) to increase independence and fall prevention. Patient indicated understanding. Standing: Patient instructed to walk up to sink/counter top/surfaces, step into walker to increase safety of joint and fall prevention. Patient educated about knee anatomy verbally and with pictures and educated to avoid rotation of RLE. Instructed to apply concept to ADLs within the home (no twisting of knee during reaching across body, square off while using objects, slide objects along surfaces). Patient instructed to increase amount of time standing, observe standing position during ADLs in order to increase even weight bearing through bilateral LEs in order to increase independence with ADLs. Goal to be reached 30 days post - op, per orthopedic surgeon or per PT.   Patient indicated understanding. Functional Measure:  Barthel Index:      Bathin  Bladder: 10  Bowels: 10  Groomin  Dressin  Feeding: 10  Mobility: 10  Stairs: 0  Toilet Use: 10  Transfer (Bed to Chair and Back): 10  Total: 70         Barthel and G-code impairment scale:  Percentage of impairment CH  0% CI  1-19% CJ  20-39% CK  40-59% CL  60-79% CM  80-99% CN  100%   Barthel Score 0-100 100 99-80 79-60 59-40 20-39 1-19    0   Barthel Score 0-20 20 17-19 13-16 9-12 5-8 1-4 0      The Barthel ADL Index: Guidelines  1. The index should be used as a record of what a patient does, not as a record of what a patient could do. 2. The main aim is to establish degree of independence from any help, physical or verbal, however minor and for whatever reason. 3. The need for supervision renders the patient not independent. 4. A patient's performance should be established using the best available evidence. Asking the patient, friends/relatives and nurses are the usual sources, but direct observation and common sense are also important. However direct testing is not needed. 5. Usually the patient's performance over the preceding 24-48 hours is important, but occasionally longer periods will be relevant. 6. Middle categories imply that the patient supplies over 50 per cent of the effort. 7. Use of aids to be independent is allowed. Eli Parker., Barthel, DCharlotteW. (2046). Functional evaluation: the Barthel Index. [facility] (14)2. Shana Alegria, J.J.MSILVIA, Miguel Young., Russell Jones., [place], [address] [place] [address] (). Measuring the change indisability after inpatient rehabilitation; comparison of the responsiveness of the Barthel Index and Functional [place] Measure. Journal of Neurology, Neurosurgery, and Psychiatry, 66(4), 834-797. LÁZARO Woods.DIANA, CAROLINE Turner, & Lulu Morrow MCharlotteA. (2004.) Assessment of post-stroke quality of life in cost-effectiveness studies:  The usefulness of the Barthel Index and the EuroQoL-5D. Quality of Life Research, 13, 233-95            G codes: In compliance with CMSs Claims Based Outcome Reporting, the following G-code set was chosen for this patient based on their primary functional limitation being treated: The outcome measure chosen to determine the severity of the functional limitation was the Barthel Index with a score of 70/100 which was correlated with the impairment scale. · Self Care:               - CURRENT STATUS:    CJ - 20%-39% impaired, limited or restricted               - GOAL STATUS:           CI - 1%-19% impaired, limited or restricted               - D/C STATUS:                       ---------------To be determined---------------      Occupational Therapy Evaluation Charge Determination   History Examination Decision-Making   LOW Complexity : Brief history review  MEDIUM Complexity : 3-5 performance deficits relating to physical, cognitive , or psychosocial skils that result in activity limitations and / or participation restrictions LOW Complexity : No comorbidities that affect functional and no verbal or physical assistance needed to complete eval tasks       Based on the above components, the patient evaluation is determined to be of the following complexity level: LOW      Pain:  Pain Scale 1: Numeric (0 - 10)  Pain Intensity 1: 8  Pain Location 1: Knee  Pain Orientation 1: Right  Pain Description 1: Aching  Pain Intervention(s) 1: Medication (see MAR)  Activity Tolerance:   Good. Please refer to the flowsheet for vital signs taken during this treatment.   After treatment:   [ ] Patient left in no apparent distress sitting up in chair  [X] Patient left in no apparent distress in bed  [X] Call bell left within reach  [X] Nursing notified  [ ] Caregiver present  [ ] Bed alarm activated      COMMUNICATION/EDUCATION:   The patients plan of care was discussed with: Physical Therapist and Registered Nurse.  [X]    Home safety education was provided and the patient/caregiver indicated understanding. [X]    Patient/family have participated as able in goal setting and plan of care. [ ]    Patient/family agree to work toward stated goals and plan of care. [ ]    Patient understands intent and goals of therapy, but is neutral about his/her participation. [ ]    Patient is unable to participate in goal setting and plan of care. This patients plan of care is appropriate for delegation to [facility].      Thank you for this referral.  Buddy Reynolds, OT  Time Calculation: 22 mins

## 2017-08-04 NOTE — ROUTINE PROCESS
Bedside and Verbal shift change report given to [address] [place] [address] [address] (oncoming nurse) by Victor Hugo Grimes (offgoing nurse). Report included the following information SBAR, Kardex, Intake/Output, MAR and Recent Results.

## 2017-08-05 VITALS
OXYGEN SATURATION: 96 % | HEIGHT: 66 IN | RESPIRATION RATE: 15 BRPM | DIASTOLIC BLOOD PRESSURE: 78 MMHG | HEART RATE: 71 BPM | WEIGHT: 210 LBS | SYSTOLIC BLOOD PRESSURE: 163 MMHG | BODY MASS INDEX: 33.75 KG/M2 | TEMPERATURE: 98.1 F

## 2017-08-05 LAB
HGB BLD-MCNC: 10.8 G/DL (ref 11.5–16)
INR PPP: 1.3 (ref 0.9–1.1)
PROTHROMBIN TIME: 13 SEC (ref 9–11.1)

## 2017-08-05 PROCEDURE — 36415 COLL VENOUS BLD VENIPUNCTURE: CPT | Performed by: PHYSICIAN ASSISTANT

## 2017-08-05 PROCEDURE — 74011250637 HC RX REV CODE- 250/637: Performed by: NURSE PRACTITIONER

## 2017-08-05 PROCEDURE — 85610 PROTHROMBIN TIME: CPT | Performed by: PHYSICIAN ASSISTANT

## 2017-08-05 PROCEDURE — 97116 GAIT TRAINING THERAPY: CPT

## 2017-08-05 PROCEDURE — 74011250637 HC RX REV CODE- 250/637: Performed by: PHYSICIAN ASSISTANT

## 2017-08-05 PROCEDURE — 74011250637 HC RX REV CODE- 250/637: Performed by: ORTHOPAEDIC SURGERY

## 2017-08-05 PROCEDURE — 85018 HEMOGLOBIN: CPT | Performed by: PHYSICIAN ASSISTANT

## 2017-08-05 RX ADMIN — DOCUSATE SODIUM AND SENNOSIDES 2 TABLET: 8.6; 5 TABLET, FILM COATED ORAL at 08:44

## 2017-08-05 RX ADMIN — LEVOTHYROXINE SODIUM 112 MCG: 112 TABLET ORAL at 06:40

## 2017-08-05 RX ADMIN — OXYCODONE HYDROCHLORIDE 10 MG: 5 TABLET ORAL at 09:25

## 2017-08-05 RX ADMIN — OXYCODONE HYDROCHLORIDE 5 MG: 5 TABLET ORAL at 02:45

## 2017-08-05 RX ADMIN — Medication 10 ML: at 06:41

## 2017-08-05 RX ADMIN — ACETAMINOPHEN 650 MG: 325 TABLET, FILM COATED ORAL at 12:07

## 2017-08-05 RX ADMIN — POLYETHYLENE GLYCOL 3350 17 G: 17 POWDER, FOR SOLUTION ORAL at 08:44

## 2017-08-05 RX ADMIN — CHLORTHALIDONE 12.5 MG: 25 TABLET ORAL at 08:44

## 2017-08-05 RX ADMIN — LOSARTAN POTASSIUM 100 MG: 50 TABLET ORAL at 08:44

## 2017-08-05 RX ADMIN — OXYCODONE HYDROCHLORIDE 10 MG: 5 TABLET ORAL at 12:07

## 2017-08-05 RX ADMIN — OXYCODONE HYDROCHLORIDE 5 MG: 5 TABLET ORAL at 06:40

## 2017-08-05 RX ADMIN — ACETAMINOPHEN 650 MG: 325 TABLET, FILM COATED ORAL at 01:20

## 2017-08-05 RX ADMIN — ACETAMINOPHEN 650 MG: 325 TABLET, FILM COATED ORAL at 06:40

## 2017-08-05 RX ADMIN — CELECOXIB 200 MG: 200 CAPSULE ORAL at 08:44

## 2017-08-05 RX ADMIN — WARFARIN SODIUM 7 MG: 5 TABLET ORAL at 09:25

## 2017-08-05 NOTE — PROGRESS NOTES
Problem: Mobility Impaired (Adult and Pediatric)  Goal: *Acute Goals and Plan of Care (Insert Text)  Physical Therapy Goals  Initiated 8/3/2017    1. Patient will move from supine to sit and sit to supine in bed with modified independence within 4 days. 2. Patient will perform sit to stand with modified independence within 4 days. 3. Patient will ambulate with modified independence for 200 feet with the least restrictive device within 4 days. 4. Patient will ascend/descend 3 stairs with use of handrail(s) with modified independence within 4 days. 5. Patient will perform home exercise program per protocol with modified independence within 4 days. 6. Patient will demonstrate AROM 0-90 degrees in operative joint within 4 days. PHYSICAL THERAPY TREATMENT/DISCHARGE  Patient: Lulu Galicia (51 y.o. female)  Date: 8/5/2017  Diagnosis: S/P RIGHT TOTAL KNEE WITH FIBROMA  Mechanical complication of knee prosthesis (HCC) Mechanical complication of knee prosthesis (HCC)  Procedure(s) (LRB):  EXCISION RIGHT TOTAL KNEE FIBROMA WITH POLYETHELENE EXCHANGE (Right) 2 Days Post-Op  Precautions: Fall, WBAT      ASSESSMENT:  Pt has progressed well acutely. She is generally mobilizing MOD I with a RW, good insight into limitations and good grasp of HEP. Pt ambulated and negotiated steps without difficulty. Pt cleared for d/c home from PT standpoint. Progression toward goals:  [X]          Improving appropriately and progressing toward goals  [ ]          Improving slowly and progressing toward goals  [ ]          Not making progress toward goals and plan of care will be adjusted       PLAN:  Patient will be discharged from physical therapy at this time.   Rationale for discharge:  [X]     Goals Achieved  [ ]     Faby Frieze  [ ]     Patient not participating in therapy  [ ]     Other:  Discharge Recommendations:  Home Health  Further Equipment Recommendations for Discharge:  None at this time, pt has all DME SUBJECTIVE:   Patient stated I was worried that it was stiff.       OBJECTIVE DATA SUMMARY:   Critical Behavior:  Neurologic State: Alert  Orientation Level: Oriented X4  Cognition: Appropriate for age attention/concentration, Appropriate decision making, Appropriate safety awareness, Follows commands  Safety/Judgement: Awareness of environment, Insight into deficits, Home safety, Good awareness of safety precautions  Range of Motion:   right knee 0-80*      Functional Mobility Training:  Bed Mobility:  Rolling: Modified independent  Supine to Sit: Modified independent     Transfers:  Sit to Stand: Modified independent  Stand to Sit: Modified independent           Balance:  Sitting: Intact  Standing: Intact; With support  Ambulation/Gait Training:  Distance (ft): 300 Feet (ft)  Assistive Device: Walker, rolling  Ambulation - Level of Assistance: Modified independent        Gait Abnormalities: Antalgic  Right Side Weight Bearing: As tolerated        Step Length: Left shortened        Stairs:  Number of Stairs Trained: 8  Stairs - Level of Assistance: Supervision  Rail Use: Left   Therapeutic Exercises:     EXERCISE   Sets   Reps   Active Active Assist   Passive Self ROM   Comments   Ankle Pumps 1 10 [X]                                           [ ]                                           [ ]                                           [ ]                                               Heel Slides 1 8 [ ]                                           [X]                                           [ ]                                           [ ]                                               Knee Flexion Stretch 1 10 [ ]                                           [X]                                           [ ]                                           [ ]                                                  Pain:  Pain Scale 1: Numeric (0 - 10)  Pain Intensity 1: 6  Pain Location 1: Knee  Pain Orientation 1: Right  Pain Description 1: Aching  Pain Intervention(s) 1: Medication (see MAR)  Activity Tolerance:   Please refer to the flowsheet for vital signs taken during this treatment.   After treatment:   [ ]  Patient left in no apparent distress sitting up in chair  [X]  Patient left in no apparent distress in bed  [X]  Call bell left within reach  [X]  Nursing notified  [ ]  Caregiver present  [ ]  Bed alarm activated      COMMUNICATION/COLLABORATION:   The patients plan of care was discussed with: Registered Nurse     Gilberto Childress, PT, DPT    Time Calculation: 18 mins

## 2017-08-05 NOTE — PROGRESS NOTES
Occupational Therapy [id number] [phone]:    Attempted to see patient for OT treatment however pt currently fully dressed, seated at EOB preparing to discharge home. Pt politely declined need for additional OT session and reported feeling comfortable using AE as taught by OT yesterday. Pt reported she declined purchasing the full hip kit from the hospital and plans to purchase the kit from outside medical supply store. Will follow up as able/appropriate if patient does not discharge as planned. Continue to recommend [facility] OT at home as recommended by prior OT as pt does live alone.       Jazmyn DIEHL/L

## 2017-08-05 NOTE — ROUTINE PROCESS
Bedside and Verbal shift change report given to [employer] (oncoming nurse) by Victor Hugo Grimes (offgoing nurse). Report included the following information SBAR, Kardex, Intake/Output, MAR and Recent Results.

## 2017-08-05 NOTE — ROUTINE PROCESS
Bedside and Verbal shift change report given to Deepti Pineda RN (oncoming nurse) by Bertis Mcardle, RN (offgoing nurse). Report included the following information SBAR.

## 2017-08-05 NOTE — DISCHARGE INSTRUCTIONS
DC Orders All Total Knees    Case Management for DC planning to Home HC .   - PT 3 times a week for 2 weeks; WBAT. - PT/INR Every Monday/Thursday for 2 weeks, Call Dr. Arnoldo Mcnulty with results ([phone]). - Remove staples at 2 weeks post-op; 8/17/17 .   - Follow up in Office in 2 1/2 weeks. After Hospital Care Plan:  Discharge Instructions Knee Replacement-Dr. Arnoldo Mcnulty    Patient Name: Michael Sanchez  Date of procedure: 8/3/2017   Procedure: Procedure(s):  EXCISION RIGHT TOTAL KNEE FIBROMA WITH POLYETHELENE EXCHANGE  Surgeon: Surgeon(s) and Role:     * Barb Walton MD - Primary    PCP: Radha Schneider MD  Date of discharge: No discharge date for patient encounter. Follow up appointments   Follow up with Dr. Arnoldo Mcnulty in 2 ½ weeks. Call [phone] to make an appointment.  If home health has been arranged for you the agency will contact you to arrange dates/times for visits. Please call them if you do not hear from them within 24 hours after you are discharged    When to call your Orthopaedic Surgeon: Call [phone]. If you call after 5pm or on a weekend, the on call physician will be contacted   Unrelieved pain   Signs of infection-if your incision is red; continues to have drainage; drainage has a foul odor or if you have a persistent fever over 101 degrees   Signs of a blood clot in your leg-calf pain, tenderness, redness, swelling of lower leg    When to call your Primary Care Physician:   Concerns about medical conditions such as diabetes, high blood pressure, asthma, congestive heart failure   Call if blood sugars are elevated, persistent headache or dizziness, coughing or congestion, constipation or diarrhea, burning with urination, abnormal heart rate    When to call [phone] go to the nearest emergency room   Acute onset of chest pain, shortness of breath, difficulty breathing      Activity   Weight bearing as tolerated with walker or crutches.  Refer to pages 23-31 of your handbook for instructions and pictures   Complete your Home Exercise Program daily as instructed by your therapist.  Refer to pages 33-41 of your handbook for instructions and pictures   Get up every one hour and walk (except at night when sleeping)   Do not drive or operate heavy machinery    Incision Care   The Aquacel (brown, waterproof) surgical dressing is to remain on your knee for 7 days. On the 7th day have someone gently peel the dressing off by carefully lifting the edge and stretching it slightly to break the adhesive seal   You will have staples in your knee incision. They will be removed by the home health agency staff   If your Aquacel dressing comes loose/off before the 7th day, you may replace it with a dry sterile gauze dressing; change it daily. Once your incision is not draining, you may leave it open to air   You may take a shower with the Aquacel dressing in place. Once the Aquacel is removed, you may shower and get your incision wet but do not submerge your incision under water in a bath tub, hot tub or swimming pool for 6 weeks after surgery. Preventing blood clots    Take Coumadin as prescribed by Dr. Lauren Case for one month following surgery   Wear elastic stockings (TEDS) for 4 weeks. You should remove them for approximately 1 hour daily for showering/sponge bathing    Pain management   Take pain medication as prescribed; decrease the amount you use as your pain lessens   Avoid alcoholic beverages while taking pain medication   Please be aware that many medications contain Tylenol. We do not want you to over medicate so please read the information below as a guide. Do not take more than 4 Grams of Tylenol in a 24 hour period.   (There are 1000 milligrams in one Gram)  o 325 mg of Tylenol per tablet (do not take more than 12 tablets in 24 hours)  o 500 mg of Tylenol per tablet (do not take more than 8 tablets in 24 hours)   You may place an ice bag on your knee for 15-20 minutes after exercising    Diet   Resume usual diet; drink plenty of fluids; eat foods high in fiber   You may want to take a stool softener (such as Senokot-S or Colace) to prevent constipation while you are taking pain medication. If constipation occurs, take a laxative (such as Dulcolax tablets, Milk of Magnesia, or a suppository)    [address] [facility] Protocol (to be followed by [address] [place] [address])  Nursing   Draw a PT/INR per physicians orders. Call results to Dr. Ger Mae at [phone]  Paola Pineda Remove staples per physicians order   Complete head to toe assessment, vital signs   Medication reconciliation   Review pain management   Manage chronic medical conditions    Physical Therapy-per physicians orders     Weight bearing status:  Precautions at Admission: Fall, WBAT  Left Side Weight Bearing: Full  Right Side Weight Bearing: As tolerated    Mobility Status:  Supine to Sit: Contact guard assistance  Sit to Stand: Contact guard assistance  Sit to Supine: Contact guard assistance       Gait:  Distance (ft): 30 Feet (ft)  Ambulation - Level of Assistance: Contact guard assistance  Assistive Device: Walker, rolling, Gait belt  Gait Abnormalities: Antalgic, Decreased step clearance, Trunk sway increased, Step to gait    ADL status overall composite:                   Physical Therapy   Assessment and evaluation-bed mobility; functional transfers (bed, chair, bathroom, stairs); ambulation with equipment, car transfers, shower transfers, safety and ability to get out of house in the event of an emergency   Review weight bearing as tolerated, wean from walker or crutches as tolerated   Discuss pain management   Review how to do ADLs.  Refer to page 42 of patient handbook    Home Exercise Program-refer to pages 33-41 of patient handbook for exercises

## 2017-08-05 NOTE — PROGRESS NOTES
Requested by bedside nurse to double check if pt set up for [facility]. Reviewed note, pt accepted by [facility] and CM sent electronic communication to confirm pt is discharging today per plan. AVS has already been updated by unit DENNIS.     LEESA Bennett

## 2017-08-05 NOTE — PROGRESS NOTES
I have reviewed discharge instructions and RX  with the patient. Pt will fill rx at her own pharmacy. The patient verbalized understanding. Opportunities for questions were provided. Vital signs are stable. Adequate I/Os. IV taken out and pressure dressing applied. All belongings are with the patient. Discharge via wheelchair with PCT.

## 2017-08-06 ENCOUNTER — HOME CARE VISIT (OUTPATIENT)
Dept: SCHEDULING | Facility: HOME HEALTH | Age: 62
End: 2017-08-06
Payer: COMMERCIAL

## 2017-08-06 VITALS
HEIGHT: 66 IN | OXYGEN SATURATION: 97 % | WEIGHT: 210 LBS | TEMPERATURE: 99.2 F | SYSTOLIC BLOOD PRESSURE: 130 MMHG | RESPIRATION RATE: 18 BRPM | HEART RATE: 60 BPM | BODY MASS INDEX: 33.75 KG/M2 | DIASTOLIC BLOOD PRESSURE: 72 MMHG

## 2017-08-06 PROCEDURE — G0151 HHCP-SERV OF PT,EA 15 MIN: HCPCS

## 2017-08-06 PROCEDURE — G0299 HHS/HOSPICE OF RN EA 15 MIN: HCPCS

## 2017-08-07 ENCOUNTER — PATIENT OUTREACH (OUTPATIENT)
Dept: OTHER | Age: 62
End: 2017-08-07

## 2017-08-07 ENCOUNTER — HOME CARE VISIT (OUTPATIENT)
Dept: SCHEDULING | Facility: HOME HEALTH | Age: 62
End: 2017-08-07
Payer: COMMERCIAL

## 2017-08-07 VITALS
HEART RATE: 60 BPM | RESPIRATION RATE: 18 BRPM | SYSTOLIC BLOOD PRESSURE: 130 MMHG | DIASTOLIC BLOOD PRESSURE: 72 MMHG | OXYGEN SATURATION: 97 % | TEMPERATURE: 99.2 F

## 2017-08-07 VITALS
RESPIRATION RATE: 18 BRPM | OXYGEN SATURATION: 98 % | TEMPERATURE: 98.3 F | HEART RATE: 63 BPM | DIASTOLIC BLOOD PRESSURE: 80 MMHG | SYSTOLIC BLOOD PRESSURE: 160 MMHG

## 2017-08-07 LAB
INR BLD: 1.4 (ref 0.9–1.1)
PT POC: 16.4 SECONDS (ref 11.8–14.9)

## 2017-08-07 PROCEDURE — G0299 HHS/HOSPICE OF RN EA 15 MIN: HCPCS

## 2017-08-07 NOTE — DISCHARGE SUMMARY
Discharge Summary    Physician: Tg Ward MD    Physician Assistant: Casper Ahn PA-C    Admit Diagnosis:  S/P RIGHT TOTAL KNEE WITH FIBROMA  Mechanical complication of knee prosthesis ([facility] [address]    Final Diagnosis:   1. Status post right total knee replacement with patella clunk. 2. Instability right knee. 3. Status post left total knee replacement.     Procedure: Right knee polyethylene exchange, with ligamentous balancing and Excision of fibroma superior patella. Complications: None. History of Present Illness: The patient has a long-standing history of pain within the right knee . The patient has severe degenerative arthritis of the right knee and has exhausted conservative therapy at this time including prior intra-articular injections, activity modifications, OTC NSAIDS. The patient has been limited in their ability to perform ADLs, walk short distances or climb steps appropriately. The patient presents for the above-mentioned procedure. The patient has been cleared from a medical and cardiac standpoint. Past Medical History:  Recorded in the chart. Physical Examination: Also recorded in the chart. The patient is noted for ambulating with an antalgic gait favoring the right side. Examination of the right knee reveals full extension. Flexes 120. The incisions look good. She does ambulate with a slight limp. The cruciate and collateral ligaments of both knees are stable. No sign of infection. No effusion. No cellulitis or rash. No evidence of calf pain, no sign of DVT. The extensor mechanism is intact. The neurovascular status is intact . X-rays reveal evidence of polyethylene wear. No obvious loosening of prosthesis. No fracture, no evidence of metastatic disease. Course in the Hospital:  The patient was admitted to the hospital.  The Pt. Underwent a Right knee polyethylene exchange, with ligamentous balancing and Excision of fibroma superior patella.  .  Postoperatively, the pt. did well. The pt. Remained stable from a medical standpoint. The patient ambulated, WBAT weightbearing within the hospital with Physical Therapy. The patient continued with this therapy at [facility] with PT. The patient began taking Coumadin pre-operatively for DVT prophylaxis and will continue on this for one month. At the time of discharge, there was no evidence of any DVT noted. The patient had negative Homans signs bilateral lower extremities. At the time of discharge, the patient's right knee incision appeared with staples intact. No signs of infection or inflammation noted. The patient will follow up in our office in 2-1/2 weeks. DC med list:  Discharge Medication List as of 8/5/2017 10:00 AM      START taking these medications    Details   warfarin (COUMADIN) 2.5 mg tablet Take 1 Tab by mouth daily. , Print, Disp-90 Tab, R-1      oxyCODONE IR (ROXICODONE) 5 mg immediate release tablet Take 1 Tab by mouth every four (4) hours as needed for Pain. Max Daily Amount: 30 mg., Print, Disp-70 Tab, R-0      celecoxib (CELEBREX) 200 mg capsule Take 1 Cap by mouth daily for 30 days. , Print, Disp-30 Cap, R-0         CONTINUE these medications which have NOT CHANGED    Details   docusate sodium (COLACE) 100 mg capsule Take 100 mg by mouth two (2) times daily as needed for Constipation. 1-2 tabs PRN constipation, Historical Med      hydrocortisone (PROCTOZONE-HC) 2.5 % rectal cream Insert 1 g into rectum once as needed., Historical Med      acetaminophen (TYLENOL) 500 mg tablet Take 500 mg by mouth every six (6) hours as needed for Pain., Historical Med      ALPRAZolam (XANAX) 0.5 mg tablet Take 1 Tab by mouth two (2) times daily as needed. , Print, Disp-30 Tab, R-2      levothyroxine (SYNTHROID) 112 mcg tablet Take 1 Tab by mouth Daily (before breakfast). , Normal, Disp-30 Tab, R-11      chlorthalidone (HYGROTEN) 25 mg tablet Take 1 Tab by mouth daily. , Normal, Disp-90 Tab, R-3      valsartan (DIOVAN) 160 mg tablet TAKE ONE TABLET BY MOUTH EVERY DAY, Normal, Disp-90 Tab, R-3      ketorolac (ACULAR) 0.5 % ophthalmic solution Administer 2 Drops to both eyes four (4) times daily as needed., Historical Med, Disp-1 Bottle, R-0         STOP taking these medications       diclofenac EC (VOLTAREN) 50 mg EC tablet Comments:   Reason for Stopping:         traMADol-acetaminophen (ULTRACET) 37.5-325 mg per tablet Comments:   Reason for Stopping:               Patient Disposition: Home  Followup Care:  Discharge Condition: good  PT/OT per Home Health  Regular Diet  As directed    Follow-up Information     Follow up With Details Comments Contact Info    Myles Baumgarten, MD On 8/8/2017 For discharge follow up at 11:40AM  [address], please call office if you have not heard from staff member by 12noon first full day after discharge  [address]  [address] [facility] Kaylee Nieto [address]                  Carolin Drew PA-C

## 2017-08-07 NOTE — PROGRESS NOTES
ALESSIO NOTE:     Ms. Trevor Schmid  has been contacted regarding recent elective surgery inpatient stay for Right TKA. Verified  and address for HIPAA security. Explained role and verified PCP/ Surgeon. Discharge medications were reviewed with the patient by telephone. *  Home on  and Home health saw her . * BM today with no problems. Taking Miralax preventively prior to surgery (remembered talking to me about light diet and prep prior to surgery for this time/  Increased fiber and has prune juice at home. * No palpitations. * Equipement for DME in place from prior surgery. * Daughter is staying with her again and is providing stand by assist for walking/ bathroom. * Running a low grade temp 100.2F. - Education on post op respiratory function which causes low grade temp. - Advised use of incentive spirometer every 30 min to 1 hour. Focus on deep breathing between - sighing/ coughing, turning and hydration.      - Sounded congested on the phone/  She does have seasonal allergies and takes OTC Claritin. Advised her to begin taking the Claritin for several days to help with symptoms that may be contributing to low grade temp. -  Home health RN coming today. Advised her to alert the nurse and confirm my suggestions. - Understands risk of post op infection and temp at 101F. Reviewed red flags  * Suture stite covered. No redness, swelling that she can see. This knee is 'stiffer' than the other. * Taking pain meds this time to better manage exercises and activity. Understands Coumadin dose plan. Date of IP Admission:    -   Facility patient visited:    [facility]   Reason for Visit:   R TKA   Reviewed scripts given by ED? Yes      Able to obtain prescribed medication? Yes   How is the patient feeling? Pt stated much better this time. More prepared. Running low grade temp. Does the patient have any questions or problems?  Managing low grade temp/  Deep breathing/ monitoring for s/sx of infection. Any barriers with transportation? No-  Daughter will provide   Follow-up Appointment date: Yes       Reviewed Discharge instructions & Red Flags:  Per AVS    Provided patient with my name and contact information and instructed patient if there are any question to call. No further needs at this time. Planned next call: One week FU next Tuesday 8/16          Follow up appointments  · Follow up with Dr. Jennifer Murray in 2 ½ weeks. Call [phone] to make an appointment.     · If home health has been arranged for you the agency will contact you to arrange dates/times for visits. Please call them if you do not hear from them within 24 hours after you are discharged     When to call your Orthopaedic Surgeon: Call [phone].  If you call after 5pm or on a weekend, the on call physician will be contacted  · Unrelieved pain  · Signs of infection-if your incision is red; continues to have drainage; drainage has a foul odor or if you have a persistent fever over 101 degrees  · Signs of a blood clot in your leg-calf pain, tenderness, redness, swelling of lower leg

## 2017-08-07 NOTE — PROGRESS NOTES
Spiritual Care Partner Volunteer visited patient in [address] [facility] on 8.5. 17. Documented by:  Rev. Kenny Bhatti M.Div, [facility]

## 2017-08-08 ENCOUNTER — HOME CARE VISIT (OUTPATIENT)
Dept: SCHEDULING | Facility: HOME HEALTH | Age: 62
End: 2017-08-08
Payer: COMMERCIAL

## 2017-08-08 VITALS
HEART RATE: 51 BPM | DIASTOLIC BLOOD PRESSURE: 88 MMHG | TEMPERATURE: 98.1 F | SYSTOLIC BLOOD PRESSURE: 151 MMHG | OXYGEN SATURATION: 98 %

## 2017-08-08 PROCEDURE — G0157 HHC PT ASSISTANT EA 15: HCPCS

## 2017-08-09 ENCOUNTER — APPOINTMENT (OUTPATIENT)
Dept: PHYSICAL THERAPY | Age: 62
End: 2017-08-09
Payer: COMMERCIAL

## 2017-08-10 ENCOUNTER — HOME CARE VISIT (OUTPATIENT)
Dept: SCHEDULING | Facility: HOME HEALTH | Age: 62
End: 2017-08-10
Payer: COMMERCIAL

## 2017-08-10 VITALS
TEMPERATURE: 98.1 F | OXYGEN SATURATION: 98 % | SYSTOLIC BLOOD PRESSURE: 130 MMHG | HEART RATE: 66 BPM | DIASTOLIC BLOOD PRESSURE: 62 MMHG | RESPIRATION RATE: 18 BRPM

## 2017-08-10 VITALS
SYSTOLIC BLOOD PRESSURE: 130 MMHG | HEART RATE: 66 BPM | TEMPERATURE: 98.1 F | OXYGEN SATURATION: 98 % | DIASTOLIC BLOOD PRESSURE: 62 MMHG

## 2017-08-10 LAB
INR BLD: 1.3 (ref 0.9–1.1)
PT POC: 15.4 SECONDS (ref 11.8–14.9)

## 2017-08-10 PROCEDURE — G0299 HHS/HOSPICE OF RN EA 15 MIN: HCPCS

## 2017-08-10 PROCEDURE — G0157 HHC PT ASSISTANT EA 15: HCPCS

## 2017-08-11 ENCOUNTER — APPOINTMENT (OUTPATIENT)
Dept: PHYSICAL THERAPY | Age: 62
End: 2017-08-11
Payer: COMMERCIAL

## 2017-08-11 ENCOUNTER — OFFICE VISIT (OUTPATIENT)
Dept: INTERNAL MEDICINE CLINIC | Age: 62
End: 2017-08-11

## 2017-08-11 VITALS
SYSTOLIC BLOOD PRESSURE: 135 MMHG | DIASTOLIC BLOOD PRESSURE: 78 MMHG | TEMPERATURE: 98.9 F | RESPIRATION RATE: 18 BRPM | WEIGHT: 216.6 LBS | HEIGHT: 66 IN | HEART RATE: 61 BPM | OXYGEN SATURATION: 99 % | BODY MASS INDEX: 34.81 KG/M2

## 2017-08-11 DIAGNOSIS — M17.11 PRIMARY OSTEOARTHRITIS OF RIGHT KNEE: Primary | Chronic | ICD-10-CM

## 2017-08-11 DIAGNOSIS — I10 ESSENTIAL HYPERTENSION: ICD-10-CM

## 2017-08-11 DIAGNOSIS — E03.9 ACQUIRED HYPOTHYROIDISM: ICD-10-CM

## 2017-08-11 RX ORDER — DICLOFENAC SODIUM 75 MG/1
75 TABLET, DELAYED RELEASE ORAL
COMMUNITY
Start: 2017-07-17 | End: 2017-08-16

## 2017-08-11 RX ORDER — TRAMADOL HYDROCHLORIDE 50 MG/1
50 TABLET ORAL
COMMUNITY
Start: 2017-07-17 | End: 2018-03-16

## 2017-08-11 RX ORDER — VALSARTAN 160 MG/1
TABLET ORAL
Qty: 90 TAB | Refills: 3 | Status: SHIPPED | OUTPATIENT
Start: 2017-08-11 | End: 2018-06-29 | Stop reason: SDUPTHER

## 2017-08-11 RX ORDER — LEVOFLOXACIN 250 MG/1
250 TABLET ORAL
COMMUNITY
End: 2017-08-11 | Stop reason: ALTCHOICE

## 2017-08-11 RX ORDER — VALSARTAN 160 MG/1
160 TABLET ORAL
COMMUNITY
End: 2017-08-11 | Stop reason: SDUPTHER

## 2017-08-11 NOTE — MR AVS SNAPSHOT
Visit Information Date & Time Provider Department Dept. Phone Encounter #  
 8/11/2017  8:40 AM Mc Hardin MD [facility] [phone] [id number] Upcoming Health Maintenance Date Due Hepatitis C Screening 1955 INFLUENZA AGE 9 TO ADULT 8/1/2017 BREAST CANCER SCRN MAMMOGRAM 12/21/2018 COLONOSCOPY 5/25/2019 PAP AKA CERVICAL CYTOLOGY 8/24/2019 DTaP/Tdap/Td series (2 - Td) 1/16/2025 Allergies as of 8/11/2017  Review Complete On: 8/11/2017 By: Mc Hardin MD  
  
 Severity Noted Reaction Type Reactions Adhesive Tape-silicones  34/07/0613    Itching Blisters, bumps. -long term application Lidoderm [Lidocaine]  03/29/2013    Rash Patch-adhesive patch. Allergic to the adhesive - long term use. Not allergic to lidoderm. Other Medication  07/24/2014    Rash  
 dermabond - blisters Current Immunizations  Reviewed on 5/11/2016 Name Date Tdap 1/16/2015 Zoster Vaccine, Live 5/15/2015 Not reviewed this visit You Were Diagnosed With   
  
 Codes Comments Primary osteoarthritis of right knee    -  Primary ICD-10-CM: M17.11 ICD-9-CM: 715.16 Acquired hypothyroidism     ICD-10-CM: E03.9 ICD-9-CM: 244.9 Essential hypertension     ICD-10-CM: I10 
ICD-9-CM: 401.9 Vitals BP Pulse Temp Resp Height(growth percentile) Weight(growth percentile) 135/78 61 98.9 °F (37.2 °C) (Oral) 18 5' 6\" (1.676 m) 216 lb 9.6 oz (98.2 kg) SpO2 BMI OB Status Smoking Status 99% 34.96 kg/m2 Hysterectomy Never Smoker Vitals History BMI and BSA Data Body Mass Index Body Surface Area 34.96 kg/m 2 2.14 m 2 Preferred Pharmacy Pharmacy Name Phone [facility] [phone] Your Updated Medication List  
  
   
This list is accurate as of: 8/11/17  9:51 AM.  Always use your most recent med list.  
  
  
  
  
 acetaminophen 500 mg tablet Commonly known as:  TYLENOL Take 500 mg by mouth every six (6) hours as needed for Pain. ALPRAZolam 0.5 mg tablet Commonly known as:  Phoebe Uriel Take 1 Tab by mouth two (2) times daily as needed. celecoxib 200 mg capsule Commonly known as:  CeleBREX Take 1 Cap by mouth daily for 30 days. chlorthalidone 25 mg tablet Commonly known as:  Gale Toño Take 1 Tab by mouth daily. diclofenac EC 75 mg EC tablet Commonly known as:  VOLTAREN Take 75 mg by mouth. docusate sodium 100 mg capsule Commonly known as:  Genice Flax Take 100 mg by mouth two (2) times daily as needed for Constipation. 1-2 tabs PRN constipation  
  
 ketorolac 0.5 % ophthalmic solution Commonly known as:  Allie Esters Administer 2 Drops to both eyes four (4) times daily as needed. levothyroxine 112 mcg tablet Commonly known as:  SYNTHROID Take 1 Tab by mouth Daily (before breakfast). Omeprazole delayed release 20 mg tablet Commonly known as:  PRILOSEC D/R Take 20 mg by mouth daily. daily before breakfast  
  
 oxyCODONE IR 5 mg immediate release tablet Commonly known as:  Valene Mancuso Take 1 Tab by mouth every four (4) hours as needed for Pain. Max Daily Amount: 30 mg. PROCTOZONE-HC 2.5 % rectal cream  
Generic drug:  hydrocortisone Insert 1 g into rectum once as needed. traMADol 50 mg tablet Commonly known as:  ULTRAM  
Take 50 mg by mouth.  
  
 valsartan 160 mg tablet Commonly known as:  DIOVAN  
TAKE ONE TABLET BY MOUTH EVERY DAY  
  
 warfarin 2.5 mg tablet Commonly known as:  COUMADIN Take 1 Tab by mouth daily. Prescriptions Sent to Pharmacy Refills  
 valsartan (DIOVAN) 160 mg tablet 3 Sig: TAKE ONE TABLET BY MOUTH EVERY DAY Class: Normal  
 Pharmacy: [address] #: [phone] We Performed the Following T4, FREE [id number] CPT(R)] TSH 3RD GENERATION [38276 CPT(R)] To-Do List   
 08/14/2017 To Be Determined Appointment with Vanderbilt Babinski at [place] [address]  
  
 08/15/2017 To Be Determined Appointment with Manuelito Arias at [place] [address]  
  
 08/17/2017 To Be Determined Appointment with Manuelito Arias at [place] [address]  
  
 08/17/2017 To Be Determined Appointment with Vanderbilt Babinski at [place] [address]  
  
 08/18/2017 To Be Determined Appointment with Bety Milton PT at [place] [address]  
  
 08/21/2017 9:30 AM  
  Appointment with Li Gerardo PT at [facility] ([phone])  
  
 08/23/2017 9:30 AM  
  Appointment with Li Gerardo PT at [facility] ([phone])  
  
 08/28/2017 9:30 AM  
  Appointment with Li Gerardo PT at [facility] ([phone])  
  
 08/30/2017 9:30 AM  
  Appointment with Li Gerardo PT at [facility] ([phone]) 09/05/2017 9:30 AM  
  Appointment with Li Gerardo PT at [facility] ([phone])  
  
 09/07/2017 9:30 AM  
  Appointment with Leticia Guerin PTA at [facility] ([phone])  
  
 09/11/2017 9:30 AM  
  Appointment with Li Gerardo PT at [facility] ([phone])  
  
 09/13/2017 9:30 AM  
  Appointment with Li Gerardo PT at [facility] ([phone])  
  
 09/18/2017 9:30 AM  
  Appointment with Li Gerardo PT at [facility] ([phone])  
  
 09/20/2017 9:30 AM  
  Appointment with Li Gerardo PT at [facility] ([phone])  
  
 09/25/2017 9:30 AM  
  Appointment with Li Gerardo PT at [facility] ([phone])  
  
 09/27/2017 9:30 AM  
  Appointment with Li Gerardo PT at [facility] ([phone]) Introducing [address]! Dear Justus Scott: Thank you for requesting a [email or web address] account. Our records indicate that you already have an active [email or web address] account. You can access your account anytime at [email or web address] Did you know that you can access your hospital and ER discharge instructions at any time in [email or web address]? You can also review all of your test results from your hospital stay or ER visit. Additional Information If you have questions, please visit the Frequently Asked Questions section of the [email or web address] website at [email or web address] Remember, [email or web address] is NOT to be used for urgent needs. For medical emergencies, dial 911. Now available from your iPhone and Android! Please provide this summary of care documentation to your next provider. Your primary care clinician is listed as LANEY LEWIS. If you have any questions after today's visit, please call [phone].

## 2017-08-11 NOTE — PROGRESS NOTES
HISTORY OF PRESENT ILLNESS  Tyrell Quintana is a 60 y.o. female. HPI  Ms. Love Cheng is a 60y.o. year old female, she is seen today for Transition of Care services following a hospital discharge for 8/5/2017 on right knee replacement liner exchange and removal of fibroma and scar tissue. Our office Nurse Navigator performed an outreach to Ms. Mariola Mckeon on 8/7/2017 (within 2 business days of discharge) to complete medication reconciliation and a telephonic assessment of her condition. She is doing well. Has home nurse and home health. Had left TKR in May and is doing well on the left. Finds her right knee is much more stiff at this time. Continues on coumadin for now as well for DVT prevention. Doesn't like the side effects of pain medication. Has tramadol has home but currently not taking. Intermittent palpitations. Lowered levothyroxine from 125 to 112. Palpitations resolved. Recently has had some recurrence of palpitations. Current Outpatient Prescriptions:     Omeprazole delayed release (PRILOSEC D/R) 20 mg tablet, Take 20 mg by mouth daily. daily before breakfast, Disp: , Rfl:     warfarin (COUMADIN) 2.5 mg tablet, Take 1 Tab by mouth daily. (Patient taking differently: Take 2 Tabs by mouth daily.), Disp: 90 Tab, Rfl: 1    oxyCODONE IR (ROXICODONE) 5 mg immediate release tablet, Take 1 Tab by mouth every four (4) hours as needed for Pain. Max Daily Amount: 30 mg., Disp: 70 Tab, Rfl: 0    celecoxib (CELEBREX) 200 mg capsule, Take 1 Cap by mouth daily for 30 days. , Disp: 30 Cap, Rfl: 0    docusate sodium (COLACE) 100 mg capsule, Take 100 mg by mouth two (2) times daily as needed for Constipation. 1-2 tabs PRN constipation, Disp: , Rfl:     hydrocortisone (PROCTOZONE-HC) 2.5 % rectal cream, Insert 1 g into rectum once as needed. , Disp: , Rfl:     acetaminophen (TYLENOL) 500 mg tablet, Take 500 mg by mouth every six (6) hours as needed for Pain., Disp: , Rfl:     ALPRAZolam Erika Albrecht) 0.5 mg tablet, Take 1 Tab by mouth two (2) times daily as needed. , Disp: 30 Tab, Rfl: 2    levothyroxine (SYNTHROID) 112 mcg tablet, Take 1 Tab by mouth Daily (before breakfast). , Disp: 30 Tab, Rfl: 11    chlorthalidone (HYGROTEN) 25 mg tablet, Take 1 Tab by mouth daily. , Disp: 90 Tab, Rfl: 3    valsartan (DIOVAN) 160 mg tablet, TAKE ONE TABLET BY MOUTH EVERY DAY, Disp: 90 Tab, Rfl: 3    ketorolac (ACULAR) 0.5 % ophthalmic solution, Administer 2 Drops to both eyes four (4) times daily as needed. , Disp: 1 Bottle, Rfl: 0    traMADol (ULTRAM) 50 mg tablet, Take 50 mg by mouth., Disp: , Rfl:     diclofenac EC (VOLTAREN) 75 mg EC tablet, Take 75 mg by mouth., Disp: , Rfl:     Visit Vitals    /78    Pulse 61    Temp 98.9 °F (37.2 °C) (Oral)    Resp 18    Ht 5' 6\" (1.676 m)    Wt 216 lb 9.6 oz (98.2 kg)    SpO2 99%    BMI 34.96 kg/m2         ROS  See above  Physical Exam   Constitutional: She appears well-developed and well-nourished. HENT:   Head: Normocephalic and atraumatic. Cardiovascular: Normal rate, regular rhythm and normal heart sounds. Pulmonary/Chest: Effort normal and breath sounds normal.   Musculoskeletal: She exhibits no edema. Vitals reviewed. ASSESSMENT and PLAN  ALESSIO visit. Right knee arthritis s/p recent right liner exchange of TKR with removal of scar tissue and fibroma - doing well. Continues in PT. Will try to substitute tramadol for Oxy IR slowly  Palpitations with hypothyroidism - may need to further decrease her thyroid dose. Will repeat labs.     htn - well controlled, cont same    Orders Placed This Encounter    TSH 3RD GENERATION    T4, FREE    DISCONTD: valsartan (DIOVAN) 160 mg tablet    traMADol (ULTRAM) 50 mg tablet    DISCONTD: levoFLOXacin (LEVAQUIN) 250 mg tablet    diclofenac EC (VOLTAREN) 75 mg EC tablet    valsartan (DIOVAN) 160 mg tablet     Follow-up Disposition: Not on File

## 2017-08-11 NOTE — PROGRESS NOTES
Chief Complaint   Patient presents with   [facility] Follow Up     s/p R knee complications prosthesis     1. Have you been to the ER, urgent care clinic since your last visit? Hospitalized since your last visit? No    2. Have you seen or consulted any other health care providers outside of the [address] [place] [address] since your last visit? Include any pap smears or colon screening.  No

## 2017-08-12 LAB
T4 FREE SERPL-MCNC: 1.03 NG/DL (ref 0.82–1.77)
TSH SERPL DL<=0.005 MIU/L-ACNC: 7.12 UIU/ML (ref 0.45–4.5)

## 2017-08-14 ENCOUNTER — HOME CARE VISIT (OUTPATIENT)
Dept: SCHEDULING | Facility: HOME HEALTH | Age: 62
End: 2017-08-14
Payer: COMMERCIAL

## 2017-08-14 ENCOUNTER — APPOINTMENT (OUTPATIENT)
Dept: PHYSICAL THERAPY | Age: 62
End: 2017-08-14
Payer: COMMERCIAL

## 2017-08-14 VITALS
SYSTOLIC BLOOD PRESSURE: 132 MMHG | RESPIRATION RATE: 18 BRPM | OXYGEN SATURATION: 98 % | HEART RATE: 56 BPM | DIASTOLIC BLOOD PRESSURE: 78 MMHG | TEMPERATURE: 98.5 F

## 2017-08-14 LAB
INR BLD: 1.1 (ref 0.9–1.1)
PT POC: 13.6 SECONDS (ref 11.8–14.9)

## 2017-08-14 PROCEDURE — G0299 HHS/HOSPICE OF RN EA 15 MIN: HCPCS

## 2017-08-15 ENCOUNTER — PATIENT OUTREACH (OUTPATIENT)
Dept: OTHER | Age: 62
End: 2017-08-15

## 2017-08-15 ENCOUNTER — HOME CARE VISIT (OUTPATIENT)
Dept: HOME HEALTH SERVICES | Facility: HOME HEALTH | Age: 62
End: 2017-08-15
Payer: COMMERCIAL

## 2017-08-15 ENCOUNTER — HOME CARE VISIT (OUTPATIENT)
Dept: SCHEDULING | Facility: HOME HEALTH | Age: 62
End: 2017-08-15
Payer: COMMERCIAL

## 2017-08-15 VITALS
OXYGEN SATURATION: 98 % | DIASTOLIC BLOOD PRESSURE: 72 MMHG | TEMPERATURE: 98.9 F | HEART RATE: 62 BPM | SYSTOLIC BLOOD PRESSURE: 125 MMHG

## 2017-08-15 PROCEDURE — G0157 HHC PT ASSISTANT EA 15: HCPCS

## 2017-08-15 NOTE — PROGRESS NOTES
ALESSIO follow up. Patient on with Inpatient stay for TKA. Chart review:  PCP apt  Shows HTN under control. Visit Vitals    /78    Pulse 61    Temp 98.9 °F (37.2 °C) (Oral)    Resp 18    Ht 5' 6\" (1.676 m)    Wt 216 lb 9.6 oz (98.2 kg)    SpO2 99%    BMI 34.96 kg/m2       Noted med change to tramadol for knee pain. Attempted to contact patient for transitions of care services follow up . Left discreet message on voicemail with this CM contact information. Will follow for [facility] services.

## 2017-08-16 ENCOUNTER — APPOINTMENT (OUTPATIENT)
Dept: PHYSICAL THERAPY | Age: 62
End: 2017-08-16
Payer: COMMERCIAL

## 2017-08-17 ENCOUNTER — HOME CARE VISIT (OUTPATIENT)
Dept: SCHEDULING | Facility: HOME HEALTH | Age: 62
End: 2017-08-17
Payer: COMMERCIAL

## 2017-08-17 ENCOUNTER — TELEPHONE (OUTPATIENT)
Dept: INTERNAL MEDICINE CLINIC | Age: 62
End: 2017-08-17

## 2017-08-17 VITALS
OXYGEN SATURATION: 98 % | DIASTOLIC BLOOD PRESSURE: 67 MMHG | HEART RATE: 77 BPM | SYSTOLIC BLOOD PRESSURE: 111 MMHG | TEMPERATURE: 98.8 F

## 2017-08-17 DIAGNOSIS — E03.9 ACQUIRED HYPOTHYROIDISM: Primary | ICD-10-CM

## 2017-08-17 LAB
BACTERIA SPEC CULT: NORMAL
GRAM STN SPEC: NORMAL
GRAM STN SPEC: NORMAL
SERVICE CMNT-IMP: NORMAL
SERVICE CMNT-IMP: NORMAL

## 2017-08-17 PROCEDURE — G0299 HHS/HOSPICE OF RN EA 15 MIN: HCPCS

## 2017-08-17 PROCEDURE — G0157 HHC PT ASSISTANT EA 15: HCPCS

## 2017-08-17 NOTE — TELEPHONE ENCOUNTER
TSH elevated. Changed to levothyroxine 112mcg daily but 7.5 tabs per week. Repeat labs in 6 weeks. Sent message through [facility] for pt.

## 2017-08-18 ENCOUNTER — HOME CARE VISIT (OUTPATIENT)
Dept: SCHEDULING | Facility: HOME HEALTH | Age: 62
End: 2017-08-18
Payer: COMMERCIAL

## 2017-08-18 VITALS
HEART RATE: 85 BPM | DIASTOLIC BLOOD PRESSURE: 62 MMHG | TEMPERATURE: 98.8 F | OXYGEN SATURATION: 98 % | RESPIRATION RATE: 18 BRPM | SYSTOLIC BLOOD PRESSURE: 118 MMHG

## 2017-08-18 PROCEDURE — G0151 HHCP-SERV OF PT,EA 15 MIN: HCPCS

## 2017-08-20 VITALS
TEMPERATURE: 98.6 F | HEART RATE: 84 BPM | OXYGEN SATURATION: 99 % | SYSTOLIC BLOOD PRESSURE: 110 MMHG | DIASTOLIC BLOOD PRESSURE: 80 MMHG

## 2017-08-21 ENCOUNTER — HOSPITAL ENCOUNTER (OUTPATIENT)
Dept: PHYSICAL THERAPY | Age: 62
Discharge: HOME | End: 2017-08-21
Payer: COMMERCIAL

## 2017-08-21 PROCEDURE — 97140 MANUAL THERAPY 1/> REGIONS: CPT | Performed by: PHYSICAL THERAPIST

## 2017-08-21 PROCEDURE — 97161 PT EVAL LOW COMPLEX 20 MIN: CPT | Performed by: PHYSICAL THERAPIST

## 2017-08-21 PROCEDURE — 97016 VASOPNEUMATIC DEVICE THERAPY: CPT | Performed by: PHYSICAL THERAPIST

## 2017-08-21 PROCEDURE — 97110 THERAPEUTIC EXERCISES: CPT | Performed by: PHYSICAL THERAPIST

## 2017-08-21 NOTE — PROGRESS NOTES
[facility]  [address]  [place], [address] [facility]  Phone: [phone]  Fax: [phone]    Plan of Care/Statement of Necessity for Physical Therapy Services  2-15    Patient name: Michael Sanchez  :   Provider#: [id number]  Referral source: Ellen Cabrera MD      Medical/Treatment Diagnosis: Left knee pain [M25.562]  Pain in right knee [M25.561]     Prior Hospitalization: see medical history     Comorbidities: see evaluation  Prior Level of Function: see evaluation  Medications: Verified on Patient Summary List    Start of Care: 17      Onset Date: 8/3/17       The Plan of Care and following information is based on the information from the initial evaluation. Assessment/ key information: Pt is a 60year old female s/p R knee polyethylene exchange with ligamentous balancing and excision of fibroma superior patella on 8/3/17. PMH + R TKA approx 4 years ago and recent onset of increased R pain, popping, clicking. Pt will benefit from PT to address problem list below.     Evaluation Complexity History MEDIUM  Complexity : 1-2 comorbidities / personal factors will impact the outcome/ POC ; Examination LOW Complexity : 1-2 Standardized tests and measures addressing body structure, function, activity limitation and / or participation in recreation  ;Presentation LOW Complexity : Stable, uncomplicated  ;Clinical Decision Making MEDIUM Complexity : FOTO score of 26-74  Overall Complexity Rating: LOW     Problem List: pain affecting function, decrease ROM, decrease strength, edema affecting function, impaired gait/ balance, decrease ADL/ functional abilitiies, decrease activity tolerance and decrease flexibility/ joint mobility   Treatment Plan may include any combination of the following: Therapeutic exercise, Therapeutic activities, Neuromuscular re-education, Physical agent/modality, Gait/balance training, Manual therapy, Patient education, Self Care training, Functional mobility training, Home safety training and Stair training  Patient / Family readiness to learn indicated by: asking questions, trying to perform skills and interest  Persons(s) to be included in education: patient (P)  Barriers to Learning/Limitations: None  Patient Goal (s): see evaluation  Patient Self Reported Health Status: good  Rehabilitation Potential: good    Short Term Goals: To be accomplished in 3-4 weeks:  1) Pt will be independent in initial HEP  2) Pt will report > or =25% decrease in exacerbation of symptoms  3) Pt will ambulate in clinic independently, without SPC, and normalized gait mechanics in order to return to exercise    Long Term Goals: To be accomplished in 6-8 weeks:  1) Pt will demonstrate 5/5 knee flexion and knee extension strength during MMT in order to return to exercise and perform work duties  2) Pt will improve R knee AROM flex to at least 120 deg in order to squat  3) Pt will perform SLS on R for at least 8 seconds without hip drop in order to demonstrate improvement in proprioception and balance to perform ADL's    Frequency / Duration: Patient to be seen 1-2 times per week for 6-8 weeks. Patient/ Caregiver education and instruction: self care, activity modification and exercises    [x]  Plan of care has been reviewed with SABINO Casey, PT 8/21/2017 10:28 AM    ________________________________________________________________________    I certify that the above Therapy Services are being furnished while the patient is under my care. I agree with the treatment plan and certify that this therapy is necessary.     [de-identified] Signature:____________________  Date:____________Time: _________

## 2017-08-21 NOTE — PROGRESS NOTES
[facility]  [address], [place], [address]  Phone: [phone]  Fax: [phone]      PT INITIAL EVALUATION NOTE - [facility] 2-15    Patient Name: Dimas Patel  Date:2017  : 1955  [x]  Patient  Verified  Payor: Nathalie Independence / Plan: Elridge Serge / Product Type: Commerical /    In time: 930 AM  Out time:10:35 AM  Total Treatment Time (min): 65  Total Timed Codes (min): 25  1:1 Treatment Time ([facility] only): --   Visit #: 14     Treatment Area: Left knee pain [M25.562]  Pain in right knee [M25.561]    SUBJECTIVE  Any medication changes, allergies to medications, adverse drug reactions, diagnosis change, or new procedure performed?: [] No    [x] Yes (see summary sheet for update)    Current symptoms/chief complaint:   Pt presents s/p R knee polyethylene exchange and excision of fibroma superior patella on 8/3/17. PMH significant for R TKA approx 4 years ago-- recent onset of increased pain, popping, clicking in R knee. She had HHPT for 2x/wk for 2 weeks. Pt is using SPC at this time. Staples removed on Thursday. She is planning on going back to work the first week in October. She had L TKA on 17. 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  She has not worked since L TKA on 17 and plans on returning to work early Oct 2017       Prior level of function: R TKA approx 4 years ago-- pt has recently had increased pain, popping, clicking in R knee    Patient goal(s): \"have decreased pain, be able to walk without a cane\"    Objective:    Posture/Observations:   Incision appears to be healing well     Gait:    Independently, without SPC: Dec'd R knee flexion, decreased R hip flexion, inc'd trunk sway  With SPC on L: decreased trunk sway compared to independent ambulation. Pt able to sequence properly with R LE    Stairs:  Pt ascended/descended 4 stairs with step-to gait pattern and use of unilateral HS on L-- leading with L LE while ascending; leaded with R LE while descending           ROM   Right Left Comments   AROM flex 109 - p! AROM ext 0 - -         PROM flex - - -   PROM ext - - -       Effusion:  Mid patella: symmetrical  Supra patellar: +2 cm    Strength (1-5)          NT at this time. Joint Mobility:  R patella mobs WNL      Outcome Measure:  FOTO not complete at this time.      OBJECTIVE    15 min Therapeutic Exercise:  [x] See flow sheet : heel slides, TG squats for ROM, quad set with SLR   Rationale: increase ROM and increase strength to improve the patients ability to perform household chores    10 min Manual Therapy: R patellar mobs  PROM R knee-- pt seated off table    Rationale: decrease pain, increase ROM and increase tissue extensibility to improve the patients ability to return to work, exercise    VC-- 15 min, R knee  Ice L knee    With   [x] TE   [] TA   [] neuro   [] other: Patient Education: [x] Review HEP    [] Progressed/Changed HEP based on:   [] positioning   [] body mechanics   [] transfers   [x] heat/ice application    [x] other: PT POC; advised pt to continue with use of SPC at this time in order to avoid LBP from abnormal gait mechanics     Pain Level (0-10 scale) post treatment: not reported    Assessment: See POC    Plan: See Jose Luis Campbell PT, DPT    8/21/2017

## 2017-08-23 ENCOUNTER — APPOINTMENT (OUTPATIENT)
Dept: PHYSICAL THERAPY | Age: 62
End: 2017-08-23
Payer: COMMERCIAL

## 2017-08-28 ENCOUNTER — HOSPITAL ENCOUNTER (OUTPATIENT)
Dept: PHYSICAL THERAPY | Age: 62
End: 2017-08-28
Payer: COMMERCIAL

## 2017-08-29 ENCOUNTER — ANESTHESIA (OUTPATIENT)
Dept: ENDOSCOPY | Age: 62
End: 2017-08-29
Payer: COMMERCIAL

## 2017-08-29 ENCOUNTER — HOSPITAL ENCOUNTER (OUTPATIENT)
Age: 62
Setting detail: OUTPATIENT SURGERY
Discharge: HOME | End: 2017-08-29
Attending: SPECIALIST | Admitting: SPECIALIST
Payer: COMMERCIAL

## 2017-08-29 ENCOUNTER — ANESTHESIA EVENT (OUTPATIENT)
Dept: ENDOSCOPY | Age: 62
End: 2017-08-29
Payer: COMMERCIAL

## 2017-08-29 VITALS
WEIGHT: 211 LBS | HEART RATE: 62 BPM | OXYGEN SATURATION: 100 % | SYSTOLIC BLOOD PRESSURE: 117 MMHG | DIASTOLIC BLOOD PRESSURE: 74 MMHG | HEIGHT: 66 IN | RESPIRATION RATE: 16 BRPM | TEMPERATURE: 97.6 F | BODY MASS INDEX: 33.91 KG/M2

## 2017-08-29 LAB
H PYLORI FROM TISSUE: NEGATIVE
KIT LOT NO., HCLOLOT: NORMAL
NEGATIVE CONTROL: NEGATIVE
POSITIVE CONTROL: POSITIVE

## 2017-08-29 PROCEDURE — 88305 TISSUE EXAM BY PATHOLOGIST: CPT | Performed by: SPECIALIST

## 2017-08-29 PROCEDURE — 76040000019: Performed by: SPECIALIST

## 2017-08-29 PROCEDURE — 76060000031 HC ANESTHESIA FIRST 0.5 HR: Performed by: SPECIALIST

## 2017-08-29 PROCEDURE — 74011000250 HC RX REV CODE- 250

## 2017-08-29 PROCEDURE — 77030009426 HC FCPS BIOP ENDOSC BSC -B: Performed by: SPECIALIST

## 2017-08-29 PROCEDURE — 87077 CULTURE AEROBIC IDENTIFY: CPT | Performed by: SPECIALIST

## 2017-08-29 PROCEDURE — 74011250636 HC RX REV CODE- 250/636

## 2017-08-29 RX ORDER — NALOXONE HYDROCHLORIDE 0.4 MG/ML
0.4 INJECTION, SOLUTION INTRAMUSCULAR; INTRAVENOUS; SUBCUTANEOUS
Status: DISCONTINUED | OUTPATIENT
Start: 2017-08-29 | End: 2017-08-29 | Stop reason: HOSPADM

## 2017-08-29 RX ORDER — SODIUM CHLORIDE 0.9 % (FLUSH) 0.9 %
5-10 SYRINGE (ML) INJECTION AS NEEDED
Status: DISCONTINUED | OUTPATIENT
Start: 2017-08-29 | End: 2017-08-29 | Stop reason: HOSPADM

## 2017-08-29 RX ORDER — CEPHALEXIN 250 MG/1
500 CAPSULE ORAL 4 TIMES DAILY
COMMUNITY
End: 2018-03-16

## 2017-08-29 RX ORDER — PROPOFOL 10 MG/ML
INJECTION, EMULSION INTRAVENOUS AS NEEDED
Status: DISCONTINUED | OUTPATIENT
Start: 2017-08-29 | End: 2017-08-29 | Stop reason: HOSPADM

## 2017-08-29 RX ORDER — HYDROCODONE BITARTRATE AND ACETAMINOPHEN 7.5; 325 MG/1; MG/1
TABLET ORAL
COMMUNITY
End: 2018-03-16

## 2017-08-29 RX ORDER — DEXTROMETHORPHAN/PSEUDOEPHED 2.5-7.5/.8
1.2 DROPS ORAL
Status: DISCONTINUED | OUTPATIENT
Start: 2017-08-29 | End: 2017-08-29 | Stop reason: HOSPADM

## 2017-08-29 RX ORDER — ATROPINE SULFATE 0.1 MG/ML
0.5 INJECTION INTRAVENOUS
Status: DISCONTINUED | OUTPATIENT
Start: 2017-08-29 | End: 2017-08-29 | Stop reason: HOSPADM

## 2017-08-29 RX ORDER — SODIUM CHLORIDE 0.9 % (FLUSH) 0.9 %
5-10 SYRINGE (ML) INJECTION EVERY 8 HOURS
Status: DISCONTINUED | OUTPATIENT
Start: 2017-08-29 | End: 2017-08-29 | Stop reason: HOSPADM

## 2017-08-29 RX ORDER — EPINEPHRINE 0.1 MG/ML
1 INJECTION INTRACARDIAC; INTRAVENOUS
Status: DISCONTINUED | OUTPATIENT
Start: 2017-08-29 | End: 2017-08-29 | Stop reason: HOSPADM

## 2017-08-29 RX ORDER — MIDAZOLAM HYDROCHLORIDE 1 MG/ML
.25-1 INJECTION, SOLUTION INTRAMUSCULAR; INTRAVENOUS
Status: DISCONTINUED | OUTPATIENT
Start: 2017-08-29 | End: 2017-08-29 | Stop reason: HOSPADM

## 2017-08-29 RX ORDER — FENTANYL CITRATE 50 UG/ML
200 INJECTION, SOLUTION INTRAMUSCULAR; INTRAVENOUS
Status: DISCONTINUED | OUTPATIENT
Start: 2017-08-29 | End: 2017-08-29 | Stop reason: HOSPADM

## 2017-08-29 RX ORDER — SODIUM CHLORIDE 9 MG/ML
INJECTION, SOLUTION INTRAVENOUS
Status: DISCONTINUED | OUTPATIENT
Start: 2017-08-29 | End: 2017-08-29 | Stop reason: HOSPADM

## 2017-08-29 RX ORDER — SODIUM CHLORIDE 9 MG/ML
50 INJECTION, SOLUTION INTRAVENOUS CONTINUOUS
Status: DISCONTINUED | OUTPATIENT
Start: 2017-08-29 | End: 2017-08-29 | Stop reason: HOSPADM

## 2017-08-29 RX ORDER — LORAZEPAM 2 MG/ML
2 INJECTION INTRAMUSCULAR AS NEEDED
Status: DISCONTINUED | OUTPATIENT
Start: 2017-08-29 | End: 2017-08-29 | Stop reason: HOSPADM

## 2017-08-29 RX ORDER — FLUMAZENIL 0.1 MG/ML
0.2 INJECTION INTRAVENOUS
Status: DISCONTINUED | OUTPATIENT
Start: 2017-08-29 | End: 2017-08-29 | Stop reason: HOSPADM

## 2017-08-29 RX ORDER — LIDOCAINE HYDROCHLORIDE 20 MG/ML
INJECTION, SOLUTION EPIDURAL; INFILTRATION; INTRACAUDAL; PERINEURAL AS NEEDED
Status: DISCONTINUED | OUTPATIENT
Start: 2017-08-29 | End: 2017-08-29 | Stop reason: HOSPADM

## 2017-08-29 RX ADMIN — PROPOFOL 100 MG: 10 INJECTION, EMULSION INTRAVENOUS at 13:34

## 2017-08-29 RX ADMIN — PROPOFOL 50 MG: 10 INJECTION, EMULSION INTRAVENOUS at 13:45

## 2017-08-29 RX ADMIN — PROPOFOL 50 MG: 10 INJECTION, EMULSION INTRAVENOUS at 13:54

## 2017-08-29 RX ADMIN — PROPOFOL 50 MG: 10 INJECTION, EMULSION INTRAVENOUS at 13:46

## 2017-08-29 RX ADMIN — LIDOCAINE HYDROCHLORIDE 50 MG: 20 INJECTION, SOLUTION EPIDURAL; INFILTRATION; INTRACAUDAL; PERINEURAL at 13:45

## 2017-08-29 RX ADMIN — SODIUM CHLORIDE: 9 INJECTION, SOLUTION INTRAVENOUS at 13:09

## 2017-08-29 NOTE — ROUTINE PROCESS
Mega Harpreet  1955  [id number]    Situation:  Verbal report received from: Prabha Cabrera RN  Procedure: Procedure(s):  ESOPHAGOGASTRODUODENOSCOPY (EGD)  ESOPHAGOGASTRODUODENAL (EGD) BIOPSY    Background:    Preoperative diagnosis: NAUSEA, VOMITING, GERD, ULCERATIVE COLITIS, IBS, DIARRHEA, LUQ ABD PAIN  Postoperative diagnosis: 1.- Mild Gastritis    :  Dr. Carey Baker  Assistant(s): Endoscopy Technician-1: Paul Grady  Endoscopy RN-1: Octavio Skaggs RN    Specimens:   ID Type Source Tests Collected by Time Destination   1 : Antral Biopsies Preservative   Rody Magallanes MD 8/29/2017 1353 Pathology   2 : Body of Stomach Biopsies Preservative   Rody Magallanes MD 8/29/2017 1354 Pathology     H. Pylori  yes    Assessment:  Intra-procedure medications       Anesthesia gave intra-procedure sedation and medications, see anesthesia flow sheet yes    Intravenous fluids: NS@ KVO     Vital signs stable     Abdominal assessment: round and soft     Recommendation:  Discharge patient per MD order.   Family or Friend Pt daughter  Permission to share finding with family or friend yes

## 2017-08-29 NOTE — ANESTHESIA PREPROCEDURE EVALUATION
Anesthetic History     PONV          Review of Systems / Medical History  Patient summary reviewed, nursing notes reviewed and pertinent labs reviewed    Pulmonary                   Neuro/Psych              Cardiovascular    Hypertension                Comments: Normal stress test 5/2017   GI/Hepatic/Renal     GERD      PUD     Endo/Other      Hypothyroidism  Arthritis     Other Findings            Physical Exam    Airway  Mallampati: I  TM Distance: > 6 cm  Neck ROM: normal range of motion   Mouth opening: Normal     Cardiovascular    Rhythm: regular  Rate: normal         Dental  No notable dental hx       Pulmonary  Breath sounds clear to auscultation               Abdominal         Other Findings            Anesthetic Plan    ASA: 2  Anesthesia type: MAC          Induction: Intravenous  Anesthetic plan and risks discussed with: Patient

## 2017-08-29 NOTE — PROCEDURES
EGD Procedure Note    Indications:  Chest pain, GERD, Vomiting, persitent of unclear etilogy   Referring Physician: Dania Rodríguez MD   Anesthesia/Sedation:MAC  Endoscopist:  Dr. Yovana Garzon  Assistant:  Endoscopy Technician-1: [facility]  Endoscopy RN-1: Rowena Prader, RN    Preoperative diagnosis: NAUSEA, VOMITING, GERD, ULCERATIVE COLITIS, IBS, DIARRHEA, LUQ ABD PAIN    Postoperative diagnosis: 1.- Mild Gastritis      Procedure in Detail:  Informed consent was obtained for the procedure, including sedation. Risks of perforation, hemorrhage, adverse drug reaction, and aspiration were discussed. The patient was placed in the left lateral decubitus position. Based on the pre-procedure assessment, including review of the patient's medical history, medications, allergies, and review of systems, she had been deemed to be an appropriate candidate for moderate sedation; she was therefore sedated with the medications listed above. The patient was monitored continuously with ECG tracing, pulse oximetry, blood pressure monitoring, and direct observations. An Olympus video endoscope was inserted into the mouth and advanced under direct vision to into the esophagus, then stomach and duodenum. A careful inspection was made as the gastroscope was withdrawn, including a retroflexed view of the proximal stomach; findings and interventions are described below. Findings:   Esophagus:normal  Stomach: minimal discontinuous erythematous streaking in antrum. Pyloritek obtained and path from antrum and body also done  Duodenum/jejunum: normal    Therapies:  See above    Specimens: see above           EBL: None    Complications:   None; patient tolerated the procedure well. Recommendations:  -Continue acid suppression. , -Await pathology. , -Await ALEXNADRA test result and treat for Helicobacter pylori if positive. , -Follow up with me., -No NSAIDS, -suspect noncardiac chest pain is reflux and possible AZRA but also sx's that sound like palpitations    Kacie Frias MD

## 2017-08-29 NOTE — PROGRESS NOTES
Bedside and Verbal shift change report given to cross,rn (oncoming nurse) by Ary Dennison (offgoing nurse). Report included the following information SBAR.

## 2017-08-29 NOTE — IP AVS SNAPSHOT
[address] [place] [address] [address] [facility] 
[phone] Patient: Katherin Oakley MRN: [id number] Y:[birth year] You are allergic to the following Allergen Reactions Adhesive Tape-Silicones Itching Blisters, bumps. -long term application Lidoderm (Lidocaine) Rash Patch-adhesive patch. Allergic to the adhesive - long term use. Not allergic to lidoderm. Other Medication Rash  
 dermabond - blisters Recent Documentation Height Weight BMI OB Status Smoking Status 1.676 m 95.7 kg 34.06 kg/m2 Hysterectomy Never Smoker Emergency Contacts Name Discharge Info Relation Home Work Mobile CassieKetty DISCHARGE CAREGIVER [3] Child [2] [phone] About your hospitalization You were admitted on:  August 29, 2017 You last received care in the:  [facility] ENDOSCOPY You were discharged on:  August 29, 2017 Unit phone number:  [phone] Why you were hospitalized Your primary diagnosis was:  Not on File Providers Seen During Your Hospitalizations Provider Role Specialty Primary office phone Wilhelm Mcardle, MD Attending Provider Gastroenterology [phone] Your Primary Care Physician (PCP) Primary Care Physician Office Phone Office Fax [address] [facility] [place] [address] [phone] Follow-up Information None Your Appointments Tuesday September 05, 2017  9:30 AM EDT  
PT GENERAL F/U with Darcy Diaz, SUSAN  
[facility] ([address] [place] [address]) [address] [facility] [address] [address]  
[phone] Thursday September 07, 2017  9:30 AM EDT  
PT GENERAL F/U with Ema Cote PTA  
[facility] ([address] [place] [address]) [address] [facility] [address] [address]  
[phone]  Monday September 11, 2017  9:30 AM EDT  
PT GENERAL F/U with Darcy Diaz PT  
 [facility] ([address] [place] [address]) [address] [address]  
[phone] Wednesday September 13, 2017  9:30 AM EDT  
PT GENERAL F/U with Daniela Singleton, PT  
[facility] ([address] [place] [address]) [address] [address]  
[phone] Monday September 18, 2017  9:30 AM EDT  
PT GENERAL F/U with Daniela Singleton, PT  
[facility] ([address] [place] [address] Road) [address] [address]  
[phone] Wednesday September 20, 2017  9:30 AM EDT  
PT GENERAL F/U with Daniela Singleton, PT  
[facility] ([address] [place] [address]) [address] [address]  
[phone] Monday September 25, 2017  9:30 AM EDT  
PT GENERAL F/U with Daniela Singleton, PT  
[facility] ([address] [place] [address] Road) [address] [address]  
[phone] Wednesday September 27, 2017  9:30 AM EDT  
PT GENERAL F/U with Daniela Singleton, PT  
[facility] ([address] [place] [address]) [address] [address] [phone] Current Discharge Medication List  
  
CONTINUE these medications which have NOT CHANGED Dose & Instructions Dispensing Information Comments Morning Noon Evening Bedtime  
 acetaminophen 500 mg tablet Commonly known as:  TYLENOL Your last dose was: Your next dose is:    
   
   
 Dose:  500 mg Take 500 mg by mouth every six (6) hours as needed for Pain. Refills:  0 ALPRAZolam 0.5 mg tablet Commonly known as:  Jestine Pies Your last dose was: Your next dose is:    
   
   
 Dose:  0.5 mg Take 1 Tab by mouth two (2) times daily as needed. Quantity:  30 Tab Refills:  2  
     
   
   
   
  
 aspirin 325 mg tablet Commonly known as:  ASPIRIN Your last dose was:     
   
Your next dose is:    
   
   
 Dose:  325 mg  
 Take 325 mg by mouth daily. Refills:  0  
     
   
   
   
  
 celecoxib 200 mg capsule Commonly known as:  CeleBREX Your last dose was: Your next dose is:    
   
   
 Dose:  200 mg Take 1 Cap by mouth daily for 30 days. Quantity:  30 Cap Refills:  0  
     
   
   
   
  
 cephALEXin 250 mg capsule Commonly known as:  Chelsea Pleas Your last dose was: Your next dose is:    
   
   
 Dose:  500 mg Take 500 mg by mouth four (4) times daily. Refills:  0  
     
   
   
   
  
 chlorthalidone 25 mg tablet Commonly known as:  Gale Lundberg Your last dose was: Your next dose is:    
   
   
 Dose:  25 mg Take 1 Tab by mouth daily. Quantity:  90 Tab Refills:  3  
     
   
   
   
  
 docusate sodium 100 mg capsule Commonly known as:  Genice Flax Your last dose was: Your next dose is:    
   
   
 Dose:  100 mg Take 100 mg by mouth two (2) times daily as needed for Constipation. 1-2 tabs PRN constipation Refills:  0 HYDROcodone-acetaminophen 7.5-325 mg per tablet Commonly known as:  Celeste Hodgsond Your last dose was: Your next dose is: Take  by mouth. Refills:  0  
     
   
   
   
  
 ketorolac 0.5 % ophthalmic solution Commonly known as:  Allie Esters Your last dose was: Your next dose is:    
   
   
 Dose:  2 Drop Administer 2 Drops to both eyes four (4) times daily as needed. Quantity:  1 Bottle Refills:  0  
     
   
   
   
  
 levothyroxine 112 mcg tablet Commonly known as:  SYNTHROID Your last dose was: Your next dose is:    
   
   
 Dose:  112 mcg Take 1 Tab by mouth Daily (before breakfast). Quantity:  30 Tab Refills:  11 Omeprazole delayed release 20 mg tablet Commonly known as:  PRILOSEC D/R Your last dose was:     
   
Your next dose is:    
   
   
 Dose:  20 mg  
 Take 20 mg by mouth daily. daily before breakfast  
 Refills:  0  
     
   
   
   
  
 oxyCODONE IR 5 mg immediate release tablet Commonly known as:  Azalia Meza Your last dose was: Your next dose is:    
   
   
 Dose:  5 mg Take 1 Tab by mouth every four (4) hours as needed for Pain. Max Daily Amount: 30 mg.  
 Quantity:  70 Tab Refills:  0 PROCTOZONE-HC 2.5 % rectal cream  
Generic drug:  hydrocortisone Your last dose was: Your next dose is:    
   
   
 Dose:  1 g Insert 1 g into rectum once as needed. Refills:  0  
     
   
   
   
  
 traMADol 50 mg tablet Commonly known as:  ULTRAM  
   
Your last dose was: Your next dose is:    
   
   
 Dose:  50 mg Take 50 mg by mouth. Refills:  0  
     
   
   
   
  
 valsartan 160 mg tablet Commonly known as:  DIOVAN Your last dose was: Your next dose is: TAKE ONE TABLET BY MOUTH EVERY DAY Quantity:  90 Tab Refills:  3  
     
   
   
   
  
 warfarin 2.5 mg tablet Commonly known as:  COUMADIN Your last dose was: Your next dose is:    
   
   
 Dose:  2.5 mg Take 1 Tab by mouth daily. Quantity:  90 Tab Refills:  1 Discharge Instructions Alvarado Jacobson [id number] 1955 EGD DISCHARGE INSTRUCTIONS Discomfort: 
Sore throat- throat lozenges or warm salt water gargle 
redness at IV site- apply warm compress to area; if redness or soreness persist- contact your physician Gaseous discomfort- walking, belching will help relieve any discomfort You may not operate a vehicle for 12 hours You may not engage in an occupation involving machinery or appliances for rest of today. You may not drink alcoholic beverages for at least 12 hours Avoid making any critical decisions for at least 24 hour DIET You may resume your regular diet  however -  remember your colon is empty and a heavy meal will produce gas. Avoid these foods:  vegetables, fried / greasy foods, carbonated drinks ACTIVITY You may resume your normal daily activities. Spend the remainder of the day resting -  avoid any strenuous activity. CALL M.D. ANY SIGN OF Increasing pain, nausea, vomiting Abdominal distension (swelling) New increased bleeding (oral or rectal) Fever (chills) Pain in chest area Bloody discharge from nose or mouth Shortness of breath You may not take any Advil, Aspirin, Ibuprofen, Motrin, Aleve, or Goodys unless MD recommended, ONLY  Tylenol as needed for pain. Follow-up Instructions: 
 Call [facility] office to make appointment Office telephone for problems or questions [phone] Results of procedure / biopsy in 10-14 days Discharge Orders None Introducing [facility]! Dear Bard Cleveland: Thank you for requesting a [employer] account. Our records indicate that you already have an active [employer] account. You can access your account anytime at [email or web address] Did you know that you can access your hospital and ER discharge instructions at any time in [employer]? You can also review all of your test results from your hospital stay or ER visit. Additional Information If you have questions, please visit the Frequently Asked Questions section of the [employer] website at [email or web address] Remember, [employer] is NOT to be used for urgent needs. For medical emergencies, dial 911. Now available from your iPhone and Android! General Information Please provide this summary of care documentation to your next provider. Patient Signature:  ____________________________________________________________ Date:  ____________________________________________________________  
  
Digna Shaw Provider Signature:  ____________________________________________________________ Date:  ____________________________________________________________

## 2017-08-29 NOTE — PROGRESS NOTES

## 2017-08-29 NOTE — ANESTHESIA POSTPROCEDURE EVALUATION
Post-Anesthesia Evaluation and Assessment    Patient: Barrie Carr MRN: [id number]  SSN: [id number]    YOB: 1955  Age: 60 y.o. Sex: female       Cardiovascular Function/Vital Signs  Visit Vitals    /74    Pulse 62    Temp 36.4 °C (97.6 °F)    Resp 16    Ht 5' 6\" (1.676 m)    Wt 95.7 kg (211 lb)    SpO2 100%    BMI 34.06 kg/m2       Patient is status post MAC anesthesia for Procedure(s):  ESOPHAGOGASTRODUODENOSCOPY (EGD)  ESOPHAGOGASTRODUODENAL (EGD) BIOPSY. Nausea/Vomiting: None    Postoperative hydration reviewed and adequate. Pain:  Pain Scale 1: Numeric (0 - 10) (08/29/17 1421)  Pain Intensity 1: 6 (08/29/17 1421)   Managed    Neurological Status: At baseline    Mental Status and Level of Consciousness: Arousable    Pulmonary Status:   O2 Device: Room air (08/29/17 1423)   Adequate oxygenation and airway patent    Complications related to anesthesia: None    Post-anesthesia assessment completed.  No concerns    Signed By: Law Fraga MD     August 29, 2017

## 2017-08-29 NOTE — H&P
Pre-endoscopy H and P    The patient was seen and examined. The airway was assessed and documented. The problem list, past medical history, and medications were reviewed. 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 9) E66.9     Social History     Social History    Marital status: SINGLE     Spouse name: N/A    Number of children: N/A    Years of education: N/A     Occupational History    Not on file. Social History Main Topics    Smoking status: Never Smoker    Smokeless tobacco: Never Used    Alcohol use 0.0 - 0.6 oz/week     0 - 1 Standard drinks or equivalent per week      Comment: 2 drinks per month    Drug use: No    Sexual activity: No     Other Topics Concern    Not on file     Social History Narrative     Past Medical History:   Diagnosis Date    Arrhythmia     \"PALPATATIONS, FLUTTERING, POSSIBLE MEDS PER DR. Shahid Ip"    Arthritis     knees, back    Chronic pain     LOWER BACK, R HIP    Duodenal ulcer 2002    GERD (gastroesophageal reflux disease)     H/O blood clots 2007    TO RIGHT EYE    Hypertension     Iritis 2007    both eyes    Nausea & vomiting     Other ill-defined conditions     back pain/spasm    PONV (postoperative nausea and vomiting)     Thyroid disease     hypothroid    Ulcerative colitis 2002     The patient has a family history of na    Prior to Admission Medications   Prescriptions Last Dose Informant Patient Reported? Taking? ALPRAZolam (XANAX) 0.5 mg tablet 8/29/2017 at Unknown time  No Yes   Sig: Take 1 Tab by mouth two (2) times daily as needed. HYDROcodone-acetaminophen (NORCO) 7.5-325 mg per tablet 8/29/2017 at Unknown time  Yes Yes   Sig: Take  by mouth. Omeprazole delayed release (PRILOSEC D/R) 20 mg tablet 8/29/2017 at Unknown time  Yes Yes   Sig: Take 20 mg by mouth daily. daily before breakfast   acetaminophen (TYLENOL) 500 mg tablet Not Taking at Unknown time  Yes No   Sig: Take 500 mg by mouth every six (6) hours as needed for Pain. aspirin (ASPIRIN) 325 mg tablet 8/25/2017  Yes No   Sig: Take 325 mg by mouth daily. celecoxib (CELEBREX) 200 mg capsule 8/25/2017  No No   Sig: Take 1 Cap by mouth daily for 30 days. cephALEXin (KEFLEX) 250 mg capsule 8/29/2017 at Unknown time  Yes Yes   Sig: Take 500 mg by mouth four (4) times daily. chlorthalidone (HYGROTEN) 25 mg tablet 8/29/2017 at Unknown time  No Yes   Sig: Take 1 Tab by mouth daily. docusate sodium (COLACE) 100 mg capsule 8/29/2017 at Unknown time  Yes Yes   Sig: Take 100 mg by mouth two (2) times daily as needed for Constipation. 1-2 tabs PRN constipation   hydrocortisone (PROCTOZONE-HC) 2.5 % rectal cream 8/22/2017 at Unknown time  Yes Yes   Sig: Insert 1 g into rectum once as needed. ketorolac (ACULAR) 0.5 % ophthalmic solution 8/28/2017 at Unknown time  Yes Yes   Sig: Administer 2 Drops to both eyes four (4) times daily as needed. levothyroxine (SYNTHROID) 112 mcg tablet 8/29/2017 at Unknown time  No Yes   Sig: Take 1 Tab by mouth Daily (before breakfast). oxyCODONE IR (ROXICODONE) 5 mg immediate release tablet 8/25/2017 at Unknown time  No Yes   Sig: Take 1 Tab by mouth every four (4) hours as needed for Pain.  Max Daily Amount: 30 mg.   traMADol (ULTRAM) 50 mg tablet Not Taking at Unknown time  Yes No   Sig: Take 50 mg by mouth.   valsartan (DIOVAN) 160 mg tablet 8/29/2017 at Unknown time  No Yes   Sig: TAKE ONE TABLET BY MOUTH EVERY DAY warfarin (COUMADIN) 2.5 mg tablet 8/24/2017 at Unknown time  No No   Sig: Take 1 Tab by mouth daily. Patient not taking: Reported on 8/15/2017      Facility-Administered Medications: None         The review of systems is:  negative for shortness of breath or chest pain      The heart, lungs and mental status were satisfactory for the administration of MAC sedation and for the procedure. Mallampati score: See Anesthesia. I discussed with the patient the objectives, risks, consequences and alternatives to the procedure. Plan: Endoscopic procedure with MAC sedation.     Yamila Solorzano MD  8/29/2017  1:35 PM

## 2017-08-30 ENCOUNTER — APPOINTMENT (OUTPATIENT)
Dept: PHYSICAL THERAPY | Age: 62
End: 2017-08-30
Payer: COMMERCIAL

## 2017-09-05 ENCOUNTER — PATIENT OUTREACH (OUTPATIENT)
Dept: OTHER | Age: 62
End: 2017-09-05

## 2017-09-05 ENCOUNTER — APPOINTMENT (OUTPATIENT)
Dept: PHYSICAL THERAPY | Age: 62
End: 2017-09-05
Payer: COMMERCIAL

## 2017-09-07 ENCOUNTER — APPOINTMENT (OUTPATIENT)
Dept: PHYSICAL THERAPY | Age: 62
End: 2017-09-07
Payer: COMMERCIAL

## 2017-09-11 ENCOUNTER — HOSPITAL ENCOUNTER (OUTPATIENT)
Dept: PHYSICAL THERAPY | Age: 62
Discharge: HOME | End: 2017-09-11
Payer: COMMERCIAL

## 2017-09-11 PROCEDURE — 97140 MANUAL THERAPY 1/> REGIONS: CPT | Performed by: PHYSICAL THERAPIST

## 2017-09-11 PROCEDURE — 97110 THERAPEUTIC EXERCISES: CPT | Performed by: PHYSICAL THERAPIST

## 2017-09-11 PROCEDURE — 97016 VASOPNEUMATIC DEVICE THERAPY: CPT | Performed by: PHYSICAL THERAPIST

## 2017-09-11 NOTE — PROGRESS NOTES
PT DAILY TREATMENT/Progress NOTE 2-15    Patient Name: Vu Badillo  Date:2017  : 1955  [x]  Patient  Verified  Payor: Micha Courtney / Plan: Deisi Farias / Product Type: PPO /    In time: 9:30 AM  Out time:10:45  Total Treatment Time (min): 75 (60 timed)  Visit #: 15     Treatment Area: Left knee pain [M25.562]  Pain in right knee [M25.561]    SUBJECTIVE  Pain Level (0-10 scale): 6-7/10 R knee; \"a little\" L knee  Any medication changes, allergies to medications, adverse drug reactions, diagnosis change, or new procedure performed?: [x] No    [] Yes (see summary sheet for update)  Subjective functional status/changes:   [] No changes reported  Pt states she has been on antibiotics twice and MD told her to stop all exercise. \"He thought he was going to have to go back in and do surgery to get the sutures out but everything ended up healing okay. \" She feels her R knee has buckled a few times-- \"he thinks I just need to strengthen it. \"    OBJECTIVE    Modality rationale: decrease inflammation, decrease pain and increase tissue extensibility to improve the patients ability to ambulate   Min Type Additional Details    [] Estim: []Att   []Unatt        []TENS instruct                  []IFC  []Premod   []NMES                     []Other:  []w/US   []w/ice   []w/heat  Position:  Location:    []  Traction: [] Cervical       []Lumbar                       [] Prone          []Supine                       []Intermittent   []Continuous Lbs:  [] before manual  [] after manual  []w/heat    []  Ultrasound: []Continuous   [] Pulsed at:                            []1MHz   []3MHz Location:  W/cm2:    []  Paraffin         Location:  []w/heat    []  Ice     []  Heat  []  Ice massage Position:  Location:    []  Laser  []  Other: Position:  Location:   15 [x]  Vasopneumatic Device (R knee); ice L knee Pressure:       [x] lo [] med [] hi   Temperature: 34   [x] Skin assessment post-treatment: [x]intact []redness- no adverse reaction    []redness - adverse reaction:     50 min Therapeutic Exercise:  [x] See flow sheet : Reassessment perfromed   Rationale: increase ROM and increase strength to improve the patients ability to aide in walking, sitting, work duties    10 min Manual Therapy:  bilat patellar mobs  Posterior glide of femur to   Rationale: increase ROM and increase tissue extensibility  to improve the patients ability to ambulate independently          With   [x] TE   [] TA   [] neuro   [] other: Patient Education: [x] Review HEP    [] Progressed/Changed HEP based on:   [] positioning   [] body mechanics   [] transfers   [x] heat/ice application    [x] other: self patellar mobs, resume initial HEP     Other Objective/Functional Measures:   R knee AROM= 0-116 deg  L knee AROM= 0-113 deg     Pain Level (0-10 scale) post treatment: 4/10    ASSESSMENT/Changes in Function:   Pt progressing with ROM, strengthening, and proprioception bilaterally. Advised pt to resume initial HEP since she has been cleared for exercise by MD. Pt to continue therapy 2x/wk until return to work early October to meet remaining PT goals and progress strength and proprioception bilaterally. Patient will continue to benefit from skilled PT services to modify and progress therapeutic interventions, address functional mobility deficits, address ROM deficits, address strength deficits, analyze and address soft tissue restrictions, analyze and cue movement patterns and analyze and modify body mechanics/ergonomics to attain remaining goals. []  See Plan of Care  []  See progress note/recertification  []  See Discharge Summary         Progress towards goals / Updated goals (R knee):   Short Term Goals:  To be accomplished in 3-4 weeks:  1) Pt will be independent in initial HEP MET  2) Pt will report > or =25% decrease in exacerbation of symptoms progressing  3) Pt will ambulate in clinic independently, without SPC, and normalized gait mechanics in order to return to exercise progressing     Long Term Goals: To be accomplished in 6-8 weeks:  1) Pt will demonstrate 5/5 knee flexion and knee extension strength during MMT in order to return to exercise and perform work duties  2) Pt will improve R knee AROM flex to at least 120 deg in order to squat  3) Pt will perform SLS on R for at least 8 seconds without hip drop in order to demonstrate improvement in proprioception and balance to perform ADL's      Progress towards goals / Updated goals (L knee):  Short Term Goals: To be accomplished in 2-3 weeks:  1) Pt will be independent in initial HEP MET  2) Pt will report > or =25% decrease in exacerbation of symptoms MET  3) Pt will report being able to sleep through night without being awoken from high pain levels Progressing                        Long Term Goals: To be accomplished in 6-8 weeks:  1) Pt will increase L knee AROM from 0-120 deg in order to gain full ROM and perform ADL's.  Progressing  2) Pt will increase L knee flex/ext strength to 5/5 without pain in order to exercise Progressing  3) Pt will ambulate in clinic independently with proper gait mechanics Progressing  4) Pt will increase FOTO score to at least 71/100 in order to demonstrate increase in functional mobility Progressing    PLAN  []  Upgrade activities as tolerated     [x]  Continue plan of care  []  Update interventions per flow sheet       []  Discharge due to:_  [x]  Other:_      Geno Alexandra, PT 9/11/2017  9:41 AM

## 2017-09-12 ENCOUNTER — TELEPHONE (OUTPATIENT)
Dept: INTERNAL MEDICINE CLINIC | Age: 62
End: 2017-09-12

## 2017-09-12 NOTE — TELEPHONE ENCOUNTER
Pt wants to know can Thyroid medication dosage be change.  And pt wants to know why bloodwork results haven't been reviewed with her

## 2017-09-12 NOTE — TELEPHONE ENCOUNTER
LM for pt - had been instructed to increase her Levothyroxine 112mcg to 7.5 tablets a week on 8/17 in reference to labs drawn on 8/11. Instructed to increase dose if has not and have labs rechecked in 6 weeks.

## 2017-09-13 ENCOUNTER — HOSPITAL ENCOUNTER (OUTPATIENT)
Dept: PHYSICAL THERAPY | Age: 62
Discharge: HOME | End: 2017-09-13
Payer: COMMERCIAL

## 2017-09-13 PROCEDURE — 97016 VASOPNEUMATIC DEVICE THERAPY: CPT | Performed by: PHYSICAL THERAPIST

## 2017-09-13 PROCEDURE — 97110 THERAPEUTIC EXERCISES: CPT | Performed by: PHYSICAL THERAPIST

## 2017-09-13 PROCEDURE — 97140 MANUAL THERAPY 1/> REGIONS: CPT | Performed by: PHYSICAL THERAPIST

## 2017-09-13 NOTE — PROGRESS NOTES
PT DAILY TREATMENT NOTE 2-15    Patient Name: Derek De La Rosa  Date:2017  : 1955  [x]  Patient  Verified  Payor: Jyothi Vargas / Plan: Jadyn Simpson / Product Type: PPO /    In time: 9:30 AM  Out time: 10:55  Total Treatment Time (min): 85 (70 timed)  Visit #: 16     Treatment Area: Left knee pain [M25.562]  Pain in right knee [M25.561]    SUBJECTIVE  Pain Level (0-10 scale): 6/10 R knee  Any medication changes, allergies to medications, adverse drug reactions, diagnosis change, or new procedure performed?: [x] No    [] Yes (see summary sheet for update)  Subjective functional status/changes:   [] No changes reported  Pt reports she has been walking without her cane. She is feeling better.     OBJECTIVE    Modality rationale: decrease inflammation, decrease pain and increase tissue extensibility to improve the patients ability to ambulate   Min Type Additional Details    [] Estim: []Att   []Unatt        []TENS instruct                  []IFC  []Premod   []NMES                     []Other:  []w/US   []w/ice   []w/heat  Position:  Location:    []  Traction: [] Cervical       []Lumbar                       [] Prone          []Supine                       []Intermittent   []Continuous Lbs:  [] before manual  [] after manual  []w/heat    []  Ultrasound: []Continuous   [] Pulsed at:                            []1MHz   []3MHz Location:  W/cm2:    []  Paraffin         Location:  []w/heat    []  Ice     []  Heat  []  Ice massage Position:  Location:    []  Laser  []  Other: Position:  Location:   15 [x]  Vasopneumatic Device (R knee); ice L knee Pressure:       [x] lo [] med [] hi   Temperature: 34   [x] Skin assessment post-treatment:  [x]intact []redness- no adverse reaction    []redness - adverse reaction:     55 min Therapeutic Exercise:  [x] See flow sheet : progressed strengthening exercises   Rationale: increase ROM and increase strength to improve the patients ability to aide in walking, sitting, work duties    15 min Manual Therapy:  bilat patellar mobs  Posterior glide of femur to increase ext bilat  Prone quad stretch bilat   Rationale: increase ROM and increase tissue extensibility  to improve the patients ability to ambulate independently          With   [x] TE   [] TA   [] neuro   [] other: Patient Education: [x] Review HEP    [] Progressed/Changed HEP based on:   [] positioning   [] body mechanics   [] transfers   [x] heat/ice application    [x] other: self patellar mobs, resume initial HEP     Other Objective/Functional Measures:   n/a     Pain Level (0-10 scale) post treatment: \"okay\"    ASSESSMENT/Changes in Function:   Pt ambulating independently without SPC today, demonstrated slightly antalgic gait-- dec'd knee ext on R during terminal stance and dec'd stance time on R, most likely due to high pain levels. Pt tolerated progression of strengthening well today. Patient will continue to benefit from skilled PT services to modify and progress therapeutic interventions, address functional mobility deficits, address ROM deficits, address strength deficits, analyze and address soft tissue restrictions, analyze and cue movement patterns and analyze and modify body mechanics/ergonomics to attain remaining goals. []  See Plan of Care  []  See progress note/recertification  []  See Discharge Summary         Progress towards goals / Updated goals (R knee):   Short Term Goals: To be accomplished in 3-4 weeks:  1) Pt will be independent in initial HEP MET  2) Pt will report > or =25% decrease in exacerbation of symptoms progressing  3) Pt will ambulate in clinic independently, without SPC, and normalized gait mechanics in order to return to exercise progressing     Long Term Goals:  To be accomplished in 6-8 weeks:  1) Pt will demonstrate 5/5 knee flexion and knee extension strength during MMT in order to return to exercise and perform work duties  2) Pt will improve R knee AROM flex to at least 120 deg in order to squat  3) Pt will perform SLS on R for at least 8 seconds without hip drop in order to demonstrate improvement in proprioception and balance to perform ADL's      Progress towards goals / Updated goals (L knee):  Short Term Goals: To be accomplished in 2-3 weeks:  1) Pt will be independent in initial HEP MET  2) Pt will report > or =25% decrease in exacerbation of symptoms MET  3) Pt will report being able to sleep through night without being awoken from high pain levels Progressing                        Long Term Goals: To be accomplished in 6-8 weeks:  1) Pt will increase L knee AROM from 0-120 deg in order to gain full ROM and perform ADL's.  Progressing  2) Pt will increase L knee flex/ext strength to 5/5 without pain in order to exercise Progressing  3) Pt will ambulate in clinic independently with proper gait mechanics Progressing  4) Pt will increase FOTO score to at least 71/100 in order to demonstrate increase in functional mobility Progressing    PLAN  []  Upgrade activities as tolerated     [x]  Continue plan of care  []  Update interventions per flow sheet       []  Discharge due to:_  [x]  Other:_      Leo Saenz, PT 9/13/2017  9:40 AM

## 2017-09-18 ENCOUNTER — HOSPITAL ENCOUNTER (OUTPATIENT)
Dept: PHYSICAL THERAPY | Age: 62
Discharge: HOME | End: 2017-09-18
Payer: COMMERCIAL

## 2017-09-18 PROCEDURE — 97110 THERAPEUTIC EXERCISES: CPT | Performed by: PHYSICAL THERAPIST

## 2017-09-18 PROCEDURE — 97140 MANUAL THERAPY 1/> REGIONS: CPT | Performed by: PHYSICAL THERAPIST

## 2017-09-18 NOTE — PROGRESS NOTES
PT DAILY TREATMENT NOTE 2-15    Patient Name: Alvarado Jacobson  Date:2017  : 1955  [x]  Patient  Verified  Payor: [facility] / Plan: Chelsea Donovan / Product Type: PPO /    In time: 9:35 AM  Out time: 11:15  Total Treatment Time (min): 100 (90 timed)  Visit #: 17    Treatment Area: Left knee pain [M25.562]  Pain in right knee [M25.561]    SUBJECTIVE  Pain Level (0-10 scale): 7/10 R knee  Any medication changes, allergies to medications, adverse drug reactions, diagnosis change, or new procedure performed?: [x] No    [] Yes (see summary sheet for update)  Subjective functional status/changes:   [] No changes reported  Pt states she gets \"cramp-like\" pain in right knee occasionally that makes it hard to walk-- usually after sitting for a while. \"Do you have any suggestions? \"  She is supposed to go back to work the 1st or 2nd week in October.     OBJECTIVE    Modality rationale: decrease inflammation, decrease pain and increase tissue extensibility to improve the patients ability to ambulate   Min Type Additional Details    [] Estim: []Att   []Unatt        []TENS instruct                  []IFC  []Premod   []NMES                     []Other:  []w/US   []w/ice   []w/heat  Position:  Location:    []  Traction: [] Cervical       []Lumbar                       [] Prone          []Supine                       []Intermittent   []Continuous Lbs:  [] before manual  [] after manual  []w/heat    []  Ultrasound: []Continuous   [] Pulsed at:                            []1MHz   []3MHz Location:  W/cm2:    []  Paraffin         Location:  []w/heat    []  Ice     []  Heat  []  Ice massage Position:  Location:    []  Laser  []  Other: Position:  Location:   10 [x]  Vasopneumatic Device (R knee); ice L knee Pressure:       [x] lo [] med [] hi   Temperature: 34   [x] Skin assessment post-treatment:  [x]intact []redness- no adverse reaction    []redness - adverse reaction:     75 min Therapeutic Exercise: [x] See flow sheet : progressed strengthening exercises   Rationale: increase ROM and increase strength to improve the patients ability to aide in walking, sitting, work duties    15 min Manual Therapy:  bilat patellar mobs  Posterior glide of femur to increase ext bilat  Prone quad stretch bilat   Rationale: increase ROM and increase tissue extensibility  to improve the patients ability to ambulate independently          With   [x] TE   [] TA   [] neuro   [] other: Patient Education: [x] Review HEP    [] Progressed/Changed HEP based on:   [] positioning   [] body mechanics   [] transfers   [x] heat/ice application    [x] other: self patellar mobs     Other Objective/Functional Measures:   n/a     Pain Level (0-10 scale) post treatment: \"okay\"    ASSESSMENT/Changes in Function:   Pt challenged with balance exercises. Strength, ROM improving. Patient will continue to benefit from skilled PT services to modify and progress therapeutic interventions, address functional mobility deficits, address ROM deficits, address strength deficits, analyze and address soft tissue restrictions, analyze and cue movement patterns and analyze and modify body mechanics/ergonomics to attain remaining goals. []  See Plan of Care  []  See progress note/recertification  []  See Discharge Summary         Progress towards goals / Updated goals (R knee):   Short Term Goals: To be accomplished in 3-4 weeks:  1) Pt will be independent in initial HEP MET  2) Pt will report > or =25% decrease in exacerbation of symptoms progressing  3) Pt will ambulate in clinic independently, without SPC, and normalized gait mechanics in order to return to exercise progressing     Long Term Goals:  To be accomplished in 6-8 weeks:  1) Pt will demonstrate 5/5 knee flexion and knee extension strength during MMT in order to return to exercise and perform work duties  2) Pt will improve R knee AROM flex to at least 120 deg in order to squat  3) Pt will perform SLS on R for at least 8 seconds without hip drop in order to demonstrate improvement in proprioception and balance to perform ADL's      Progress towards goals / Updated goals (L knee):  Short Term Goals: To be accomplished in 2-3 weeks:  1) Pt will be independent in initial HEP MET  2) Pt will report > or =25% decrease in exacerbation of symptoms MET  3) Pt will report being able to sleep through night without being awoken from high pain levels Progressing                        Long Term Goals: To be accomplished in 6-8 weeks:  1) Pt will increase L knee AROM from 0-120 deg in order to gain full ROM and perform ADL's.  Progressing  2) Pt will increase L knee flex/ext strength to 5/5 without pain in order to exercise Progressing  3) Pt will ambulate in clinic independently with proper gait mechanics Progressing  4) Pt will increase FOTO score to at least 71/100 in order to demonstrate increase in functional mobility Progressing    PLAN  []  Upgrade activities as tolerated     [x]  Continue plan of care  []  Update interventions per flow sheet       []  Discharge due to:_  [x]  Other:_      Darcy Diaz, PT 9/18/2017  9:44 AM

## 2017-09-20 ENCOUNTER — HOSPITAL ENCOUNTER (OUTPATIENT)
Dept: PHYSICAL THERAPY | Age: 62
Discharge: HOME | End: 2017-09-20
Payer: COMMERCIAL

## 2017-09-20 PROCEDURE — 97110 THERAPEUTIC EXERCISES: CPT | Performed by: PHYSICAL THERAPIST

## 2017-09-20 PROCEDURE — 97140 MANUAL THERAPY 1/> REGIONS: CPT | Performed by: PHYSICAL THERAPIST

## 2017-09-20 NOTE — PROGRESS NOTES
PT DAILY TREATMENT NOTE 2-15    Patient Name: Tyrell Quintana  Date:2017  : 1955  [x]  Patient  Verified  Payor: Vincent Larson / Plan: Sohan Fletcher / Product Type: PPO /    In time: 9:40 AM  Out time: 11:00 AM  Total Treatment Time (min): 80 (65 timed)  Visit #: 18    Treatment Area: Left knee pain [M25.562]  Pain in right knee [M25.561]    SUBJECTIVE  Pain Level (0-10 scale): 5/10 R knee; 3/10 L knee  Any medication changes, allergies to medications, adverse drug reactions, diagnosis change, or new procedure performed?: [x] No    [] Yes (see summary sheet for update)  Subjective functional status/changes:   [] No changes reported  Pt states her pain levels are going down. She feels she is walking better    OBJECTIVE    Modality rationale: decrease inflammation, decrease pain and increase tissue extensibility to improve the patients ability to ambulate   Min Type Additional Details    [] Estim: []Att   []Unatt        []TENS instruct                  []IFC  []Premod   []NMES                     []Other:  []w/US   []w/ice   []w/heat  Position:  Location:    []  Traction: [] Cervical       []Lumbar                       [] Prone          []Supine                       []Intermittent   []Continuous Lbs:  [] before manual  [] after manual  []w/heat    []  Ultrasound: []Continuous   [] Pulsed at:                            []1MHz   []3MHz Location:  W/cm2:    []  Paraffin         Location:  []w/heat   15 [x]  Ice     []  Heat  []  Ice massage Position:  Location: bilat knees    []  Laser  []  Other: Position:  Location:    [x]  Vasopneumatic Device (R knee); ice L knee Pressure:       [] lo [] med [] hi   Temperature: 34   [x] Skin assessment post-treatment:  [x]intact []redness- no adverse reaction    []redness - adverse reaction:     50 min Therapeutic Exercise:  [x] See flow sheet : progressed strengthening exercises.  Added knee flexion stretch at step   Rationale: increase ROM and increase strength to improve the patients ability to aide in walking, sitting, work duties    15 min Manual Therapy:  bilat patellar mobs  Posterior glide of femur to increase ext bilat  Prone quad stretch bilat   Rationale: increase ROM and increase tissue extensibility  to improve the patients ability to ambulate independently          With   [x] TE   [] TA   [] neuro   [] other: Patient Education: [x] Review HEP    [] Progressed/Changed HEP based on:   [] positioning   [] body mechanics   [] transfers   [x] heat/ice application    [x] other: self patellar mobs     Other Objective/Functional Measures:   n/a     Pain Level (0-10 scale) post treatment: not reported    ASSESSMENT/Changes in Function:   Encouraged pt to perform prone hang, ankle prop on L and knee flexion stretch on step bilat. Pt tolerated progression of strengthening exercises well. Patient will continue to benefit from skilled PT services to modify and progress therapeutic interventions, address functional mobility deficits, address ROM deficits, address strength deficits, analyze and address soft tissue restrictions, analyze and cue movement patterns and analyze and modify body mechanics/ergonomics to attain remaining goals. []  See Plan of Care  []  See progress note/recertification  []  See Discharge Summary         Progress towards goals / Updated goals (R knee):   Short Term Goals: To be accomplished in 3-4 weeks:  1) Pt will be independent in initial HEP MET  2) Pt will report > or =25% decrease in exacerbation of symptoms progressing  3) Pt will ambulate in clinic independently, without SPC, and normalized gait mechanics in order to return to exercise progressing     Long Term Goals:  To be accomplished in 6-8 weeks:  1) Pt will demonstrate 5/5 knee flexion and knee extension strength during MMT in order to return to exercise and perform work duties  2) Pt will improve R knee AROM flex to at least 120 deg in order to squat  3) Pt will perform SLS on R for at least 8 seconds without hip drop in order to demonstrate improvement in proprioception and balance to perform ADL's      Progress towards goals / Updated goals (L knee):  Short Term Goals: To be accomplished in 2-3 weeks:  1) Pt will be independent in initial HEP MET  2) Pt will report > or =25% decrease in exacerbation of symptoms MET  3) Pt will report being able to sleep through night without being awoken from high pain levels Progressing                        Long Term Goals: To be accomplished in 6-8 weeks:  1) Pt will increase L knee AROM from 0-120 deg in order to gain full ROM and perform ADL's.  Progressing  2) Pt will increase L knee flex/ext strength to 5/5 without pain in order to exercise Progressing  3) Pt will ambulate in clinic independently with proper gait mechanics Progressing  4) Pt will increase FOTO score to at least 71/100 in order to demonstrate increase in functional mobility Progressing    PLAN  []  Upgrade activities as tolerated     [x]  Continue plan of care  []  Update interventions per flow sheet       []  Discharge due to:_  [x]  Other:_      Simon Arambula, PT 9/20/2017  9:43 AM

## 2017-09-25 ENCOUNTER — HOSPITAL ENCOUNTER (OUTPATIENT)
Dept: PHYSICAL THERAPY | Age: 62
Discharge: HOME | End: 2017-09-25
Payer: COMMERCIAL

## 2017-09-25 PROCEDURE — 97140 MANUAL THERAPY 1/> REGIONS: CPT | Performed by: PHYSICAL THERAPIST

## 2017-09-25 PROCEDURE — 97110 THERAPEUTIC EXERCISES: CPT | Performed by: PHYSICAL THERAPIST

## 2017-09-25 NOTE — PROGRESS NOTES
PT to MD note    Patient Name: Alvarado Jacobson  Date:2017  : 1955  Visit #: 19    Treatment Area: Left knee pain [M25.562]  Pain in right knee [M25.561]    SUBJECTIVE  Pain Level (0-10 scale): 5/10 R knee; 3/10 L knee  Pt has f/u with MD this afternoon. She states she feels she is getting stronger. Her back has been hurting-- \"probably because I've been limping with my right knee pain\"  She is supposed to start back at work the second week in October. OBJECTIVE    R Knee AROM= 0-116 deg  K knee PROM flex= 120 deg    L knee AROM= 0-110 deg  L knee PROM flex= 112 deg    Strength:  4+/5 bilat quad strength  4+/4 bilat HS strength    Decreased eccentric quad control R>L while descending stairs     Pain Level (0-10 scale) post treatment: 3/10    ASSESSMENT/Changes in Function:   Pt continuing to progress with ROM, strengthening, and proprioceptive exercises. Limited by pain levels R>L at this time. Bilat incisions appear to be healing well. Please advise, thank you!      Wilmer Hidalgo, PT 2017  11:28 AM

## 2017-09-25 NOTE — PROGRESS NOTES
PT DAILY TREATMENT/Progress NOTE 2-15    Patient Name: Derek De La Rosa  Date:2017  : 1955  [x]  Patient  Verified  Payor: Jyothi Vargas / Plan: Jadyn Simpson / Product Type: PPO /    In time: 9:30 AM  Out time: 11:20 AM  Total Treatment Time (min): 110 (95 timed)  Visit #: 19    Treatment Area: Left knee pain [M25.562]  Pain in right knee [M25.561]    SUBJECTIVE  Pain Level (0-10 scale): 5/10 R knee; 3/10 L knee  Any medication changes, allergies to medications, adverse drug reactions, diagnosis change, or new procedure performed?: [x] No    [] Yes (see summary sheet for update)  Subjective functional status/changes:   [] No changes reported  Pt has f/u with MD this afternoon. She states she feels she is getting stronger.  Her back has been hurting-- \"probably because I've been limping with my right knee pain\"    OBJECTIVE    Modality rationale: decrease inflammation, decrease pain and increase tissue extensibility to improve the patients ability to ambulate   Min Type Additional Details    [] Estim: []Att   []Unatt        []TENS instruct                  []IFC  []Premod   []NMES                     []Other:  []w/US   []w/ice   []w/heat  Position:  Location:    []  Traction: [] Cervical       []Lumbar                       [] Prone          []Supine                       []Intermittent   []Continuous Lbs:  [] before manual  [] after manual  []w/heat    []  Ultrasound: []Continuous   [] Pulsed at:                            []1MHz   []3MHz Location:  W/cm2:    []  Paraffin         Location:  []w/heat   15 [x]  Ice     []  Heat  []  Ice massage Position: supine, LE's elevated  Location: bilat knees    []  Laser  []  Other: Position:  Location:    [x]  Vasopneumatic Device (R knee); ice L knee Pressure:       [] lo [] med [] hi   Temperature: 34   [x] Skin assessment post-treatment:  [x]intact []redness- no adverse reaction    []redness - adverse reaction:     85 min Therapeutic Exercise:  [x] See flow sheet : progressed strengthening exercises. Measurements taken for MD note. Rationale: increase ROM and increase strength to improve the patients ability to aide in walking, sitting, work duties    10 min Manual Therapy:  bilat patellar mobs  Prone quad stretch bilat   Rationale: increase ROM and increase tissue extensibility  to improve the patients ability to ambulate independently          With   [x] TE   [] TA   [] neuro   [] other: Patient Education: [x] Review HEP    [] Progressed/Changed HEP based on:   [] positioning   [] body mechanics   [] transfers   [x] heat/ice application    [x] other:      Other Objective/Functional Measures:   R Knee AROM= 0-116 deg  K knee PROM flex= 120 deg    L knee AROM= 0-110 deg  L knee PROM flex= 112 deg    Strength:  4+/5 bilat quad strength  4+/4 bilat HS strength    Decreased eccentric quad control R>L while descending stairs     Pain Level (0-10 scale) post treatment: 3/10    ASSESSMENT/Changes in Function:   Pt continuing to progress with ROM, strengthening, and proprioceptive exercises. Limited by pain levels R>L at this time. Bilat incisions appear to be healing well. Patient will continue to benefit from skilled PT services to modify and progress therapeutic interventions, address functional mobility deficits, address ROM deficits, address strength deficits, analyze and address soft tissue restrictions, analyze and cue movement patterns and analyze and modify body mechanics/ergonomics to attain remaining goals. []  See Plan of Care  []  See progress note/recertification  []  See Discharge Summary         Progress towards goals / Updated goals (R knee):   Short Term Goals:  To be accomplished in 3-4 weeks:  1) Pt will be independent in initial HEP MET  2) Pt will report > or =25% decrease in exacerbation of symptoms progressing  3) Pt will ambulate in clinic independently, without SPC, and normalized gait mechanics in order to return to exercise progressing     Long Term Goals: To be accomplished in 6-8 weeks:  1) Pt will demonstrate 5/5 knee flexion and knee extension strength during MMT in order to return to exercise and perform work duties  2) Pt will improve R knee AROM flex to at least 120 deg in order to squat  3) Pt will perform SLS on R for at least 8 seconds without hip drop in order to demonstrate improvement in proprioception and balance to perform ADL's      Progress towards goals / Updated goals (L knee):  Short Term Goals: To be accomplished in 2-3 weeks:  1) Pt will be independent in initial HEP MET  2) Pt will report > or =25% decrease in exacerbation of symptoms MET  3) Pt will report being able to sleep through night without being awoken from high pain levels Progressing                        Long Term Goals: To be accomplished in 6-8 weeks:  1) Pt will increase L knee AROM from 0-120 deg in order to gain full ROM and perform ADL's.  Progressing  2) Pt will increase L knee flex/ext strength to 5/5 without pain in order to exercise Progressing  3) Pt will ambulate in clinic independently with proper gait mechanics Progressing  4) Pt will increase FOTO score to at least 71/100 in order to demonstrate increase in functional mobility Progressing    PLAN  []  Upgrade activities as tolerated     [x]  Continue plan of care  []  Update interventions per flow sheet       []  Discharge due to:_  [x]  Other:_      Geno Alexandra, PT 9/25/2017  10:42 AM

## 2017-09-27 ENCOUNTER — HOSPITAL ENCOUNTER (OUTPATIENT)
Dept: PHYSICAL THERAPY | Age: 62
Discharge: HOME | End: 2017-09-27
Payer: COMMERCIAL

## 2017-09-27 PROCEDURE — 97110 THERAPEUTIC EXERCISES: CPT | Performed by: PHYSICAL THERAPIST

## 2017-09-27 PROCEDURE — 97140 MANUAL THERAPY 1/> REGIONS: CPT | Performed by: PHYSICAL THERAPIST

## 2017-09-27 NOTE — PROGRESS NOTES
PT DAILY TREATMENT NOTE 2-15    Patient Name: Lina Ray  Date:2017  : 1955  [x]  Patient  Verified  Payor: Nenita Salinas / Plan: Olga Nguyen / Product Type: PPO /    In time: 9:40 AM  Out time: 10:40 AM  Total Treatment Time (min): 60 (50 timed)  Visit #: 20    Treatment Area: Left knee pain [M25.562]  Pain in right knee [M25.561]    SUBJECTIVE  Pain Level (0-10 scale): 4/10 R knee  Any medication changes, allergies to medications, adverse drug reactions, diagnosis change, or new procedure performed?: [x] No    [] Yes (see summary sheet for update)  Subjective functional status/changes:   [] No changes reported  Pt had f/u with MD-- she is going to go back to work 4 hrs/day week of Oct 9 and then full time.     OBJECTIVE    Modality rationale: decrease inflammation, decrease pain and increase tissue extensibility to improve the patients ability to ambulate   Min Type Additional Details    [] Estim: []Att   []Unatt        []TENS instruct                  []IFC  []Premod   []NMES                     []Other:  []w/US   []w/ice   []w/heat  Position:  Location:    []  Traction: [] Cervical       []Lumbar                       [] Prone          []Supine                       []Intermittent   []Continuous Lbs:  [] before manual  [] after manual  []w/heat    []  Ultrasound: []Continuous   [] Pulsed at:                            []1MHz   []3MHz Location:  W/cm2:    []  Paraffin         Location:  []w/heat   Pt declined [x]  Ice     []  Heat  []  Ice massage Position: supine, LE's elevated  Location: bilat knees    []  Laser  []  Other: Position:  Location:    [x]  Vasopneumatic Device (R knee); ice L knee Pressure:       [] lo [] med [] hi   Temperature: 34   [x] Skin assessment post-treatment:  [x]intact []redness- no adverse reaction    []redness - adverse reaction:     40 min Therapeutic Exercise:  [x] See flow sheet : progressed strengthening exercises   Rationale: increase ROM and increase strength to improve the patients ability to aide in walking, sitting, work duties    10 min Manual Therapy:  bilat patellar mobs  Prone quad stretch bilat   Rationale: increase ROM and increase tissue extensibility  to improve the patients ability to ambulate independently          With   [x] TE   [] TA   [] neuro   [] other: Patient Education: [x] Review HEP    [] Progressed/Changed HEP based on:   [] positioning   [] body mechanics   [] transfers   [x] heat/ice application    [x] other:      Other Objective/Functional Measures:   n/a     Pain Level (0-10 scale) post treatment: 3/10    ASSESSMENT/Changes in Function:   Pt progressing well with LE strengthening. Improved L knee ROM today. Dec'd antalgic gait today compared to past visits. Patient will continue to benefit from skilled PT services to modify and progress therapeutic interventions, address functional mobility deficits, address ROM deficits, address strength deficits, analyze and address soft tissue restrictions, analyze and cue movement patterns and analyze and modify body mechanics/ergonomics to attain remaining goals. []  See Plan of Care  []  See progress note/recertification  []  See Discharge Summary         Progress towards goals / Updated goals (R knee):   Short Term Goals: To be accomplished in 3-4 weeks:  1) Pt will be independent in initial HEP MET  2) Pt will report > or =25% decrease in exacerbation of symptoms progressing  3) Pt will ambulate in clinic independently, without SPC, and normalized gait mechanics in order to return to exercise progressing     Long Term Goals:  To be accomplished in 6-8 weeks:  1) Pt will demonstrate 5/5 knee flexion and knee extension strength during MMT in order to return to exercise and perform work duties  2) Pt will improve R knee AROM flex to at least 120 deg in order to squat  3) Pt will perform SLS on R for at least 8 seconds without hip drop in order to demonstrate improvement in proprioception and balance to perform ADL's      Progress towards goals / Updated goals (L knee):  Short Term Goals: To be accomplished in 2-3 weeks:  1) Pt will be independent in initial HEP MET  2) Pt will report > or =25% decrease in exacerbation of symptoms MET  3) Pt will report being able to sleep through night without being awoken from high pain levels Progressing                        Long Term Goals: To be accomplished in 6-8 weeks:  1) Pt will increase L knee AROM from 0-120 deg in order to gain full ROM and perform ADL's.  Progressing  2) Pt will increase L knee flex/ext strength to 5/5 without pain in order to exercise Progressing  3) Pt will ambulate in clinic independently with proper gait mechanics Progressing  4) Pt will increase FOTO score to at least 71/100 in order to demonstrate increase in functional mobility Progressing    PLAN  []  Upgrade activities as tolerated     [x]  Continue plan of care  []  Update interventions per flow sheet       []  Discharge due to:_  [x]  Other:_      Gustabo Nyhan, PT 9/27/2017  9:42 AM

## 2017-09-28 DIAGNOSIS — E03.9 ACQUIRED HYPOTHYROIDISM: ICD-10-CM

## 2017-10-02 ENCOUNTER — HOSPITAL ENCOUNTER (OUTPATIENT)
Dept: PHYSICAL THERAPY | Age: 62
Discharge: HOME | End: 2017-10-02
Payer: COMMERCIAL

## 2017-10-02 PROCEDURE — 97140 MANUAL THERAPY 1/> REGIONS: CPT | Performed by: PHYSICAL THERAPIST

## 2017-10-02 PROCEDURE — 97110 THERAPEUTIC EXERCISES: CPT | Performed by: PHYSICAL THERAPIST

## 2017-10-02 NOTE — PROGRESS NOTES
PT DAILY TREATMENT NOTE 2-15    Patient Name: Ivan Chand  Date:10/2/2017  : 1955  [x]  Patient  Verified  Payor: Cheri Dash / Plan: Maciej Pedraza / Product Type: PPO /    In time: 12:00 PM  Out time: 12:45 PM  Total Treatment Time (min): 45 (45 timed)  Visit #: 21    Treatment Area: Left knee pain [M25.562]  Pain in right knee [M25.561]    SUBJECTIVE  Pain Level (0-10 scale): 5/10 R knee  Any medication changes, allergies to medications, adverse drug reactions, diagnosis change, or new procedure performed?: [x] No    [] Yes (see summary sheet for update)  Subjective functional status/changes:   [] No changes reported  Pt states she is having some low back and R hip pain-- \"I've had shots for the arthritis and bursitis in there.  I think its just acting up\"  She has to leave by 12:45 PM for appointment    OBJECTIVE    Modality rationale: decrease inflammation, decrease pain and increase tissue extensibility to improve the patients ability to ambulate   Min Type Additional Details    [] Estim: []Att   []Unatt        []TENS instruct                  []IFC  []Premod   []NMES                     []Other:  []w/US   []w/ice   []w/heat  Position:  Location:    []  Traction: [] Cervical       []Lumbar                       [] Prone          []Supine                       []Intermittent   []Continuous Lbs:  [] before manual  [] after manual  []w/heat    []  Ultrasound: []Continuous   [] Pulsed at:                            []1MHz   []3MHz Location:  W/cm2:    []  Paraffin         Location:  []w/heat   Pt declined [x]  Ice     []  Heat  []  Ice massage Position: supine, LE's elevated  Location: bilat knees    []  Laser  []  Other: Position:  Location:    [x]  Vasopneumatic Device (R knee); ice L knee Pressure:       [] lo [] med [] hi   Temperature: 34   [x] Skin assessment post-treatment:  [x]intact []redness- no adverse reaction    []redness  adverse reaction:     35 min Therapeutic Exercise:  [x] See flow sheet : progressed strengthening exercises   Rationale: increase ROM and increase strength to improve the patients ability to aide in walking, sitting, work duties    10 min Manual Therapy:  bilat patellar mobs  Prone quad stretch bilat   Rationale: increase ROM and increase tissue extensibility  to improve the patients ability to ambulate independently          With   [x] TE   [] TA   [] neuro   [] other: Patient Education: [x] Review HEP    [] Progressed/Changed HEP based on:   [] positioning   [] body mechanics   [] transfers   [x] heat/ice application    [x] other:      Other Objective/Functional Measures:   n/a     Pain Level (0-10 scale) post treatment: \"good\"    ASSESSMENT/Changes in Function:   Pt continuing to progress with ROM and strengthening bilaterally. Patient will continue to benefit from skilled PT services to modify and progress therapeutic interventions, address functional mobility deficits, address ROM deficits, address strength deficits, analyze and address soft tissue restrictions, analyze and cue movement patterns and analyze and modify body mechanics/ergonomics to attain remaining goals. []  See Plan of Care  []  See progress note/recertification  []  See Discharge Summary         Progress towards goals / Updated goals (R knee):   Short Term Goals: To be accomplished in 3-4 weeks:  1) Pt will be independent in initial HEP MET  2) Pt will report > or =25% decrease in exacerbation of symptoms progressing  3) Pt will ambulate in clinic independently, without SPC, and normalized gait mechanics in order to return to exercise progressing     Long Term Goals:  To be accomplished in 6-8 weeks:  1) Pt will demonstrate 5/5 knee flexion and knee extension strength during MMT in order to return to exercise and perform work duties  2) Pt will improve R knee AROM flex to at least 120 deg in order to squat  3) Pt will perform SLS on R for at least 8 seconds without hip drop in order to demonstrate improvement in proprioception and balance to perform ADL's      Progress towards goals / Updated goals (L knee):  Short Term Goals: To be accomplished in 2-3 weeks:  1) Pt will be independent in initial HEP MET  2) Pt will report > or =25% decrease in exacerbation of symptoms MET  3) Pt will report being able to sleep through night without being awoken from high pain levels Progressing                        Long Term Goals: To be accomplished in 6-8 weeks:  1) Pt will increase L knee AROM from 0-120 deg in order to gain full ROM and perform ADL's.  Progressing  2) Pt will increase L knee flex/ext strength to 5/5 without pain in order to exercise Progressing  3) Pt will ambulate in clinic independently with proper gait mechanics Progressing  4) Pt will increase FOTO score to at least 71/100 in order to demonstrate increase in functional mobility Progressing    PLAN  []  Upgrade activities as tolerated     [x]  Continue plan of care  []  Update interventions per flow sheet       []  Discharge due to:_  [x]  Other:_      Jono Byrd, PT 10/2/2017  12:31 PM

## 2017-10-04 ENCOUNTER — HOSPITAL ENCOUNTER (OUTPATIENT)
Dept: PHYSICAL THERAPY | Age: 62
Discharge: HOME | End: 2017-10-04
Payer: COMMERCIAL

## 2017-10-04 PROCEDURE — 97110 THERAPEUTIC EXERCISES: CPT | Performed by: PHYSICAL THERAPIST

## 2017-10-04 NOTE — PROGRESS NOTES
PT DAILY TREATMENT/Progress NOTE 2-15    Patient Name: Brinda Gay  Date:10/4/2017  : 1955  [x]  Patient  Verified  Payor: Renee Mcdaniel / Plan: Roberto Divers / Product Type: PPO /    In time: 9:35 AM  Out time: 10:10  AM  Total Treatment Time (min): 45 (45 timed)  Visit #: 22    Treatment Area: Left knee pain [M25.562]  Pain in right knee [M25.561]    SUBJECTIVE  Pain Level (0-10 scale): 3-4/10 R knee  Any medication changes, allergies to medications, adverse drug reactions, diagnosis change, or new procedure performed?: [x] No    [] Yes (see summary sheet for update)  Subjective functional status/changes:   [] No changes reported  Pt states \"close to 100%\" improvement since beginning therapy with R and L knees. She feels much stronger and that she is ready to return to work starting next Monday. She is compliant with HEP.     OBJECTIVE    Modality rationale: decrease inflammation, decrease pain and increase tissue extensibility to improve the patients ability to ambulate   Min Type Additional Details    [] Estim: []Att   []Unatt        []TENS instruct                  []IFC  []Premod   []NMES                     []Other:  []w/US   []w/ice   []w/heat  Position:  Location:    []  Traction: [] Cervical       []Lumbar                       [] Prone          []Supine                       []Intermittent   []Continuous Lbs:  [] before manual  [] after manual  []w/heat    []  Ultrasound: []Continuous   [] Pulsed at:                            []1MHz   []3MHz Location:  W/cm2:    []  Paraffin         Location:  []w/heat   Pt declined [x]  Ice     []  Heat  []  Ice massage Position: supine, LE's elevated  Location: bilat knees    []  Laser  []  Other: Position:  Location:    [x]  Vasopneumatic Device (R knee); ice L knee Pressure:       [] lo [] med [] hi   Temperature: 34   [x] Skin assessment post-treatment:  [x]intact []redness- no adverse reaction    []redness  adverse reaction:     45 min Therapeutic Exercise:  [x] See flow sheet : Reviewed HEP. Reassessment performed. Rationale: increase ROM and increase strength to improve the patients ability to aide in walking, sitting, work duties    -- min Manual Therapy:  --   Rationale: increase ROM and increase tissue extensibility  to improve the patients ability to ambulate independently          With   [x] TE   [] TA   [] neuro   [] other: Patient Education: [x] Review HEP    [] Progressed/Changed HEP based on:   [] positioning   [] body mechanics   [] transfers   [x] heat/ice application    [x] other:      Other Objective/Functional Measures:   ROM:  R AROM= 0-121 deg  L AROM= 0-118 deg    Strength:  5/5 knee ext bilat  5/5 knee flex bilat    SLS on L: >10 sec  SLS on R: 10 sec     FOTO= 83/100 (51 at last reassessment)    Pain Level (0-10 scale) post treatment: \"good\"    ASSESSMENT/Changes in Function:   Pt has completed 22 skilled PT visits. Pt has met 100% of PT goals for R knee and 3/3 STG's, 3/4 LTG's for L knee. Pt demonstrates improvement in bilat knee AROM, LE strength, improvement in gait mechanics without AD, improved balance/proprioception, and improved activity tolerance. Pt to return to work part-time starting next week-- pt to f/u with PT only if necessary. Pt compliant with HEP and ready for D/C. Progress towards goals / Updated goals (R knee):   Short Term Goals: To be accomplished in 3-4 weeks:  1) Pt will be independent in initial HEP MET   2) Pt will report > or =25% decrease in exacerbation of symptoms MET  3) Pt will ambulate in clinic independently, without SPC, and normalized gait mechanics in order to return to exercise MET     Long Term Goals:  To be accomplished in 6-8 weeks:  1) Pt will demonstrate 5/5 knee flexion and knee extension strength during MMT in order to return to exercise and perform work duties MET  2) Pt will improve R knee AROM flex to at least 120 deg in order to squat MET  3) Pt will perform SLS on R for at least 8 seconds without hip drop in order to demonstrate improvement in proprioception and balance to perform ADL's MET      Progress towards goals / Updated goals (L knee):  Short Term Goals: To be accomplished in 2-3 weeks:  1) Pt will be independent in initial HEP MET  2) Pt will report > or =25% decrease in exacerbation of symptoms MET  3) Pt will report being able to sleep through night without being awoken from high pain levels  MET                        Long Term Goals: To be accomplished in 6-8 weeks:  1) Pt will increase L knee AROM from 0-120 deg in order to gain full ROM and perform ADL's. Not met (118 deg)  2) Pt will increase L knee flex/ext strength to 5/5 without pain in order to exercise  MET  3) Pt will ambulate in clinic independently with proper gait mechanics MET  4) Pt will increase FOTO score to at least 71/100 in order to demonstrate increase in functional mobility MET    PLAN  []  Upgrade activities as tolerated     []  Continue plan of care  []  Update interventions per flow sheet       []  Discharge due to:_  [x]  Other:_  Pt to trial with return to work part-time next week. Pt to return only if necessary, otherwise pt ready for D/C.     Altagracia Mcgee, PT 10/4/2017  9:43 AM

## 2017-10-05 LAB
SPECIMEN STATUS REPORT, ROLRST: NORMAL
T4 FREE SERPL-MCNC: 1.7 NG/DL (ref 0.82–1.77)
TSH SERPL DL<=0.005 MIU/L-ACNC: 1.48 UIU/ML (ref 0.45–4.5)

## 2017-10-11 ENCOUNTER — APPOINTMENT (OUTPATIENT)
Dept: PHYSICAL THERAPY | Age: 62
End: 2017-10-11
Payer: COMMERCIAL

## 2017-12-28 ENCOUNTER — APPOINTMENT (OUTPATIENT)
Dept: PHYSICAL THERAPY | Age: 62
End: 2017-12-28

## 2018-01-02 ENCOUNTER — APPOINTMENT (OUTPATIENT)
Dept: PHYSICAL THERAPY | Age: 63
End: 2018-01-02
Payer: COMMERCIAL

## 2018-01-03 ENCOUNTER — HOSPITAL ENCOUNTER (OUTPATIENT)
Dept: PHYSICAL THERAPY | Age: 63
Discharge: HOME | End: 2018-01-03
Payer: COMMERCIAL

## 2018-01-03 PROCEDURE — 97110 THERAPEUTIC EXERCISES: CPT | Performed by: PHYSICAL THERAPIST

## 2018-01-03 PROCEDURE — 97161 PT EVAL LOW COMPLEX 20 MIN: CPT | Performed by: PHYSICAL THERAPIST

## 2018-01-03 NOTE — PROGRESS NOTES
[facility]  [address] [facility], [address]  Phone: [phone]  Fax: [phone]      PT INITIAL EVALUATION NOTE - [facility] 2-15    Patient Name: Chepe Broderick  Date:1/3/2018  : 1955  [x]  Patient  Verified  Payor: Mary Ellen Grade / Plan: Tatyana Gilman / Product Type: PPO /    In time: 3:35 PM  Out time:4:35 PM  Total Treatment Time (min): 70  Total Timed Codes (min): 25  1:1 Treatment Time ( only): --   Visit #: 1     Treatment Area: Left knee pain [M25.562]  Right knee pain [M25.561]  Right shoulder pain [M25.511]    SUBJECTIVE  Any medication changes, allergies to medications, adverse drug reactions, diagnosis change, or new procedure performed?: [] No    [x] Yes (see summary sheet for update)    Current symptoms/chief complaint:   Pt presents with complaints of R>L knee pain/\"buckling and clicking\", R shoulder pain, and low back pain. She had PT after L TKR (May 2016) and revision of R TKR (Aug 2016). She is currently having increased \"buckling and grinding\" in R>L knee. Pain along medial aspect of R knee-- pt points to pes anserine. \"Dr. Adeola Hoff thinks it is tendinitis or bursitis or something\". She states- \"I haven't been very good about doing my exercises\" [HEP from [facility] 21  She began to have R shoulder pain approx 2 months ago. She has a history of bone spurs- \"it's been holding up okay until recently\". She has difficulty raising her L arm, lifting objects, drawing blood from patients at work. On 17 she had a cortisone shot-- no change in symptoms. Had x-ray-- \"bone spurs\". She has pain running down in her hand. Pain wakes her up at night. No MRI at this time.   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 Prior level of function: long history of bilat knee pain-- inc'd pain throughout the work day; piror to a couple months ago, able to lift, raise arm with minimal to no pain    Patient goal(s): \"decrease my pain and get better range of motion\"     Objective:      Observation/posture:   Pt ambulates independently without AD. Pt guarding R shoulder    ROM Shoulder:  AROM R shoulder flex= 162 deg (severe p!)  AROM R shoulder abd= 150 deg (min to mod p!)    PROM R shoulder flex, ER, IR= WNL. Minimal to no pain. Strength:                                                                            R (1-5) L (1-5) Comments   Shoulder Flexion 4- 4+ Severe p! Shoulder Scaption 4- 4+ Severe p! Shoulder Abduction 4- 4+ Severe p! Shoulder ER 4- 4+ Severe p! Shoulder IR 4 4+    Elbow Flexion 4+ 4+    Elbow Extension - -    Supination 4+ 4+    Pronation - -        Palpation:    Severe TTP R pes anserine  Severe TTP R RTC insertion; min TTP R supraspinatus, infraspinatus muscle bellies    Special Tests:   Neer's Test  [x] Pos   [] Neg   Hawkin's Test  [x] Pos   [] Neg   Empty Can  [x] Pos   [] Neg  Full Can   [x] Pos   [] Neg   Drop Arm Test [] Pos   [x] Neg  External Rotation Lag Sign [] Pos   [x] Neg     Speed's Test  [x] Pos   [] Neg   Apprehension  [] Pos   [x] Neg     Outcome Measure: FOTO not complete at this time. Will report score at next visit. Today's Treatment[de-identified]    25 min Therapeutic Exercise:  [x] See flow sheet : Reviewed knee exercises when pt was treated for TKR a few months ago-- advised to continue with SLR and sidelying hip abduction.    Shoulder-- wall slides, sidelying ER, and scap pinches   Rationale: increase ROM and increase strength to improve the patients ability to perform ADL's with dec'd pain    10 min--ice.  R shoulder, R knee  Pt supine with bolster under LE's          With   [x] TE   [] TA   [] neuro   [] other: Patient Education: [x] Review HEP    [] Progressed/Changed HEP based on:   [x] positioning   [] body mechanics   [] transfers   [x] heat/ice application    [x] other: encouraged pt to use heat on shoulder prior to exercising and ice after; educated on ice massage with [place] cup over pes anserine and to continue with heel slides for dec'd \"stiffness\"; reviewed positioning of R UE at night and use of pillow under arm for proper shoulder alignment when supine     Pain Level (0-10 scale) post treatment: \"good\"    Assessment:   [x] See POC    Plan:    [x] See Jose Luis Campbell PT, DPT   1/3/2018 4:22 PM

## 2018-01-03 NOTE — PROGRESS NOTES
[facility]  [address]  [place], [address]  Phone: [phone]  Fax: [phone]    Plan of Care/Statement of Necessity for Physical Therapy Services  2-15    Patient name: Gaurav Markham  :   Provider#: [id number]  Referral source: Cyndee Bolton MD      Medical/Treatment Diagnosis: Left knee pain [M25.562]  Right knee pain [M25.561]  Right shoulder pain [M25.511]     Prior Hospitalization: see medical history     Comorbidities: see evaluation  Prior Level of Function: see evaluation  Medications: Verified on Patient Summary List    Start of Care: 1/3/17      Onset Date: revision of R TKR 2016; onset of R shoulder pain approx 2 months ago       The Plan of Care and following information is based on the information from the initial evaluation. Assessment/ key information: Pt is a 60year old female presenting with R>L knee pain (history of TKR bilaterally) and weakness, and signs and symptoms consistent with R shoulder impingement. She will benefit from PT to address problem list below.     Evaluation Complexity History MEDIUM  Complexity : 1-2 comorbidities / personal factors will impact the outcome/ POC ; Examination LOW Complexity : 1-2 Standardized tests and measures addressing body structure, function, activity limitation and / or participation in recreation  ;Presentation LOW Complexity : Stable, uncomplicated  ;Clinical Decision Making MEDIUM Complexity : FOTO score of 26-74  Overall Complexity Rating: LOW     Problem List: pain affecting function, decrease ROM, decrease strength, edema affecting function, decrease ADL/ functional abilitiies, decrease activity tolerance and decrease flexibility/ joint mobility   Treatment Plan may include any combination of the following: Therapeutic exercise, Therapeutic activities, Neuromuscular re-education, Physical agent/modality, Gait/balance training, Manual therapy, Patient education, Self Care training, Functional mobility training, Home safety training and Stair training  Patient / Family readiness to learn indicated by: asking questions, trying to perform skills and interest  Persons(s) to be included in education: patient (P)  Barriers to Learning/Limitations: None  Patient Goal (s): see evaluation  Patient Self Reported Health Status: good  Rehabilitation Potential: good    Short Term Goals: To be accomplished in 2-3 weeks:  1) Pt will be independent in initial HEP  2) Pt will report > or =25% decrease in exacerbation of symptoms  3) Pt will report compliance with ice and heat regimen    Long Term Goals: To be accomplished in 6-8 weeks:  1) Pt will demonstrate R shoulder AROM flexion > or =160 deg with < 2/10 pain in order to reach into cabinets  2) Pt will demonstrate R shoulder flex/scap strength to > or =4/5 with <2/10 pain in order to lift obejcts  3) Pt will report no R knee buckling throughout work day     Frequency / Duration: Patient to be seen 1-2 times per week for 6-8 weeks. Patient/ Caregiver education and instruction: self care, activity modification and exercises    [x]  Plan of care has been reviewed with PTA    Aaron Hook, PT 1/3/2018 4:39 PM    ________________________________________________________________________    I certify that the above Therapy Services are being furnished while the patient is under my care. I agree with the treatment plan and certify that this therapy is necessary.     [de-identified] Signature:____________________  Date:____________Time: _________

## 2018-01-05 ENCOUNTER — APPOINTMENT (OUTPATIENT)
Dept: PHYSICAL THERAPY | Age: 63
End: 2018-01-05
Payer: COMMERCIAL

## 2018-01-10 ENCOUNTER — HOSPITAL ENCOUNTER (EMERGENCY)
Age: 63
Discharge: HOME | End: 2018-01-10
Attending: EMERGENCY MEDICINE
Payer: COMMERCIAL

## 2018-01-10 ENCOUNTER — APPOINTMENT (OUTPATIENT)
Dept: PHYSICAL THERAPY | Age: 63
End: 2018-01-10
Payer: COMMERCIAL

## 2018-01-10 ENCOUNTER — APPOINTMENT (OUTPATIENT)
Dept: GENERAL RADIOLOGY | Age: 63
End: 2018-01-10
Attending: EMERGENCY MEDICINE
Payer: COMMERCIAL

## 2018-01-10 VITALS
RESPIRATION RATE: 18 BRPM | HEART RATE: 113 BPM | SYSTOLIC BLOOD PRESSURE: 147 MMHG | OXYGEN SATURATION: 100 % | DIASTOLIC BLOOD PRESSURE: 76 MMHG | HEIGHT: 65 IN | TEMPERATURE: 98.3 F | WEIGHT: 212 LBS | BODY MASS INDEX: 35.32 KG/M2

## 2018-01-10 DIAGNOSIS — R07.9 CHEST PAIN, UNSPECIFIED TYPE: Primary | ICD-10-CM

## 2018-01-10 LAB
ALBUMIN SERPL-MCNC: 3.8 G/DL (ref 3.5–5)
ALBUMIN/GLOB SERPL: 1.3 {RATIO} (ref 1.1–2.2)
ALP SERPL-CCNC: 83 U/L (ref 45–117)
ALT SERPL-CCNC: 22 U/L (ref 12–78)
ANION GAP SERPL CALC-SCNC: 5 MMOL/L (ref 5–15)
AST SERPL-CCNC: 15 U/L (ref 15–37)
ATRIAL RATE: 71 BPM
BASOPHILS # BLD: 0 K/UL (ref 0–0.1)
BASOPHILS NFR BLD: 1 % (ref 0–1)
BILIRUB SERPL-MCNC: 0.7 MG/DL (ref 0.2–1)
BUN SERPL-MCNC: 19 MG/DL (ref 6–20)
BUN/CREAT SERPL: 21 (ref 12–20)
CALCIUM SERPL-MCNC: 8.7 MG/DL (ref 8.5–10.1)
CALCULATED P AXIS, ECG09: 86 DEGREES
CALCULATED R AXIS, ECG10: -8 DEGREES
CALCULATED T AXIS, ECG11: 31 DEGREES
CHLORIDE SERPL-SCNC: 105 MMOL/L (ref 97–108)
CO2 SERPL-SCNC: 31 MMOL/L (ref 21–32)
CREAT SERPL-MCNC: 0.89 MG/DL (ref 0.55–1.02)
DIAGNOSIS, 93000: NORMAL
EOSINOPHIL # BLD: 0.2 K/UL (ref 0–0.4)
EOSINOPHIL NFR BLD: 4 % (ref 0–7)
ERYTHROCYTE [DISTWIDTH] IN BLOOD BY AUTOMATED COUNT: 13.5 % (ref 11.5–14.5)
GLOBULIN SER CALC-MCNC: 2.9 G/DL (ref 2–4)
GLUCOSE SERPL-MCNC: 86 MG/DL (ref 65–100)
HCT VFR BLD AUTO: 37.6 % (ref 35–47)
HGB BLD-MCNC: 11.9 G/DL (ref 11.5–16)
LYMPHOCYTES # BLD: 2 K/UL (ref 0.8–3.5)
LYMPHOCYTES NFR BLD: 48 % (ref 12–49)
MCH RBC QN AUTO: 30.1 PG (ref 26–34)
MCHC RBC AUTO-ENTMCNC: 31.6 G/DL (ref 30–36.5)
MCV RBC AUTO: 94.9 FL (ref 80–99)
MONOCYTES # BLD: 0.3 K/UL (ref 0–1)
MONOCYTES NFR BLD: 6 % (ref 5–13)
NEUTS SEG # BLD: 1.7 K/UL (ref 1.8–8)
NEUTS SEG NFR BLD: 41 % (ref 32–75)
P-R INTERVAL, ECG05: 148 MS
PLATELET # BLD AUTO: 223 K/UL (ref 150–400)
POTASSIUM SERPL-SCNC: 3.5 MMOL/L (ref 3.5–5.1)
PROT SERPL-MCNC: 6.7 G/DL (ref 6.4–8.2)
Q-T INTERVAL, ECG07: 394 MS
QRS DURATION, ECG06: 76 MS
QTC CALCULATION (BEZET), ECG08: 428 MS
RBC # BLD AUTO: 3.96 M/UL (ref 3.8–5.2)
SODIUM SERPL-SCNC: 141 MMOL/L (ref 136–145)
TROPONIN I SERPL-MCNC: <0.04 NG/ML
VENTRICULAR RATE, ECG03: 71 BPM
WBC # BLD AUTO: 4.2 K/UL (ref 3.6–11)

## 2018-01-10 PROCEDURE — 74011250637 HC RX REV CODE- 250/637: Performed by: EMERGENCY MEDICINE

## 2018-01-10 PROCEDURE — 85025 COMPLETE CBC W/AUTO DIFF WBC: CPT | Performed by: EMERGENCY MEDICINE

## 2018-01-10 PROCEDURE — 71046 X-RAY EXAM CHEST 2 VIEWS: CPT

## 2018-01-10 PROCEDURE — 99284 EMERGENCY DEPT VISIT MOD MDM: CPT

## 2018-01-10 PROCEDURE — 93005 ELECTROCARDIOGRAM TRACING: CPT

## 2018-01-10 PROCEDURE — 84484 ASSAY OF TROPONIN QUANT: CPT | Performed by: EMERGENCY MEDICINE

## 2018-01-10 PROCEDURE — 80053 COMPREHEN METABOLIC PANEL: CPT | Performed by: EMERGENCY MEDICINE

## 2018-01-10 PROCEDURE — 36415 COLL VENOUS BLD VENIPUNCTURE: CPT | Performed by: EMERGENCY MEDICINE

## 2018-01-10 RX ORDER — FAMOTIDINE 20 MG/1
20 TABLET, FILM COATED ORAL 2 TIMES DAILY
Qty: 20 TAB | Refills: 0 | Status: SHIPPED | OUTPATIENT
Start: 2018-01-10 | End: 2018-01-20

## 2018-01-10 RX ORDER — FAMOTIDINE 20 MG/1
20 TABLET, FILM COATED ORAL
Status: COMPLETED | OUTPATIENT
Start: 2018-01-10 | End: 2018-01-10

## 2018-01-10 RX ADMIN — FAMOTIDINE 20 MG: 20 TABLET, FILM COATED ORAL at 13:54

## 2018-01-10 NOTE — ED TRIAGE NOTES
Patient comes to the ER via EMS c/o sharp chest pain x30 minutes ago. 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  Given 324 ASA

## 2018-01-10 NOTE — ED PROVIDER NOTES
HPI Comments: 60 y.o. female with past medical history significant for palpitations, hypertension, hypothyroidism who presents from work via EMS with chief complaint of chest pain. Patient reports while at work she had onset of severe 10/10 midsternal chest \"heaviness\" that radiated across her chest and was associated with diaphoresis. Patient states pain started 1.5-2 hours ago and lasted approximately 15 minutes. Patient took Tums at onset with minimal relief. Nurse at patient's work gave patient 324 mg ASA and obtained and EKG. Patient states she has minimal chest \"pressure\" right now, rated 2/10, and states she feels much better. Patient has a history of similar symptoms and states she takes Tums frequently \"but they take a long time to work. \"  Patient does not take daily antacids. Patient also notes recent congestion and headaches. Patient had a normal stress test approximately 9 months ago after being seen for palpitations and lightheadedness; it was thought her symptoms were caused by abnormal thyroid. Patient denies nausea, vomiting, cough, shortness of breath, palpitations, lightheadedness. There are no other acute medical concerns at this time. Social hx: Nonsmoker; Works at the [address] [place] [address]. PCP: Marcus Garcia MD    Note written by Yane Andres, as dictated by Terrall Crigler, MD 1:18 PM      The history is provided by the patient.         Past Medical History:   Diagnosis Date    Arrhythmia     \"PALPATATIONS, FLUTTERING, POSSIBLE MEDS PER DR. Margie Thomas"    Arthritis     knees, back    Chronic pain     LOWER BACK, R HIP    Duodenal ulcer 2002    GERD (gastroesophageal reflux disease)     H/O blood clots 2007    TO RIGHT EYE    Hypertension     Iritis 2007    both eyes    Nausea & vomiting     Other ill-defined conditions(799.89)     back pain/spasm    PONV (postoperative nausea and vomiting)     Thyroid disease     hypothroid    Ulcerative colitis 2002 Past Surgical History:   Procedure Laterality Date    COLONOSCOPY N/A 5/25/2016    COL[facility]COPY performed [facility]ales [facility] END[facility]COPY    HX ACL RECONSTRUCTION  2003    right    HX BREAST BIOPSY Left 12/30/2016    MARKER     HX CHOLECYSTECTOMY  2008    HX HEENT  2003    benign mass removed from uvula    HX KNEE REPLACEMENT Right 05/01/2013    right    HX KNEE REPLACEMENT  05/31/2017    Left    HX ORTHOPAEDIC  2009    exc mass right elbow - benign    HX ORTHOPAEDIC Right 08/03/2017    Knee reconstruction and excision of fibroma    HX OTHER SURGICAL  2003    growth removed from back of leg - benign    HX ISABELL AND BSO  2004    HX TONSILLECTOMY  2003    done at the time mass removed from uvula    LAP, PLACE ADJUST GASTR BAND  2008    fluid removed/band removed         Family History:   Problem Relation Age of Onset    Stroke Mother      cerebral hemorrhage    Post-op Nausea/Vomiting Mother     Hypertension Mother     Heart Disease Father      PACEMAKER    Heart Attack Father 68    Other Sister      PAD    Other Brother      PAD    Cancer Paternal Grandmother      colon    Thyroid Disease Daughter      HYPO    Post-op Cognitive Dysfunction Daughter        Social History     Social History    Marital status: SINGLE     Spouse name: N/A    Number of children: N/A    Years of education: N/A     Occupational History    Not on file. Social History Main Topics    Smoking status: Never Smoker    Smokeless tobacco: Never Used    Alcohol use 0.0 - 0.6 oz/week     0 - 1 Standard drinks or equivalent per week      Comment: 2 drinks per month    Drug use: No    Sexual activity: No     Other Topics Concern    Not on file     Social History Narrative         ALLERGIES: Adhesive tape-silicones; Lidoderm [lidocaine]; and Other medication    Review of Systems   Constitutional: Positive for diaphoresis. Negative for fever. HENT: Negative for facial swelling.     Eyes: Negative for visual disturbance. Respiratory: Negative for chest tightness. Cardiovascular: Positive for chest pain. Gastrointestinal: Negative for abdominal pain. Genitourinary: Negative for dysuria. Musculoskeletal: Negative for arthralgias. Skin: Negative for rash. Neurological: Negative for dizziness. Hematological: Negative for adenopathy. Psychiatric/Behavioral: Negative for suicidal ideas. All other systems reviewed and are negative. Vitals:    01/10/18 1232   BP: 147/76   Pulse: (!) 113   Resp: 18   Temp: 98.3 °F (36.8 °C)   SpO2: 100%   Weight: 96.2 kg (212 lb)   Height: 5' 5\" (1.651 m)            Physical Exam   Constitutional: She is oriented to person, place, and time. She appears well-developed and well-nourished. No distress. HENT:   Head: Normocephalic and atraumatic. Mouth/Throat: Oropharynx is clear and moist.   Eyes: Pupils are equal, round, and reactive to light. No scleral icterus. Neck: Normal range of motion. Neck supple. No thyromegaly present. Cardiovascular: Normal rate, regular rhythm, normal heart sounds and intact distal pulses. No murmur heard. Pulmonary/Chest: Effort normal and breath sounds normal. No respiratory distress. Abdominal: Soft. Bowel sounds are normal. She exhibits no distension. There is no tenderness. Musculoskeletal: Normal range of motion. She exhibits no edema. Neurological: She is alert and oriented to person, place, and time. Skin: Skin is warm and dry. No rash noted. She is not diaphoretic. Nursing note and vitals reviewed. Note written by Liliane Rodas. Brian Nagy, as dictated by Scar Skaggs MD 1:17 PM       MDM  Number of Diagnoses or Management Options  Chest pain, unspecified type:   Diagnosis management comments: A:  58yo F with CP that lasted about 15 minutes. No sig pain at this time. VS stable after initial slight tachycardia. Pt has had similar pain in past usually relieved after taking TUMS.   No h/o cardiac disease. Had normal stress test in 4/2017 per patient. Symptoms somewhat atypical for ACS. P:  ecg  cxr  Labs  Had ASA x4 by EMS    ED Course       Procedures    ED EKG interpretation:  Rhythm: normal sinus rhythm. Rate (approx.): 71. Axis: normal.  ST segment:  No concerning ST elevations or depressions. This EKG was interpreted by Conrado Banks MD,ED Provider. Labs unremarkable. Chest X-ray, 2 view:  Chest xray, PA and Lat, shows no signs of acute disease. This CXR was interpreted by Conrado Banks MD, ED Provider. 3:16 PM  Patient resting comfortably with no complaints at this time. VS remain stable. Repeat physical exam is unremarkable. Pt ambulatory without difficulty and tolerating po's well. Pain free. Suspect symptoms more likely GI related. Will advise pt to start daily pepcid. F/u with PCP. Return to ED if symptoms worsen.

## 2018-01-10 NOTE — DISCHARGE INSTRUCTIONS
Chest Pain: Care Instructions  Your Care Instructions    There are many things that can cause chest pain. Some are not serious and will get better on their own in a few days. But some kinds of chest pain need more testing and treatment. Your doctor may have recommended a follow-up visit in the next 8 to 12 hours. If you are not getting better, you may need more tests or treatment. Even though your doctor has released you, you still need to watch for any problems. The doctor carefully checked you, but sometimes problems can develop later. If you have new symptoms or if your symptoms do not get better, get medical care right away. If you have worse or different chest pain or pressure that lasts more than 5 minutes or you passed out (lost consciousness), call 911 or seek other emergency help right away. A medical visit is only one step in your treatment. Even if you feel better, you still need to do what your doctor recommends, such as going to all suggested follow-up appointments and taking medicines exactly as directed. This will help you recover and help prevent future problems. How can you care for yourself at home? · Rest until you feel better. · Take your medicine exactly as prescribed. Call your doctor if you think you are having a problem with your medicine. · Do not drive after taking a prescription pain medicine. When should you call for help? Call 911 if:  ? · You passed out (lost consciousness). ? · You have severe difficulty breathing. ? · You have symptoms of a heart attack. These may include:  ¨ Chest pain or pressure, or a strange feeling in your chest.  ¨ Sweating. ¨ Shortness of breath. ¨ Nausea or vomiting. ¨ Pain, pressure, or a strange feeling in your back, neck, jaw, or upper belly or in one or both shoulders or arms. ¨ Lightheadedness or sudden weakness. ¨ A fast or irregular heartbeat.   After you call 911, the  may tell you to chew 1 adult-strength or 2 to 4 low-dose aspirin. Wait for an ambulance. Do not try to drive yourself. ?Call your doctor today if:  ? · You have any trouble breathing. ? · Your chest pain gets worse. ? · You are dizzy or lightheaded, or you feel like you may faint. ? · You are not getting better as expected. ? · You are having new or different chest pain. Where can you learn more? Go to http://leandra-henrique.info/. Enter A120 in the search box to learn more about \"Chest Pain: Care Instructions. \"  Current as of: March 20, 2017  Content Version: 11.4  © 9488-0799 [employer]. Care instructions adapted under license by [employer] (which disclaims liability or warranty for this information). If you have questions about a medical condition or this instruction, always ask your healthcare professional. [place] [address] any warranty or liability for your use of this information. We hope that we have addressed all of your medical concerns. The examination and treatment you received in the Emergency Department were for an emergent problem and were not intended as complete care. It is important that you follow up with your healthcare provider(s) for ongoing care. If your symptoms worsen or do not improve as expected, and you are unable to reach your usual health care provider(s), you should return to the Emergency Department. Today's healthcare is undergoing tremendous change, and patient satisfaction surveys are one of the many tools to assess the quality of medical care. You may receive a survey from the [employer] organization regarding your experience in the Emergency Department. I hope that your experience has been completely positive, particularly the medical care that I provided. As such, please participate in the survey; anything less than excellent does not meet my expectations or intentions.         [facility] participate in nationally recognized quality of care measures. If your blood pressure is greater than 120/80, as reported below, we urge that you seek medical care to address the potential of high blood pressure, commonly known as hypertension. Hypertension can be hereditary or can be caused by certain medical conditions, pain, stress, or \"white coat syndrome. \"       Please make an appointment with your health care provider(s) for follow up of your Emergency Department visit. VITALS:   Patient Vitals for the past 8 hrs:   Temp Pulse Resp BP SpO2   01/10/18 1232 98.3 °F (36.8 °C) (!) 113 18 147/76 100 %          Thank you for allowing us to provide you with medical care today. We realize that you have many choices for your emergency care needs. Please choose us in the future for any continued health care needs. Max Najera MD    [facility], [facility].   Office: [phone]            Recent Results (from the past 24 hour(s))   CBC WITH AUTOMATED DIFF    Collection Time: 01/10/18 12:36 PM   Result Value Ref Range    WBC 4.2 3.6 - 11.0 K/uL    RBC 3.96 3.80 - 5.20 M/uL    HGB 11.9 11.5 - 16.0 g/dL    HCT 37.6 35.0 - 47.0 %    MCV 94.9 80.0 - 99.0 FL    MCH 30.1 26.0 - 34.0 PG    MCHC 31.6 30.0 - 36.5 g/dL    RDW 13.5 11.5 - 14.5 %    PLATELET 824 796 - 193 K/uL    NEUTROPHILS 41 32 - 75 %    LYMPHOCYTES 48 12 - 49 %    MONOCYTES 6 5 - 13 %    EOSINOPHILS 4 0 - 7 %    BASOPHILS 1 0 - 1 %    ABS. NEUTROPHILS 1.7 (L) 1.8 - 8.0 K/UL    ABS. LYMPHOCYTES 2.0 0.8 - 3.5 K/UL    ABS. MONOCYTES 0.3 0.0 - 1.0 K/UL    ABS. EOSINOPHILS 0.2 0.0 - 0.4 K/UL    ABS.  BASOPHILS 0.0 0.0 - 0.1 K/UL   METABOLIC PANEL, COMPREHENSIVE    Collection Time: 01/10/18 12:36 PM   Result Value Ref Range    Sodium 141 136 - 145 mmol/L    Potassium 3.5 3.5 - 5.1 mmol/L    Chloride 105 97 - 108 mmol/L    CO2 31 21 - 32 mmol/L    Anion gap 5 5 - 15 mmol/L    Glucose 86 65 - 100 mg/dL    BUN 19 6 - 20 MG/DL    Creatinine 0.89 0.55 - 1.02 MG/DL    BUN/Creatinine ratio 21 (H) 12 - 20      GFR est AA >60 >60 ml/min/1.73m2    GFR est non-AA >60 >60 ml/min/1.73m2    Calcium 8.7 8.5 - 10.1 MG/DL    Bilirubin, total 0.7 0.2 - 1.0 MG/DL    ALT (SGPT) 22 12 - 78 U/L    AST (SGOT) 15 15 - 37 U/L    Alk. phosphatase 83 45 - 117 U/L    Protein, total 6.7 6.4 - 8.2 g/dL    Albumin 3.8 3.5 - 5.0 g/dL    Globulin 2.9 2.0 - 4.0 g/dL    A-G Ratio 1.3 1.1 - 2.2     TROPONIN I    Collection Time: 01/10/18 12:36 PM   Result Value Ref Range    Troponin-I, Qt. <0.04 <0.05 ng/mL       Xr Chest Pa Lat    Result Date: 1/10/2018  EXAM:  XR CHEST PA LAT INDICATION:   Chest pain COMPARISON: July 9, 2014. FINDINGS: PA and lateral radiographs of the chest demonstrate clear lungs and pleural margins. Cardiac size remains normal. Mild thoracic aortic ectasia and tortuosity is again shown, with otherwise normal mediastinal and hilar contour. Moderate thoracic spine degenerative changes are again noted as is a mild dextroconvex mid-lower thoracic curve. IMPRESSION: No acute findings.

## 2018-01-11 ENCOUNTER — PATIENT OUTREACH (OUTPATIENT)
Dept: OTHER | Age: 63
End: 2018-01-11

## 2018-01-11 NOTE — PROGRESS NOTES
Initial ALESSIO:   Patient on report as discharged from [facility] ED Visit 1/10/18 for   Chest Pain. Initial attempt to contact patient for transitions of care.  Left discreet message on voicemail with this Care Coordinator's contact information.  Will inform CM was unable to reach. Chest pain, unspecified type:   Diagnosis management comments: A:  60yo F with CP that lasted about 15 minutes. No sig pain at this time. VS stable after initial slight tachycardia. Pt has had similar pain in past usually relieved after taking TUMS. No h/o cardiac disease. Had normal stress test in 4/2017 per patient. Symptoms somewhat atypical for ACS. ED EKG interpretation:  Rhythm: normal sinus rhythm. Rate (approx.): 71  Chest X-ray, 2 view:  Chest xray, PA and Lat, shows no signs of acute disease. Suspect symptoms more likely GI related. Will advise pt to start daily pepcid. F/u with PCP.

## 2018-01-12 ENCOUNTER — HOSPITAL ENCOUNTER (OUTPATIENT)
Dept: PHYSICAL THERAPY | Age: 63
Discharge: HOME | End: 2018-01-12
Payer: COMMERCIAL

## 2018-01-12 PROCEDURE — 97110 THERAPEUTIC EXERCISES: CPT | Performed by: PHYSICAL THERAPIST

## 2018-01-12 NOTE — PROGRESS NOTES
PT DAILY TREATMENT NOTE 2-15    Patient Name: Mio Layne  Date:2018  : 1955  [x]  Patient  Verified  Payor: Padmini Amaro / Plan: Ermelinda Eliseo / Product Type: PPO /    In time: 9:30 AM  Out time: 10:45 AM  Total Treatment Time (min): 75 (65 timed)  Visit #: 2     Treatment Area: Left knee pain [M25.562]  Right knee pain [M25.561]    SUBJECTIVE  Pain Level (0-10 scale): 6/10  Any medication changes, allergies to medications, adverse drug reactions, diagnosis change, or new procedure performed?: [x] No    [] Yes (see summary sheet for update)  Subjective functional status/changes:   [] No changes reported  Pain mostly in L shoulder. \"its better though, not as bad as it was\" she states compliance with HEP. Pt went to emergency room on Tuesday-- \"I was at work and no feeling well, I had this terrible chest pain and broke out in a sweat. I thought I was having a stroke! My coworkers told me to go down to the ER-- they did some tests and said a tightening of my esophagus. I am going to to follow up with some more doctors. \" She has had heart problems in the past    OBJECTIVE    Modality rationale: decrease inflammation and decrease pain to improve the patients ability to perform work duties   Min Type Additional Details    [] Estim: []Att   []Unatt        []TENS instruct                  []IFC  []Premod   []NMES                     []Other:  []w/US   []w/ice   []w/heat  Position:  Location:    []  Traction: [] Cervical       []Lumbar                       [] Prone          []Supine                       []Intermittent   []Continuous Lbs:  [] before manual  [] after manual  []w/heat    []  Ultrasound: []Continuous   [] Pulsed at:                            []1MHz   []3MHz Location:  W/cm2:    []  Paraffin         Location:  []w/heat   10 [x]  Ice     []  Heat  []  Ice massage Position: supine, LE's elevated  Location: bilat knees, L shoulder    []  Laser  []  Other: Position:  Location:    []  Vasopneumatic Device Pressure:       [] lo [] med [] hi   Temperature:    [x] Skin assessment post-treatment:  [x]intact []redness- no adverse reaction    []redness  adverse reaction:     60 min Therapeutic Exercise:  [x] See flow sheet :   Rationale: increase ROM and increase strength to improve the patients ability to don/doff clothing, reach into cabinets      5 min Manual Therapy:  PROM L shoulder all planes  Rhythmic stabilization at 90   Rationale: decrease pain, increase ROM and increase tissue extensibility  to improve the patients ability to perform daily activities          With   [] TE   [] TA   [] neuro   [] other: Patient Education: [x] Review HEP    [] Progressed/Changed HEP based on:   [] positioning   [] body mechanics   [] transfers   [] heat/ice application    [] other:      Other Objective/Functional Measures:   n/a     Pain Level (0-10 scale) post treatment: 4/10    ASSESSMENT/Changes in Function:   Cues for proper scapular stabilization during sidelying ER. PROM L shoulder WNL all planes, minimal to no pain. Tolerated therapy session well. Patient will continue to benefit from skilled PT services to modify and progress therapeutic interventions, address functional mobility deficits, address ROM deficits, address strength deficits, analyze and address soft tissue restrictions, analyze and cue movement patterns, analyze and modify body mechanics/ergonomics and assess and modify postural abnormalities to attain remaining goals.      []  See Plan of Care  []  See progress note/recertification  []  See Discharge Summary         Progress towards goals / Updated goals:  nt    PLAN  []  Upgrade activities as tolerated     []  Continue plan of care  []  Update interventions per flow sheet       []  Discharge due to:_  []  Other:_      Titus Austin PT 1/12/2018  9:44 AM

## 2018-01-15 ENCOUNTER — PATIENT OUTREACH (OUTPATIENT)
Dept: OTHER | Age: 63
End: 2018-01-15

## 2018-01-15 ENCOUNTER — APPOINTMENT (OUTPATIENT)
Dept: PHYSICAL THERAPY | Age: 63
End: 2018-01-15
Payer: COMMERCIAL

## 2018-01-15 NOTE — PROGRESS NOTES
LAESSIO 2nd call:      Patient on report as with discharge from ED visit 01/10 for atypical CP. Second attempt to contact patient for transitions of care. Left discreet message on voicemail with this CM contact information. Unable to reach letter sent via My Chart/  Mail  Will follow for one month for transitions of care needs.

## 2018-01-15 NOTE — LETTER
1/15/2018 12:32 PM 
 
Ms[address] [phone] Dear MsCharlotte Rishabh Espitia My name is Ratna Rubio , Employee Care Manager for [employer], and I have been trying to reach you. The Employee Care  is a free-of-charge, confidential service provided to our employees and their family members covered by the [employer]. Part of my job is to follow up with members who have recently been in the hospital or emergency room, to help them coordinate their care and answer questions they may have about their visit. I am able to provide assistance with medication questions, scheduling needed follow-up appointments, and arranging services like home health or home medical equipment. I can also provide education regarding your hospital or ER visit as well as your medical conditions. As healthcare providers, we know that patients do better when they have close follow up with a primary care provider (PCP), especially after a hospital or emergency department visit. If you do not have a PCP, I can help you find one that is convenient to you and covered by your insurance. I can also help you understand any after visit instructions, such as what symptoms to watch out for, or any new or changed medications. Remember that you can access your After Visit Summary by logging into your [employer] account. If you do not have a [employer] account, I can help you request access. Our program is designed to provide you with the opportunity to have a [employer] care manager partner with you for your healthcare needs. Please contact me at the below number if I can provide you with assistance for any of the above services.  
 
 
Sincerely, 
 
 
 
ED Beaulieu RN, [place] [address], [address] [address].  
Phone:  [phone]     Fax:  [phone]    [email or web address]

## 2018-01-16 ENCOUNTER — HOSPITAL ENCOUNTER (OUTPATIENT)
Dept: PHYSICAL THERAPY | Age: 63
Discharge: HOME | End: 2018-01-16
Payer: COMMERCIAL

## 2018-01-16 PROCEDURE — 97110 THERAPEUTIC EXERCISES: CPT | Performed by: PHYSICAL THERAPIST

## 2018-01-16 NOTE — PROGRESS NOTES
PT DAILY TREATMENT NOTE 2-15    Patient Name: Jaison Kirkland  Date:2018  : 1955  [x]  Patient  Verified  Payor: Av Rodas / Plan: Lakshmi Castano / Product Type: PPO /    In time: 7:40 AM  Out time: 8:30 AM  Total Treatment Time (min): 50 (40 timed)  Visit #: 3    Treatment Area: Left knee pain [M25.562]  Right knee pain [M25.561]    SUBJECTIVE  Pain Level (0-10 scale): 6/10  Any medication changes, allergies to medications, adverse drug reactions, diagnosis change, or new procedure performed?: [x] No    [] Yes (see summary sheet for update)  Subjective functional status/changes:   [] No changes reported  She states pain mostly in R shoulder.  \"It's better than it was\"  She states work was fine after PT on Friday    Pt 10 min late for appt today    OBJECTIVE    Modality rationale: decrease inflammation and decrease pain to improve the patients ability to perform work duties   Min Type Additional Details    [] Estim: []Att   []Unatt        []TENS instruct                  []IFC  []Premod   []NMES                     []Other:  []w/US   []w/ice   []w/heat  Position:  Location:    []  Traction: [] Cervical       []Lumbar                       [] Prone          []Supine                       []Intermittent   []Continuous Lbs:  [] before manual  [] after manual  []w/heat    []  Ultrasound: []Continuous   [] Pulsed at:                            []1MHz   []3MHz Location:  W/cm2:    []  Paraffin         Location:  []w/heat   10 [x]  Ice     []  Heat  []  Ice massage Position: supine, LE's elevated  Location: bilat knees, L shoulder    []  Laser  []  Other: Position:  Location:    []  Vasopneumatic Device Pressure:       [] lo [] med [] hi   Temperature:    [x] Skin assessment post-treatment:  [x]intact []redness- no adverse reaction    []redness  adverse reaction:     40 min Therapeutic Exercise:  [x] See flow sheet :   Rationale: increase ROM and increase strength to improve the patients ability to don/doff clothing, reach into cabinets      Held min Manual Therapy:  PROM L shoulder all planes  Rhythmic stabilization at 90   Rationale: decrease pain, increase ROM and increase tissue extensibility  to improve the patients ability to perform daily activities          With   [] TE   [] TA   [] neuro   [] other: Patient Education: [x] Review HEP    [] Progressed/Changed HEP based on:   [] positioning   [] body mechanics   [] transfers   [] heat/ice application    [] other:      Other Objective/Functional Measures:   n/a     Pain Level (0-10 scale) post treatment: 6/10    ASSESSMENT/Changes in Function:   Held from LE exercises today due to pt late for PT appointment and needing to leave for work at 8:30 AM. Despite reports of high pain levels, pt able to complete majority of exercises. Held from serratus punches and ER walkouts due to reports of inc'd pain. Patient will continue to benefit from skilled PT services to modify and progress therapeutic interventions, address functional mobility deficits, address ROM deficits, address strength deficits, analyze and address soft tissue restrictions, analyze and cue movement patterns, analyze and modify body mechanics/ergonomics and assess and modify postural abnormalities to attain remaining goals.      []  See Plan of Care  []  See progress note/recertification  []  See Discharge Summary         Progress towards goals / Updated goals:  nt    PLAN  []  Upgrade activities as tolerated     []  Continue plan of care  []  Update interventions per flow sheet       []  Discharge due to:_  []  Other:_      Jacy Dubose, PT 1/16/2018  7:45 AM

## 2018-01-18 ENCOUNTER — APPOINTMENT (OUTPATIENT)
Dept: PHYSICAL THERAPY | Age: 63
End: 2018-01-18
Payer: COMMERCIAL

## 2018-01-23 ENCOUNTER — APPOINTMENT (OUTPATIENT)
Dept: PHYSICAL THERAPY | Age: 63
End: 2018-01-23
Payer: COMMERCIAL

## 2018-01-24 NOTE — ANCILLARY DISCHARGE INSTRUCTIONS
[facility]  [address]  [place], [address]  Phone: [phone]  Fax: [phone]    Discharge Summary  2-15    Patient name: Alyssa Rooney  : 1955  Provider#:[id number]  Referral source: Emilie Brown MD      Medical/Treatment Diagnosis: Left knee pain [M25.562]  Pain in right knee [M25.561]     Prior Hospitalization: see medical history     Comorbidities: see evaluation  Prior Level of Function:see evaluation  Medications: Verified on Patient Summary List    Start of Care: 17      Onset Date:see evaluation   Visits from Start of Care: 22     Missed Visits: see CC  Reporting Period : 17 to 10/4/17    Summary of care:   ROM:  R AROM= 0-121 deg  L AROM= 0-118 deg     Strength:  5/5 knee ext bilat  5/5 knee flex bilat     SLS on L: >10 sec  SLS on R: 10 sec                       FOTO= 83/100 (51 at last reassessment)     Progress towards goals  (R knee):   Short Term Goals:  1) Pt will be independent in initial HEP MET   2) Pt will report > or =25% decrease in exacerbation of symptoms MET  3) Pt will ambulate in clinic independently, without SPC, and normalized gait mechanics in order to return to exercise MET      Long Term Goals:  1) Pt will demonstrate 5/5 knee flexion and knee extension strength during MMT in order to return to exercise and perform work duties MET  2) Pt will improve R knee AROM flex to at least 120 deg in order to squat MET  3) Pt will perform SLS on R for at least 8 seconds without hip drop in order to demonstrate improvement in proprioception and balance to perform ADL's MET        Progress towards goals / Updated goals (L knee):  Short Term Goals:-  1) Pt will be independent in initial HEP MET  2) Pt will report > or =25% decrease in exacerbation of symptoms MET  3) Pt will report being able to sleep through night without being awoken from high pain levels  MET                        Long Term Goals:   1) Pt will increase L knee AROM from 0-120 deg in order to gain full ROM and perform ADL's. Not met (118 deg)  2) Pt will increase L knee flex/ext strength to 5/5 without pain in order to exercise  MET  3) Pt will ambulate in clinic independently with proper gait mechanics MET  4) Pt will increase FOTO score to at least 71/100 in order to demonstrate increase in functional mobility MET     ASSESSMENT  Pt has completed 22 skilled PT visits. Pt has met 100% of PT goals for R knee and 3/3 STG's, 3/4 LTG's for L knee. Pt demonstrates improvement in bilat knee AROM, LE strength, improvement in gait mechanics without AD, improved balance/proprioception, and improved activity tolerance. Pt to return to work part-time starting next week-- pt to f/u with PT only if necessary.  Pt compliant with HEP and ready for D/C.    RECOMMENDATIONS:  [x]Discontinue therapy: [x]Patient has reached or is progressing toward set goals      []Patient is non-compliant or has abdicated      []Due to lack of appreciable progress towards set [address] Brianna Amaya PT 1/24/2018 3:40 PM

## 2018-01-26 ENCOUNTER — HOSPITAL ENCOUNTER (OUTPATIENT)
Dept: PHYSICAL THERAPY | Age: 63
Discharge: HOME | End: 2018-01-26
Payer: COMMERCIAL

## 2018-01-26 PROCEDURE — 97110 THERAPEUTIC EXERCISES: CPT | Performed by: PHYSICAL THERAPIST

## 2018-01-26 NOTE — PROGRESS NOTES
PT DAILY TREATMENT NOTE 2-15    Patient Name: Jaison Kirkland  Date:2018  : 1955  [x]  Patient  Verified  Payor: Av Rodas / Plan: Lakshmi Castano / Product Type: PPO /    In time: 9:45 AM  Out time: 11:00 AM  Total Treatment Time (min): 75 (65 timed)  Visit #: 4    Treatment Area: Left knee pain [M25.562]  Right knee pain [M25.561]    SUBJECTIVE  Pain Level (0-10 scale): 5/10  Any medication changes, allergies to medications, adverse drug reactions, diagnosis change, or new procedure performed?: [x] No    [] Yes (see summary sheet for update)  Subjective functional status/changes:   [] No changes reported  \"I still have a lot of grinding in both knees\" She states her shoulder still bothers her when she reaching away from her body.  \"It's not a 10 anymore so it has gotten better, but it still hurts me\"    OBJECTIVE    Modality rationale: decrease inflammation and decrease pain to improve the patients ability to perform work duties   Min Type Additional Details    [] Estim: []Att   []Unatt        []TENS instruct                  []IFC  []Premod   []NMES                     []Other:  []w/US   []w/ice   []w/heat  Position:  Location:    []  Traction: [] Cervical       []Lumbar                       [] Prone          []Supine                       []Intermittent   []Continuous Lbs:  [] before manual  [] after manual  []w/heat    []  Ultrasound: []Continuous   [] Pulsed at:                            []1MHz   []3MHz Location:  W/cm2:    []  Paraffin         Location:  []w/heat   10 [x]  Ice     []  Heat  []  Ice massage Position: supine, LE's elevated  Location: bilat knees, L shoulder    []  Laser  []  Other: Position:  Location:    []  Vasopneumatic Device Pressure:       [] lo [] med [] hi   Temperature:    [x] Skin assessment post-treatment:  [x]intact []redness- no adverse reaction    []redness  adverse reaction:     65 min Therapeutic Exercise:  [x] See flow sheet :   Rationale: increase ROM and increase strength to improve the patients ability to don/doff clothing, reach into cabinets      Held min Manual Therapy:  PROM L shoulder all planes  Rhythmic stabilization at 90   Rationale: decrease pain, increase ROM and increase tissue extensibility  to improve the patients ability to perform daily activities          With   [] TE   [] TA   [] neuro   [] other: Patient Education: [x] Review HEP    [] Progressed/Changed HEP based on:   [] positioning   [] body mechanics   [] transfers   [] heat/ice application    [] other:      Other Objective/Functional Measures:   n/a     Pain Level (0-10 scale) post treatment: 4/10    ASSESSMENT/Changes in Function:   Reviewed appropriate exercises to perform at home for LE's. Pt able to perform AROM IR/ER with tband today. Difficulty progressing exercises due to high pain reports. Patient will continue to benefit from skilled PT services to modify and progress therapeutic interventions, address functional mobility deficits, address ROM deficits, address strength deficits, analyze and address soft tissue restrictions, analyze and cue movement patterns, analyze and modify body mechanics/ergonomics and assess and modify postural abnormalities to attain remaining goals.      []  See Plan of Care  []  See progress note/recertification  []  See Discharge Summary         Progress towards goals / Updated goals:  nt    PLAN  []  Upgrade activities as tolerated     []  Continue plan of care  []  Update interventions per flow sheet       []  Discharge due to:_  []  Other:_      Skylar Mays, PT 1/26/2018  10:05 AM

## 2018-01-30 ENCOUNTER — HOSPITAL ENCOUNTER (OUTPATIENT)
Dept: PHYSICAL THERAPY | Age: 63
Discharge: HOME | End: 2018-01-30
Payer: COMMERCIAL

## 2018-01-30 PROCEDURE — 97140 MANUAL THERAPY 1/> REGIONS: CPT | Performed by: PHYSICAL THERAPIST

## 2018-01-30 PROCEDURE — 97110 THERAPEUTIC EXERCISES: CPT | Performed by: PHYSICAL THERAPIST

## 2018-01-30 NOTE — PROGRESS NOTES
PT DAILY TREATMENT NOTE 2-15    Patient Name: Ryan Rosenthal  Date:2018  : 1955  [x]  Patient  Verified  Payor: Billie Prado / Plan: Kilo Glass / Product Type: PPO /    In time: 8:25 AM  Out time: 9:25 AM  Total Treatment Time (min): 60  Visit #: 5    Treatment Area: Left knee pain [M25.562]  Right knee pain [M25.561]    SUBJECTIVE  Pain Level (0-10 scale): 710  Any medication changes, allergies to medications, adverse drug reactions, diagnosis change, or new procedure performed?: [x] No    [] Yes (see summary sheet for update)  Subjective functional status/changes:   [] No changes reported  Patient arrived late today. Primary PT unable to see, so was seen in opening on my schedule. \"This shoulder is giving me a fit this morning. I think the spur is pressing up against something making it hurt down my arm. \"  States difficulty transferring out of low chair because of knee pain and weakness.     OBJECTIVE    Modality rationale: decrease inflammation and decrease pain to improve the patients ability to perform work duties   Min Type Additional Details    [] Estim: []Att   []Unatt        []TENS instruct                  []IFC  []Premod   []NMES                     []Other:  []w/US   []w/ice   []w/heat  Position:  Location:    []  Traction: [] Cervical       []Lumbar                       [] Prone          []Supine                       []Intermittent   []Continuous Lbs:  [] before manual  [] after manual  []w/heat    []  Ultrasound: []Continuous   [] Pulsed at:                            []1MHz   []3MHz Location:  W/cm2:    []  Paraffin         Location:  []w/heat   On own [x]  Ice     []  Heat  []  Ice massage Position: supine, LE's elevated  Location: bilat knees, L shoulder    []  Laser  []  Other: Position:  Location:    []  Vasopneumatic Device Pressure:       [] lo [] med [] hi   Temperature:    [x] Skin assessment post-treatment:  [x]intact []redness- no adverse reaction    []redness  adverse reaction:     45 min Therapeutic Exercise:  [x] See flow sheet :  Instructed in sit to stands with hip hinge to improve transfers   Rationale: increase ROM and increase strength to improve the patients ability to don/doff clothing, reach into cabinets      15 min Manual Therapy:    Inferior and posterior glide GH joint mobs   PROM L shoulder all planes  Rhythmic stabilization at 90   Rationale: decrease pain, increase ROM and increase tissue extensibility  to improve the patients ability to perform daily activities          With   [] TE   [] TA   [] neuro   [] other: Patient Education: [x] Review HEP    [] Progressed/Changed HEP based on:   [] positioning   [] body mechanics   [] transfers   [] heat/ice application    [] other:      Other Objective/Functional Measures:   n/a     Pain Level (0-10 scale) post treatment: 4/10    ASSESSMENT/Changes in Function:  Decreased shoulder pain with treatment. Required cues for proper exercise performance. Patient will continue to benefit from skilled PT services to modify and progress therapeutic interventions, address functional mobility deficits, address ROM deficits, address strength deficits, analyze and address soft tissue restrictions, analyze and cue movement patterns, analyze and modify body mechanics/ergonomics and assess and modify postural abnormalities to attain remaining goals.      []  See Plan of Care  []  See progress note/recertification  []  See Discharge Summary         Progress towards goals / Updated goals:  nt    PLAN  [x]  Upgrade activities as tolerated     [x]  Continue plan of care  []  Update interventions per flow sheet       []  Discharge due to:_  []  Other:_      Karan Carroll, PT 1/30/2018  8:31 AM

## 2018-02-02 ENCOUNTER — APPOINTMENT (OUTPATIENT)
Dept: PHYSICAL THERAPY | Age: 63
End: 2018-02-02
Payer: COMMERCIAL

## 2018-02-06 ENCOUNTER — APPOINTMENT (OUTPATIENT)
Dept: PHYSICAL THERAPY | Age: 63
End: 2018-02-06
Payer: COMMERCIAL

## 2018-02-08 ENCOUNTER — HOSPITAL ENCOUNTER (OUTPATIENT)
Dept: PHYSICAL THERAPY | Age: 63
Discharge: HOME | End: 2018-02-08
Payer: COMMERCIAL

## 2018-02-08 PROCEDURE — 97110 THERAPEUTIC EXERCISES: CPT | Performed by: PHYSICAL THERAPIST

## 2018-02-08 NOTE — PROGRESS NOTES
PT DAILY TREATMENT NOTE 2-15    Patient Name: Sierra Luciano  Date:2018  : 1955  [x]  Patient  Verified  Payor: Kailyn Broussard / Plan: Jerald Shaikh / Product Type: PPO /    In time: 9:35 AM  Out time:  10:25 AM  Total Treatment Time (min): 50  Visit #: 6    Treatment Area: Left knee pain [M25.562]  Right knee pain [M25.561]    SUBJECTIVE  Pain Level (0-10 scale): 7/10  Any medication changes, allergies to medications, adverse drug reactions, diagnosis change, or new procedure performed?: [x] No    [] Yes (see summary sheet for update)  Subjective functional status/changes:   [] No changes reported  Pt states \"that spur is really pressing on something. I had tears in my eyes yesterday while I was drawing blood at work\". She has not followed up with MD- \"I will stop by his office today and see if they can give me a cortisone injection. The last one didn't work but the one before did\"    \"The legs are better. They aren't buckling like they used to. They still are grinding when I stand up. I'm going up the stairs like a big girl! \"    OBJECTIVE    Modality rationale: decrease inflammation and decrease pain to improve the patients ability to perform work duties   Min Type Additional Details    [] Estim: []Att   []Unatt        []TENS instruct                  []IFC  []Premod   []NMES                     []Other:  []w/US   []w/ice   []w/heat  Position:  Location:    []  Traction: [] Cervical       []Lumbar                       [] Prone          []Supine                       []Intermittent   []Continuous Lbs:  [] before manual  [] after manual  []w/heat    []  Ultrasound: []Continuous   [] Pulsed at:                            []1MHz   []3MHz Location:  W/cm2:    []  Paraffin         Location:  []w/heat   On own [x]  Ice     []  Heat  []  Ice massage Position: supine, LE's elevated  Location: bilat knees, L shoulder    []  Laser  []  Other: Position:  Location:    []  Vasopneumatic Device Pressure:       [] lo [] med [] hi   Temperature:    [x] Skin assessment post-treatment:  [x]intact []redness- no adverse reaction    []redness  adverse reaction:     50 min Therapeutic Exercise:  [x] See flow sheet :  Progressed LE exercises   Rationale: increase ROM and increase strength to improve the patients ability to don/doff clothing, reach into cabinets      Held min Manual Therapy:    Inferior and posterior glide GH joint mobs   PROM L shoulder all planes  Rhythmic stabilization at 90   Rationale: decrease pain, increase ROM and increase tissue extensibility  to improve the patients ability to perform daily activities          With   [] TE   [] TA   [] neuro   [] other: Patient Education: [x] Review HEP    [] Progressed/Changed HEP based on:   [] positioning   [] body mechanics   [] transfers   [] heat/ice application    [] other:      Other Objective/Functional Measures:   n/a     Pain Level (0-10 scale) post treatment: 3-4/10    ASSESSMENT/Changes in Function:   Strongly encouraged f/u with MD for R shoulder pain due to minimal to no improvement after 1 month of PT. Focus on LE strengthening until receive MD recommendation for next steps in shoulder POC    Patient will continue to benefit from skilled PT services to modify and progress therapeutic interventions, address functional mobility deficits, address ROM deficits, address strength deficits, analyze and address soft tissue restrictions, analyze and cue movement patterns, analyze and modify body mechanics/ergonomics and assess and modify postural abnormalities to attain remaining goals.      []  See Plan of Care  []  See progress note/recertification  []  See Discharge Summary         Progress towards goals / Updated goals:  nt    PLAN  [x]  Upgrade activities as tolerated     [x]  Continue plan of care  []  Update interventions per flow sheet       []  Discharge due to:_  []  Other:_      Eloisa Rudd, PT 2/8/2018  10:03 AM

## 2018-02-12 ENCOUNTER — PATIENT OUTREACH (OUTPATIENT)
Dept: OTHER | Age: 63
End: 2018-02-12

## 2018-02-12 NOTE — LETTER
2/12/2018 2:59 PM 
 
Ms[address] [phone] Dear Ms. Veronica Benton, My name is Michael Ibarra , Employee Care Manager for [employer], and I have been trying to reach you. The Employee Care Management program is a free-of-charge, confidential service provided to our employees and their family members covered by the [employer]. Part of my job is to follow up with members who have recently been in the hospital or emergency room, to help them coordinate their care and answer questions they may have about their visit. As healthcare providers, we know that patients do better when they have close follow up with a primary care provider (PCP), especially after a hospital or emergency department visit. If you do not have a PCP, I can help you find one that is convenient to you and covered by your insurance. I can also help you understand any after visit instructions, such as what symptoms to watch out for, or any new or changed medications. Remember that you can access your After Visit Summary by logging into your [employer] account. If you do not have a [employer] account, I can help you request access. Our program is designed to provide you with the opportunity to have a [employer] care manager partner with you for your healthcare needs. [employer] now partners with [facility]. If you are a qualifying employee, you may receive an additional 10 wellness incentive points for every month of active participation with an Employee Care Manager. Please contact me at the below number if I can provide you with assistance for any of the above services.  
 
 
Sincerely, 
 
 
 
 
 
ED Carroll RN, [place] [address], [address] [address]  
Phone:  [phone]     Fax:  [phone]    [email or web address]

## 2018-02-12 NOTE — PROGRESS NOTES
ALESSIO  Wrap Up  Resolving current episode (Transitions of care complete). No further ED/UC or hospital admissions within 30 days post discharge. No contact with this patient to confirm any follow up appointment. None found in [facility] for PCP or cardilogy - PT sessions are attended. Final attempt to contact patient for transitions of care. Left discreet message on voicemail with this CM contact information. Three phone attempts and a letter sent encouraging contact with CM. Final letter will be sent via My Chart and mail to inform patient of CM services available. No outreach from patient to [address] [place] [address].

## 2018-02-13 ENCOUNTER — APPOINTMENT (OUTPATIENT)
Dept: PHYSICAL THERAPY | Age: 63
End: 2018-02-13
Payer: COMMERCIAL

## 2018-02-14 ENCOUNTER — HOSPITAL ENCOUNTER (OUTPATIENT)
Dept: MRI IMAGING | Age: 63
Discharge: HOME | End: 2018-02-14
Attending: ORTHOPAEDIC SURGERY
Payer: COMMERCIAL

## 2018-02-14 DIAGNOSIS — M25.511 RIGHT SHOULDER PAIN: ICD-10-CM

## 2018-02-14 PROCEDURE — 73221 MRI JOINT UPR EXTREM W/O DYE: CPT

## 2018-02-15 ENCOUNTER — APPOINTMENT (OUTPATIENT)
Dept: PHYSICAL THERAPY | Age: 63
End: 2018-02-15
Payer: COMMERCIAL

## 2018-02-16 ENCOUNTER — APPOINTMENT (OUTPATIENT)
Dept: PHYSICAL THERAPY | Age: 63
End: 2018-02-16
Payer: COMMERCIAL

## 2018-02-20 ENCOUNTER — APPOINTMENT (OUTPATIENT)
Dept: PHYSICAL THERAPY | Age: 63
End: 2018-02-20
Payer: COMMERCIAL

## 2018-02-23 ENCOUNTER — APPOINTMENT (OUTPATIENT)
Dept: PHYSICAL THERAPY | Age: 63
End: 2018-02-23
Payer: COMMERCIAL

## 2018-02-27 ENCOUNTER — APPOINTMENT (OUTPATIENT)
Dept: PHYSICAL THERAPY | Age: 63
End: 2018-02-27
Payer: COMMERCIAL

## 2018-03-02 ENCOUNTER — APPOINTMENT (OUTPATIENT)
Dept: PHYSICAL THERAPY | Age: 63
End: 2018-03-02

## 2018-03-02 ENCOUNTER — HOSPITAL ENCOUNTER (OUTPATIENT)
Dept: MAMMOGRAPHY | Age: 63
Discharge: HOME | End: 2018-03-02
Payer: COMMERCIAL

## 2018-03-02 DIAGNOSIS — Z12.31 VISIT FOR SCREENING MAMMOGRAM: ICD-10-CM

## 2018-03-02 PROCEDURE — 77067 SCR MAMMO BI INCL CAD: CPT

## 2018-03-16 ENCOUNTER — HOSPITAL ENCOUNTER (OUTPATIENT)
Dept: PREADMISSION TESTING | Age: 63
Discharge: HOME | End: 2018-03-16
Payer: COMMERCIAL

## 2018-03-16 VITALS
DIASTOLIC BLOOD PRESSURE: 80 MMHG | HEART RATE: 66 BPM | TEMPERATURE: 98.2 F | OXYGEN SATURATION: 98 % | SYSTOLIC BLOOD PRESSURE: 154 MMHG | HEIGHT: 65 IN | BODY MASS INDEX: 36.99 KG/M2 | WEIGHT: 222 LBS

## 2018-03-16 PROBLEM — M75.121 COMPLETE TEAR OF RIGHT ROTATOR CUFF: Status: ACTIVE | Noted: 2018-03-16

## 2018-03-16 LAB
CREAT SERPL-MCNC: 0.72 MG/DL (ref 0.55–1.02)
HGB BLD-MCNC: 12.8 G/DL (ref 11.5–16)
POTASSIUM SERPL-SCNC: 3.7 MMOL/L (ref 3.5–5.1)

## 2018-03-16 PROCEDURE — 84132 ASSAY OF SERUM POTASSIUM: CPT | Performed by: ORTHOPAEDIC SURGERY

## 2018-03-16 PROCEDURE — 85018 HEMOGLOBIN: CPT | Performed by: ORTHOPAEDIC SURGERY

## 2018-03-16 PROCEDURE — 82565 ASSAY OF CREATININE: CPT | Performed by: ORTHOPAEDIC SURGERY

## 2018-03-16 PROCEDURE — 36415 COLL VENOUS BLD VENIPUNCTURE: CPT | Performed by: ORTHOPAEDIC SURGERY

## 2018-03-16 RX ORDER — DICLOFENAC SODIUM 75 MG/1
TABLET, DELAYED RELEASE ORAL
COMMUNITY
End: 2018-04-11 | Stop reason: ALTCHOICE

## 2018-03-16 RX ORDER — CALCIUM CARBONATE 200(500)MG
1 TABLET,CHEWABLE ORAL AS NEEDED
COMMUNITY

## 2018-03-16 RX ORDER — TRAMADOL HYDROCHLORIDE AND ACETAMINOPHEN 37.5; 325 MG/1; MG/1
TABLET ORAL
COMMUNITY
End: 2018-03-22

## 2018-03-16 RX ORDER — PREDNISOLONE ACETATE 10 MG/ML
1 SUSPENSION/ DROPS OPHTHALMIC
COMMUNITY
End: 2021-07-01

## 2018-03-16 RX ORDER — ONDANSETRON 4 MG/1
4 TABLET, FILM COATED ORAL
COMMUNITY
End: 2019-05-31 | Stop reason: SDUPTHER

## 2018-03-16 RX ORDER — NIFEDIPINE 30 MG/1
60 TABLET, EXTENDED RELEASE ORAL DAILY
COMMUNITY
End: 2018-07-23 | Stop reason: DRUGHIGH

## 2018-03-16 NOTE — H&P
Chief Complaint: No chief complaint on file.     Robb Taylor comes in for follow-up of the right shoulder. She has previously tried cortisone injections which really did not provide any relief. She continues to have severe pain and crepitus with motion of the shoulder. Her pain continues to interfere with her daily activities. She comes in for review of MRI results.      Review of Systems   2/15/2018  Genitourinary: Frequent Urination: Negative  HEENT: Vision Loss: Negative  Neurological: Memory Loss: Negative  Integumentary: Rash: Negative  Cardiovascular: Palpatations: Negative  Hematologic: Bruises/Bleeds Easily: Negative  Gastrointestinal: Constipation: Negative  Immunological: Seasonal Allergies: Positive  Musculoskeletal: Joint Pain: Negative     Medical History        Past Medical History:   Diagnosis Date    Hypertension              Surgical History         Past Surgical History:   Procedure Laterality Date    GASTRIC BYPASS        JOINT REPLACEMENT        KNEE SURGERY                Objective:      There were no vitals filed for this visit.     Constitutional:  No acute distress. Well nourished. Well developed. Eyes:  Sclera are nonicteric. Respiratory:  No labored breathing. Cardiovascular:  No marked edema. Skin:  No marked skin ulcers. Neurological:  No marked sensory loss noted. Psychiatric: Alert and oriented x3. Musculoskeletal      On exam she has diffuse tenderness and marked crepitus in the region of the supraspinatus with mild weakness to external rotation. No neck pain. No numbness or paresthesias in the hands. No effusion. No sign of infection. No cellulitis or rash. I detect no motor or sensory deficits in the upper extremities. The neurovascular status of the upper extremities is intact.   Imaging/Studies:    PHD Shoulder Right Without Contrast ([id number])     Result Date: 2018  [facility] - MRI SHOULDER RT WO CONT Patient: Ata Cotter : 1955 Date of Service: 2/14/2018 1:35:39 PM Reason For Exam: Right shoulder pain Ordering Provider: Nicolás Salas Physician: Kylee Dubose Signing Date: 2/14/2018 1:44:51 PM INDICATION: Right shoulder pain for several months. No pain relief with steroid injection. Pain keeps her up at night. Affecting daily activity. COMPARISON: None TECHNIQUE: Axial proton density fat-saturated; oblique coronal T1, T2 fat-saturated, and proton density fat-saturated; and oblique sagittal T2 fat-saturated MRI of the right shoulder. CONTRAST: None. FINDINGS: A.C. joint: There are minimal degenerative changes of the acromioclavicular joint Anterior acromion process type: 2 Bone marrow: Within normal limits. No acute fracture, dislocation, or marrow replacing process. Joint fluid: There is fluid in the subacromial subdeltoid bursa and glenohumeral joint minimal synovitis within the axillary pouch Rotator cuff tendons: There is a large full-thickness tear of the supraspinatus. Torn fibers are thinned due to chronic tearing and delamination. There is a maximum of 2.6 cm of retraction. There is high-grade partial-thickness undersurface tearing of the anterior infraspinatus. There is tendinopathy with shallow partial-thickness undersurface tearing of the subscapularis. Teres minor is intact] of the Biceps tendon: There is a full-thickness tear of the biceps long head tendon with distal retraction. There is a small tendon slip within the biceps tendon sheath likely representing an accessory slip. Muscles: Minimal atrophy of the infraspinatus. No muscle edema is grade Glenoid labrum: Superior labrum is degenerated Joint capsule: within normal limits. Glenohumeral articular cartilage: Intact. No focal osteochondral lesion. Soft tissue mass: None. IMPRESSION: 1. Large full-thickness tear of the supraspinatus with 2.6 m retraction. Torn fibers are thinned due to chronic delamination 2.  High-grade partial-thickness undersurface tearing anterior infraspinatus and superior subscapularis 3. Torn biceps long head tendon with distal retraction         Assessment:      1. Complete tear of right rotator cuff    2. Biceps tendon tear             Plan:   I reviewed the findings of the MRI of the patient revealing a large full-thickness tear of the supraspinatus with retraction and high-grade partial thickness tearing of the infraspinatus and subscapularis. She also has a long head biceps tendon tear with retraction. I explained the pathophysiology. We discussed treatment options. She has failed conservative treatment including cortisone injections which provided no relief. She has a large full-thickness tear and the only option that will provide any significant long-term relief is right shoulder arthroscopy with mini open rotator cuff repair. I explained the procedure of arthroscopy and mini open rotator cuff repair. I explained the hospitalization, post-op and rehabilitation for the procedure. The patient is aware of all complications associated with the surgery, including the chance of infection and recurrent tear. She understands that she would be on antibiotics following surgery to prevent infection and that she would undergo a course of post-op physical therapy for rehabilitation. She understands the period of immobilization following rotator cuff repair and the extent of physical therapy following the operation to regain motion.      Following discussion she would like to proceed with the surgery. She understands the importance of physical therapy post-operatively and all potential complications associated with the surgery    PROCEDURE: Right shoulder arthroscopy, subacromial decompression and mini-open rotator cuff repair. Date of Surgery Update:  Chepe Broderick was seen and examined.   Past Medical History:   Diagnosis Date    Arrhythmia     \"PALPATATIONS, FLUTTERING, MEDS PER DR. ZAYAS\"    Arthritis     knees, back, SHOULDERS, R HIP    Chest pain 03/16/2018 PT STATES SHE'S BEEN ASSESSED FOR AND DETERMINED TO BE ESOPHAGEAL SPASMS PER FREYA LUNA AND DR.MCGROARTY ODILON    Chronic pain     LOWER BACK, R HIP    Duodenal ulcer 2002    GERD (gastroesophageal reflux disease)     H/O blood clots 2007    TO RIGHT EYE    Hypertension     Iritis 2007    both eyes    Nausea & vomiting     Other ill-defined conditions(429.89)     back pain/spasm    PONV (postoperative nausea and vomiting)     Thyroid disease     hypothroid    Ulcerative colitis 2002     Prior to Admission Medications   Prescriptions Last Dose Informant Patient Reported? Taking? Cholestyramine-Aspartame (CHOLESTYRAMINE LIGHT) 4 gram powder Not Taking at Unknown time  Yes No   Sig: Take  by mouth three (3) times daily (with meals). NIFEdipine ER (PROCARDIA XL) 30 mg ER tablet Not Taking at Unknown time  Yes No   Sig: Take  by mouth daily. Omeprazole delayed release (PRILOSEC D/R) 20 mg tablet 3/21/2018 at Unknown time  Yes Yes   Sig: Take 20 mg by mouth daily. daily before breakfast   calcium carbonate (TUMS) 200 mg calcium (500 mg) chew 3/15/2018 at Unknown time  Yes Yes   Sig: Take 1 Tab by mouth as needed. chlorthalidone (HYGROTEN) 25 mg tablet 3/20/2018  No No   Sig: Take 1 Tab by mouth daily. diclofenac EC (VOLTAREN) 75 mg EC tablet 3/15/2018 at Unknown time  Yes Yes   Sig: Take  by mouth daily as needed. MAY TAKE 1-2 TABS   docusate sodium (COLACE) 100 mg capsule 2/22/2018 at Unknown time  Yes Yes   Sig: Take 100 mg by mouth two (2) times daily as needed for Constipation. 1-2 tabs PRN constipation   hydrocortisone (PROCTOZONE-HC) 2.5 % rectal cream Not Taking at Unknown time  Yes No   Sig: Insert 1 g into rectum once as needed. ketorolac (ACULAR) 0.5 % ophthalmic solution 3/21/2018 at Unknown time  Yes Yes   Sig: Administer 2 Drops to both eyes four (4) times daily as needed.    levothyroxine (SYNTHROID) 112 mcg tablet 3/21/2018 at Unknown time  No Yes   Sig: Take 1 Tab by mouth Daily (before breakfast). ondansetron hcl (ZOFRAN) 4 mg tablet 3/20/2018  Yes No   Sig: Take 4 mg by mouth every eight (8) hours as needed for Nausea. prednisoLONE acetate (PRED FORTE) 1 % ophthalmic suspension 3/21/2018 at Unknown time  Yes Yes   Sig: Administer 1 Drop to both eyes four (4) times daily. traMADol-acetaminophen (ULTRACET) 37.5-325 mg per tablet 3/21/2018 at Unknown time  Yes Yes   Sig: Take  by mouth every six (6) hours as needed for Pain.   valsartan (DIOVAN) 160 mg tablet 3/21/2018 at am  No Yes   Sig: TAKE ONE TABLET BY MOUTH EVERY DAY      Facility-Administered Medications: None      Allergy to: Adhesive tape-silicones; Lidoderm [lidocaine]; and Other medication  Physical Examination: General appearance - alert, well appearing, and in no distress  History and physical has been reviewed. The patient has been examined.  There have been no significant clinical changes since the completion of the originally dated History and Physical.    Signed By: Bart Lira PA-C     March 22, 2018 7:29 AM

## 2018-03-21 ENCOUNTER — ANESTHESIA EVENT (OUTPATIENT)
Dept: SURGERY | Age: 63
End: 2018-03-21
Payer: COMMERCIAL

## 2018-03-21 RX ORDER — MORPHINE SULFATE 10 MG/ML
2 INJECTION, SOLUTION INTRAMUSCULAR; INTRAVENOUS
Status: CANCELLED | OUTPATIENT
Start: 2018-03-21

## 2018-03-21 RX ORDER — SODIUM CHLORIDE, SODIUM LACTATE, POTASSIUM CHLORIDE, CALCIUM CHLORIDE 600; 310; 30; 20 MG/100ML; MG/100ML; MG/100ML; MG/100ML
1000 INJECTION, SOLUTION INTRAVENOUS CONTINUOUS
Status: CANCELLED | OUTPATIENT
Start: 2018-03-21

## 2018-03-21 RX ORDER — EPHEDRINE SULFATE 50 MG/ML
5 INJECTION, SOLUTION INTRAVENOUS AS NEEDED
Status: CANCELLED | OUTPATIENT
Start: 2018-03-21

## 2018-03-21 RX ORDER — ALBUTEROL SULFATE 0.83 MG/ML
2.5 SOLUTION RESPIRATORY (INHALATION) AS NEEDED
Status: CANCELLED | OUTPATIENT
Start: 2018-03-21

## 2018-03-21 RX ORDER — DIPHENHYDRAMINE HYDROCHLORIDE 50 MG/ML
12.5 INJECTION, SOLUTION INTRAMUSCULAR; INTRAVENOUS AS NEEDED
Status: CANCELLED | OUTPATIENT
Start: 2018-03-21 | End: 2018-03-21

## 2018-03-21 RX ORDER — HYDROCODONE BITARTRATE AND ACETAMINOPHEN 5; 325 MG/1; MG/1
1 TABLET ORAL AS NEEDED
Status: CANCELLED | OUTPATIENT
Start: 2018-03-21

## 2018-03-21 RX ORDER — SODIUM CHLORIDE 9 MG/ML
25 INJECTION, SOLUTION INTRAVENOUS CONTINUOUS
Status: CANCELLED | OUTPATIENT
Start: 2018-03-21

## 2018-03-21 RX ORDER — ONDANSETRON 2 MG/ML
4 INJECTION INTRAMUSCULAR; INTRAVENOUS AS NEEDED
Status: CANCELLED | OUTPATIENT
Start: 2018-03-21

## 2018-03-21 RX ORDER — MIDAZOLAM HYDROCHLORIDE 1 MG/ML
0.5 INJECTION, SOLUTION INTRAMUSCULAR; INTRAVENOUS
Status: CANCELLED | OUTPATIENT
Start: 2018-03-21

## 2018-03-21 RX ORDER — FENTANYL CITRATE 50 UG/ML
25 INJECTION, SOLUTION INTRAMUSCULAR; INTRAVENOUS
Status: CANCELLED | OUTPATIENT
Start: 2018-03-21

## 2018-03-22 ENCOUNTER — ANESTHESIA (OUTPATIENT)
Dept: SURGERY | Age: 63
End: 2018-03-22
Payer: COMMERCIAL

## 2018-03-22 ENCOUNTER — HOSPITAL ENCOUNTER (OUTPATIENT)
Age: 63
Setting detail: OUTPATIENT SURGERY
Discharge: HOME | End: 2018-03-22
Attending: ORTHOPAEDIC SURGERY | Admitting: ORTHOPAEDIC SURGERY
Payer: COMMERCIAL

## 2018-03-22 VITALS
OXYGEN SATURATION: 98 % | RESPIRATION RATE: 14 BRPM | DIASTOLIC BLOOD PRESSURE: 79 MMHG | HEIGHT: 65 IN | TEMPERATURE: 97.5 F | SYSTOLIC BLOOD PRESSURE: 143 MMHG | HEART RATE: 67 BPM | BODY MASS INDEX: 36.99 KG/M2 | WEIGHT: 222 LBS

## 2018-03-22 DIAGNOSIS — M75.121 COMPLETE TEAR OF RIGHT ROTATOR CUFF: Primary | ICD-10-CM

## 2018-03-22 PROCEDURE — 77030002986 HC SUT PROL J&J -A: Performed by: ORTHOPAEDIC SURGERY

## 2018-03-22 PROCEDURE — 76210000021 HC REC RM PH II 0.5 TO 1 HR: Performed by: ORTHOPAEDIC SURGERY

## 2018-03-22 PROCEDURE — 77030002919 HC SUT FBRTAPE ARTH -B: Performed by: ORTHOPAEDIC SURGERY

## 2018-03-22 PROCEDURE — 74011250636 HC RX REV CODE- 250/636: Performed by: ORTHOPAEDIC SURGERY

## 2018-03-22 PROCEDURE — 77030008753 HC TU IRR IN/OUT FLO S&N -B: Performed by: ORTHOPAEDIC SURGERY

## 2018-03-22 PROCEDURE — 76010000131 HC OR TIME 2 TO 2.5 HR: Performed by: ORTHOPAEDIC SURGERY

## 2018-03-22 PROCEDURE — 77030003598 HC NDL MULT/FIRE ARTH -C: Performed by: ORTHOPAEDIC SURGERY

## 2018-03-22 PROCEDURE — 77030013079 HC BLNKT BAIR HGGR 3M -A: Performed by: ANESTHESIOLOGY

## 2018-03-22 PROCEDURE — 77030026438 HC STYL ET INTUB CARD -A: Performed by: ANESTHESIOLOGY

## 2018-03-22 PROCEDURE — 74011250637 HC RX REV CODE- 250/637

## 2018-03-22 PROCEDURE — 77030006884 HC BLD SHV INCIS S&N -B: Performed by: ORTHOPAEDIC SURGERY

## 2018-03-22 PROCEDURE — 77030020782 HC GWN BAIR PAWS FLX 3M -B

## 2018-03-22 PROCEDURE — 74011250636 HC RX REV CODE- 250/636

## 2018-03-22 PROCEDURE — 77030003601 HC NDL NRV BLK BBMI -A

## 2018-03-22 PROCEDURE — 76060000035 HC ANESTHESIA 2 TO 2.5 HR: Performed by: ORTHOPAEDIC SURGERY

## 2018-03-22 PROCEDURE — 74011250636 HC RX REV CODE- 250/636: Performed by: PHYSICIAN ASSISTANT

## 2018-03-22 PROCEDURE — C1713 ANCHOR/SCREW BN/BN,TIS/BN: HCPCS | Performed by: ORTHOPAEDIC SURGERY

## 2018-03-22 PROCEDURE — 77030008684 HC TU ET CUF COVD -B: Performed by: ANESTHESIOLOGY

## 2018-03-22 PROCEDURE — 77030018835 HC SOL IRR LR ICUM -A: Performed by: ORTHOPAEDIC SURGERY

## 2018-03-22 PROCEDURE — 76210000016 HC OR PH I REC 1 TO 1.5 HR: Performed by: ORTHOPAEDIC SURGERY

## 2018-03-22 PROCEDURE — 74011000250 HC RX REV CODE- 250

## 2018-03-22 PROCEDURE — 77030032490 HC SLV COMPR SCD KNE COVD -B: Performed by: ORTHOPAEDIC SURGERY

## 2018-03-22 PROCEDURE — 74011250636 HC RX REV CODE- 250/636: Performed by: ANESTHESIOLOGY

## 2018-03-22 PROCEDURE — 77030004451 HC BUR SHV S&N -B: Performed by: ORTHOPAEDIC SURGERY

## 2018-03-22 PROCEDURE — 77030031139 HC SUT VCRL2 J&J -A: Performed by: ORTHOPAEDIC SURGERY

## 2018-03-22 DEVICE — ANCHOR SUTURE BIOCOMP 4.75X19.1 MM SWIVELOCK C: Type: IMPLANTABLE DEVICE | Site: SHOULDER | Status: FUNCTIONAL

## 2018-03-22 RX ORDER — ROPIVACAINE HYDROCHLORIDE 5 MG/ML
30 INJECTION, SOLUTION EPIDURAL; INFILTRATION; PERINEURAL AS NEEDED
Status: DISCONTINUED | OUTPATIENT
Start: 2018-03-22 | End: 2018-03-22 | Stop reason: HOSPADM

## 2018-03-22 RX ORDER — SODIUM CHLORIDE, SODIUM LACTATE, POTASSIUM CHLORIDE, CALCIUM CHLORIDE 600; 310; 30; 20 MG/100ML; MG/100ML; MG/100ML; MG/100ML
INJECTION, SOLUTION INTRAVENOUS
Status: DISCONTINUED | OUTPATIENT
Start: 2018-03-22 | End: 2018-03-22 | Stop reason: HOSPADM

## 2018-03-22 RX ORDER — PROPOFOL 10 MG/ML
INJECTION, EMULSION INTRAVENOUS AS NEEDED
Status: DISCONTINUED | OUTPATIENT
Start: 2018-03-22 | End: 2018-03-22 | Stop reason: HOSPADM

## 2018-03-22 RX ORDER — PHENYLEPHRINE HCL IN 0.9% NACL 0.4MG/10ML
SYRINGE (ML) INTRAVENOUS AS NEEDED
Status: DISCONTINUED | OUTPATIENT
Start: 2018-03-22 | End: 2018-03-22 | Stop reason: HOSPADM

## 2018-03-22 RX ORDER — SODIUM CHLORIDE 9 MG/ML
25 INJECTION, SOLUTION INTRAVENOUS CONTINUOUS
Status: DISCONTINUED | OUTPATIENT
Start: 2018-03-22 | End: 2018-03-22 | Stop reason: HOSPADM

## 2018-03-22 RX ORDER — GLYCOPYRROLATE 0.2 MG/ML
0.2 INJECTION INTRAMUSCULAR; INTRAVENOUS
Status: DISCONTINUED | OUTPATIENT
Start: 2018-03-22 | End: 2018-03-22 | Stop reason: HOSPADM

## 2018-03-22 RX ORDER — FENTANYL CITRATE 50 UG/ML
50 INJECTION, SOLUTION INTRAMUSCULAR; INTRAVENOUS AS NEEDED
Status: DISCONTINUED | OUTPATIENT
Start: 2018-03-22 | End: 2018-03-22 | Stop reason: HOSPADM

## 2018-03-22 RX ORDER — GLYCOPYRROLATE 0.2 MG/ML
INJECTION INTRAMUSCULAR; INTRAVENOUS AS NEEDED
Status: DISCONTINUED | OUTPATIENT
Start: 2018-03-22 | End: 2018-03-22 | Stop reason: HOSPADM

## 2018-03-22 RX ORDER — SODIUM CHLORIDE, SODIUM LACTATE, POTASSIUM CHLORIDE, CALCIUM CHLORIDE 600; 310; 30; 20 MG/100ML; MG/100ML; MG/100ML; MG/100ML
1000 INJECTION, SOLUTION INTRAVENOUS CONTINUOUS
Status: DISCONTINUED | OUTPATIENT
Start: 2018-03-22 | End: 2018-03-22 | Stop reason: HOSPADM

## 2018-03-22 RX ORDER — ROCURONIUM BROMIDE 10 MG/ML
INJECTION, SOLUTION INTRAVENOUS AS NEEDED
Status: DISCONTINUED | OUTPATIENT
Start: 2018-03-22 | End: 2018-03-22 | Stop reason: HOSPADM

## 2018-03-22 RX ORDER — LIDOCAINE HYDROCHLORIDE 20 MG/ML
INJECTION, SOLUTION EPIDURAL; INFILTRATION; INTRACAUDAL; PERINEURAL AS NEEDED
Status: DISCONTINUED | OUTPATIENT
Start: 2018-03-22 | End: 2018-03-22 | Stop reason: HOSPADM

## 2018-03-22 RX ORDER — EPHEDRINE SULFATE 50 MG/ML
INJECTION, SOLUTION INTRAVENOUS AS NEEDED
Status: DISCONTINUED | OUTPATIENT
Start: 2018-03-22 | End: 2018-03-22 | Stop reason: HOSPADM

## 2018-03-22 RX ORDER — ONDANSETRON 2 MG/ML
INJECTION INTRAMUSCULAR; INTRAVENOUS AS NEEDED
Status: DISCONTINUED | OUTPATIENT
Start: 2018-03-22 | End: 2018-03-22 | Stop reason: HOSPADM

## 2018-03-22 RX ORDER — DEXAMETHASONE SODIUM PHOSPHATE 4 MG/ML
INJECTION, SOLUTION INTRA-ARTICULAR; INTRALESIONAL; INTRAMUSCULAR; INTRAVENOUS; SOFT TISSUE AS NEEDED
Status: DISCONTINUED | OUTPATIENT
Start: 2018-03-22 | End: 2018-03-22 | Stop reason: HOSPADM

## 2018-03-22 RX ORDER — SUCCINYLCHOLINE CHLORIDE 20 MG/ML
INJECTION INTRAMUSCULAR; INTRAVENOUS AS NEEDED
Status: DISCONTINUED | OUTPATIENT
Start: 2018-03-22 | End: 2018-03-22 | Stop reason: HOSPADM

## 2018-03-22 RX ORDER — CEFAZOLIN SODIUM 2 G/50ML
2 SOLUTION INTRAVENOUS EVERY 8 HOURS
Status: DISCONTINUED | OUTPATIENT
Start: 2018-03-22 | End: 2018-03-22 | Stop reason: HOSPADM

## 2018-03-22 RX ORDER — HYDROMORPHONE HYDROCHLORIDE 2 MG/1
2-4 TABLET ORAL
Qty: 42 TAB | Refills: 0 | Status: SHIPPED | OUTPATIENT
Start: 2018-03-22 | End: 2018-04-11 | Stop reason: ALTCHOICE

## 2018-03-22 RX ORDER — SCOLOPAMINE TRANSDERMAL SYSTEM 1 MG/1
PATCH, EXTENDED RELEASE TRANSDERMAL AS NEEDED
Status: DISCONTINUED | OUTPATIENT
Start: 2018-03-22 | End: 2018-03-22 | Stop reason: HOSPADM

## 2018-03-22 RX ORDER — MIDAZOLAM HYDROCHLORIDE 1 MG/ML
1 INJECTION, SOLUTION INTRAMUSCULAR; INTRAVENOUS AS NEEDED
Status: DISCONTINUED | OUTPATIENT
Start: 2018-03-22 | End: 2018-03-22 | Stop reason: HOSPADM

## 2018-03-22 RX ADMIN — SODIUM CHLORIDE, SODIUM LACTATE, POTASSIUM CHLORIDE, AND CALCIUM CHLORIDE 1000 ML: 600; 310; 30; 20 INJECTION, SOLUTION INTRAVENOUS at 06:53

## 2018-03-22 RX ADMIN — GLYCOPYRROLATE 0.2 MG: 0.2 INJECTION INTRAMUSCULAR; INTRAVENOUS at 08:15

## 2018-03-22 RX ADMIN — PROPOFOL 50 MG: 10 INJECTION, EMULSION INTRAVENOUS at 07:30

## 2018-03-22 RX ADMIN — EPHEDRINE SULFATE 5 MG: 50 INJECTION, SOLUTION INTRAVENOUS at 07:50

## 2018-03-22 RX ADMIN — SODIUM CHLORIDE, SODIUM LACTATE, POTASSIUM CHLORIDE, CALCIUM CHLORIDE: 600; 310; 30; 20 INJECTION, SOLUTION INTRAVENOUS at 07:23

## 2018-03-22 RX ADMIN — SCOLOPAMINE TRANSDERMAL SYSTEM 1 PATCH: 1 PATCH, EXTENDED RELEASE TRANSDERMAL at 07:23

## 2018-03-22 RX ADMIN — PROPOFOL 150 MG: 10 INJECTION, EMULSION INTRAVENOUS at 07:28

## 2018-03-22 RX ADMIN — ONDANSETRON 4 MG: 2 INJECTION INTRAMUSCULAR; INTRAVENOUS at 09:01

## 2018-03-22 RX ADMIN — EPHEDRINE SULFATE 5 MG: 50 INJECTION, SOLUTION INTRAVENOUS at 08:35

## 2018-03-22 RX ADMIN — SUCCINYLCHOLINE CHLORIDE 140 MG: 20 INJECTION INTRAMUSCULAR; INTRAVENOUS at 07:28

## 2018-03-22 RX ADMIN — MIDAZOLAM HYDROCHLORIDE 4 MG: 1 INJECTION, SOLUTION INTRAMUSCULAR; INTRAVENOUS at 07:19

## 2018-03-22 RX ADMIN — FENTANYL CITRATE 50 MCG: 50 INJECTION, SOLUTION INTRAMUSCULAR; INTRAVENOUS at 07:19

## 2018-03-22 RX ADMIN — ROPIVACAINE HYDROCHLORIDE 150 MG: 5 INJECTION, SOLUTION EPIDURAL; INFILTRATION; PERINEURAL at 07:19

## 2018-03-22 RX ADMIN — EPHEDRINE SULFATE 5 MG: 50 INJECTION, SOLUTION INTRAVENOUS at 08:05

## 2018-03-22 RX ADMIN — Medication 80 MCG: at 07:54

## 2018-03-22 RX ADMIN — DEXAMETHASONE SODIUM PHOSPHATE 8 MG: 4 INJECTION, SOLUTION INTRA-ARTICULAR; INTRALESIONAL; INTRAMUSCULAR; INTRAVENOUS; SOFT TISSUE at 07:28

## 2018-03-22 RX ADMIN — ROCURONIUM BROMIDE 5 MG: 10 INJECTION, SOLUTION INTRAVENOUS at 07:28

## 2018-03-22 RX ADMIN — LIDOCAINE HYDROCHLORIDE 100 MG: 20 INJECTION, SOLUTION EPIDURAL; INFILTRATION; INTRACAUDAL; PERINEURAL at 07:28

## 2018-03-22 RX ADMIN — Medication 80 MCG: at 07:50

## 2018-03-22 RX ADMIN — CEFAZOLIN SODIUM 2 G: 2 SOLUTION INTRAVENOUS at 07:26

## 2018-03-22 NOTE — ANESTHESIA PROCEDURE NOTES
Peripheral Block    Start time: 3/22/2018 7:14 AM  End time: 3/22/2018 7:22 AM  Performed by: Yeni Guerrero  Authorized by: Yeni Guerrero       Pre-procedure: Indications: at surgeon's request, post-op pain management and procedure for pain    Preanesthetic Checklist: patient identified, risks and benefits discussed, site marked, timeout performed, anesthesia consent given and patient being monitored    Timeout Time: 07:14          Block Type:   Block Type:   Interscalene  Monitoring:  Standard ASA monitoring, continuous pulse ox, frequent vital sign checks, heart rate, oxygen and responsive to questions  Injection Technique:  Single shot  Procedures: nerve stimulator    Patient Position: supine  Prep: chlorhexidine    Location:  Interscalene  Needle Type:  Stimuplex  Needle Gauge:  22 G  Needle Localization:  Nerve stimulator  Medication Injected:  0.5%  ropivacaine  Volume (mL):  30    Assessment:  Number of attempts:  1  Injection Assessment:  Incremental injection every 5 mL, negative aspiration for CSF, no paresthesia, negative aspiration for blood and no intravascular symptoms  Patient tolerance:  Patient tolerated the procedure well with no immediate complications

## 2018-03-22 NOTE — IP AVS SNAPSHOT
[address] [facility] [place] [address] 
[phone] Patient: Erick Marquez MRN: [id number] OKA:[birth year]309 About your hospitalization You were admitted on:  March 22, 2018 You last received care in the:  [facility] PACU You were discharged on:  March 22, 2018 Why you were hospitalized Your primary diagnosis was:  Complete Tear Of Right Rotator Cuff Follow-up Information Follow up With Details [place]   [address] [address] 
[phone] Katie Bynum MD Follow up in 10 day(s) Call office to schedule a follow up appointment. [address] [facility] 13 
[phone] Discharge Orders None A check ashley indicates which time of day the medication should be taken. My Medications START taking these medications Instructions Each Dose to Equal  
 Morning Noon Evening Bedtime HYDROmorphone 2 mg tablet Commonly known as:  DILAUDID Your last dose was: Your next dose is: Take 1-2 Tabs by mouth every four (4) hours as needed for Pain. Max Daily Amount: 24 mg.  
 2-4 mg CONTINUE taking these medications Instructions Each Dose to Equal  
 Morning Noon Evening Bedtime  
 calcium carbonate 200 mg calcium (500 mg) Chew Commonly known as:  TUMS Your last dose was: Your next dose is: Take 1 Tab by mouth as needed. 1 Tab  
    
   
   
   
  
 chlorthalidone 25 mg tablet Commonly known as:  Royanne ty Your last dose was: Your next dose is: Take 1 Tab by mouth daily. 25 mg  
    
   
   
   
  
 CHOLESTYRAMINE LIGHT 4 gram powder Generic drug:  Cholestyramine-Aspartame Your last dose was: Your next dose is: Take  by mouth three (3) times daily (with meals). diclofenac EC 75 mg EC tablet Commonly known as:  VOLTAREN Your last dose was: Your next dose is: Take  by mouth daily as needed. MAY TAKE 1-2 TABS  
     
   
   
   
  
 docusate sodium 100 mg capsule Commonly known as:  Joel Tapia Your last dose was: Your next dose is: Take 100 mg by mouth two (2) times daily as needed for Constipation. 1-2 tabs PRN constipation 100 mg  
    
   
   
   
  
 ketorolac 0.5 % ophthalmic solution Commonly known as:  Taraia Infante Your last dose was: Your next dose is:    
   
   
 Administer 2 Drops to both eyes four (4) times daily as needed. 2 Drop  
    
   
   
   
  
 levothyroxine 112 mcg tablet Commonly known as:  SYNTHROID Your last dose was: Your next dose is: Take 1 Tab by mouth Daily (before breakfast). 112 mcg NIFEdipine ER 30 mg ER tablet Commonly known as:  PROCARDIA XL Your last dose was: Your next dose is: Take  by mouth daily. Omeprazole delayed release 20 mg tablet Commonly known as:  PRILOSEC D/R Your last dose was: Your next dose is: Take 20 mg by mouth daily. daily before breakfast  
 20 mg  
    
   
   
   
  
 ondansetron hcl 4 mg tablet Commonly known as:  Finis Bunddamien Your last dose was: Your next dose is: Take 4 mg by mouth every eight (8) hours as needed for Nausea. 4 mg  
    
   
   
   
  
 prednisoLONE acetate 1 % ophthalmic suspension Commonly known as:  PRED FORTE Your last dose was: Your next dose is:    
   
   
 Administer 1 Drop to both eyes four (4) times daily. 1 Drop PROCTOZONE-HC 2.5 % rectal cream  
Generic drug:  hydrocortisone Your last dose was: Your next dose is: Insert 1 g into rectum once as needed.   
 1 g  
    
   
   
   
  
 valsartan 160 mg tablet Commonly known as:  DIOVAN Your last dose was: Your next dose is: TAKE ONE TABLET BY MOUTH EVERY DAY  
     
   
   
   
  
  
STOP taking these medications   
 traMADol-acetaminophen 37.5-325 mg per tablet Commonly known as:  ULTRACET Where to Get Your Medications Information on where to get these meds will be given to you by the nurse or doctor. ! Ask your nurse or doctor about these medications HYDROmorphone 2 mg tablet Discharge Instructions [address] [place] [address] POST-OPERATIVE INSTRUCTIONS Rotator Cuff Repair - Shoulder 1. First meal at home should be clear liquids. Progress to regular diet as tolerated. 2. Apply an ice bag or use cold therapy device for 20-30 minutes 4 times a day for the first 48-72 hours to reduce swelling and pain and then use as desired. 3. You may remove the bulky dressing 24 hours after surgery and at that time it is ok to shower. If there are steri-strips, please DO NOT remove them until seen in the office; otherwise, apply band-aids over the small incisions and change daily after showers. 4. You will use a sling with a pillow until your first post-op visit. You can remove the sling to shower and to let your arm hang down by your side. 5. You may perform pendulum arm exercises that you may begin 1-2 days after surgery. Remove your sling to do these exercises several times a day. We will discuss therapy at your first post-op visit. 6. A recliner position is often more comfortable for sleeping the first several days/weeks after surgery. 7. A prescription for pain medication will be provided before you are discharged home. Please take 1 aspirin a day for one week after surgery, beginning tomorrow. 8. A post-op appointment should be scheduled for ten days post-op, earlier as needed.   Please call the office to schedule your appointment time ([phone]). 9. If you develop a fever greater than 101, unexpected redness or swelling in your arm, please call the office immediately. Some bleeding and or drainage can be expected the first few days after surgery. Thank you for following the above instructions. If you have any questions, please call the office ([phone]). ______________________________________________________________________ Anesthesia Discharge Instructions After general anesthesia or intervenous sedation, for 24 hours or while taking prescription Narcotics: · Limit your activities · Do not drive or operate hazardous machinery · If you have not urinated within 8 hours after discharge, please contact your surgeon on call. · Do not make important personal or business decisions · Do not drink alcoholic beverages Report the following to your surgeon: 
· Excessive pain, swelling, redness or odor of or around the surgical area · Temperature over 100.5 degrees · Nausea and vomiting lasting longer than 4 hours or if unable to take medication · Any signs of decreased circulation or nerve impairment to extremity:  Change in color, persistent numbness, tingling, coldness or increased pain. · Any questions Introducing [facility]! Dear Jacky Cason: Thank you for requesting a [employer] account. Our records indicate that you already have an active [employer] account. You can access your account anytime at [email or web address] Did you know that you can access your hospital and ER discharge instructions at any time in [employer]? You can also review all of your test results from your hospital stay or ER visit. Additional Information If you have questions, please visit the Frequently Asked Questions section of the [employer] website at [email or web address] Remember, [employer] is NOT to be used for urgent needs. For medical emergencies, dial 911. Now available from your iPhone and Android! Providers Seen During Your Hospitalization Provider Specialty Primary office phone Дмитрий Caballero MD Orthopedic Surgery [phone] Your Primary Care Physician (PCP) Primary Care Physician Office Phone Office Fax [phone] [facility] [place] [address] [phone] You are allergic to the following Allergen Reactions Adhesive Tape-Silicones Itching Blisters, bumps. -long term application Lidoderm (Lidocaine) Rash Patch-adhesive patch. Allergic to the adhesive - long term use. Not allergic to lidoderm. Other Medication Rash  
 dermabond - blisters Recent Documentation Height Weight BMI OB Status Smoking Status 1.651 m 100.7 kg 36.94 kg/m2 Hysterectomy Never Smoker Emergency Contacts Name Discharge Info Relation Home Work Mobile MatthewsKetty DISCHARGE CAREGIVER [3] Child [2] [phone] Patient Belongings The following personal items are in your possession at time of discharge: 
  Dental Appliances: None  Visual Aid: Glasses      Home Medications: None   Jewelry: None  Clothing: Other (comment) (CLOTHING, JACKET & SHOES TO PACU)    Other Valuables: Eyeglasses (GLASSES TO PACU) Discharge Instructions Attachments/References MEFS - HYDROMORPHONE (DILAUDID, EXALGO) - (BY MOUTH) (ENGLISH) Patient Handouts Hydromorphone (Dilaudid, Exalgo) - (By mouth) Why this medicine is used:  
Treats moderate to severe pain. This medicine is a narcotic pain reliever. Contact a nurse or doctor right away if you have: 
· Extreme weakness, shallow breathing, fast or slow heartbeat · Severe confusion, lightheadedness, dizziness, fainting · Sweating or cold, clammy skin · Anxiety, restlessness, fever, sweating, muscle spasms, twitching, seeing or hearing things that are not there · Severe constipation, stomach pain, vomiting Common side effects: · Mild constipation, nausea, diarrhea · Sleepiness, tiredness · Itching, rash © 2017 [facility] Information is for End User's use only and may not be sold, redistributed or otherwise used for commercial purposes. Please provide this summary of care documentation to your next provider. Signatures-by signing, you are acknowledging that this After Visit Summary has been reviewed with you and you have received a copy. Patient Signature:  ____________________________________________________________ Date:  ____________________________________________________________  
  
Shea Moritz Provider Signature:  ____________________________________________________________ Date:  ____________________________________________________________

## 2018-03-22 NOTE — ANESTHESIA PREPROCEDURE EVALUATION
Anesthetic History     PONV          Review of Systems / Medical History  Patient summary reviewed, nursing notes reviewed and pertinent labs reviewed    Pulmonary  Within defined limits                 Neuro/Psych   Within defined limits           Cardiovascular    Hypertension: well controlled              Exercise tolerance: >4 METS     GI/Hepatic/Renal     GERD: well controlled           Endo/Other      Hypothyroidism: well controlled  Arthritis     Other Findings              Physical Exam    Airway  Mallampati: II  TM Distance: > 6 cm  Neck ROM: normal range of motion   Mouth opening: Normal     Cardiovascular  Regular rate and rhythm,  S1 and S2 normal,  no murmur, click, rub, or gallop             Dental  No notable dental hx       Pulmonary  Breath sounds clear to auscultation               Abdominal  GI exam deferred       Other Findings            Anesthetic Plan    ASA: 2  Anesthesia type: general      Post-op pain plan if not by surgeon: peripheral nerve block single    Induction: Intravenous  Anesthetic plan and risks discussed with: Patient

## 2018-03-22 NOTE — DISCHARGE INSTRUCTIONS
[facility]    POST-OPERATIVE INSTRUCTIONS  Rotator Cuff Repair - Shoulder    1. First meal at home should be clear liquids. Progress to regular diet as tolerated. 2. Apply an ice bag or use cold therapy device for 20-30 minutes 4 times a day for the first 48-72 hours to reduce swelling and pain and then use as desired. 3. You may remove the bulky dressing 24 hours after surgery and at that time it is ok to shower. If there are steri-strips, please DO NOT remove them until seen in the office; otherwise, apply band-aids over the small incisions and change daily after showers. 4. You will use a sling with a pillow until your first post-op visit. You can remove the sling to shower and to let your arm hang down by your side. 5. You may perform pendulum arm exercises that you may begin 1-2 days after surgery. Remove your sling to do these exercises several times a day. We will discuss therapy at your first post-op visit. 6. A recliner position is often more comfortable for sleeping the first several days/weeks after surgery. 7. A prescription for pain medication will be provided before you are discharged home. Please take 1 aspirin a day for one week after surgery, beginning tomorrow. 8. A post-op appointment should be scheduled for ten days post-op, earlier as needed. Please call the office to schedule your appointment time ([phone]). 9. If you develop a fever greater than 101, unexpected redness or swelling in your arm, please call the office immediately. Some bleeding and or drainage can be expected the first few days after surgery. Thank you for following the above instructions. If you have any questions, please call the office ([phone]).     ______________________________________________________________________    Anesthesia Discharge Instructions    After general anesthesia or intervenous sedation, for 24 hours or while taking prescription Narcotics:  · Limit your activities  · Do not drive or operate hazardous machinery  · If you have not urinated within 8 hours after discharge, please contact your surgeon on call. · Do not make important personal or business decisions  · Do not drink alcoholic beverages    Report the following to your surgeon:  · Excessive pain, swelling, redness or odor of or around the surgical area  · Temperature over 100.5 degrees  · Nausea and vomiting lasting longer than 4 hours or if unable to take medication  · Any signs of decreased circulation or nerve impairment to extremity:  Change in color, persistent numbness, tingling, coldness or increased pain.   · Any questions

## 2018-03-22 NOTE — ANESTHESIA POSTPROCEDURE EVALUATION
Post-Anesthesia Evaluation and Assessment    Patient: Michael Horton MRN: [id number]  SSN: [id number]    YOB: 1955  Age: 60 y.o. Sex: female       Cardiovascular Function/Vital Signs  Visit Vitals    /79    Pulse 67    Temp 36.4 °C (97.5 °F)    Resp 14    Ht 5' 5\" (1.651 m)    Wt 100.7 kg (222 lb)    SpO2 98%    BMI 36.94 kg/m2       Patient is status post general anesthesia for Procedure(s):  RIGHT SHOULDER ARTHROSCOPY WITH DECOMPRESSION, MINI OPEN ROTATOR CUFF REPAIR. Nausea/Vomiting: None    Postoperative hydration reviewed and adequate. Pain:  Pain Scale 1: Numeric (0 - 10) (03/22/18 1030)  Pain Intensity 1: 0 (03/22/18 1030)   Managed    Neurological Status:   Neuro (WDL): Exceptions to WDL (03/22/18 1030)  Neuro  Neurologic State: Alert (03/22/18 1030)  LUE Motor Response: Purposeful (03/22/18 1030)  LLE Motor Response: Purposeful (03/22/18 1030)  RUE Motor Response: Numbness (block surgical) (03/22/18 1030)  RLE Motor Response: Purposeful (03/22/18 1030)   At baseline    Mental Status and Level of Consciousness: Arousable    Pulmonary Status:   O2 Device: Room air (03/22/18 1030)   Adequate oxygenation and airway patent    Complications related to anesthesia: None    Post-anesthesia assessment completed.  No concerns    Signed By: Priya Bonilla MD     March 22, 2018

## 2018-03-23 NOTE — OP NOTES
[address]  ACUTE CARE OP NOTE    Jalyn Carlin  MR#: [id number]  : 1955  ACCOUNT #: [de-identified]   DATE OF SERVICE: 2018    PREOPERATIVE DIAGNOSES:  1. Right shoulder impingement. 2.  Rotator cuff tear. 3.  Biceps tendon rupture. POSTOPERATIVE DIAGNOSES:     1. Right shoulder impingement. 2.  Rotator cuff tear. 3.  Biceps tendon rupture. PROCEDURES PERFORMED:    1. Right shoulder arthroscopy with subacromial decompression. 2.  Mini open rotator cuff repair. ANESTHESIA:  Scalene block plus general.    ESTIMATED BLOOD LOSS:  Minimal.    DRAINS:  None. SPECIMENS REMOVED:  None. ASSISTANT:  Temo Miguel PA-C    SURGEON:  Shannon Dow MD    IMPLANTS:  SwiveLock anchors x3. COMPLICATIONS:  None. INDICATIONS:  The patient has a several month history of pain in her right shoulder. She now presents for the above procedure. DESCRIPTION OF PROCEDURE:  On day of operation, the patient was taken to the holding area, scalene block administered. The patient was taken to the operating room, placed in supine position. General anesthesia administered, the patient was placed in the semi-sitting position. The right shoulder was prepped and draped in the usual fashion. A needle was placed posteriorly for insufflation of the joint with saline. Arthroscope brought through the posterior portal.  There is mild chondromalacia of the glenohumeral joint, but not significant. Mild fraying of the labrum was noted. The biceps tendon was ruptured. There was a large tear of the rotator cuff. Arthroscope brought into the subacromial space. A lateral portal was established. There was impingement about the acromion. An acromionizer bur was used to perform an acromioplasty and felt to adequately decompress the subacromial space. There was a large rotator cuff tear with significant retraction noted. Saline and instruments removed.   The posterior portal closed with 3-0 Prolene suture. Surgeon's gloves and assistants gloves changed. The lateral portal was extended to a small degree with dissection down to the subacromial space. There was a significant avulsion of the supraspinatus tendon. The tendon was freed up of adhesions. Both arms of 3 TigerTapes were brought through the tendon and then brought laterally, secured using 3 SwiveLock anchors. A complete repair achieved. Incisions were thoroughly irrigated. Coagulation achieved with electrocautery. The fascia closed with 0 Vicryl, 2-0 Vicryl was used to close subcutaneous tissue, skin closed in subcuticular fashion using 3-0 Prolene sutures. Sterile dressings applied. Patient taken to recovery room in satisfactory condition. The assistant is Sheri Pearson PA-C, who assisted me with positioning, retraction, rotator cuff repair and incision closure. MD Lynne Victor /   D: 03/22/2018 17:24     T: 03/23/2018 10:05  JOB #: [id number]  CC: LANEY Kemp MD

## 2018-03-26 ENCOUNTER — PATIENT OUTREACH (OUTPATIENT)
Dept: OTHER | Age: 63
End: 2018-03-26

## 2018-03-26 NOTE — DISCHARGE SUMMARY
Discharge Summary    Physician: Lisa Miguel MD    Physician Assistant: Dago Uribe PA-C    Admit Diagnosis:  RIGHT ROTATOR CUFF TEAR     Final Diagnosis:   1. Right shoulder impingement. 2.  Rotator cuff tear. 3.  Biceps tendon rupture.     Procedure:  Right shoulder arthroscopy with subacromial decompression and Mini open rotator cuff repair     Complications: None. History of Present Illness: The patient has a history of pain within the right shoulder . The patient has a right shoulder rotator cuff tear and has exhausted conservative therapy at this time. The patient presents for the above-mentioned procedure. The patient has been cleared from a medical and cardiac standpoint. Past Medical History:  Recorded in the chart. Physical Examination: Also recorded in the chart. Examination of the right shoulder reveals marked crepitus in the region of the supraspinatus with mild weakness to external rotation. No neck pain. No numbness or paresthesias in the hands. No effusion. No sign of infection. No cellulitis or rash. I detect no motor or sensory deficits in the upper extremities. The neurovascular status of the upper extremities is intact. MRI reveals 1. Large full-thickness tear of the supraspinatus with 2.6 m retraction. Torn fibers are thinned due to chronic delamination 2. High-grade partial-thickness undersurface tearing anterior infraspinatus and superior subscapularis 3. Torn biceps long head tendon with distal retraction . Course in the Hospital:  The patient was admitted to the hospital.  The Pt. Underwent a  Right shoulder arthroscopy with subacromial decompression and Mini open rotator cuff repair . Postoperatively, the pt. did well. The pt. Remained stable from a medical standpoint. At the time of discharge, the patient's right shoulder incision appeared with sutures/steri-strips intact. No signs of infection or inflammation noted.  The patient will follow up in our office in ten days post-operatively. DC med list:  Discharge Medication List as of 3/22/2018 10:47 AM      START taking these medications    Details   HYDROmorphone (DILAUDID) 2 mg tablet Take 1-2 Tabs by mouth every four (4) hours as needed for Pain. Max Daily Amount: 24 mg., Print, Disp-42 Tab, R-0         CONTINUE these medications which have NOT CHANGED    Details   ondansetron hcl (ZOFRAN) 4 mg tablet Take 4 mg by mouth every eight (8) hours as needed for Nausea., Historical Med      diclofenac EC (VOLTAREN) 75 mg EC tablet Take  by mouth daily as needed. MAY TAKE 1-2 TABS, Historical Med      Cholestyramine-Aspartame (CHOLESTYRAMINE LIGHT) 4 gram powder Take  by mouth three (3) times daily (with meals). , Historical Med      NIFEdipine ER (PROCARDIA XL) 30 mg ER tablet Take  by mouth daily. , Historical Med      prednisoLONE acetate (PRED FORTE) 1 % ophthalmic suspension Administer 1 Drop to both eyes four (4) times daily. , Historical Med      calcium carbonate (TUMS) 200 mg calcium (500 mg) chew Take 1 Tab by mouth as needed., Historical Med      valsartan (DIOVAN) 160 mg tablet TAKE ONE TABLET BY MOUTH EVERY DAY, Normal, Disp-90 Tab, R-3      Omeprazole delayed release (PRILOSEC D/R) 20 mg tablet Take 20 mg by mouth daily. daily before breakfast, Historical Med      docusate sodium (COLACE) 100 mg capsule Take 100 mg by mouth two (2) times daily as needed for Constipation. 1-2 tabs PRN constipation, Historical Med      hydrocortisone (PROCTOZONE-HC) 2.5 % rectal cream Insert 1 g into rectum once as needed., Historical Med      levothyroxine (SYNTHROID) 112 mcg tablet Take 1 Tab by mouth Daily (before breakfast). , Normal, Disp-30 Tab, R-11      chlorthalidone (HYGROTEN) 25 mg tablet Take 1 Tab by mouth daily. , Normal, Disp-90 Tab, R-3      ketorolac (ACULAR) 0.5 % ophthalmic solution Administer 2 Drops to both eyes four (4) times daily as needed., Historical Med, Disp-1 Bottle, R-0         STOP taking these medications       traMADol-acetaminophen (ULTRACET) 37.5-325 mg per tablet Comments:   Reason for Stopping:               Patient Disposition: Home  Followup Care:  Discharge Condition: good  Activity as tolerated in slingshot brace (abduction pillow)  Regular Diet  As directed    Follow-up Information     Follow up With Details Comments Contact Info    Jadiel Jamison MD   [facility]      Naomi Rogers MD Follow up in 10 day(s) Call office to schedule a follow up appointment.  [address]                Tarsha Head PA-C

## 2018-03-26 NOTE — PROGRESS NOTES
Initial ALESSIO:   Patient on report as discharged from [facility] OP Visit 3/22/18 for Right shoulder arthroscopy with subacromial decompression. Sound Mini open rotator cuff repair. Initial attempt to contact patient for transitions of care.  Left discreet message on voicemail with this Care Coordinator's contact information.  Will attempt outreach on 3/27/18.        Chart Review:    Course in the Hospital:  The patient was admitted to the hospital.  The Pt. Underwent a  Right shoulder arthroscopy with subacromial decompression and Mini open rotator cuff repair .  Postoperatively, the pt. did well. The pt. Remained stable from a medical standpoint.  At the time of discharge, the patient's right shoulder incision appeared with sutures/steri-strips intact.  No signs of infection or inflammation noted. The patient will follow up in our office in [address] med list:            Discharge Medication List as of 3/22/2018 10:47 AM                  START taking these medications      Details   HYDROmorphone (DILAUDID) 2 mg tablet Take 1-2 Tabs by mouth every four (4) hours as needed for Pain.  Max Daily Amount: 24 mg., Print, Disp-42 Tab, R-0

## 2018-03-26 NOTE — PROGRESS NOTES
ALESSIO Call:      Patient on report as with discharge from hospital 3/22 for Right rotator cuff repair. Initial attempt to contact patient for transitions of care. Left discreet message on voicemail with this CM contact information. Will attempt to contact again. Chart Review:    Course in the Hospital:  The patient was admitted to the hospital.  The Pt. Underwent a  Right shoulder arthroscopy with subacromial decompression and Mini open rotator cuff repair . Postoperatively, the pt. did well. The pt. Remained stable from a medical standpoint. At the time of discharge, the patient's right shoulder incision appeared with sutures/steri-strips intact. No signs of infection or inflammation noted. The patient will follow up in our office in ten days post-operatively.     DC med list:        Discharge Medication List as of 3/22/2018 10:47 AM             START taking these medications     Details   HYDROmorphone (DILAUDID) 2 mg tablet Take 1-2 Tabs by mouth every four (4) hours as needed for Pain.  Max Daily Amount: 24 mg., Print, Disp-42 Tab, R-0

## 2018-03-27 ENCOUNTER — PATIENT OUTREACH (OUTPATIENT)
Dept: OTHER | Age: 63
End: 2018-03-27

## 2018-03-27 NOTE — PROGRESS NOTES
Transition Of Care Note    Patient discharged from [facility] OP for Right shoulder arthroscopy with subacromial decompression. Sound Mini open rotator cuff repair      Medical History:     Past Medical History:   Diagnosis Date    Arrhythmia     \"PALPATATIONS, FLUTTERING, MEDS PER DR. ZAYAS\"    Arthritis     knees, back, SHOULDERS, R HIP    Chest pain 2018    PT STATES SHE'S BEEN ASSESSED FOR AND DETERMINED TO BE ESOPHAGEAL SPASMS PER FREYA LUNA AND DR.MCGROARTY ODILON    Chronic pain     LOWER BACK, R HIP    Duodenal ulcer     GERD (gastroesophageal reflux disease)     H/O blood clots 2007    TO RIGHT EYE    Hypertension     Iritis     both eyes    Nausea & vomiting     Other ill-defined conditions(799.89)     back pain/spasm    PONV (postoperative nausea and vomiting)     Thyroid disease     hypothroid    Ulcerative colitis        Care Manager contacted the patient by telephone to perform post 3/22/18(hospital) discharge assessment. Verified  and zip code with patient as identifiers. Provided introduction to self, and explanation of the Nurse Care Manager role. * Spoke with patient who reports that she is doing better. First night she could not sleep or get comfortable due to pain. * Made a call to Ortho office and spoke with PA about pain not being controlled with pain medication. Was instructed on how to take medication to get pain under control. *Rates pain as 2 on 0-10 pain scale. Has not taken anything for pain this morning. * Patient was instructed to do finger exercises until appointment on Friday. Still has Slingshot brace on. * Patient denies fever, numbness, tingling in fingers, hand or discoloration. Hand remains swollen some. Putting ice on shoulder. Medication:   Performed medication reconciliation with patient, and patient verbalizes understanding of administration of home medications.   There were no barriers to obtaining medications identified at this time. Support system:  patient and daughter    Discharge Instructions :  Reviewed discharge instructions with patient. Patient verbalizes understanding of discharge instructions and follow-up care. Red Flags:      Advance Care Planning:   Patient was offered the opportunity to discuss advance care planning:  no     Does patient have an Advance Directive:  no   If no, did you provide information on Caring Connections?  no     PCP/Specialist follow up: Patient scheduled to follow up with Erik Vital MD - Ortho on Friday 3/30/18. Reviewed red flags with patient, and patient verbalizes understanding. Patient given an opportunity to ask questions. No other clinical/social/functional needs noted. The patient agrees to contact the PCP office for questions related to their healthcare. The patient expressed thanks, offered no additional questions and ended the call. Next outreach 4/11/18 - f/u Ortho Appt - Rotator Cuff Repair.

## 2018-04-11 ENCOUNTER — PATIENT OUTREACH (OUTPATIENT)
Dept: OTHER | Age: 63
End: 2018-04-11

## 2018-04-11 ENCOUNTER — OFFICE VISIT (OUTPATIENT)
Dept: INTERNAL MEDICINE CLINIC | Age: 63
End: 2018-04-11

## 2018-04-11 VITALS
DIASTOLIC BLOOD PRESSURE: 73 MMHG | RESPIRATION RATE: 18 BRPM | BODY MASS INDEX: 36.75 KG/M2 | HEIGHT: 65 IN | HEART RATE: 60 BPM | OXYGEN SATURATION: 99 % | TEMPERATURE: 98 F | WEIGHT: 220.6 LBS | SYSTOLIC BLOOD PRESSURE: 143 MMHG

## 2018-04-11 DIAGNOSIS — K22.4 ESOPHAGEAL SPASM: ICD-10-CM

## 2018-04-11 DIAGNOSIS — E03.9 ACQUIRED HYPOTHYROIDISM: ICD-10-CM

## 2018-04-11 DIAGNOSIS — J30.1 SEASONAL ALLERGIC RHINITIS DUE TO POLLEN: ICD-10-CM

## 2018-04-11 DIAGNOSIS — I10 ESSENTIAL HYPERTENSION: Primary | ICD-10-CM

## 2018-04-11 RX ORDER — TRAMADOL HYDROCHLORIDE 50 MG/1
50 TABLET ORAL
COMMUNITY
End: 2018-07-23 | Stop reason: ALTCHOICE

## 2018-04-11 RX ORDER — PHENOL/SODIUM PHENOLATE
20 AEROSOL, SPRAY (ML) MUCOUS MEMBRANE DAILY
Qty: 30 TAB | Refills: 2 | Status: SHIPPED | OUTPATIENT
Start: 2018-04-11 | End: 2018-07-18 | Stop reason: SDUPTHER

## 2018-04-11 RX ORDER — PHENOL/SODIUM PHENOLATE
20 AEROSOL, SPRAY (ML) MUCOUS MEMBRANE DAILY
Qty: 30 TAB | Refills: 11 | Status: CANCELLED | OUTPATIENT
Start: 2018-04-11

## 2018-04-11 NOTE — PROGRESS NOTES
Subjective:     Dimas Patel is a 60 y.o. female who presents for follow up of hypertension and hypothyroidism. Diet and Lifestyle: generally follows a low sodium diet  Home BP Monitoring: is well controlled at home, ranging 130-140's/60's    Cardiovascular ROS: taking medications as instructed, no medication side effects noted, no TIA's, no chest pain on exertion, no dyspnea on exertion, no swelling of ankles, no orthostatic dizziness or lightheadedness, palpitations described as fluttering - see below. .     New concerns: Had right rotator cuff repair with Dr. Karel Maldonado 3/22. Starts therapy tomorrow. Has finished the Dilaudid but starting Tramadol prior to PT. Feels euthyroid  4 months ago had increased heart fluttering and palpitations. 1 day at work had chest pain. Took 4 TUMS without improvement. Went to ER, heart was fine but help it was esophageal spasms. Placed on omeprazole. Saw Dr. Virgen Pedraza who continued the omeprazole. Still having some breakthrough spasms. Dr. Virgen Pedraza had her go back to see Dr. Moreno Muñiz. Placed her on Nifedipine to help with the spasms but has not started. Has had mild spasms but nothing severe. Current Outpatient Prescriptions   Medication Sig Dispense Refill    ondansetron hcl (ZOFRAN) 4 mg tablet Take 4 mg by mouth every eight (8) hours as needed for Nausea.  prednisoLONE acetate (PRED FORTE) 1 % ophthalmic suspension Administer 1 Drop to both eyes four (4) times daily.  calcium carbonate (TUMS) 200 mg calcium (500 mg) chew Take 1 Tab by mouth as needed.  valsartan (DIOVAN) 160 mg tablet TAKE ONE TABLET BY MOUTH EVERY DAY 90 Tab 3    Omeprazole delayed release (PRILOSEC D/R) 20 mg tablet Take 20 mg by mouth daily. daily before breakfast      docusate sodium (COLACE) 100 mg capsule Take 100 mg by mouth two (2) times daily as needed for Constipation.  1-2 tabs PRN constipation      hydrocortisone (PROCTOZONE-HC) 2.5 % rectal cream Insert 1 g into rectum once as needed.  levothyroxine (SYNTHROID) 112 mcg tablet Take 1 Tab by mouth Daily (before breakfast). 30 Tab 11    chlorthalidone (HYGROTEN) 25 mg tablet Take 1 Tab by mouth daily. 90 Tab 3    ketorolac (ACULAR) 0.5 % ophthalmic solution Administer 2 Drops to both eyes four (4) times daily as needed. 1 Bottle 0    NIFEdipine ER (PROCARDIA XL) 30 mg ER tablet Take  by mouth daily. Lab Results  Component Value Date/Time   GFR est non-AA >60 03/16/2018 09:17 AM   GFR est AA >60 03/16/2018 09:17 AM   Creatinine 0.72 03/16/2018 09:17 AM   BUN 19 01/10/2018 12:36 PM   Sodium 141 01/10/2018 12:36 PM   Potassium 3.7 03/16/2018 09:17 AM   Chloride 105 01/10/2018 12:36 PM   CO2 31 01/10/2018 12:36 PM     Lab Results  Component Value Date/Time   TSH 1.480 10/04/2017 12:00 AM   T4, Free 1.70 10/04/2017 12:00 AM         Review of Systems, additional:  Pertinent items are noted in HPI. Objective:     Visit Vitals    /73 (BP 1 Location: Left arm, BP Patient Position: Sitting)    Pulse 60    Temp 98 °F (36.7 °C) (Oral)    Resp 18    Ht 5' 5\" (1.651 m)    Wt 220 lb 9.6 oz (100.1 kg)    SpO2 99%    BMI 36.71 kg/m2     Appearance: alert, well appearing, and in no distress and oriented to person, place, and time. General exam:   Nares - edematous with clear discharge  OP - post nasal draiange  . NECK: supple, no lad, no bruit, no tm  LUNGS: cta bilat  CV rrr, no m/g/r  ABD: soft, nt, nd, nabs  EXT: no c/c/e  Right arm in sling. Assessment/Plan:     hypertension borderline controlled. the following changes are made - agree with starting mifedipine as will help with blood pressure and esoph spasms. Pt has med at home an dwill start. .     Hypothyroid - clinically euthyroid  Continue same    Allergic rhinitis - continue Zyrtec and add flonase    Esophageal spasms - continue omeprazole 20mg every day. Obesity - weight loss through diet and exercise encouraged.       Orders Placed This Encounter    traMADol (ULTRAM) 50 mg tablet    Omeprazole delayed release (PRILOSEC D/R) 20 mg tablet     Follow-up Disposition:  Return in about 6 months (around 10/11/2018) for htn, hypothyroid.

## 2018-04-11 NOTE — MR AVS SNAPSHOT
[address] [place] [address] [address] 
[phone] Patient: Michael Sanchez MRN: [id number] BGT:1[birth year]84/2238 Visit Information Date & Time Provider Department Dept. Phone Encounter #  
 4/11/2018 10:20 AM Radha Schneider MD [facility] [phone] [id number] Follow-up Instructions Return in about 6 months (around 10/11/2018) for htn, hypothyroid. Upcoming Health Maintenance Date Due Hepatitis C Screening 1955 Influenza Age 5 to Adult 8/1/2017 COLONOSCOPY 5/25/2019 PAP AKA CERVICAL CYTOLOGY 8/24/2019 BREAST CANCER SCRN MAMMOGRAM 3/2/2020 DTaP/Tdap/Td series (2 - Td) 1/16/2025 Allergies as of 4/11/2018  Review Complete On: 4/11/2018 By: Radha Schneider MD  
  
 Severity Noted Reaction Type Reactions Adhesive Tape-silicones  80/13/5237    Itching Blisters, bumps. -long term application Lidoderm [Lidocaine]  03/29/2013    Rash Patch-adhesive patch. Allergic to the adhesive - long term use. Not allergic to lidoderm. Other Medication  07/24/2014    Rash  
 dermabond - blisters Current Immunizations  Reviewed on 5/11/2016 Name Date Influenza Vaccine 10/15/2017 Tdap 1/16/2015 Zoster Vaccine, Live 5/15/2015 Not reviewed this visit You Were Diagnosed With   
  
 Codes Comments Essential hypertension    -  Primary ICD-10-CM: I10 
ICD-9-CM: 401.9 Esophageal spasm     ICD-10-CM: K22.4 ICD-9-CM: 530.5 Acquired hypothyroidism     ICD-10-CM: E03.9 ICD-9-CM: 244.9 Seasonal allergic rhinitis due to pollen     ICD-10-CM: J30.1 ICD-9-CM: 477.0 Vitals BP Pulse Temp Resp Height(growth percentile) Weight(growth percentile) 143/73 (BP 1 Location: Left arm, BP Patient Position: Sitting) 60 98 °F (36.7 °C) (Oral) 18 5' 5\" (1.651 m) 220 lb 9.6 oz (100.1 kg) SpO2 BMI OB Status Smoking Status 99% 36.71 kg/m2 Hysterectomy Never Smoker BMI and BSA Data Body Mass Index Body Surface Area  
 36.71 kg/m 2 2.14 m 2 Preferred Pharmacy Pharmacy Name Phone [facility] [phone] Your Updated Medication List  
  
   
This list is accurate as of 4/11/18 10:55 AM.  Always use your most recent med list.  
  
  
  
  
 calcium carbonate 200 mg calcium (500 mg) William Calico Commonly known as:  TUMS Take 1 Tab by mouth as needed. chlorthalidone 25 mg tablet Commonly known as:  Ollie Ma Take 1 Tab by mouth daily. docusate sodium 100 mg capsule Commonly known as:  Atnonia Rout Take 100 mg by mouth two (2) times daily as needed for Constipation. 1-2 tabs PRN constipation  
  
 ketorolac 0.5 % ophthalmic solution Commonly known as:  Wilnette Ventura Administer 2 Drops to both eyes four (4) times daily as needed. levothyroxine 112 mcg tablet Commonly known as:  SYNTHROID Take 1 Tab by mouth Daily (before breakfast). NIFEdipine ER 30 mg ER tablet Commonly known as:  PROCARDIA XL Take  by mouth daily. Omeprazole delayed release 20 mg tablet Commonly known as:  PRILOSEC D/R Take 1 Tab by mouth daily. daily before breakfast  
  
 ondansetron hcl 4 mg tablet Commonly known as:  Fredrich Cone Take 4 mg by mouth every eight (8) hours as needed for Nausea. prednisoLONE acetate 1 % ophthalmic suspension Commonly known as:  PRED FORTE Administer 1 Drop to both eyes four (4) times daily. PROCTOZONE-HC 2.5 % rectal cream  
Generic drug:  hydrocortisone Insert 1 g into rectum once as needed. traMADol 50 mg tablet Commonly known as:  ULTRAM  
Take 50 mg by mouth every six (6) hours as needed for Pain.  
  
 valsartan 160 mg tablet Commonly known as:  DIOVAN  
TAKE ONE TABLET BY MOUTH EVERY DAY Prescriptions Sent to Pharmacy Refills Omeprazole delayed release (PRILOSEC D/R) 20 mg tablet 2 Sig: Take 1 Tab by mouth daily. daily before breakfast  
 Class: Normal  
 [facility]: [facility]  #: [phone] Route: Oral  
  
Follow-up Instructions Return in about 6 months (around 10/11/2018) for htn, hypothyroid. To-Do List   
 04/12/2018 9:00 AM  
  Appointment with Blaire Tapia PT at [facility] ([phone]) Patient Instructions Start the Nifedipine. Continue Zyrtec and add Flonase (Fluticasone) nasal spray for allergies. Introducing [facility]! Dear Sheila Perdomo: Thank you for requesting a [employer] account. Our records indicate that you already have an active Cubby account. You can access your account anytime at [email or web address] Did you know that you can access your hospital and ER discharge instructions at any time in Cubby? You can also review all of your test results from your hospital stay or ER visit. Additional Information If you have questions, please visit the Frequently Asked Questions section of the [employer]by website at [email or web address] Remember, [employer]by is NOT to be used for urgent needs. For medical emergencies, dial 911. Now available from your iPhone and Android! Please provide this summary of care documentation to your next provider. Your primary care clinician is listed as LANEY LEWIS. If you have any questions after today's visit, please call [phone].

## 2018-04-11 NOTE — PROGRESS NOTES
ALESSIO f/u:  Telephone attempt to contact patient for transitions of care. Left discreet message on voicemail with this CC contact information. Will follow for one month for transitions of care needs. Next outreach is 4/30/17 for discussion Right Shoulder Arthroscopy and Resolve. PCP Visit 4/11/18  New concerns: Had right rotator cuff repair with Dr. Hwang Heart 3/22. Starts therapy tomorrow.   Has finished the Dilaudid but starting Tramadol prior to PT.

## 2018-04-11 NOTE — PROGRESS NOTES
Chief Complaint   Patient presents with    Follow Up Chronic Condition     HTN, Thyroid- discuss meds     Sleep Problem    Allergies     what meds to take      1. Have you been to the ER, urgent care clinic since your last visit? Hospitalized since your last visit?  Yes in [address] [place] [address]

## 2018-04-12 ENCOUNTER — HOSPITAL ENCOUNTER (OUTPATIENT)
Dept: PHYSICAL THERAPY | Age: 63
Discharge: HOME | End: 2018-04-12
Payer: COMMERCIAL

## 2018-04-12 PROCEDURE — 97140 MANUAL THERAPY 1/> REGIONS: CPT | Performed by: PHYSICAL THERAPIST

## 2018-04-12 PROCEDURE — 97161 PT EVAL LOW COMPLEX 20 MIN: CPT | Performed by: PHYSICAL THERAPIST

## 2018-04-12 PROCEDURE — 97110 THERAPEUTIC EXERCISES: CPT | Performed by: PHYSICAL THERAPIST

## 2018-04-12 NOTE — PROGRESS NOTES
[facility]  [address]  [place], [address]  Phone: [phone]  Fax: [phone]    Plan of Care/Statement of Necessity for Physical Therapy Services  2-15    Patient name: Grant Chan  : [id number]  Provider#: [id number]  Referral source: Marialuisa Frazier MD      Medical/Treatment Diagnosis: Right shoulder pain [M25.511]     Prior Hospitalization: see medical history     Comorbidities: see evaluation  Prior Level of Function: see evaluation  Medications: Verified on Patient Summary List    Start of Care: 18      Onset Date: 3/22/18       The Plan of Care and following information is based on the information from the initial evaluation. Assessment/ key information: Pt is a 60year old female s/p R mini open RTC repair and shoulder arthroscopy with subacromial debridement on 3/22/18.  She will benefit from PT to address problem list below    Evaluation Complexity History MEDIUM  Complexity : 1-2 comorbidities / personal factors will impact the outcome/ POC ; Examination LOW Complexity : 1-2 Standardized tests and measures addressing body structure, function, activity limitation and / or participation in recreation  ;Presentation LOW Complexity : Stable, uncomplicated  ;Clinical Decision Making MEDIUM Complexity : FOTO score of 26-74  Overall Complexity Rating: LOW     Problem List: pain affecting function, decrease ROM, decrease strength, edema affecting function, decrease ADL/ functional abilitiies, decrease activity tolerance and decrease flexibility/ joint mobility   Treatment Plan may include any combination of the following: Therapeutic exercise, Therapeutic activities, Neuromuscular re-education, Physical agent/modality, Manual therapy, Patient education, Self Care training and Functional mobility training  Patient / Family readiness to learn indicated by: asking questions, trying to perform skills and interest  Persons(s) to be included in education: patient (P)  Barriers to Learning/Limitations: None  Patient Goal (s): see evaluation  Patient Self Reported Health Status: good  Rehabilitation Potential: good    Short Term Goals: To be accomplished in 4-6 weeks:  1) Pt will be independent in initial HEP  2) Pt will report good compliance with ice regimen in order to decrease inflammation and manage pain levels  3) Pt will improve R shoulder PROM flexion to > or =145 deg in order to progress towards reaching into cabinets  4) Pt will improve R shoulder PROM ER to > or =60 deg in order to progress towards donning/doffing clothing with ease    Long Term Goals: To be accomplished in 10-12 weeks:  1) Pt will improve R shoulder AROM flexion to > or =145 deg in order to reach into cabinets and perform ADL's with ease  2) Pt will improve R shoulder AROM ER to > or =70 deg in order to   3) Pt will report being able to don/doff clothing without R shoulder pain  4) Pt will demonstrate > or =4/5 R shoulder scaption strength in order to lift objects    Frequency / Duration: Patient to be seen 1-2 times per week for 10-12 weeks. Patient/ Caregiver education and instruction: self care, activity modification and exercises    [x]  Plan of care has been reviewed with SABINO Connolly, PT 4/12/2018 9:07 AM    ________________________________________________________________________    I certify that the above Therapy Services are being furnished while the patient is under my care. I agree with the treatment plan and certify that this therapy is necessary.     [de-identified] Signature:____________________  Date:____________Time: _________

## 2018-04-12 NOTE — PROGRESS NOTES
[facility]  [address] [facility], [address]  Phone: [phone]  Fax: [phone]    PT INITIAL EVALUATION NOTE - [facility] 2-15    Patient Name: Alvarado Jacobson  Date:2018  : 1955  [x]  Patient  Verified  Payor: [facility] / Plan: Chelsea Donovan / Product Type: PPO /    In time: 9:00 AM  Out time: 9:50 AM  Total Treatment Time (min): 50  Total Timed Codes (min): 30  1:1 Treatment Time ([facility] only): --   Visit #: 1     Treatment Area: Right shoulder pain [M25.511]    SUBJECTIVE    Any medication changes, allergies to medications, adverse drug reactions, diagnosis change, or new procedure performed?: [] No    [x] Yes (see summary sheet for update)    Current symptoms/chief complaint:    Pt s/p R mini open RTC repair and shoulder arthroscopy with subacromial debridement on 3/22/18. She states she has overall been doing well-- had a pillow in her sling which he removed at last MD visit 1.5 weeks ago; next MD appointment in 2-3 weeks. She states MD placed 3 anchors in her shoulder; was told at last visit that she should not be using her shoulder [therapist planning to call MD office in order to confirm MD recommendations]. MRI results (from [address] [place] [address]): IMPRESSION:   1. Large full-thickness tear of the supraspinatus with 2.6 m retraction. 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 High-grade partial-thickness undersurface tearing anterior infraspinatus and superior subscapularis  3. 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  She is on schedule to return to work on May 21-- states she must be able to return full duty for her job    Prior level of function: difficulty raising arm, donning/doffing clothing, lifting objects for several months    Patient goal(s): \"to be able to raise and move arm without pain\"     Objective:      Observation/posture:   Pt with R UE in sling, no bolster. All portal holes, incisions appear to be healing well, no redness or tenderness surrounding areas    ROM:    PROM R shoulder:  Flex= 105 deg (p!)  ER at 0 deg= 31 deg (p!)  IR at 45 deg= 60 deg     Outcome Measure: Patient presents with an initial FOTO score of 45/100. Today's Treatment[de-identified]    10 min Therapeutic Exercise:  [x] See flow sheet : scap pinches, R AROM elbow flexion/extension in sitting, pendulums    Rationale: increase ROM and increase strength to improve the patients ability to don/doff clothing, perform ADL's with dec'd symptoms    20 min Manual Therapy: PROM R shoulder flex, abd, IR, ER to tolerance  Gentle oscillations for pain control and to promote relaxation    Rationale: decrease pain, increase ROM and increase tissue extensibility to improve the patients ability to lift objects, reach overhead into cabinets    Ice- 10 min. R shoulder.            With   [x] TE   [] TA   [] neuro   [] other: Patient Education: [x] Review HEP    [] Progressed/Changed HEP based on:   [x] positioning   [] body mechanics   [] transfers   [x] heat/ice application    [x] other: renee/phys; PT POC; importance of performing HEP in order to maximize benefit of PT; strongly encouraged use of ice to for 10-20 min throughout the day in order to manage pain levels and decrease inflammation; reviewed that she is PROM only at this time and she should not be actively moving R shoulder at this time per protocol; reviewed proper resting positions and to keep arm propped for comfort     Pain Level (0-10 scale) post treatment: \"it's achy but feels good to get it moving\"    Assessment:   [x] See POC    Plan:    [x] See Jose Luis Campbell PT, DPT   4/12/2018 9:07 AM

## 2018-04-18 ENCOUNTER — HOSPITAL ENCOUNTER (OUTPATIENT)
Dept: PHYSICAL THERAPY | Age: 63
Discharge: HOME | End: 2018-04-18
Payer: COMMERCIAL

## 2018-04-18 PROCEDURE — 97140 MANUAL THERAPY 1/> REGIONS: CPT | Performed by: PHYSICAL THERAPIST

## 2018-04-18 PROCEDURE — 97110 THERAPEUTIC EXERCISES: CPT | Performed by: PHYSICAL THERAPIST

## 2018-04-18 NOTE — PROGRESS NOTES
PT DAILY TREATMENT NOTE 2-15    Patient Name: Derek De La Rosa  Date:2018  : 1955  [x]  Patient  Verified  Payor: Jyothi Vargas / Plan: Jadyn Simpson / Product Type: PPO /    In time: 9:45 AM  Out time: 10:30 AM  Total Treatment Time (min): 45 (35 timed)  Visit #: 2     Treatment Area: Right shoulder pain [M25.511]    SUBJECTIVE  Pain Level (0-10 scale): 6/10  Any medication changes, allergies to medications, adverse drug reactions, diagnosis change, or new procedure performed?: [x] No    [] Yes (see summary sheet for update)  Subjective functional status/changes:   [] No changes reported  Pt 15 min late for PT appointment-- states her daughter, who is her ride, was running late    She states good compliance with HEP; her shoulder has been achy  Pt states she went to see MD earlier this week- \"I forgot to tell you about that appointment\"-- he wants her to continue with PROM only and in 2 weeks she can take her sling off when at her house, but she should wear it in the community    OBJECTIVE    Modality rationale: decrease edema, decrease inflammation and decrease pain to improve the patients ability to reach overhead   Min Type Additional Details    [] Estim: []Att   []Unatt        []TENS instruct                  []IFC  []Premod   []NMES                     []Other:  []w/US   []w/ice   []w/heat  Position:  Location:    []  Traction: [] Cervical       []Lumbar                       [] Prone          []Supine                       []Intermittent   []Continuous Lbs:  [] before manual  [] after manual  []w/heat    []  Ultrasound: []Continuous   [] Pulsed at:                            []1MHz   []3MHz Location:  W/cm2:    []  Paraffin         Location:  []w/heat   10 [x]  Ice     []  Heat  []  Ice massage Position: seated, UE supported  Location: R shoulder    []  Laser  []  Other: Position:  Location:    []  Vasopneumatic Device Pressure:       [] lo [] med [] hi   Temperature:    [x] Skin assessment post-treatment:  [x]intact []redness- no adverse reaction    []redness  adverse reaction:     10 min Therapeutic Exercise:  [x] See flow sheet : reviewed HEP   Rationale: increase ROM and increase strength to improve the patients ability to reach overhead, lift objects, perform daily chores    25 min Manual Therapy:  PROM R shoulder flex, abd, IR, ER to tolerance   Gentle GH oscillations for pain control   Rationale: decrease pain, increase ROM and increase tissue extensibility  to improve the patients ability to perform daily activities, don/doff clothing          With   [] TE   [] TA   [] neuro   [] other: Patient Education: [x] Review HEP    [] Progressed/Changed HEP based on:   [] positioning   [] body mechanics   [] transfers   [] heat/ice application    [] other:      Other Objective/Functional Measures: n/a     Pain Level (0-10 scale) post treatment: 3/10    ASSESSMENT/Changes in Function:   Pt tolerated PROM well, good ability to relax during manual techniques. Dec'd pain levels after therapy. Patient will continue to benefit from skilled PT services to modify and progress therapeutic interventions, address functional mobility deficits, address ROM deficits, address strength deficits, analyze and address soft tissue restrictions, analyze and cue movement patterns, analyze and modify body mechanics/ergonomics and assess and modify postural abnormalities to attain remaining goals.      []  See Plan of Care  []  See progress note/recertification  []  See Discharge Summary         Progress towards goals / Updated goals:  nt    PLAN  []  Upgrade activities as tolerated     []  Continue plan of care  []  Update interventions per flow sheet       []  Discharge due to:_  []  Other:_      Chelsea Kwok PT 4/18/2018  10:28 AM

## 2018-04-20 ENCOUNTER — HOSPITAL ENCOUNTER (OUTPATIENT)
Dept: PHYSICAL THERAPY | Age: 63
Discharge: HOME | End: 2018-04-20
Payer: COMMERCIAL

## 2018-04-20 PROCEDURE — 97110 THERAPEUTIC EXERCISES: CPT | Performed by: PHYSICAL THERAPIST

## 2018-04-20 PROCEDURE — 97140 MANUAL THERAPY 1/> REGIONS: CPT | Performed by: PHYSICAL THERAPIST

## 2018-04-20 NOTE — PROGRESS NOTES
PT DAILY TREATMENT NOTE 2-15    Patient Name: Grant Chan  Date:2018  : 1955  [x]  Patient  Verified  Payor: Taylor Peraza / Plan: Ari Nguyen / Product Type: PPO /    In time: 11:30 AM  Out time: 12:15 PM  Total Treatment Time (min): 45 (35 timed)  Visit #: 3    Treatment Area: Right shoulder pain [M25.511]    SUBJECTIVE  Pain Level (0-10 scale): 5-6/10  Any medication changes, allergies to medications, adverse drug reactions, diagnosis change, or new procedure performed?: [x] No    [] Yes (see summary sheet for update)  Subjective functional status/changes:   [] No changes reported  Pt states she was a little sore after last visit- \"it felt good to move it though\"    OBJECTIVE    Modality rationale: decrease edema, decrease inflammation and decrease pain to improve the patients ability to reach overhead   Min Type Additional Details    [] Estim: []Att   []Unatt        []TENS instruct                  []IFC  []Premod   []NMES                     []Other:  []w/US   []w/ice   []w/heat  Position:  Location:    []  Traction: [] Cervical       []Lumbar                       [] Prone          []Supine                       []Intermittent   []Continuous Lbs:  [] before manual  [] after manual  []w/heat    []  Ultrasound: []Continuous   [] Pulsed at:                            []1MHz   []3MHz Location:  W/cm2:    []  Paraffin         Location:  []w/heat   10 [x]  Ice     []  Heat  []  Ice massage Position: seated, UE supported  Location: R shoulder    []  Laser  []  Other: Position:  Location:    []  Vasopneumatic Device Pressure:       [] lo [] med [] hi   Temperature:    [x] Skin assessment post-treatment:  [x]intact []redness- no adverse reaction    []redness  adverse reaction:     10 min Therapeutic Exercise:  [x] See flow sheet : reviewed HEP   Rationale: increase ROM and increase strength to improve the patients ability to reach overhead, lift objects, perform daily chores    25 min Manual Therapy:  PROM R shoulder flex, abd, IR, ER to tolerance   Gentle GH oscillations for pain control   Rationale: decrease pain, increase ROM and increase tissue extensibility  to improve the patients ability to perform daily activities, don/doff clothing          With   [] TE   [] TA   [] neuro   [] other: Patient Education: [x] Review HEP    [] Progressed/Changed HEP based on:   [] positioning   [] body mechanics   [] transfers   [] heat/ice application    [] other:      Other Objective/Functional Measures: n/a     Pain Level (0-10 scale) post treatment: 2-3/10    ASSESSMENT/Changes in Function:   ROM progressing. Patient will continue to benefit from skilled PT services to modify and progress therapeutic interventions, address functional mobility deficits, address ROM deficits, address strength deficits, analyze and address soft tissue restrictions, analyze and cue movement patterns, analyze and modify body mechanics/ergonomics and assess and modify postural abnormalities to attain remaining goals.      []  See Plan of Care  []  See progress note/recertification  []  See Discharge Summary         Progress towards goals / Updated goals:  nt    PLAN  []  Upgrade activities as tolerated     []  Continue plan of care  []  Update interventions per flow sheet       []  Discharge due to:_  []  Other:_      Saqib Tanner, PT 4/20/2018  11:59 AM

## 2018-04-23 ENCOUNTER — HOSPITAL ENCOUNTER (OUTPATIENT)
Dept: PHYSICAL THERAPY | Age: 63
Discharge: HOME | End: 2018-04-23
Payer: COMMERCIAL

## 2018-04-23 PROCEDURE — 97140 MANUAL THERAPY 1/> REGIONS: CPT | Performed by: PHYSICAL THERAPY ASSISTANT

## 2018-04-23 PROCEDURE — 97110 THERAPEUTIC EXERCISES: CPT | Performed by: PHYSICAL THERAPY ASSISTANT

## 2018-04-23 NOTE — PROGRESS NOTES
PT DAILY TREATMENT NOTE 2-15    Patient Name: Mega Mullins  Date:2018  : 1955  [x]  Patient  Verified  Payor: Sil Au / Plan: Digna Chi / Product Type: PPO /    In time: 10:00 AM  Out time: 10:45 PM  Total Treatment Time (min): 45 (35 timed)  Visit #: 4    Treatment Area: Right shoulder pain [M25.511]    SUBJECTIVE  Pain Level (0-10 scale): 6/10  Any medication changes, allergies to medications, adverse drug reactions, diagnosis change, or new procedure performed?: [x] No    [] Yes (see summary sheet for update)  Subjective functional status/changes:   [] No changes reported  Pt reports she has had difficulty sleeping at night due to sharp pains that she gets in her R shoulder. States she is taking tramadol at night but that doesn't seem to be helping.      OBJECTIVE    Modality rationale: decrease edema, decrease inflammation and decrease pain to improve the patients ability to reach overhead   Min Type Additional Details    [] Estim: []Att   []Unatt        []TENS instruct                  []IFC  []Premod   []NMES                     []Other:  []w/US   []w/ice   []w/heat  Position:  Location:    []  Traction: [] Cervical       []Lumbar                       [] Prone          []Supine                       []Intermittent   []Continuous Lbs:  [] before manual  [] after manual  []w/heat    []  Ultrasound: []Continuous   [] Pulsed at:                            []1MHz   []3MHz Location:  W/cm2:    []  Paraffin         Location:  []w/heat   10 [x]  Ice     []  Heat  []  Ice massage Position: seated, UE supported  Location: R shoulder    []  Laser  []  Other: Position:  Location:    []  Vasopneumatic Device Pressure:       [] lo [] med [] hi   Temperature:    [x] Skin assessment post-treatment:  [x]intact []redness- no adverse reaction    []redness  adverse reaction:     10 min Therapeutic Exercise:  [x] See flow sheet : reviewed HEP   Rationale: increase ROM and increase strength to improve the patients ability to reach overhead, lift objects, perform daily chores    25 min Manual Therapy:  PROM R shoulder flex, abd, IR, ER to tolerance   Gentle GH oscillations for pain control   Rationale: decrease pain, increase ROM and increase tissue extensibility  to improve the patients ability to perform daily activities, don/doff clothing          With   [x] TE   [] TA   [] neuro   [] other: Patient Education: [x] Review HEP    [] Progressed/Changed HEP based on:   [] positioning   [] body mechanics   [] transfers   [] heat/ice application    [x] other: suggested patient try adjusting her pillows for R UE support while she is sleeping to see If that may help with the sharp pains she is experiencing at night. Also advised patient to avoid carrying any objects in R hand at this time. Other Objective/Functional Measures: n/a     Pain Level (0-10 scale) post treatment: 0/10    ASSESSMENT/Changes in Function:   Cues for relaxation during PROM. Overall tolerated session well. Patient will continue to benefit from skilled PT services to modify and progress therapeutic interventions, address functional mobility deficits, address ROM deficits, address strength deficits, analyze and address soft tissue restrictions, analyze and cue movement patterns, analyze and modify body mechanics/ergonomics and assess and modify postural abnormalities to attain remaining goals.      []  See Plan of Care  []  See progress note/recertification  []  See Discharge Summary         Progress towards goals / Updated goals:  nt    PLAN  []  Upgrade activities as tolerated     []  Continue plan of care  []  Update interventions per flow sheet       []  Discharge due to:_  []  Other:_      Brendan Zimmerman PTA 4/23/2018  10:00 AM

## 2018-04-25 ENCOUNTER — HOSPITAL ENCOUNTER (OUTPATIENT)
Dept: PHYSICAL THERAPY | Age: 63
Discharge: HOME | End: 2018-04-25
Payer: COMMERCIAL

## 2018-04-25 PROCEDURE — 97110 THERAPEUTIC EXERCISES: CPT | Performed by: PHYSICAL THERAPIST

## 2018-04-25 PROCEDURE — 97140 MANUAL THERAPY 1/> REGIONS: CPT | Performed by: PHYSICAL THERAPIST

## 2018-04-25 NOTE — PROGRESS NOTES
PT DAILY TREATMENT NOTE 2-15    Patient Name: Torie Cason  Date:2018  : 1955  [x]  Patient  Verified  Payor: Chris Cordoba / Plan: Ruperto Corado / Product Type: PPO /    In time: 10:00 AM  Out time: 10:45 PM  Total Treatment Time (min): 45 (35 timed)  Visit #: 5    Treatment Area: Right shoulder pain [M25.511]    SUBJECTIVE  Pain Level (0-10 scale): 6/10  Any medication changes, allergies to medications, adverse drug reactions, diagnosis change, or new procedure performed?: [x] No    [] Yes (see summary sheet for update)  Subjective functional status/changes:   [] No changes reported  Pt states that she has been feeling better after coming to therapy.  Per MD she is able to take her sling off at home, is unsure whether or not she should sleep with her arm in or out of the sling    OBJECTIVE    Modality rationale: decrease edema, decrease inflammation and decrease pain to improve the patients ability to reach overhead   Min Type Additional Details    [] Estim: []Att   []Unatt        []TENS instruct                  []IFC  []Premod   []NMES                     []Other:  []w/US   []w/ice   []w/heat  Position:  Location:    []  Traction: [] Cervical       []Lumbar                       [] Prone          []Supine                       []Intermittent   []Continuous Lbs:  [] before manual  [] after manual  []w/heat    []  Ultrasound: []Continuous   [] Pulsed at:                            []1MHz   []3MHz Location:  W/cm2:    []  Paraffin         Location:  []w/heat   10 [x]  Ice     []  Heat  []  Ice massage Position: seated, UE supported  Location: R shoulder    []  Laser  []  Other: Position:  Location:    []  Vasopneumatic Device Pressure:       [] lo [] med [] hi   Temperature:    [x] Skin assessment post-treatment:  [x]intact []redness- no adverse reaction    []redness  adverse reaction:     10 min Therapeutic Exercise:  [x] See flow sheet : reviewed HEP   Rationale: increase ROM and increase strength to improve the patients ability to reach overhead, lift objects, perform daily chores    25 min Manual Therapy:  PROM R shoulder flex, abd, IR, ER to tolerance   Gentle GH oscillations for pain control   Rationale: decrease pain, increase ROM and increase tissue extensibility  to improve the patients ability to perform daily activities, don/doff clothing          With   [x] TE   [] TA   [] neuro   [] other: Patient Education: [x] Review HEP    [] Progressed/Changed HEP based on:   [] positioning   [] body mechanics   [] transfers   [] heat/ice application    [x] other: suggested patient continue with sleeping in sling at night due to high pain levels while sleeping-- and use pillows as reviewed at last PT visit     Other Objective/Functional Measures: n/a     Pain Level (0-10 scale) post treatment: 0/10    ASSESSMENT/Changes in Function:   PROM improving. Added supine ER today with strict instruction on PROM only during exercise. Pt tolerated well. Patient will continue to benefit from skilled PT services to modify and progress therapeutic interventions, address functional mobility deficits, address ROM deficits, address strength deficits, analyze and address soft tissue restrictions, analyze and cue movement patterns, analyze and modify body mechanics/ergonomics and assess and modify postural abnormalities to attain remaining goals.      []  See Plan of Care  []  See progress note/recertification  []  See Discharge Summary         Progress towards goals / Updated goals:  nt    PLAN  []  Upgrade activities as tolerated     []  Continue plan of care  []  Update interventions per flow sheet       []  Discharge due to:_  []  Other:_      Tarah Roberto, PT 4/25/2018  10:02 AM

## 2018-04-30 ENCOUNTER — PATIENT OUTREACH (OUTPATIENT)
Dept: OTHER | Age: 63
End: 2018-04-30

## 2018-04-30 ENCOUNTER — HOSPITAL ENCOUNTER (OUTPATIENT)
Dept: PHYSICAL THERAPY | Age: 63
Discharge: HOME | End: 2018-04-30
Payer: COMMERCIAL

## 2018-04-30 PROCEDURE — 97110 THERAPEUTIC EXERCISES: CPT | Performed by: PHYSICAL THERAPIST

## 2018-04-30 PROCEDURE — 97140 MANUAL THERAPY 1/> REGIONS: CPT | Performed by: PHYSICAL THERAPIST

## 2018-04-30 NOTE — PROGRESS NOTES
PT DAILY TREATMENT NOTE 2-15    Patient Name: Grant Chan  Date:2018  : 1955  [x]  Patient  Verified  Payor: Taylor Peraza / Plan: Ari Nguyen / Product Type: PPO /    In time: 10:20 AM  Out time: 11:20 AM  Total Treatment Time (min): 60 (50 timed)  Visit #: 6    Treatment Area: Right shoulder pain [M25.511]    SUBJECTIVE  Pain Level (0-10 scale): 2-310  Any medication changes, allergies to medications, adverse drug reactions, diagnosis change, or new procedure performed?: [x] No    [] Yes (see summary sheet for update)  Subjective functional status/changes:   [] No changes reported  Pt states that she had inc'd pain last night, she took a tramadol this AM, \"it was a 7 last night but its come down some\"    OBJECTIVE    Modality rationale: decrease edema, decrease inflammation and decrease pain to improve the patients ability to reach overhead   Min Type Additional Details    [] Estim: []Att   []Unatt        []TENS instruct                  []IFC  []Premod   []NMES                     []Other:  []w/US   []w/ice   []w/heat  Position:  Location:    []  Traction: [] Cervical       []Lumbar                       [] Prone          []Supine                       []Intermittent   []Continuous Lbs:  [] before manual  [] after manual  []w/heat    []  Ultrasound: []Continuous   [] Pulsed at:                            []1MHz   []3MHz Location:  W/cm2:    []  Paraffin         Location:  []w/heat   10 [x]  Ice     []  Heat  []  Ice massage Position: seated, UE supported  Location: R shoulder    []  Laser  []  Other: Position:  Location:    []  Vasopneumatic Device Pressure:       [] lo [] med [] hi   Temperature:    [x] Skin assessment post-treatment:  [x]intact []redness- no adverse reaction    []redness  adverse reaction:     25 min Therapeutic Exercise:  [x] See flow sheet : reviewed HEP   Rationale: increase ROM and increase strength to improve the patients ability to reach overhead, lift objects, perform daily chores    25 min Manual Therapy:  PROM R shoulder flex, abd, IR, ER to tolerance   Gentle GH oscillations for pain control   Rationale: decrease pain, increase ROM and increase tissue extensibility  to improve the patients ability to perform daily activities, don/doff clothing          With   [x] TE   [] TA   [] neuro   [] other: Patient Education: [x] Review HEP    [] Progressed/Changed HEP based on:   [] positioning   [] body mechanics   [] transfers   [] heat/ice application    [x] other: suggested patient continue with sleeping in sling at night due to high pain levels while sleeping-- and use pillows as reviewed at last PT visit     Other Objective/Functional Measures: n/a     Pain Level (0-10 scale) post treatment: 0/10    ASSESSMENT/Changes in Function:   Added supine ER with cane to HEP-- reviewed importance of PROM. Pt tolerated seated table slides well today. Progressing with ROM. Patient will continue to benefit from skilled PT services to modify and progress therapeutic interventions, address functional mobility deficits, address ROM deficits, address strength deficits, analyze and address soft tissue restrictions, analyze and cue movement patterns, analyze and modify body mechanics/ergonomics and assess and modify postural abnormalities to attain remaining goals.      []  See Plan of Care  []  See progress note/recertification  []  See Discharge Summary         Progress towards goals / Updated goals:  nt    PLAN  []  Upgrade activities as tolerated     []  Continue plan of care  []  Update interventions per flow sheet       []  Discharge due to:_  []  Other:_      Rayna Quintanilla, PT 4/30/2018  10:48 AM

## 2018-05-01 NOTE — PROGRESS NOTES
Resolving current episode 3/22/18(Transitions of care complete). No further ED/UC or hospital admissions within 30 days post discharge. Patient attended follow-up appointments as directed. No outreach from patient to  Rigo Joseph  - Patient returned call from previous 4/30/18 . * Patent reports  that she has been feeling better.    -Attending Therapy as ordered.    -Per MD she is able to take her sling off at home. - Having difficulty sleeping at night due to sharp pains that she gets in her R shoulder. states she is taking tramadol at night. Rates pain as 7/10 at night. Patient offers no further questions or concerns. Patient has contact information. Reminded her that I can be reached if needs arise in the future.

## 2018-05-02 ENCOUNTER — APPOINTMENT (OUTPATIENT)
Dept: PHYSICAL THERAPY | Age: 63
End: 2018-05-02
Payer: COMMERCIAL

## 2018-05-07 ENCOUNTER — HOSPITAL ENCOUNTER (OUTPATIENT)
Dept: PHYSICAL THERAPY | Age: 63
Discharge: HOME | End: 2018-05-07
Payer: COMMERCIAL

## 2018-05-07 PROCEDURE — 97140 MANUAL THERAPY 1/> REGIONS: CPT | Performed by: PHYSICAL THERAPIST

## 2018-05-07 NOTE — PROGRESS NOTES
PT to MD Note    Patient Name: Katherin Oakley  Date:2018  : 1955  Visit #: 7    Treatment Area: Right shoulder pain [M25.511]    SUBJECTIVE  Pain Level (0-10 scale): 2-3/10  Pt has MD appointment tomorrow. She states compliance with keeping sling off while at home    OBJECTIVE    PROM R shoulder:  Flexion= 130 deg  Abduction= 100 deg  ER at 45 deg= 46 deg  IR at 90 deg= 60 deg (with ant trans of humeral head)     Pain Level (0-10 scale) post treatment: 0/10    ASSESSMENT  Pt progressing well with ROM. Very compliant with ROM restrictions at this time. Please advise, thank you!     Darcy Diaz, PT 2018  3:07 PM

## 2018-05-07 NOTE — PROGRESS NOTES
PT DAILY TREATMENT NOTE 2-15    Patient Name: Mega Mullins  Date:2018  : 1955  [x]  Patient  Verified  Payor: Sil Au / Plan: [id number] / Product Type: PPO /    In time: 10:00 AM  Out time: 10:30 AM  Total Treatment Time (min): 30 (30 timed)  Visit #: 7    Treatment Area: Right shoulder pain [M25.511]    SUBJECTIVE  Pain Level (0-10 scale): 2-3/10  Any medication changes, allergies to medications, adverse drug reactions, diagnosis change, or new procedure performed?: [x] No    [] Yes (see summary sheet for update)  Subjective functional status/changes:   [] No changes reported  Pt has MD appointment tomorrow.  She has to leave by 10:30 today because her daughter (who is her ride) has to be in court    OBJECTIVE    Modality rationale: decrease edema, decrease inflammation and decrease pain to improve the patients ability to reach overhead   Min Type Additional Details    [] Estim: []Att   []Unatt        []TENS instruct                  []IFC  []Premod   []NMES                     []Other:  []w/US   []w/ice   []w/heat  Position:  Location:    []  Traction: [] Cervical       []Lumbar                       [] Prone          []Supine                       []Intermittent   []Continuous Lbs:  [] before manual  [] after manual  []w/heat    []  Ultrasound: []Continuous   [] Pulsed at:                            []1MHz   []3MHz Location:  W/cm2:    []  Paraffin         Location:  []w/heat   At home [x]  Ice     []  Heat  []  Ice massage Position: seated, UE supported  Location: R shoulder    []  Laser  []  Other: Position:  Location:    []  Vasopneumatic Device Pressure:       [] lo [] med [] hi   Temperature:    [x] Skin assessment post-treatment:  [x]intact []redness- no adverse reaction    []redness  adverse reaction:     -- min Therapeutic Exercise:  [x] See flow sheet :    Rationale: increase ROM and increase strength to improve the patients ability to reach overhead, lift objects, perform daily chores    30 min Manual Therapy:  PROM R shoulder flex, abd, IR, ER to tolerance   Gentle GH oscillations for pain control   Rationale: decrease pain, increase ROM and increase tissue extensibility  to improve the patients ability to perform daily activities, don/doff clothing          With   [x] TE   [] TA   [] neuro   [] other: Patient Education: [x] Review HEP    [] Progressed/Changed HEP based on:   [] positioning   [] body mechanics   [] transfers   [] heat/ice application    [x] other:     Other Objective/Functional Measures:   PROM R shoulder:  Flexion= 130 deg  Abduction= 100 deg  ER at 45 deg= 46 deg  IR at 90 deg= 60 deg (with ant trans of humeral head)     Pain Level (0-10 scale) post treatment: 0/10    ASSESSMENT/Changes in Function:   Pt progressing well with ROM as tolerated. Very compliant with ROM restrictions at this time. Please advise, thank you! Patient will continue to benefit from skilled PT services to modify and progress therapeutic interventions, address functional mobility deficits, address ROM deficits, address strength deficits, analyze and address soft tissue restrictions, analyze and cue movement patterns, analyze and modify body mechanics/ergonomics and assess and modify postural abnormalities to attain remaining goals.      []  See Plan of Care  []  See progress note/recertification  []  See Discharge Summary         Progress towards goals / Updated goals:  nt    PLAN  []  Upgrade activities as tolerated     []  Continue plan of care  []  Update interventions per flow sheet       []  Discharge due to:_  []  Other:_      Blaire Tapia, PT 5/7/2018  10:36 AM

## 2018-05-09 ENCOUNTER — HOSPITAL ENCOUNTER (OUTPATIENT)
Dept: PHYSICAL THERAPY | Age: 63
Discharge: HOME | End: 2018-05-09
Payer: COMMERCIAL

## 2018-05-09 PROCEDURE — 97140 MANUAL THERAPY 1/> REGIONS: CPT | Performed by: PHYSICAL THERAPIST

## 2018-05-09 PROCEDURE — 97110 THERAPEUTIC EXERCISES: CPT | Performed by: PHYSICAL THERAPIST

## 2018-05-14 ENCOUNTER — HOSPITAL ENCOUNTER (OUTPATIENT)
Dept: PHYSICAL THERAPY | Age: 63
Discharge: HOME | End: 2018-05-14
Payer: COMMERCIAL

## 2018-05-14 PROCEDURE — 97110 THERAPEUTIC EXERCISES: CPT | Performed by: PHYSICAL THERAPIST

## 2018-05-14 PROCEDURE — 97140 MANUAL THERAPY 1/> REGIONS: CPT | Performed by: PHYSICAL THERAPIST

## 2018-05-14 NOTE — PROGRESS NOTES
PT DAILY TREATMENT NOTE 2-15    Patient Name: Derek De La Rosa  Date:2018  : 1955  [x]  Patient  Verified  Payor: Jyothi Vargas / Plan: Jadyn Simpson / Product Type: PPO /    In time: 10:00 AM  Out time: 10:50 AM  Total Treatment Time (min): 50 (50 timed)  Visit #: 9    Treatment Area: Right shoulder pain [M25.511]    SUBJECTIVE  Pain Level (0-10 scale): 10  Any medication changes, allergies to medications, adverse drug reactions, diagnosis change, or new procedure performed?: [x] No    [] Yes (see summary sheet for update)  Subjective functional status/changes:   [] No changes reported  Pt states that she has been doing well; she has been trying to keep the sling off while at home.  Her daughter was supposed to order pulleys for her, however they have not come in yet    OBJECTIVE    Modality rationale: decrease edema, decrease inflammation and decrease pain to improve the patients ability to reach overhead   Min Type Additional Details    [] Estim: []Att   []Unatt        []TENS instruct                  []IFC  []Premod   []NMES                     []Other:  []w/US   []w/ice   []w/heat  Position:  Location:    []  Traction: [] Cervical       []Lumbar                       [] Prone          []Supine                       []Intermittent   []Continuous Lbs:  [] before manual  [] after manual  []w/heat    []  Ultrasound: []Continuous   [] Pulsed at:                            []1MHz   []3MHz Location:  W/cm2:    []  Paraffin         Location:  []w/heat   To go [x]  Ice     []  Heat  []  Ice massage Position: seated, UE supported  Location: R shoulder    []  Laser  []  Other: Position:  Location:    []  Vasopneumatic Device Pressure:       [] lo [] med [] hi   Temperature:    [x] Skin assessment post-treatment:  [x]intact []redness- no adverse reaction    []redness  adverse reaction:     35 min Therapeutic Exercise:  [x] See flow sheet : pulleys, wall walks   Rationale: increase ROM and increase strength to improve the patients ability to reach overhead, lift objects, perform daily chores    15 min Manual Therapy:  PROM R shoulder flex, abd, IR, ER to tolerance   Gentle GH oscillations for pain control   Rationale: decrease pain, increase ROM and increase tissue extensibility  to improve the patients ability to perform daily activities, don/doff clothing          With   [x] TE   [] TA   [] neuro   [] other: Patient Education: [x] Review HEP    [] Progressed/Changed HEP based on:   [] positioning   [] body mechanics   [] transfers   [] heat/ice application    [x] other:     Other Objective/Functional Measures:   n/a    Pain Level (0-10 scale) post treatment: 5/10    ASSESSMENT/Changes in Function:   Progressed gentle ROM exercises today. Updated HEP to include walkouts as well as supine cane flexion    Patient will continue to benefit from skilled PT services to modify and progress therapeutic interventions, address functional mobility deficits, address ROM deficits, address strength deficits, analyze and address soft tissue restrictions, analyze and cue movement patterns, analyze and modify body mechanics/ergonomics and assess and modify postural abnormalities to attain remaining goals.      []  See Plan of Care  []  See progress note/recertification  []  See Discharge Summary         Progress towards goals / Updated goals:  nt    PLAN  []  Upgrade activities as tolerated     [x]  Continue plan of care  []  Update interventions per flow sheet       []  Discharge due to:_  []  Other:_      Socorro Gallegos PT 5/14/2018  10:01 AM

## 2018-05-16 ENCOUNTER — HOSPITAL ENCOUNTER (OUTPATIENT)
Dept: PHYSICAL THERAPY | Age: 63
Discharge: HOME | End: 2018-05-16
Payer: COMMERCIAL

## 2018-05-16 PROCEDURE — 97140 MANUAL THERAPY 1/> REGIONS: CPT | Performed by: PHYSICAL THERAPIST

## 2018-05-16 PROCEDURE — 97110 THERAPEUTIC EXERCISES: CPT | Performed by: PHYSICAL THERAPIST

## 2018-05-16 NOTE — PROGRESS NOTES
PT DAILY TREATMENT NOTE 2-15    Patient Name: Tyrell Quintana  Date:2018  : 1955  [x]  Patient  Verified  Payor: Vincent Larson / Plan: Sohan Fletcher / Product Type: PPO /    In time: 10:00 AM  Out time: 11:05 AM  Total Treatment Time (min): 65 (65 timed)  Visit #: 10    Treatment Area: Right shoulder pain [M25.511]    SUBJECTIVE  Pain Level (0-10 scale): 5-6/10  Any medication changes, allergies to medications, adverse drug reactions, diagnosis change, or new procedure performed?: [x] No    [] Yes (see summary sheet for update)  Subjective functional status/changes:   [] No changes reported  Pt states that she was having some shooting pains in her arm this AM; she felt good after last PT visit    OBJECTIVE    Modality rationale: decrease edema, decrease inflammation and decrease pain to improve the patients ability to reach overhead   Min Type Additional Details    [] Estim: []Att   []Unatt        []TENS instruct                  []IFC  []Premod   []NMES                     []Other:  []w/US   []w/ice   []w/heat  Position:  Location:    []  Traction: [] Cervical       []Lumbar                       [] Prone          []Supine                       []Intermittent   []Continuous Lbs:  [] before manual  [] after manual  []w/heat    []  Ultrasound: []Continuous   [] Pulsed at:                            []1MHz   []3MHz Location:  W/cm2:    []  Paraffin         Location:  []w/heat   To go [x]  Ice     []  Heat  []  Ice massage Position: seated, UE supported  Location: R shoulder    []  Laser  []  Other: Position:  Location:    []  Vasopneumatic Device Pressure:       [] lo [] med [] hi   Temperature:    [x] Skin assessment post-treatment:  [x]intact []redness- no adverse reaction    []redness  adverse reaction:     50 min Therapeutic Exercise:  [x] See flow sheet : added isometrics 4 direct   Rationale: increase ROM and increase strength to improve the patients ability to reach overhead, lift objects, perform daily chores    15 min Manual Therapy:  PROM R shoulder flex, abd, IR, ER to tolerance   Gentle GH oscillations for pain control   Rationale: decrease pain, increase ROM and increase tissue extensibility  to improve the patients ability to perform daily activities, don/doff clothing          With   [x] TE   [] TA   [] neuro   [] other: Patient Education: [x] Review HEP    [] Progressed/Changed HEP based on:   [] positioning   [] body mechanics   [] transfers   [] heat/ice application    [x] other:     Other Objective/Functional Measures:   n/a    Pain Level (0-10 scale) post treatment: 3/10    ASSESSMENT/Changes in Function:   Pt tolerated addition of isometrics well today without exacerbation of symptoms; cues for gentle contraction. Continuing to progress with ROM    Patient will continue to benefit from skilled PT services to modify and progress therapeutic interventions, address functional mobility deficits, address ROM deficits, address strength deficits, analyze and address soft tissue restrictions, analyze and cue movement patterns, analyze and modify body mechanics/ergonomics and assess and modify postural abnormalities to attain remaining goals.      []  See Plan of Care  []  See progress note/recertification  []  See Discharge Summary         Progress towards goals / Updated goals:  nt    PLAN  []  Upgrade activities as tolerated     [x]  Continue plan of care  []  Update interventions per flow sheet       []  Discharge due to:_  []  Other:_      Wilmer Hidalgo, PT 5/16/2018  10:21 AM

## 2018-05-21 ENCOUNTER — HOSPITAL ENCOUNTER (OUTPATIENT)
Dept: PHYSICAL THERAPY | Age: 63
Discharge: HOME | End: 2018-05-21
Payer: COMMERCIAL

## 2018-05-21 PROCEDURE — 97140 MANUAL THERAPY 1/> REGIONS: CPT | Performed by: PHYSICAL THERAPY ASSISTANT

## 2018-05-21 NOTE — PROGRESS NOTES
PT DAILY TREATMENT NOTE 2-15    Patient Name: Estrella Edward  Date:2018  : 1955  [x]  Patient  Verified  Payor: Mati Bass / Plan: [employer] / Product Type: PPO /    In time: 10:00 AM  Out time: 11:20 AM  Total Treatment Time (min): 20 (20 timed)  Visit #: 11    Treatment Area: Right shoulder pain [M25.511]    SUBJECTIVE  Pain Level (0-10 scale): 6/10  Any medication changes, allergies to medications, adverse drug reactions, diagnosis change, or new procedure performed?: [x] No    [] Yes (see summary sheet for update)  Subjective functional status/changes:   [] No changes reported  Patient reports she is only able to attend PT session for 20 minutes as her daughter has to go to a . States she continues to have burning/shooting pains in R shoulder.      OBJECTIVE    Modality rationale: decrease edema, decrease inflammation and decrease pain to improve the patients ability to reach overhead   Min Type Additional Details    [] Estim: []Att   []Unatt        []TENS instruct                  []IFC  []Premod   []NMES                     []Other:  []w/US   []w/ice   []w/heat  Position:  Location:    []  Traction: [] Cervical       []Lumbar                       [] Prone          []Supine                       []Intermittent   []Continuous Lbs:  [] before manual  [] after manual  []w/heat    []  Ultrasound: []Continuous   [] Pulsed at:                            []1MHz   []3MHz Location:  W/cm2:    []  Paraffin         Location:  []w/heat   At home [x]  Ice     []  Heat  []  Ice massage Position: seated, UE supported  Location: R shoulder    []  Laser  []  Other: Position:  Location:    []  Vasopneumatic Device Pressure:       [] lo [] med [] hi   Temperature:    [x] Skin assessment post-treatment:  [x]intact []redness- no adverse reaction    []redness  adverse reaction:     omit min Therapeutic Exercise:  [x] See flow sheet : added isometrics 4 direct   Rationale: increase ROM and increase strength to improve the patients ability to reach overhead, lift objects, perform daily chores    20 min Manual Therapy:  PROM R shoulder flex, abd, IR, ER to tolerance   Gentle GH oscillations for pain control    Adjusted sling for appropriate fit as patient observed shrugging R shoulder and not using the sling for support of R UE. Rationale: decrease pain, increase ROM and increase tissue extensibility  to improve the patients ability to perform daily activities, don/doff clothing          With   [x] TE   [] TA   [] neuro   [] other: Patient Education: [x] Review HEP    [] Progressed/Changed HEP based on:   [] positioning   [] body mechanics   [] transfers   [] heat/ice application    [x] other:     Other Objective/Functional Measures:   n/a    Pain Level (0-10 scale) post treatment: patient did not report/10    ASSESSMENT/Changes in Function:   ROM progressing. Only performed manual therapy today due to abbreviated session. Adjusted sling for appropriate   Fit. Patient will continue to benefit from skilled PT services to modify and progress therapeutic interventions, address functional mobility deficits, address ROM deficits, address strength deficits, analyze and address soft tissue restrictions, analyze and cue movement patterns, analyze and modify body mechanics/ergonomics and assess and modify postural abnormalities to attain remaining goals.      []  See Plan of Care  []  See progress note/recertification  []  See Discharge Summary         Progress towards goals / Updated goals:  nt    PLAN  []  Upgrade activities as tolerated     [x]  Continue plan of care  []  Update interventions per flow sheet       []  Discharge due to:_  []  Other:_      Jose Gerber, PTA 5/21/2018  10:00 AM

## 2018-05-23 ENCOUNTER — HOSPITAL ENCOUNTER (OUTPATIENT)
Dept: PHYSICAL THERAPY | Age: 63
Discharge: HOME | End: 2018-05-23
Payer: COMMERCIAL

## 2018-05-23 PROCEDURE — 97140 MANUAL THERAPY 1/> REGIONS: CPT | Performed by: PHYSICAL THERAPIST

## 2018-05-23 PROCEDURE — 97110 THERAPEUTIC EXERCISES: CPT | Performed by: PHYSICAL THERAPIST

## 2018-05-23 NOTE — PROGRESS NOTES
PT DAILY TREATMENT NOTE 2-15    Patient Name: Tyrell Quintana  Date:2018  : 1955  [x]  Patient  Verified  Payor: Vincent Larson / Plan: Sohan Fletcher / Product Type: PPO /    In time: 10:00 AM  Out time: 11:15 AM  Total Treatment Time (min): 75 (65 timed)  Visit #: 12    Treatment Area: Right shoulder pain [M25.511]    SUBJECTIVE  Pain Level (0-10 scale): 4-5/10  Any medication changes, allergies to medications, adverse drug reactions, diagnosis change, or new procedure performed?: [x] No    [] Yes (see summary sheet for update)  Subjective functional status/changes:   [] No changes reported  Patient states \"those anchors, I get these burning, shooting pains in my arm if I move it a certain way\"    OBJECTIVE    Modality rationale: decrease edema, decrease inflammation and decrease pain to improve the patients ability to reach overhead   Min Type Additional Details    [] Estim: []Att   []Unatt        []TENS instruct                  []IFC  []Premod   []NMES                     []Other:  []w/US   []w/ice   []w/heat  Position:  Location:    []  Traction: [] Cervical       []Lumbar                       [] Prone          []Supine                       []Intermittent   []Continuous Lbs:  [] before manual  [] after manual  []w/heat    []  Ultrasound: []Continuous   [] Pulsed at:                            []1MHz   []3MHz Location:  W/cm2:    []  Paraffin         Location:  []w/heat   10 [x]  Ice     []  Heat  []  Ice massage Position: seated, UE supported  Location: R shoulder    []  Laser  []  Other: Position:  Location:    []  Vasopneumatic Device Pressure:       [] lo [] med [] hi   Temperature:    [x] Skin assessment post-treatment:  [x]intact []redness- no adverse reaction    []redness  adverse reaction:     45 min Therapeutic Exercise:  [x] See flow sheet : added serratus punch, posterior capsule stretch   Rationale: increase ROM and increase strength to improve the patients ability to reach overhead, lift objects, perform daily chores    20 min Manual Therapy:  PROM R shoulder flex, abd, IR, ER to tolerance   Gentle GH oscillations for pain control   Rationale: decrease pain, increase ROM and increase tissue extensibility  to improve the patients ability to perform daily activities, don/doff clothing          With   [x] TE   [] TA   [] neuro   [] other: Patient Education: [x] Review HEP    [] Progressed/Changed HEP based on:   [] positioning   [] body mechanics   [] transfers   [] heat/ice application    [x] other:     Other Objective/Functional Measures:   n/a    Pain Level (0-10 scale) post treatment: 2/10    ASSESSMENT/Changes in Function:   ROM and strength continuing to progress. Added gentle posterior capsule stretch to HEP    Patient will continue to benefit from skilled PT services to modify and progress therapeutic interventions, address functional mobility deficits, address ROM deficits, address strength deficits, analyze and address soft tissue restrictions, analyze and cue movement patterns, analyze and modify body mechanics/ergonomics and assess and modify postural abnormalities to attain remaining goals.      []  See Plan of Care  []  See progress note/recertification  []  See Discharge Summary         Progress towards goals / Updated goals:  nt    PLAN  []  Upgrade activities as tolerated     [x]  Continue plan of care  []  Update interventions per flow sheet       []  Discharge due to:_  []  Other:_      Darcy Diaz PT 5/23/2018  10:08 AM

## 2018-05-28 ENCOUNTER — APPOINTMENT (OUTPATIENT)
Dept: PHYSICAL THERAPY | Age: 63
End: 2018-05-28
Payer: COMMERCIAL

## 2018-05-30 ENCOUNTER — APPOINTMENT (OUTPATIENT)
Dept: PHYSICAL THERAPY | Age: 63
End: 2018-05-30
Payer: COMMERCIAL

## 2018-06-04 ENCOUNTER — HOSPITAL ENCOUNTER (OUTPATIENT)
Dept: PHYSICAL THERAPY | Age: 63
Discharge: HOME | End: 2018-06-04
Payer: COMMERCIAL

## 2018-06-04 PROCEDURE — 97140 MANUAL THERAPY 1/> REGIONS: CPT | Performed by: PHYSICAL THERAPIST

## 2018-06-04 PROCEDURE — 97110 THERAPEUTIC EXERCISES: CPT | Performed by: PHYSICAL THERAPIST

## 2018-06-06 ENCOUNTER — HOSPITAL ENCOUNTER (OUTPATIENT)
Dept: PHYSICAL THERAPY | Age: 63
Discharge: HOME | End: 2018-06-06
Payer: COMMERCIAL

## 2018-06-06 PROCEDURE — 97110 THERAPEUTIC EXERCISES: CPT | Performed by: PHYSICAL THERAPIST

## 2018-06-06 PROCEDURE — 97140 MANUAL THERAPY 1/> REGIONS: CPT | Performed by: PHYSICAL THERAPIST

## 2018-06-06 NOTE — PROGRESS NOTES
PT DAILY TREATMENT NOTE 2-15    Patient Name: Katherin Oakley  Date:2018  : 1955  [x]  Patient  Verified  Payor: Jose Francisco Hart / Plan: Josefina Poster / Product Type: PPO /    In time: 9:55 AM  Out time: 11:20 AM  Total Treatment Time (min): 85 (75 timed)  Visit #: 14    Treatment Area: Right shoulder pain [M25.511]    SUBJECTIVE  Pain Level (0-10 scale): 2-310  Any medication changes, allergies to medications, adverse drug reactions, diagnosis change, or new procedure performed?: [x] No    [] Yes (see summary sheet for update)  Subjective functional status/changes:   [] No changes reported  Patient forgot MD script again today. She states she has been doing more with her arm, \"see I can lift it even higher than I could on Monday! \"    OBJECTIVE    Modality rationale: decrease edema, decrease inflammation and decrease pain to improve the patients ability to reach overhead   Min Type Additional Details    [] Estim: []Att   []Unatt        []TENS instruct                  []IFC  []Premod   []NMES                     []Other:  []w/US   []w/ice   []w/heat  Position:  Location:    []  Traction: [] Cervical       []Lumbar                       [] Prone          []Supine                       []Intermittent   []Continuous Lbs:  [] before manual  [] after manual  []w/heat    []  Ultrasound: []Continuous   [] Pulsed at:                            []1MHz   []3MHz Location:  W/cm2:    []  Paraffin         Location:  []w/heat   10 [x]  Ice     []  Heat  []  Ice massage Position: seated, UE supported  Location: R shoulder    []  Laser  []  Other: Position:  Location:    []  Vasopneumatic Device Pressure:       [] lo [] med [] hi   Temperature:    [x] Skin assessment post-treatment:  [x]intact []redness- no adverse reaction    []redness  adverse reaction:     60 min Therapeutic Exercise:  [x] See flow sheet : added per flow sheet   Rationale: increase ROM and increase strength to improve the patients ability to reach overhead, lift objects, perform daily chores    15 min Manual Therapy:  PROM R shoulder flex, abd, IR, ER to tolerance   Gentle GH oscillations for pain control  subscap release   Rationale: decrease pain, increase ROM and increase tissue extensibility  to improve the patients ability to perform daily activities, don/doff clothing          With   [x] TE   [] TA   [] neuro   [] other: Patient Education: [x] Review HEP    [] Progressed/Changed HEP based on:   [] positioning   [] body mechanics   [] transfers   [] heat/ice application    [x] other:     Other Objective/Functional Measures:   n/a    Pain Level (0-10 scale) post treatment: 2-3/10    ASSESSMENT/Changes in Function:   Progressed strengthening as tolerated. ROM continuing to improve. Reviewed HEP again today and strongly advised on performing exercises at home over the weekend    Patient will continue to benefit from skilled PT services to modify and progress therapeutic interventions, address functional mobility deficits, address ROM deficits, address strength deficits, analyze and address soft tissue restrictions, analyze and cue movement patterns, analyze and modify body mechanics/ergonomics and assess and modify postural abnormalities to attain remaining goals.      []  See Plan of Care  []  See progress note/recertification  []  See Discharge Summary         Progress towards goals / Updated goals:  nt    PLAN  []  Upgrade activities as tolerated     [x]  Continue plan of care  []  Update interventions per flow sheet       []  Discharge due to:_  []  Other:_      Simon Arambula PT 6/6/2018  10:02 AM

## 2018-06-11 ENCOUNTER — HOSPITAL ENCOUNTER (OUTPATIENT)
Dept: PHYSICAL THERAPY | Age: 63
Discharge: HOME | End: 2018-06-11
Payer: COMMERCIAL

## 2018-06-11 PROCEDURE — 97110 THERAPEUTIC EXERCISES: CPT | Performed by: PHYSICAL THERAPIST

## 2018-06-11 PROCEDURE — 97140 MANUAL THERAPY 1/> REGIONS: CPT | Performed by: PHYSICAL THERAPIST

## 2018-06-11 NOTE — PROGRESS NOTES
PT DAILY TREATMENT NOTE 2-15    Patient Name: Chris Gonzales  Date:2018  : 1955  [x]  Patient  Verified  Payor: Merly Ship / Plan: Bishop Shows / Product Type: PPO /    In time: 10:00 AM  Out time: 11:20 AM  Total Treatment Time (min): 80 (70 timed)  Visit #: 15    Treatment Area: Right shoulder pain [M25.511]    SUBJECTIVE  Pain Level (0-10 scale): 2/10  Any medication changes, allergies to medications, adverse drug reactions, diagnosis change, or new procedure performed?: [x] No    [] Yes (see summary sheet for update)  Subjective functional status/changes:   [] No changes reported  Patient states \"its doing good today! \" she states good compliance with HEP.  She is planning on returning to work     OBJECTIVE    Modality rationale: decrease edema, decrease inflammation and decrease pain to improve the patients ability to reach overhead   Min Type Additional Details    [] Estim: []Att   []Unatt        []TENS instruct                  []IFC  []Premod   []NMES                     []Other:  []w/US   []w/ice   []w/heat  Position:  Location:    []  Traction: [] Cervical       []Lumbar                       [] Prone          []Supine                       []Intermittent   []Continuous Lbs:  [] before manual  [] after manual  []w/heat    []  Ultrasound: []Continuous   [] Pulsed at:                            []1MHz   []3MHz Location:  W/cm2:    []  Paraffin         Location:  []w/heat   10 [x]  Ice     []  Heat  []  Ice massage Position: seated, UE supported  Location: R shoulder    []  Laser  []  Other: Position:  Location:    []  Vasopneumatic Device Pressure:       [] lo [] med [] hi   Temperature:    [x] Skin assessment post-treatment:  [x]intact []redness- no adverse reaction    []redness  adverse reaction:     55 min Therapeutic Exercise:  [x] See flow sheet : added per flow sheet   Rationale: increase ROM and increase strength to improve the patients ability to reach overhead, lift objects, perform daily chores    15 min Manual Therapy:  PROM R shoulder flex, abd, IR, ER to tolerance   Gentle GH oscillations for pain control  subscap release   Rationale: decrease pain, increase ROM and increase tissue extensibility  to improve the patients ability to perform daily activities, don/doff clothing          With   [x] TE   [] TA   [] neuro   [] other: Patient Education: [x] Review HEP    [] Progressed/Changed HEP based on:   [] positioning   [] body mechanics   [] transfers   [] heat/ice application    [x] other:     Other Objective/Functional Measures:   n/a    Pain Level (0-10 scale) post treatment: 2/10    ASSESSMENT/Changes in Function:   Challenged with ER tband walkouts today. ROM and strength continuing to progress    Patient will continue to benefit from skilled PT services to modify and progress therapeutic interventions, address functional mobility deficits, address ROM deficits, address strength deficits, analyze and address soft tissue restrictions, analyze and cue movement patterns, analyze and modify body mechanics/ergonomics and assess and modify postural abnormalities to attain remaining goals.      []  See Plan of Care  []  See progress note/recertification  []  See Discharge Summary         Progress towards goals / Updated goals:  nt    PLAN  []  Upgrade activities as tolerated     [x]  Continue plan of care  []  Update interventions per flow sheet       []  Discharge due to:_  []  Other:_      Simon Arambula PT 6/11/2018  10:14 AM

## 2018-06-13 ENCOUNTER — HOSPITAL ENCOUNTER (OUTPATIENT)
Dept: PHYSICAL THERAPY | Age: 63
Discharge: HOME | End: 2018-06-13
Payer: COMMERCIAL

## 2018-06-13 PROCEDURE — 97110 THERAPEUTIC EXERCISES: CPT | Performed by: PHYSICAL THERAPY ASSISTANT

## 2018-06-13 PROCEDURE — 97140 MANUAL THERAPY 1/> REGIONS: CPT | Performed by: PHYSICAL THERAPY ASSISTANT

## 2018-06-13 NOTE — PROGRESS NOTES
PT DAILY TREATMENT NOTE 2-15    Patient Name: Barrie Carr  Date:2018  : 1955  [x]  Patient  Verified  Payor: Naveen Guzman / Plan: Brijesh Expose / Product Type: PPO /    In time: 10:10 AM  Out time: 10:35 AM  Total Treatment Time (min): 25 (25 timed)  Visit #: 16    Treatment Area: Right shoulder pain [M25.511]    SUBJECTIVE  Pain Level (0-10 scale): 210  Any medication changes, allergies to medications, adverse drug reactions, diagnosis change, or new procedure performed?: [x] No    [] Yes (see summary sheet for update)  Subjective functional status/changes:   [] No changes reported  Patient 10 minutes late for appointment session and requesting to leave early as she is going to her nephews graduation. States continues to have pinching/catching sensation. States she is not ready to return to work due to limited range/strength deficits.      OBJECTIVE    Modality rationale: decrease edema, decrease inflammation and decrease pain to improve the patients ability to reach overhead   Min Type Additional Details    [] Estim: []Att   []Unatt        []TENS instruct                  []IFC  []Premod   []NMES                     []Other:  []w/US   []w/ice   []w/heat  Position:  Location:    []  Traction: [] Cervical       []Lumbar                       [] Prone          []Supine                       []Intermittent   []Continuous Lbs:  [] before manual  [] after manual  []w/heat    []  Ultrasound: []Continuous   [] Pulsed at:                            []1MHz   []3MHz Location:  W/cm2:    []  Paraffin         Location:  []w/heat   To go [x]  Ice     []  Heat  []  Ice massage Position: seated, UE supported  Location: R shoulder    []  Laser  []  Other: Position:  Location:    []  Vasopneumatic Device Pressure:       [] lo [] med [] hi   Temperature:    [x] Skin assessment post-treatment:  [x]intact []redness- no adverse reaction    []redness  adverse reaction:     10 min Therapeutic Exercise:  [x] See flow sheet :    Rationale: increase ROM and increase strength to improve the patients ability to reach overhead, lift objects, perform daily chores    15 min Manual Therapy:  PROM R shoulder flex, abd, IR, ER to tolerance   Gentle GH oscillations for pain control  subscap release   Rationale: decrease pain, increase ROM and increase tissue extensibility  to improve the patients ability to perform daily activities, don/doff clothing          With   [x] TE   [] TA   [] neuro   [] other: Patient Education: [x] Review HEP    [] Progressed/Changed HEP based on:   [] positioning   [] body mechanics   [] transfers   [] heat/ice application    [x] other:     Other Objective/Functional Measures:   n/a    Pain Level (0-10 scale) post treatment: 2/10    ASSESSMENT/Changes in Function:   Modified treatment session due to patient request. ROM progressing    Patient will continue to benefit from skilled PT services to modify and progress therapeutic interventions, address functional mobility deficits, address ROM deficits, address strength deficits, analyze and address soft tissue restrictions, analyze and cue movement patterns, analyze and modify body mechanics/ergonomics and assess and modify postural abnormalities to attain remaining goals.      []  See Plan of Care  []  See progress note/recertification  []  See Discharge Summary         Progress towards goals / Updated goals:  nt    PLAN  []  Upgrade activities as tolerated     [x]  Continue plan of care  []  Update interventions per flow sheet       []  Discharge due to:_  []  Other:_      Ema Cote PTA 6/13/2018  10:10 AM

## 2018-06-18 ENCOUNTER — APPOINTMENT (OUTPATIENT)
Dept: PHYSICAL THERAPY | Age: 63
End: 2018-06-18
Payer: COMMERCIAL

## 2018-06-20 ENCOUNTER — HOSPITAL ENCOUNTER (OUTPATIENT)
Dept: PHYSICAL THERAPY | Age: 63
Discharge: HOME | End: 2018-06-20
Payer: COMMERCIAL

## 2018-06-20 PROCEDURE — 97110 THERAPEUTIC EXERCISES: CPT | Performed by: PHYSICAL THERAPY ASSISTANT

## 2018-06-20 PROCEDURE — 97140 MANUAL THERAPY 1/> REGIONS: CPT | Performed by: PHYSICAL THERAPY ASSISTANT

## 2018-06-20 NOTE — PROGRESS NOTES
PT DAILY TREATMENT NOTE 2-15    Patient Name: Татьяна Zelaya  Date:2018  : 1955  [x]  Patient  Verified  Payor: Annamarie Pain / Plan: Jhoan Grady / Product Type: PPO /    In time: 10:00 AM  Out time: 10:40 AM  Total Treatment Time (min): 40 (40 timed)  Visit #: 17    Treatment Area: Right shoulder pain [M25.511]    SUBJECTIVE  Pain Level (0-10 scale): 510  Any medication changes, allergies to medications, adverse drug reactions, diagnosis change, or new procedure performed?: [x] No    [] Yes (see summary sheet for update)  Subjective functional status/changes:   [] No changes reported  Patient reporting she would like an abbreviated session due as her transportation is not her daughter and \"doesn't want to keep them for too long. \" States increased pain today due to how she slept last night.     OBJECTIVE    Modality rationale: decrease edema, decrease inflammation and decrease pain to improve the patients ability to reach overhead   Min Type Additional Details    [] Estim: []Att   []Unatt        []TENS instruct                  []IFC  []Premod   []NMES                     []Other:  []w/US   []w/ice   []w/heat  Position:  Location:    []  Traction: [] Cervical       []Lumbar                       [] Prone          []Supine                       []Intermittent   []Continuous Lbs:  [] before manual  [] after manual  []w/heat    []  Ultrasound: []Continuous   [] Pulsed at:                            []1MHz   []3MHz Location:  W/cm2:    []  Paraffin         Location:  []w/heat   To go [x]  Ice     []  Heat  []  Ice massage Position: seated, UE supported  Location: R shoulder    []  Laser  []  Other: Position:  Location:    []  Vasopneumatic Device Pressure:       [] lo [] med [] hi   Temperature:    [x] Skin assessment post-treatment:  [x]intact []redness- no adverse reaction    []redness  adverse reaction:     25 min Therapeutic Exercise:  [x] See flow sheet :    Rationale: increase ROM and increase strength to improve the patients ability to reach overhead, lift objects, perform daily chores    15 min Manual Therapy:  PROM R shoulder flex, abd, IR, ER to tolerance   Gentle GH oscillations for pain control  subscap release   Rationale: decrease pain, increase ROM and increase tissue extensibility  to improve the patients ability to perform daily activities, don/doff clothing          With   [x] TE   [] TA   [] neuro   [] other: Patient Education: [x] Review HEP    [] Progressed/Changed HEP based on:   [] positioning   [] body mechanics   [] transfers   [] heat/ice application    [x] other:     Other Objective/Functional Measures:   n/a    Pain Level (0-10 scale) post treatment: 2-3/10    ASSESSMENT/Changes in Function:   Modified treatment session due to patient request. Minor cues to stabilize scapula during ER walkout. Able to tolerate active IR using red band without report of pain. Patient will continue to benefit from skilled PT services to modify and progress therapeutic interventions, address functional mobility deficits, address ROM deficits, address strength deficits, analyze and address soft tissue restrictions, analyze and cue movement patterns, analyze and modify body mechanics/ergonomics and assess and modify postural abnormalities to attain remaining goals.      []  See Plan of Care  []  See progress note/recertification  []  See Discharge Summary         Progress towards goals / Updated goals:  nt    PLAN  []  Upgrade activities as tolerated     [x]  Continue plan of care  []  Update interventions per flow sheet       []  Discharge due to:_  []  Other:_      Leticia Guerin, PTA 6/20/2018  10:00 AM

## 2018-06-25 ENCOUNTER — APPOINTMENT (OUTPATIENT)
Dept: PHYSICAL THERAPY | Age: 63
End: 2018-06-25
Payer: COMMERCIAL

## 2018-06-27 ENCOUNTER — HOSPITAL ENCOUNTER (OUTPATIENT)
Dept: PHYSICAL THERAPY | Age: 63
Discharge: HOME | End: 2018-06-27
Payer: COMMERCIAL

## 2018-06-27 PROCEDURE — 97140 MANUAL THERAPY 1/> REGIONS: CPT | Performed by: PHYSICAL THERAPY ASSISTANT

## 2018-06-27 PROCEDURE — 97110 THERAPEUTIC EXERCISES: CPT | Performed by: PHYSICAL THERAPY ASSISTANT

## 2018-06-27 NOTE — PROGRESS NOTES
PT DAILY TREATMENT NOTE 2-15    Patient Name: Jaziel Davila  Date:2018  : 1955  [x]  Patient  Verified  Payor: Clemente Perez / Plan: Nya Sanchesr / Product Type: PPO /    In time: 10:00 AM  Out time: 11:00 AM  Total Treatment Time (min): 60 (60 timed)  Visit #: 18    Treatment Area: Right shoulder pain [M25.511]    SUBJECTIVE  Pain Level (0-10 scale): 4-5/10  Any medication changes, allergies to medications, adverse drug reactions, diagnosis change, or new procedure performed?: [x] No    [] Yes (see summary sheet for update)  Subjective functional status/changes:   [] No changes reported  Patient states she is feeling very tight and stiff today in her R shoulder. OBJECTIVE    Modality rationale: decrease edema, decrease inflammation and decrease pain to improve the patients ability to reach overhead   Min Type Additional Details    [] Estim: []Att   []Unatt        []TENS instruct                  []IFC  []Premod   []NMES                     []Other:  []w/US   []w/ice   []w/heat  Position:  Location:    []  Traction: [] Cervical       []Lumbar                       [] Prone          []Supine                       []Intermittent   []Continuous Lbs:  [] before manual  [] after manual  []w/heat    []  Ultrasound: []Continuous   [] Pulsed at:                            []1MHz   []3MHz Location:  W/cm2:    []  Paraffin         Location:  []w/heat   To go [x]  Ice     []  Heat  []  Ice massage Position: seated, UE supported  Location: R shoulder    []  Laser  []  Other: Position:  Location:    []  Vasopneumatic Device Pressure:       [] lo [] med [] hi   Temperature:    [x] Skin assessment post-treatment:  [x]intact []redness- no adverse reaction    []redness  adverse reaction:     50 min Therapeutic Exercise:  [x] See flow sheet : modified active ER to walkout due to report of pinching in R shoulder despite cues for scapula positioning.    Rationale: increase ROM and increase strength to improve the patients ability to reach overhead, lift objects, perform daily chores    10 min Manual Therapy:  PROM R shoulder flex, abd, IR, ER to tolerance   Gentle GH oscillations for pain control  subscap release   Rationale: decrease pain, increase ROM and increase tissue extensibility  to improve the patients ability to perform daily activities, don/doff clothing          With   [x] TE   [] TA   [] neuro   [] other: Patient Education: [x] Review HEP    [x] Progressed/Changed HEP based on: see chart  [] positioning   [] body mechanics   [] transfers   [] heat/ice application    [x] other:     Other Objective/Functional Measures:   n/a    Pain Level (0-10 scale) post treatment: 1-2/10    ASSESSMENT/Changes in Function:   Able to perform shoulder ER walkout today without pain. Weakness noted during shoulder flexion/scaption, max cues to avoid shoulder hiking. Gave patient updated HEP with appropriate bands. Patient will continue to benefit from skilled PT services to modify and progress therapeutic interventions, address functional mobility deficits, address ROM deficits, address strength deficits, analyze and address soft tissue restrictions, analyze and cue movement patterns, analyze and modify body mechanics/ergonomics and assess and modify postural abnormalities to attain remaining goals.      []  See Plan of Care  []  See progress note/recertification  []  See Discharge Summary         Progress towards goals / Updated goals:  nt    PLAN  []  Upgrade activities as tolerated     [x]  Continue plan of care  []  Update interventions per flow sheet       []  Discharge due to:_  []  Other:_      Lima Vera PTA 6/27/2018  10:00 AM

## 2018-07-02 ENCOUNTER — HOSPITAL ENCOUNTER (OUTPATIENT)
Dept: PHYSICAL THERAPY | Age: 63
Discharge: HOME | End: 2018-07-02
Payer: COMMERCIAL

## 2018-07-02 PROCEDURE — 97140 MANUAL THERAPY 1/> REGIONS: CPT | Performed by: PHYSICAL THERAPY ASSISTANT

## 2018-07-02 PROCEDURE — 97110 THERAPEUTIC EXERCISES: CPT | Performed by: PHYSICAL THERAPY ASSISTANT

## 2018-07-02 NOTE — PROGRESS NOTES
PT DAILY TREATMENT NOTE 2-15    Patient Name: Ajay Paredes  Date:2018  : 1955  [x]  Patient  Verified  Payor: Mariposa Mendoza / Plan: Lexus Gross / Product Type: PPO /    In time: 10:05 AM  Out time: 11:05 AM  Total Treatment Time (min): 60 (60 timed)  Visit #: 19    Treatment Area: Right shoulder pain [M25.511]    SUBJECTIVE  Pain Level (0-10 scale): 10  Any medication changes, allergies to medications, adverse drug reactions, diagnosis change, or new procedure performed?: [x] No    [] Yes (see summary sheet for update)  Subjective functional status/changes:   [] No changes reported  Patient states she has been doing her updated HEP that were given to her last weekend. States she extended her shoulder this morning which caused a pinching sensation but overall lower pain levels over the weekend.      OBJECTIVE    Modality rationale: decrease edema, decrease inflammation and decrease pain to improve the patients ability to reach overhead   Min Type Additional Details    [] Estim: []Att   []Unatt        []TENS instruct                  []IFC  []Premod   []NMES                     []Other:  []w/US   []w/ice   []w/heat  Position:  Location:    []  Traction: [] Cervical       []Lumbar                       [] Prone          []Supine                       []Intermittent   []Continuous Lbs:  [] before manual  [] after manual  []w/heat    []  Ultrasound: []Continuous   [] Pulsed at:                            []1MHz   []3MHz Location:  W/cm2:    []  Paraffin         Location:  []w/heat   To go [x]  Ice     []  Heat  []  Ice massage Position: seated, UE supported  Location: R shoulder    []  Laser  []  Other: Position:  Location:    []  Vasopneumatic Device Pressure:       [] lo [] med [] hi   Temperature:    [x] Skin assessment post-treatment:  [x]intact []redness- no adverse reaction    []redness  adverse reaction:     50 min Therapeutic Exercise:  [x] See flow sheet :    Rationale: increase ROM and increase strength to improve the patients ability to reach overhead, lift objects, perform daily chores    10 min Manual Therapy:  PROM R shoulder flex, abd, IR, ER to tolerance   Gentle GH oscillations for pain control  subscap release   Rationale: decrease pain, increase ROM and increase tissue extensibility  to improve the patients ability to perform daily activities, don/doff clothing          With   [x] TE   [] TA   [] neuro   [] other: Patient Education: [x] Review HEP    [x] Progressed/Changed HEP based on: see chart  [] positioning   [] body mechanics   [] transfers   [] heat/ice application    [x] other:     Other Objective/Functional Measures:   PROM R shoulder:  Flexion= 142 deg   Abduction= 134 deg  ER at 45 deg= 63 deg  IR at 90 deg= 66 deg (with ant trans of humeral head)                       Pain Level (0-10 scale) post treatment: 2/10    ASSESSMENT/Changes in Function:    Significant improvement with PROM, reslved \"pinching\" sensation with R posterior GH glide during ER. Patient will continue to benefit from skilled PT services to modify and progress therapeutic interventions, address functional mobility deficits, address ROM deficits, address strength deficits, analyze and address soft tissue restrictions, analyze and cue movement patterns, analyze and modify body mechanics/ergonomics and assess and modify postural abnormalities to attain remaining goals.      []  See Plan of Care  []  See progress note/recertification  []  See Discharge Summary         Progress towards goals / Updated goals:  nt    PLAN  []  Upgrade activities as tolerated     [x]  Continue plan of care  []  Update interventions per flow sheet       []  Discharge due to:_  []  Other:_      Mckinley Law PTA 7/2/2018  10:05 AM

## 2018-07-09 ENCOUNTER — PATIENT OUTREACH (OUTPATIENT)
Dept: OTHER | Age: 63
End: 2018-07-09

## 2018-07-09 ENCOUNTER — APPOINTMENT (OUTPATIENT)
Dept: PHYSICAL THERAPY | Age: 63
End: 2018-07-09
Payer: COMMERCIAL

## 2018-07-11 ENCOUNTER — HOSPITAL ENCOUNTER (OUTPATIENT)
Dept: PHYSICAL THERAPY | Age: 63
Discharge: HOME | End: 2018-07-11
Payer: COMMERCIAL

## 2018-07-11 ENCOUNTER — TELEPHONE (OUTPATIENT)
Dept: INTERNAL MEDICINE CLINIC | Age: 63
End: 2018-07-11

## 2018-07-11 PROCEDURE — 97535 SELF CARE MNGMENT TRAINING: CPT | Performed by: PHYSICAL THERAPY ASSISTANT

## 2018-07-11 NOTE — PROGRESS NOTES
PT DAILY TREATMENT NOTE 2-15    Patient Name: Victorino Arambula  Date:2018  : 1955  [x]  Patient  Verified  Payor: Leticia Master / Plan: Bart Grass / Product Type: PPO /    In time: 10:05 AM  Out time: 10:25 AM  Total Treatment Time (min): 20 (20 timed)  Visit #: 20    Treatment Area: Right shoulder pain [M25.511]    SUBJECTIVE  Pain Level (0-10 scale): 4/10  Any medication changes, allergies to medications, adverse drug reactions, diagnosis change, or new procedure performed?: [x] No    [] Yes (see summary sheet for update)  Subjective functional status/changes:   [] No changes reported  Patient reports she continues to have intermittent pinching in her R shoulder. States she took Tramadol prior to treatment session . Patient also reporting chest pain/upper abdominal pain since yesterday (did not inform therapist of this until performing pulley flexion for 3 minutes) . States she has been diagnosed with esophageal spasms and has been prescribed medication for this but did not take anything yesterday or today. Patient also reporting she does have some heart conditions as well. Patient stating she feels \"off\" today. Patient denies any SOB, blurred vision or HA, only complaint is of chest pain. She did take her BP medication this morning. States her eyes are red but she is also being treated for this as well.       OBJECTIVE    Modality rationale: decrease edema, decrease inflammation and decrease pain to improve the patients ability to reach overhead   Min Type Additional Details    [] Estim: []Att   []Unatt        []TENS instruct                  []IFC  []Premod   []NMES                     []Other:  []w/US   []w/ice   []w/heat  Position:  Location:    []  Traction: [] Cervical       []Lumbar                       [] Prone          []Supine                       []Intermittent   []Continuous Lbs:  [] before manual  [] after manual  []w/heat    []  Ultrasound: []Continuous   [] Pulsed at:                            []1MHz   []3MHz Location:  W/cm2:    []  Paraffin         Location:  []w/heat   To go [x]  Ice     []  Heat  []  Ice massage Position: seated, UE supported  Location: R shoulder    []  Laser  []  Other: Position:  Location:    []  Vasopneumatic Device Pressure:       [] lo [] med [] hi   Temperature:    [x] Skin assessment post-treatment:  [x]intact []redness- no adverse reaction    []redness  adverse reaction:     3 min Therapeutic Exercise:  [x] See flow sheet : pulley flexion only as patient then reporting she had chest pain/upper gastric pain,   Rationale: increase ROM and increase strength to improve the patients ability to reach overhead, lift objects, perform daily chores    - min Manual Therapy:  PROM R shoulder flex, abd, IR, ER to tolerance   Gentle GH oscillations for pain control  subscap release   Rationale: decrease pain, increase ROM and increase tissue extensibility  to improve the patients ability to perform daily activities, don/doff clothing          With   [x] TE   [] TA   [] neuro   [] other: Self Care/Patient Education: [x] Review HEP    [x] Progressed/Changed HEP based on: see chart  [] positioning   [] body mechanics   [] transfers   [] heat/ice application    [x] other: assessed patient's vitals and monitored patient until daughter picked up patient to take her to MD/ED. Strongly encouraged patient to contact MD and to go to ER due to high blood pressure and report of chest pain. Patient without SOB, Blurred vision or HA, again advised patient to go directly to ED if worsening symptoms. Patient aggreable. Other Objective/Functional Measures:   Vitals:     BP   HR  After Pulleys:     206/99 mmHg  64 BPM  After 5-10 minutes rest: 189/93 mmHg  62 BPM                Pain Level (0-10 scale) post treatment: 2/10    ASSESSMENT/Changes in Function:   See above.     Patient will continue to benefit from skilled PT services to modify and progress therapeutic interventions, address functional mobility deficits, address ROM deficits, address strength deficits, analyze and address soft tissue restrictions, analyze and cue movement patterns, analyze and modify body mechanics/ergonomics and assess and modify postural abnormalities to attain remaining goals.      []  See Plan of Care  []  See progress note/recertification  []  See Discharge Summary         Progress towards goals / Updated goals:  nt    PLAN  []  Upgrade activities as tolerated     [x]  Continue plan of care  []  Update interventions per flow sheet       []  Discharge due to:_  []  Other:_      Patito Dutta, SABINO 7/11/2018  10:05 AM

## 2018-07-11 NOTE — TELEPHONE ENCOUNTER
Pt returned call to inform of new BP current reading 142 over 82.  Pt stated previous BP was near 200  Pt call back# [phone]

## 2018-07-11 NOTE — ANCILLARY DISCHARGE INSTRUCTIONS
[facility]  [address]  [place], [address]  Phone: [phone]  Fax: [phone]    Discharge Summary  2-15    Patient name: Porfirio Tello  :   Provider#:[id number]  Referral source: Allan Lopez MD      Medical/Treatment Diagnosis: Left knee pain [M25.562]  Right knee pain [M25.561]     Prior Hospitalization: see medical history     Comorbidities: see evaluation  Prior Level of Function:see evaluation  Medications: Verified on Patient Summary List    Start of Care: 1/3/18      Onset Date:see evaluation   Visits from Start of Care: 6     Missed Visits: see CC  Reporting Period : 1/3/18 to 18    Summary of care:   Pt returned to MD; surgery scheduled. D/C from PT.       RECOMMENDATIONS:  [x]Discontinue therapy: []Patient has reached or is progressing toward set goals      []Patient is non-compliant or has abdicated      [x]Due to lack of appreciable progress towards set [address], PT 2018 11:33 AM

## 2018-07-11 NOTE — TELEPHONE ENCOUNTER
Spoke with patient states that her Blood pressure has gone back to normal. No further action needed at this time

## 2018-07-16 ENCOUNTER — HOSPITAL ENCOUNTER (OUTPATIENT)
Dept: PHYSICAL THERAPY | Age: 63
Discharge: HOME | End: 2018-07-16
Payer: COMMERCIAL

## 2018-07-16 PROCEDURE — 97140 MANUAL THERAPY 1/> REGIONS: CPT | Performed by: PHYSICAL THERAPY ASSISTANT

## 2018-07-16 NOTE — PROGRESS NOTES
PT DAILY TREATMENT NOTE 2-15    Patient Name: Ran Singleton  Date:2018  : 1955  [x]  Patient  Verified  Payor: Neema Aleman / Plan: Lanie White / Product Type: PPO /    In time: 10:05 AM  Out time: 10:25 AM  Total Treatment Time (min): 20 (20 timed)  Visit #: 21    Treatment Area: Right shoulder pain [M25.511]    SUBJECTIVE  Pain Level (0-10 scale): 4/10  Any medication changes, allergies to medications, adverse drug reactions, diagnosis change, or new procedure performed?: [x] No    [] Yes (see summary sheet for update)  Subjective functional status/changes:   [] No changes reported  Patient reports she continues to experience chest pains, upper abdominal pains despite taking her medication over the weekend. States her BP over the weekend was ~140's/80-90's. She is to see her PCP today and her cardiologist on Thursday to discuss medications and to see Darwin Bee is really going on. \"    OBJECTIVE    Modality rationale: decrease edema, decrease inflammation and decrease pain to improve the patients ability to reach overhead   Min Type Additional Details    [] Estim: []Att   []Unatt        []TENS instruct                  []IFC  []Premod   []NMES                     []Other:  []w/US   []w/ice   []w/heat  Position:  Location:    []  Traction: [] Cervical       []Lumbar                       [] Prone          []Supine                       []Intermittent   []Continuous Lbs:  [] before manual  [] after manual  []w/heat    []  Ultrasound: []Continuous   [] Pulsed at:                            []1MHz   []3MHz Location:  W/cm2:    []  Paraffin         Location:  []w/heat   To go [x]  Ice     []  Heat  []  Ice massage Position: seated, UE supported  Location: R shoulder    []  Laser  []  Other: Position:  Location:    []  Vasopneumatic Device Pressure:       [] lo [] med [] hi   Temperature:    [x] Skin assessment post-treatment:  [x]intact []redness- no adverse reaction    []redness  adverse reaction:     - min Therapeutic Exercise:  [x] See flow sheet : pulley flexion only as patient then reporting she had chest pain/upper gastric pain,   Rationale: increase ROM and increase strength to improve the patients ability to reach overhead, lift objects, perform daily chores    20 min Manual Therapy:  PROM R shoulder flex, abd, IR, ER to tolerance   Gentle GH oscillations for pain control     Rationale: decrease pain, increase ROM and increase tissue extensibility  to improve the patients ability to perform daily activities, don/doff clothing          With   [x] TE   [] TA   [] neuro   [] other: Self Care/Patient Education: [x] Review HEP    [x] Progressed/Changed HEP based on: see chart  [] positioning   [] body mechanics   [] transfers   [] heat/ice application    [] other:     Other Objective/Functional Measures: NT                Pain Level (0-10 scale) post treatment: 2/10    ASSESSMENT/Changes in Function:   Patient to see PCP today and Cardiologist on Thursday. Advised patient to hold PT until cleared by MD due to report of upper abdominal and chest pain. Only perform PROM today. Patient will continue to benefit from skilled PT services to modify and progress therapeutic interventions, address functional mobility deficits, address ROM deficits, address strength deficits, analyze and address soft tissue restrictions, analyze and cue movement patterns, analyze and modify body mechanics/ergonomics and assess and modify postural abnormalities to attain remaining goals.      []  See Plan of Care  []  See progress note/recertification  []  See Discharge Summary         Progress towards goals / Updated goals:  nt    PLAN  []  Upgrade activities as tolerated     [x]  Continue plan of care  []  Update interventions per flow sheet       []  Discharge due to:_  []  Other:_      Maria M Trotter PTA 7/16/2018  10:05 AM

## 2018-07-18 ENCOUNTER — APPOINTMENT (OUTPATIENT)
Dept: PHYSICAL THERAPY | Age: 63
End: 2018-07-18
Payer: COMMERCIAL

## 2018-07-23 ENCOUNTER — HOSPITAL ENCOUNTER (OUTPATIENT)
Dept: PHYSICAL THERAPY | Age: 63
Discharge: HOME | End: 2018-07-23
Payer: COMMERCIAL

## 2018-07-23 ENCOUNTER — OFFICE VISIT (OUTPATIENT)
Dept: INTERNAL MEDICINE CLINIC | Age: 63
End: 2018-07-23

## 2018-07-23 VITALS
HEART RATE: 75 BPM | OXYGEN SATURATION: 98 % | HEIGHT: 65 IN | BODY MASS INDEX: 37.39 KG/M2 | RESPIRATION RATE: 17 BRPM | TEMPERATURE: 97.8 F | WEIGHT: 224.4 LBS | DIASTOLIC BLOOD PRESSURE: 80 MMHG | SYSTOLIC BLOOD PRESSURE: 143 MMHG

## 2018-07-23 DIAGNOSIS — I10 ESSENTIAL HYPERTENSION: Primary | ICD-10-CM

## 2018-07-23 DIAGNOSIS — E66.01 MORBID OBESITY (HCC): ICD-10-CM

## 2018-07-23 DIAGNOSIS — R07.9 CHEST PAIN, UNSPECIFIED TYPE: ICD-10-CM

## 2018-07-23 DIAGNOSIS — E03.9 ACQUIRED HYPOTHYROIDISM: ICD-10-CM

## 2018-07-23 DIAGNOSIS — K22.4 ESOPHAGEAL SPASM: ICD-10-CM

## 2018-07-23 PROCEDURE — 97140 MANUAL THERAPY 1/> REGIONS: CPT | Performed by: PHYSICAL THERAPY ASSISTANT

## 2018-07-23 PROCEDURE — 97110 THERAPEUTIC EXERCISES: CPT | Performed by: PHYSICAL THERAPY ASSISTANT

## 2018-07-23 RX ORDER — NIFEDIPINE 60 MG/1
60 TABLET, EXTENDED RELEASE ORAL DAILY
COMMUNITY
End: 2020-09-02 | Stop reason: SINTOL

## 2018-07-23 RX ORDER — TRAMADOL HYDROCHLORIDE AND ACETAMINOPHEN 37.5; 325 MG/1; MG/1
TABLET ORAL
COMMUNITY
End: 2019-01-31 | Stop reason: ALTCHOICE

## 2018-07-23 NOTE — MR AVS SNAPSHOT
[address] [place] [address] [address] [place] [address] 
[phone] Patient: Darcie Howell MRN: [id number] CII:[id number] Visit Information Date & Time Provider Department Dept. Phone Encounter #  
 7/23/2018 11:40 AM Mariposa Forrest MD [facility] [phone] [id number] Upcoming Health Maintenance Date Due Hepatitis C Screening 1955 Influenza Age 5 to Adult 8/1/2018 COLONOSCOPY 5/25/2019 PAP AKA CERVICAL CYTOLOGY 8/24/2019 BREAST CANCER SCRN MAMMOGRAM 3/2/2020 DTaP/Tdap/Td series (2 - Td) 1/16/2025 Allergies as of 7/23/2018  Review Complete On: 7/23/2018 By: Mariposa Forrest MD  
  
 Severity Noted Reaction Type Reactions Adhesive Tape-silicones  97/87/9278    Itching Blisters, bumps. -long term application Lidoderm [Lidocaine]  03/29/2013    Rash Patch-adhesive patch. Allergic to the adhesive - long term use. Not allergic to lidoderm. Other Medication  07/24/2014    Rash  
 dermabond - blisters Current Immunizations  Reviewed on 5/11/2016 Name Date Influenza Vaccine 10/15/2017 Tdap 1/16/2015 Zoster Vaccine, Live 5/15/2015 Not reviewed this visit You Were Diagnosed With   
  
 Codes Comments Acquired hypothyroidism    -  Primary ICD-10-CM: E03.9 ICD-9-CM: 244.9 Essential hypertension     ICD-10-CM: I10 
ICD-9-CM: 401.9 Vitals BP Pulse Temp Resp Height(growth percentile) Weight(growth percentile) 143/80 75 97.8 °F (36.6 °C) (Oral) 17 5' 5\" (1.651 m) 224 lb 6.4 oz (101.8 kg) SpO2 BMI OB Status Smoking Status 98% 37.34 kg/m2 Hysterectomy Never Smoker Vitals History BMI and BSA Data Body Mass Index Body Surface Area  
 37.34 kg/m 2 2.16 m 2 Preferred Pharmacy Pharmacy Name Phone [facility] [phone] Your Updated Medication List  
  
 This list is accurate as of 7/23/18 12:29 PM.  Always use your most recent med list.  
  
  
  
  
 calcium carbonate 200 mg calcium (500 mg) [facility] Commonly known as:  TUMS Take 1 Tab by mouth as needed. chlorthalidone 25 mg tablet Commonly known as:  Jovany Mary Take 1 Tab by mouth daily. docusate sodium 100 mg capsule Commonly known as:  Olinda Hedy Take 100 mg by mouth two (2) times daily as needed for Constipation. 1-2 tabs PRN constipation  
  
 ketorolac 0.5 % ophthalmic solution Commonly known as:  Lenora Gaucher Administer 2 Drops to both eyes four (4) times daily as needed. levothyroxine 112 mcg tablet Commonly known as:  SYNTHROID Take 1 Tab by mouth Daily (before breakfast). NIFEdipine ER 60 mg ER tablet Commonly known as:  PROCARDIA XL Take 60 mg by mouth daily. Omeprazole delayed release 20 mg tablet Commonly known as:  PRILOSEC D/R Take 1 Tab by mouth daily. daily before breakfast  
  
 ondansetron hcl 4 mg tablet Commonly known as:  Ranell Hitesh Take 4 mg by mouth every eight (8) hours as needed for Nausea. prednisoLONE acetate 1 % ophthalmic suspension Commonly known as:  PRED FORTE Administer 1 Drop to both eyes four (4) times daily. PROCTOZONE-HC 2.5 % rectal cream  
Generic drug:  hydrocortisone Insert 1 g into rectum once as needed. traMADol-acetaminophen 37.5-325 mg per tablet Commonly known as:  ULTRACET Take  by mouth every four (4) hours as needed for Pain.  
  
 valsartan 160 mg tablet Commonly known as:  DIOVAN  
TAKE ONE TABLET BY MOUTH EVERY DAY We Performed the Following TSH RFX ON ABNORMAL TO FREE T4 [[id number] Custom] To-Do List   
 07/25/2018 10:00 AM  
  Appointment with Agnes Ryan PTA at [address] ([phone])  
  
 07/30/2018 10:00 AM  
  Appointment with Agnes Ryan PTA at [address] ([phone])  08/01/2018 10:00 AM  
 Appointment with Luis F Cardoza PTA at [facility] ([phone]) Introducing [facility] SERVICES! Dear Madhuri Albrecht: Thank you for requesting a [employer] account. Our records indicate that you already have an active [employer] account. You can access your account anytime at [email or web address] Did you know that you can access your hospital and ER discharge instructions at any time in [employer]? You can also review all of your test results from your hospital stay or ER visit. Additional Information If you have questions, please visit the Frequently Asked Questions section of the [employer] website at [email or web address] Remember, [employer] is NOT to be used for urgent needs. For medical emergencies, dial 911. Now available from your iPhone and Android! Please provide this summary of care documentation to your next provider. Your primary care clinician is listed as LANEY LEWIS. If you have any questions after today's visit, please call [phone].

## 2018-07-23 NOTE — PROGRESS NOTES
HISTORY OF PRESENT ILLNESS  Milana Mosquera is a 58 y.o. female. HPI  Started with pain and pressure in left chest.  Worse one day in PT and SBP was up to 206. Went back in to see Dr. Sadi Gonzalez. BP had improved, EKG looked good (see scanned copy). Set up for stress test on 7/25. Had stress test last year which was normal.  Rare palpitations - improved with adjustment in thyroid medication dosing. Checked blood pressure yesterday and better reading that today. Previously was having chest pain, though secondary to esophageal spasms. Placed on Nifedipine. Last week Dr. Sadi Gonzalez changed her to Procardia XL 60mg every day. Tender epigastric. Trying to eat more of a bland diet but could do better. Continues with PT right shoulder s/p right rotator cuff repair. Still cannot lift over shoulder height. Current Outpatient Prescriptions:     traMADol-acetaminophen (ULTRACET) 37.5-325 mg per tablet, Take  by mouth every four (4) hours as needed for Pain., Disp: , Rfl:     NIFEdipine ER (PROCARDIA XL) 60 mg ER tablet, Take 60 mg by mouth daily. , Disp: , Rfl:     Omeprazole delayed release (PRILOSEC D/R) 20 mg tablet, Take 1 Tab by mouth daily. daily before breakfast, Disp: 30 Tab, Rfl: 5    levothyroxine (SYNTHROID) 112 mcg tablet, Take 1 Tab by mouth Daily (before breakfast). , Disp: 90 Tab, Rfl: 3    valsartan (DIOVAN) 160 mg tablet, TAKE ONE TABLET BY MOUTH EVERY DAY, Disp: 90 Tab, Rfl: 3    ondansetron hcl (ZOFRAN) 4 mg tablet, Take 4 mg by mouth every eight (8) hours as needed for Nausea., Disp: , Rfl:     prednisoLONE acetate (PRED FORTE) 1 % ophthalmic suspension, Administer 1 Drop to both eyes four (4) times daily. , Disp: , Rfl:     calcium carbonate (TUMS) 200 mg calcium (500 mg) chew, Take 1 Tab by mouth as needed. , Disp: , Rfl:     docusate sodium (COLACE) 100 mg capsule, Take 100 mg by mouth two (2) times daily as needed for Constipation.  1-2 tabs PRN constipation, Disp: , Rfl:    hydrocortisone (PROCTOZONE-HC) 2.5 % rectal cream, Insert 1 g into rectum once as needed. , Disp: , Rfl:     chlorthalidone (HYGROTEN) 25 mg tablet, Take 1 Tab by mouth daily. , Disp: 90 Tab, Rfl: 3    ketorolac (ACULAR) 0.5 % ophthalmic solution, Administer 2 Drops to both eyes four (4) times daily as needed. , Disp: 1 Bottle, Rfl: 0    Visit Vitals    /80    Pulse 75    Temp 97.8 °F (36.6 °C) (Oral)    Resp 17    Ht 5' 5\" (1.651 m)    Wt 224 lb 6.4 oz (101.8 kg)    SpO2 98%    BMI 37.34 kg/m2       ROS  See above  Physical Exam   Constitutional: She appears well-developed and well-nourished. HENT:   Head: Normocephalic and atraumatic. Neck: Neck supple. Cardiovascular: Normal rate, regular rhythm and normal heart sounds. Exam reveals no gallop and no friction rub. No murmur heard. Pulmonary/Chest: Effort normal and breath sounds normal.   Abdominal: Soft. Bowel sounds are normal. She exhibits no distension and no mass. There is tenderness (epigastric). There is no rebound and no guarding. Musculoskeletal: She exhibits no edema. Lymphadenopathy:     She has no cervical adenopathy. Vitals reviewed. ASSESSMENT and PLAN  htn - borderline but recent increase in medication and imrpovement from last 2 weeks. Continue same medications  Check pain - upcoming stress test but ? GI  Esophageal spasms - improved but this appears to be more epigastric pain - continue PPI. Discussed bland diet. F/u with Dr. Michael Machado, GI  Hypothyroid - clinically euthyroid. Repeat Thyroid labs. S/p right rotator cuff repair - still healing.   Continue PT and Ultracet prn per ortho  Morbid obesity - continue to work on diet and exercise for weight loss  Orders Placed This Encounter    TSH RFX ON ABNORMAL TO FREE T4    traMADol-acetaminophen (ULTRACET) 37.5-325 mg per tablet    NIFEdipine ER (PROCARDIA XL) 60 mg ER tablet     Follow-up Disposition: Not on File

## 2018-07-23 NOTE — PROGRESS NOTES
PT DAILY TREATMENT NOTE 2-15    Patient Name: Princess Lucero  Date:2018  : 1955  [x]  Patient  Verified  Payor: Lola Dietrich / Plan: Roc Hendricks / Product Type: PPO /    In time: 10:00 AM  Out time: 10:30 AM  Total Treatment Time (min): 30 (30 timed)  Visit #: 22    Treatment Area: Right shoulder pain [M25.511]    SUBJECTIVE  Pain Level (0-10 scale): 4/10  Any medication changes, allergies to medications, adverse drug reactions, diagnosis change, or new procedure performed?: [x] No    [] Yes (see summary sheet for update)  Subjective functional status/changes:   [] No changes reported  Patient reports her BP has been \"normal \" 140/68 mmHg, since last visit. She had an appointment with her Cardiologist last Thursday who increased her medication for irregular heart rate which also helps control the spasms. She is to have a stress test Wednesday evening and was advised to [place] low\" until then. She also was recommended to see her GI as her cardiologist things she has some issues there. States she also saw Dr. Juan Zapata last week who asked her if she thought she re-inured her shoulder and she said \"I don't think I did I just feel the popping in there and some pain. \" Dr. Juan Zapata advised her that it is just going to take time to heal due to the severity of the tear prior to surgery. She continues to get shooting pains in her shoulder but it is not as painful as it used to be. \"it's when I move certain ways that I feel the pop. \"  She was advised to continue PT per Dr. Juan Zapata, patient will bring updated Rx       OBJECTIVE    Modality rationale: decrease edema, decrease inflammation and decrease pain to improve the patients ability to reach overhead   Min Type Additional Details    [] Estim: []Att   []Unatt        []TENS instruct                  []IFC  []Premod   []NMES                     []Other:  []w/US   []w/ice   []w/heat  Position:  Location:    []  Traction: [] Cervical []Lumbar                       [] Prone          []Supine                       []Intermittent   []Continuous Lbs:  [] before manual  [] after manual  []w/heat    []  Ultrasound: []Continuous   [] Pulsed at:                            []1MHz   []3MHz Location:  W/cm2:    []  Paraffin         Location:  []w/heat   To go [x]  Ice     []  Heat  []  Ice massage Position: seated, UE supported  Location: R shoulder    []  Laser  []  Other: Position:  Location:    []  Vasopneumatic Device Pressure:       [] lo [] med [] hi   Temperature:    [x] Skin assessment post-treatment:  [x]intact []redness- no adverse reaction    []redness  adverse reaction:     10 min Therapeutic Exercise:  [x] See flow sheet : pulley flexion/scaption, supine cane flexion and ER   Rationale: increase ROM and increase strength to improve the patients ability to reach overhead, lift objects, perform daily chores    20 min Manual Therapy:  PROM R shoulder flex, abd, IR, ER to tolerance   Gentle GH oscillations for pain control     Rationale: decrease pain, increase ROM and increase tissue extensibility  to improve the patients ability to perform daily activities, don/doff clothing          With   [x] TE   [] TA   [] neuro   [] other: Self Care/Patient Education: [x] Review HEP    [x] Progressed/Changed HEP based on: see chart  [] positioning   [] body mechanics   [] transfers   [] heat/ice application    [] other:     Other Objective/Functional Measures: NT                Pain Level (0-10 scale) post treatment: 2/10    ASSESSMENT/Changes in Function:   Continued to perform AAROM/PROM exercises only due to MD recommendations. PROM IR tightness noted today.     Patient will continue to benefit from skilled PT services to modify and progress therapeutic interventions, address functional mobility deficits, address ROM deficits, address strength deficits, analyze and address soft tissue restrictions, analyze and cue movement patterns, analyze and modify body mechanics/ergonomics and assess and modify postural abnormalities to attain remaining goals.      []  See Plan of Care  []  See progress note/recertification  []  See Discharge Summary         Progress towards goals / Updated goals:  nt    PLAN  []  Upgrade activities as tolerated     [x]  Continue plan of care  []  Update interventions per flow sheet       []  Discharge due to:_  []  Other:_      Ingris Martinez, SABINO 7/23/2018  10:00 AM

## 2018-07-24 LAB — TSH SERPL DL<=0.005 MIU/L-ACNC: 0.59 UIU/ML (ref 0.45–4.5)

## 2018-07-25 ENCOUNTER — HOSPITAL ENCOUNTER (OUTPATIENT)
Dept: PHYSICAL THERAPY | Age: 63
Discharge: HOME | End: 2018-07-25
Payer: COMMERCIAL

## 2018-07-25 PROCEDURE — 97140 MANUAL THERAPY 1/> REGIONS: CPT | Performed by: PHYSICAL THERAPY ASSISTANT

## 2018-07-25 PROCEDURE — 97110 THERAPEUTIC EXERCISES: CPT | Performed by: PHYSICAL THERAPY ASSISTANT

## 2018-07-25 NOTE — PROGRESS NOTES
PT DAILY TREATMENT NOTE 2-15    Patient Name: Viviane Wilson  Date:2018  : 1955  [x]  Patient  Verified  Payor: Niki Nuñez / Plan: Chanelle Common / Product Type: PPO /    In time: 10:10 AM  Out time: 10:40 AM  Total Treatment Time (min): 30 (30 timed)  Visit #: 23    Treatment Area: Right shoulder pain [M25.511]    SUBJECTIVE  Pain Level (0-10 scale): 5/10  Any medication changes, allergies to medications, adverse drug reactions, diagnosis change, or new procedure performed?: [x] No    [] Yes (see summary sheet for update)  Subjective functional status/changes:   [] No changes reported  Patient reports she had a few episodes of chest pains and spasms, took her medication as directed by her cardiologist which helped reduce her pain. States BP has been ranging 140/78 mmHg. She is to have her stress test this evening.        OBJECTIVE    Modality rationale: decrease edema, decrease inflammation and decrease pain to improve the patients ability to reach overhead   Min Type Additional Details    [] Estim: []Att   []Unatt        []TENS instruct                  []IFC  []Premod   []NMES                     []Other:  []w/US   []w/ice   []w/heat  Position:  Location:    []  Traction: [] Cervical       []Lumbar                       [] Prone          []Supine                       []Intermittent   []Continuous Lbs:  [] before manual  [] after manual  []w/heat    []  Ultrasound: []Continuous   [] Pulsed at:                            []1MHz   []3MHz Location:  W/cm2:    []  Paraffin         Location:  []w/heat   To go [x]  Ice     []  Heat  []  Ice massage Position: seated, UE supported  Location: R shoulder    []  Laser  []  Other: Position:  Location:    []  Vasopneumatic Device Pressure:       [] lo [] med [] hi   Temperature:    [x] Skin assessment post-treatment:  [x]intact []redness- no adverse reaction    []redness  adverse reaction:     10 min Therapeutic Exercise:  [x] See flow sheet : pulley flexion/scaption, supine cane flexion and ER   Rationale: increase ROM and increase strength to improve the patients ability to reach overhead, lift objects, perform daily chores    20 min Manual Therapy:  PROM R shoulder flex, abd, IR, ER to tolerance   Gentle GH oscillations for pain control     Rationale: decrease pain, increase ROM and increase tissue extensibility  to improve the patients ability to perform daily activities, don/doff clothing          With   [x] TE   [] TA   [] neuro   [] other: Self Care/Patient Education: [x] Review HEP    [x] Progressed/Changed HEP based on: see chart  [] positioning   [] body mechanics   [] transfers   [] heat/ice application    [] other:     Other Objective/Functional Measures: NT                Pain Level (0-10 scale) post treatment: 2/10    ASSESSMENT/Changes in Function:   R shoulder PROM ER almost full, minor report of pain at end range. Continued to modify session due to MD request secondary to stress test until patient follows up with cardiologist.     Patient will continue to benefit from skilled PT services to modify and progress therapeutic interventions, address functional mobility deficits, address ROM deficits, address strength deficits, analyze and address soft tissue restrictions, analyze and cue movement patterns, analyze and modify body mechanics/ergonomics and assess and modify postural abnormalities to attain remaining goals.      []  See Plan of Care  []  See progress note/recertification  []  See Discharge Summary         Progress towards goals / Updated goals:  nt    PLAN  []  Upgrade activities as tolerated     [x]  Continue plan of care  []  Update interventions per flow sheet       []  Discharge due to:_  []  Other:_      Vincent Smith PTA 7/25/2018  10:10 AM

## 2018-07-30 ENCOUNTER — HOSPITAL ENCOUNTER (OUTPATIENT)
Dept: PHYSICAL THERAPY | Age: 63
Discharge: HOME | End: 2018-07-30
Payer: COMMERCIAL

## 2018-07-30 PROCEDURE — 97140 MANUAL THERAPY 1/> REGIONS: CPT | Performed by: PHYSICAL THERAPY ASSISTANT

## 2018-07-30 PROCEDURE — 97110 THERAPEUTIC EXERCISES: CPT | Performed by: PHYSICAL THERAPY ASSISTANT

## 2018-07-30 NOTE — PROGRESS NOTES
PT DAILY TREATMENT NOTE 2-15 Patient Name: Brinda Gay Date:2018 : 1955 [x]  Patient  Verified Payor: Renee Mcdaniel / Plan: [address] / Product Type: PPO / In time: 10:05 AM  Out time: 11:00AM 
Total Treatment Time (min): 55 (55 timed) Visit #: 24 
RTMD: 18 Treatment Area: Right shoulder pain [M25.511] SUBJECTIVE Pain Level (0-10 scale): 5-6/10 Any medication changes, allergies to medications, adverse drug reactions, diagnosis change, or new procedure performed?: [x] No    [] Yes (see summary sheet for update) Subjective functional status/changes:   [] No changes reported Patient reports she had her stress test which was negative and has been cleared by her cardiologist to resume regular activity while at PT. Also she is scheduled for see her Gastroenterologist on Thursday 8/3/18. States she has difficulty lowering her arm when her R shoulder is higher than shoulder height. \"A little bit of pinching going on this morning but it was like that before I came here. \" OBJECTIVE Modality rationale: decrease edema, decrease inflammation and decrease pain to improve the patients ability to reach overhead Min Type Additional Details  
 [] Estim: []Att   []Unatt        []TENS instruct []IFC  []Premod   []NMES []Other:  []w/US   []w/ice   []w/heat Position: Location:  
 []  Traction: [] Cervical       []Lumbar 
                     [] Prone          []Supine []Intermittent   []Continuous Lbs: 
[] before manual 
[] after manual 
[]w/heat  
 []  Ultrasound: []Continuous   [] Pulsed at:  
                         []1MHz   []3MHz Location: 
W/cm2:  
 []  Paraffin Location: 
[]w/heat To go [x]  Ice     []  Heat 
[]  Ice massage Position: seated, UE supported Location: R shoulder  
 []  Laser 
[]  Other: Position: Location:  
 []  Vasopneumatic Device Pressure:       [] lo [] med [] hi Temperature:   
[x] Skin assessment post-treatment:  [x]intact []redness- no adverse reaction 
  []redness  adverse reaction:  
 
40 min Therapeutic Exercise:  [x] See flow sheet : resumed strengthening exercises. Rationale: increase ROM and increase strength to improve the patients ability to reach overhead, lift objects, perform daily chores 15 min Manual Therapy:  PROM R shoulder flex, abd, IR, ER to tolerance Gentle GH oscillations for pain control Rationale: decrease pain, increase ROM and increase tissue extensibility  to improve the patients ability to perform daily activities, don/doff clothing With 
 [x] TE 
 [] TA 
 [] neuro 
 [] other: Self Care/Patient Education: [x] Review HEP [x] Progressed/Changed HEP based on: see chart 
[] positioning   [] body mechanics   [] transfers   [] heat/ice application   
[] other:  
 
Other Objective/Functional Measures: NT             
 
Pain Level (0-10 scale) post treatment: 2/10 ASSESSMENT/Changes in Function: Able to resume regular exercise routine as patient cleared by cardiologist. Fatigue with standing shoulder flexion and scaption, minor cues to avoid UT activation. Used mirror for visual feedback as well. Continues to have occasional \"pinching\" R shoulder at end range shoulder flexion. Patient will continue to benefit from skilled PT services to modify and progress therapeutic interventions, address functional mobility deficits, address ROM deficits, address strength deficits, analyze and address soft tissue restrictions, analyze and cue movement patterns, analyze and modify body mechanics/ergonomics and assess and modify postural abnormalities to attain remaining goals. []  See Plan of Care 
[]  See progress note/recertification 
[]  See Discharge Summary Progress towards goals / Updated goals: 
nt 
 
PLAN 
[]  Upgrade activities as tolerated     [x]  Continue plan of care 
[]  Update interventions per flow sheet []  Discharge due to:_ 
[]  Other:_   
 
Patito Dutta, PTA 7/30/2018  10:05 AM

## 2018-08-01 ENCOUNTER — APPOINTMENT (OUTPATIENT)
Dept: PHYSICAL THERAPY | Age: 63
End: 2018-08-01
Payer: COMMERCIAL

## 2018-08-06 ENCOUNTER — HOSPITAL ENCOUNTER (OUTPATIENT)
Dept: PHYSICAL THERAPY | Age: 63
Discharge: HOME | End: 2018-08-06
Payer: COMMERCIAL

## 2018-08-06 PROCEDURE — 97140 MANUAL THERAPY 1/> REGIONS: CPT | Performed by: PHYSICAL THERAPIST

## 2018-08-06 PROCEDURE — 97110 THERAPEUTIC EXERCISES: CPT | Performed by: PHYSICAL THERAPIST

## 2018-08-06 NOTE — PROGRESS NOTES
PT DAILY TREATMENT NOTE 2-15    Patient Name: Chichi Lopes  Date:2018  : 1955  [x]  Patient  Verified  Payor: Timbo Pleitez / Plan: Karie Vital / Product Type: PPO /    In time: 950 AM  Out time: 11:00 AM  Total Treatment Time (min): 70 (70 timed)  Visit #: 25  RTMD: 18    Treatment Area: Right shoulder pain [M25.511]    SUBJECTIVE  Pain Level (0-10 scale): 5/10  Any medication changes, allergies to medications, adverse drug reactions, diagnosis change, or new procedure performed?: [x] No    [] Yes (see summary sheet for update)  Subjective functional status/changes:   [] No changes reported  Pt states that her cardiologist changed her meds; she has to schedule upper GI test with gastroenterologist which she is planning on scheduling after this PT appointment. She is having slight \"discomfort\" in upper chest this morning, \"its been that way\". She states her blood pressure has returned to normal. Her R shoulder has been sore, \"I blame those anchors, it pulls at night if I don't prop it up\".  She is returning to Dr. Micheal Masters next Tuesday    OBJECTIVE    Modality rationale: decrease edema, decrease inflammation and decrease pain to improve the patients ability to reach overhead   Min Type Additional Details    [] Estim: []Att   []Unatt        []TENS instruct                  []IFC  []Premod   []NMES                     []Other:  []w/US   []w/ice   []w/heat  Position:  Location:    []  Traction: [] Cervical       []Lumbar                       [] Prone          []Supine                       []Intermittent   []Continuous Lbs:  [] before manual  [] after manual  []w/heat    []  Ultrasound: []Continuous   [] Pulsed at:                            []1MHz   []3MHz Location:  W/cm2:    []  Paraffin         Location:  []w/heat   To go [x]  Ice     []  Heat  []  Ice massage Position: seated, UE supported  Location: R shoulder    []  Laser  []  Other: Position:  Location:    [] Vasopneumatic Device Pressure:       [] lo [] med [] hi   Temperature:    [x] Skin assessment post-treatment:  [x]intact []redness- no adverse reaction    []redness  adverse reaction:     55 min Therapeutic Exercise:  [x] See flow sheet : resumed strengthening exercises. Rationale: increase ROM and increase strength to improve the patients ability to reach overhead, lift objects, perform daily chores    15 min Manual Therapy:  PROM R shoulder flex, abd, IR, ER to tolerance   Gentle GH oscillations for pain control     Rationale: decrease pain, increase ROM and increase tissue extensibility  to improve the patients ability to perform daily activities, don/doff clothing          With   [x] TE   [] TA   [] neuro   [] other: Self Care/Patient Education: [x] Review HEP    [x] Progressed/Changed HEP based on: see chart  [] positioning   [] body mechanics   [] transfers   [] heat/ice application    [] other:     Other Objective/Functional Measures: NT                Pain Level (0-10 scale) post treatment: 3/10    ASSESSMENT/Changes in Function:   Continues to report \"pinching\", rajesh at end range PROM flexion-- cues for relaxation, which dec'd symptoms. Reviewed HEP today-- copy of previous handout given to pt as well as updated therabands. Fatigued with AROM flex/scap, cues for proper scapular positioning during sidelying ER. Patient will continue to benefit from skilled PT services to modify and progress therapeutic interventions, address functional mobility deficits, address ROM deficits, address strength deficits, analyze and address soft tissue restrictions, analyze and cue movement patterns, analyze and modify body mechanics/ergonomics and assess and modify postural abnormalities to attain remaining goals.      []  See Plan of Care  []  See progress note/recertification  []  See Discharge Summary         Progress towards goals / Updated goals:  nt    PLAN  []  Upgrade activities as tolerated     [x]  Continue plan of care  []  Update interventions per flow sheet       []  Discharge due to:_  []  Other:_      Randell Lassiter, PT 8/6/2018  9:59 AM

## 2018-08-08 ENCOUNTER — APPOINTMENT (OUTPATIENT)
Dept: PHYSICAL THERAPY | Age: 63
End: 2018-08-08
Payer: COMMERCIAL

## 2018-08-13 ENCOUNTER — HOSPITAL ENCOUNTER (OUTPATIENT)
Dept: PHYSICAL THERAPY | Age: 63
Discharge: HOME | End: 2018-08-13
Payer: COMMERCIAL

## 2018-08-13 PROCEDURE — 97140 MANUAL THERAPY 1/> REGIONS: CPT | Performed by: PHYSICAL THERAPIST

## 2018-08-13 PROCEDURE — 97110 THERAPEUTIC EXERCISES: CPT | Performed by: PHYSICAL THERAPIST

## 2018-08-13 NOTE — PROGRESS NOTES
PT to MD Note    Patient Name: Hemalatha Kent  Date:2018  : 1955  Visit #: 26    Treatment Area: Right shoulder pain [M25.511]    SUBJECTIVE  Pain Level (0-10 scale): 6/10  Pt reports that she is having increased pain this AM. \"When I move it just a little I get this pulling\"  She states that is able to functionally do more activities at home with her R arm    OBJECTIVE    PROM R shoulder:  Flexion= 132 deg (p!)  Abduction= 136 deg (p!)  ER at 45 deg= 74 deg (p!)  IR at 90 deg= 65 deg (p!)     AROM R shoulder  Flex= 88 deg (p!)  Abd= 74 deg (p!)     ASSESSMENT  Pt has progressed with therapy since last MD visit, however gains are slower than anticipated secondary to other medical issues. Pt has been cleared by cardiologist to resume full PT regimen as of approx 3 weeks ago after myocardial stress test. Please advise, thank you!     Berny Holman, PT 2018  10:17 AM

## 2018-08-13 NOTE — PROGRESS NOTES
PT DAILY TREATMENT/Progress NOTE 2-15    Patient Name: Michael Bolden  Date:2018  : 1955  [x]  Patient  Verified  Payor: Avery Olszewski / Plan: Sam Coyle / Product Type: PPO /    In time: 10:05 AM  Out time: 11:10 AM  Total Treatment Time (min): 65 (65 timed)  Visit #: 26  RTMD: 18    Treatment Area: Right shoulder pain [M25.511]    SUBJECTIVE  Pain Level (0-10 scale): 610  Any medication changes, allergies to medications, adverse drug reactions, diagnosis change, or new procedure performed?: [x] No    [] Yes (see summary sheet for update)  Subjective functional status/changes:   [] No changes reported  Pt reports that her shoulder has some increased pain today \"I barely move it and it gets this pulling\"; she has MD appointment tomorrow    OBJECTIVE    Modality rationale: decrease edema, decrease inflammation and decrease pain to improve the patients ability to reach overhead   Min Type Additional Details    [] Estim: []Att   []Unatt        []TENS instruct                  []IFC  []Premod   []NMES                     []Other:  []w/US   []w/ice   []w/heat  Position:  Location:    []  Traction: [] Cervical       []Lumbar                       [] Prone          []Supine                       []Intermittent   []Continuous Lbs:  [] before manual  [] after manual  []w/heat    []  Ultrasound: []Continuous   [] Pulsed at:                            []1MHz   []3MHz Location:  W/cm2:    []  Paraffin         Location:  []w/heat   To go [x]  Ice     []  Heat  []  Ice massage Position: seated, UE supported  Location: R shoulder    []  Laser  []  Other: Position:  Location:    []  Vasopneumatic Device Pressure:       [] lo [] med [] hi   Temperature:    [x] Skin assessment post-treatment:  [x]intact []redness- no adverse reaction    []redness  adverse reaction:     55 min Therapeutic Exercise:  [x] See flow sheet : measurements taken for MD note.    Rationale: increase ROM and increase strength to improve the patients ability to reach overhead, lift objects, perform daily chores    10 min Manual Therapy:  PROM R shoulder flex, abd, IR, ER to tolerance   Gentle GH oscillations for pain control     Rationale: decrease pain, increase ROM and increase tissue extensibility  to improve the patients ability to perform daily activities, don/doff clothing          With   [x] TE   [] TA   [] neuro   [] other: Self Care/Patient Education: [x] Review HEP    [x] Progressed/Changed HEP based on: see chart  [] positioning   [] body mechanics   [] transfers   [] heat/ice application    [] other:     Other Objective/Functional Measures:   See MD note. Pain Level (0-10 scale) post treatment: not reported    ASSESSMENT/Changes in Function:   See MD note. Plan to continue PT 2x/wk for additional 4-6 weeks to progress strengthening, ROM. Patient will continue to benefit from skilled PT services to modify and progress therapeutic interventions, address functional mobility deficits, address ROM deficits, address strength deficits, analyze and address soft tissue restrictions, analyze and cue movement patterns, analyze and modify body mechanics/ergonomics and assess and modify postural abnormalities to attain remaining goals. []  See Plan of Care  []  See progress note/recertification  []  See Discharge Summary         Progress towards goals / Updated goals:  Short Term Goals: To be accomplished in 4-6 weeks:  1) Pt will be independent in initial HEP MET  2) Pt will report good compliance with ice regimen in order to decrease inflammation and manage pain levels MET  3) Pt will improve R shoulder PROM flexion to > or =145 deg in order to progress towards reaching into cabinets not met  4) Pt will improve R shoulder PROM ER to > or =60 deg in order to progress towards donning/doffing clothing with ease MET     Long Term Goals:  To be accomplished in 10-12 weeks:  1) Pt will improve R shoulder AROM flexion to > or =145 deg in order to reach into cabinets and perform ADL's with ease  2) Pt will improve R shoulder AROM ER to > or =70 deg in order to   3) Pt will report being able to don/doff clothing without R shoulder pain  4) Pt will demonstrate > or =4/5 R shoulder scaption strength in order to lift objects    PLAN  []  Upgrade activities as tolerated     [x]  Continue plan of care  []  Update interventions per flow sheet       []  Discharge due to:_  []  Other:_      Phani Pérez PT 8/13/2018  10:08 AM

## 2018-08-15 ENCOUNTER — HOSPITAL ENCOUNTER (OUTPATIENT)
Dept: PHYSICAL THERAPY | Age: 63
Discharge: HOME | End: 2018-08-15
Payer: COMMERCIAL

## 2018-08-15 PROCEDURE — 97140 MANUAL THERAPY 1/> REGIONS: CPT | Performed by: PHYSICAL THERAPY ASSISTANT

## 2018-08-15 PROCEDURE — 97110 THERAPEUTIC EXERCISES: CPT | Performed by: PHYSICAL THERAPY ASSISTANT

## 2018-08-15 NOTE — PROGRESS NOTES
PT DAILY TREATMENT NOTE 2-15    Patient Name: Lina Ray  Date:8/15/2018  : 1955  [x]  Patient  Verified  Payor: Nenita Salinas / Plan: Olga Nguyen / Product Type: PPO /    In time: 11:30 AM  Out time: 12:00 PM  Total Treatment Time (min): 30 (30 timed)  Visit #: 27  RTMD: 18    Treatment Area: Right shoulder pain [M25.511]    SUBJECTIVE  Pain Level (0-10 scale): 6/10  Any medication changes, allergies to medications, adverse drug reactions, diagnosis change, or new procedure performed?: [x] No    [] Yes (see summary sheet for update)  Subjective functional status/changes:   [] No changes reported  Pt reports she went to the eye doctor yesterday and \"has a lot going on\"  She see's Dr. Sohan Lowery on 18. States \"the anchors are pulling but otherwise ok. \" Patient requesting to leave early as her daughter has an MD appointment.     OBJECTIVE    Modality rationale: decrease edema, decrease inflammation and decrease pain to improve the patients ability to reach overhead   Min Type Additional Details    [] Estim: []Att   []Unatt        []TENS instruct                  []IFC  []Premod   []NMES                     []Other:  []w/US   []w/ice   []w/heat  Position:  Location:    []  Traction: [] Cervical       []Lumbar                       [] Prone          []Supine                       []Intermittent   []Continuous Lbs:  [] before manual  [] after manual  []w/heat    []  Ultrasound: []Continuous   [] Pulsed at:                            []1MHz   []3MHz Location:  W/cm2:    []  Paraffin         Location:  []w/heat   To go [x]  Ice     []  Heat  []  Ice massage Position: seated, UE supported  Location: R shoulder    []  Laser  []  Other: Position:  Location:    []  Vasopneumatic Device Pressure:       [] lo [] med [] hi   Temperature:    [x] Skin assessment post-treatment:  [x]intact []redness- no adverse reaction    []redness  adverse reaction:     30 min Therapeutic Exercise: [x] See flow sheet : added 1 pound weights with standing shoulder  Flexion and scaption   Rationale: increase ROM and increase strength to improve the patients ability to reach overhead, lift objects, perform daily chores    10 min Manual Therapy:  PROM R shoulder flex, abd, IR, ER to tolerance   Gentle GH oscillations for pain control     Rationale: decrease pain, increase ROM and increase tissue extensibility  to improve the patients ability to perform daily activities, don/doff clothing          With   [x] TE   [] TA   [] neuro   [] other: Self Care/Patient Education: [x] Review HEP    [x] Progressed/Changed HEP based on: see chart  [] positioning   [] body mechanics   [] transfers   [] heat/ice application    [] other:     Other Objective/Functional Measures: NT               Pain Level (0-10 scale) post treatment: not reported    ASSESSMENT/Changes in Function:   Able to perform standing shoulder flexion and scaption using 1 pound weights today without report of pain. Encouraged patient to use soup cans at home for weights if she does not have the appropriate weights at home. Patient will continue to benefit from skilled PT services to modify and progress therapeutic interventions, address functional mobility deficits, address ROM deficits, address strength deficits, analyze and address soft tissue restrictions, analyze and cue movement patterns, analyze and modify body mechanics/ergonomics and assess and modify postural abnormalities to attain remaining goals. []  See Plan of Care  []  See progress note/recertification  []  See Discharge Summary         Progress towards goals / Updated goals:  Short Term Goals:  To be accomplished in 4-6 weeks:  1) Pt will be independent in initial HEP MET  2) Pt will report good compliance with ice regimen in order to decrease inflammation and manage pain levels MET  3) Pt will improve R shoulder PROM flexion to > or =145 deg in order to progress towards reaching into cabinets not met  4) Pt will improve R shoulder PROM ER to > or =60 deg in order to progress towards donning/doffing clothing with ease MET     Long Term Goals:  To be accomplished in 10-12 weeks:  1) Pt will improve R shoulder AROM flexion to > or =145 deg in order to reach into cabinets and perform ADL's with ease  2) Pt will improve R shoulder AROM ER to > or =70 deg in order to   3) Pt will report being able to don/doff clothing without R shoulder pain  4) Pt will demonstrate > or =4/5 R shoulder scaption strength in order to lift objects    PLAN  []  Upgrade activities as tolerated     [x]  Continue plan of care  []  Update interventions per flow sheet       []  Discharge due to:_  []  Other:_      Barb Velásquez PTA 8/15/2018  11:20 AM

## 2018-08-20 ENCOUNTER — HOSPITAL ENCOUNTER (OUTPATIENT)
Dept: PHYSICAL THERAPY | Age: 63
Discharge: HOME | End: 2018-08-20
Payer: COMMERCIAL

## 2018-08-20 PROCEDURE — 97110 THERAPEUTIC EXERCISES: CPT | Performed by: PHYSICAL THERAPIST

## 2018-08-20 PROCEDURE — 97140 MANUAL THERAPY 1/> REGIONS: CPT | Performed by: PHYSICAL THERAPIST

## 2018-08-20 NOTE — PROGRESS NOTES
PT DAILY TREATMENT NOTE 2-15    Patient Name: Gabrielle Minor  Date:2018  : 1955  [x]  Patient  Verified  Payor: Gilma Alejo / Plan: Barrie Schilder / Product Type: PPO /    In time: 10:05 AM  Out time: 11:10 AM  Total Treatment Time (min): 65 (65 timed)  Visit #: 28  RTMD: 18    Treatment Area: Right shoulder pain [M25.511]    SUBJECTIVE  Pain Level (0-10 scale): 6/10  Any medication changes, allergies to medications, adverse drug reactions, diagnosis change, or new procedure performed?: [x] No    [] Yes (see summary sheet for update)  Subjective functional status/changes:   [] No changes reported  MD appointment tomorrow; she states she had increased pain this morning when she woke up but is feeling a little better now    OBJECTIVE    Modality rationale: decrease edema, decrease inflammation and decrease pain to improve the patients ability to reach overhead   Min Type Additional Details    [] Estim: []Att   []Unatt        []TENS instruct                  []IFC  []Premod   []NMES                     []Other:  []w/US   []w/ice   []w/heat  Position:  Location:    []  Traction: [] Cervical       []Lumbar                       [] Prone          []Supine                       []Intermittent   []Continuous Lbs:  [] before manual  [] after manual  []w/heat    []  Ultrasound: []Continuous   [] Pulsed at:                            []1MHz   []3MHz Location:  W/cm2:    []  Paraffin         Location:  []w/heat   To go [x]  Ice     []  Heat  []  Ice massage Position: seated, UE supported  Location: R shoulder    []  Laser  []  Other: Position:  Location:    []  Vasopneumatic Device Pressure:       [] lo [] med [] hi   Temperature:    [x] Skin assessment post-treatment:  [x]intact []redness- no adverse reaction    []redness  adverse reaction:     50 min Therapeutic Exercise:  [x] See flow sheet : added SWB liftoffs   Rationale: increase ROM and increase strength to improve the patients ability to reach overhead, lift objects, perform daily chores    15 min Manual Therapy:  PROM R shoulder flex, abd, IR, ER to tolerance   Gentle GH oscillations for pain control     Rationale: decrease pain, increase ROM and increase tissue extensibility  to improve the patients ability to perform daily activities, don/doff clothing          With   [x] TE   [] TA   [] neuro   [] other: Self Care/Patient Education: [x] Review HEP    [x] Progressed/Changed HEP based on: see chart  [] positioning   [] body mechanics   [] transfers   [] heat/ice application    [] other:     Other Objective/Functional Measures: NT               Pain Level (0-10 scale) post treatment: 6/10    ASSESSMENT/Changes in Function:   Reviewed HEP today, progressed strengthening as tolerated. PROM improved today with cues for relaxation    Patient will continue to benefit from skilled PT services to modify and progress therapeutic interventions, address functional mobility deficits, address ROM deficits, address strength deficits, analyze and address soft tissue restrictions, analyze and cue movement patterns, analyze and modify body mechanics/ergonomics and assess and modify postural abnormalities to attain remaining goals. []  See Plan of Care  []  See progress note/recertification  []  See Discharge Summary         Progress towards goals / Updated goals:  Short Term Goals: To be accomplished in 4-6 weeks:  1) Pt will be independent in initial HEP MET  2) Pt will report good compliance with ice regimen in order to decrease inflammation and manage pain levels MET  3) Pt will improve R shoulder PROM flexion to > or =145 deg in order to progress towards reaching into cabinets not met  4) Pt will improve R shoulder PROM ER to > or =60 deg in order to progress towards donning/doffing clothing with ease MET     Long Term Goals:  To be accomplished in 10-12 weeks:  1) Pt will improve R shoulder AROM flexion to > or =145 deg in order to reach into cabinets and perform ADL's with ease  2) Pt will improve R shoulder AROM ER to > or =70 deg in order to   3) Pt will report being able to don/doff clothing without R shoulder pain  4) Pt will demonstrate > or =4/5 R shoulder scaption strength in order to lift objects    PLAN  []  Upgrade activities as tolerated     [x]  Continue plan of care  []  Update interventions per flow sheet       []  Discharge due to:_  []  Other:_      Misael Johns, PT 8/20/2018  10:11 AM

## 2018-08-22 ENCOUNTER — HOSPITAL ENCOUNTER (OUTPATIENT)
Dept: PHYSICAL THERAPY | Age: 63
Discharge: HOME | End: 2018-08-22
Payer: COMMERCIAL

## 2018-08-22 PROCEDURE — 97140 MANUAL THERAPY 1/> REGIONS: CPT | Performed by: PHYSICAL THERAPIST

## 2018-08-22 PROCEDURE — 97110 THERAPEUTIC EXERCISES: CPT | Performed by: PHYSICAL THERAPIST

## 2018-08-22 NOTE — PROGRESS NOTES
PT DAILY TREATMENT NOTE 2-15    Patient Name: Ivan Chand  Date:2018  : 1955  [x]  Patient  Verified  Payor: Cheri Dash / Plan: Maciej Pedraza / Product Type: PPO /    In time: 10:25 AM  Out time: 1120 AM  Total Treatment Time (min): 55 (55 timed)  Visit #: 29  RTMD:     Treatment Area: Right shoulder pain [M25.511]    SUBJECTIVE  Pain Level (0-10 scale): 6/10  Any medication changes, allergies to medications, adverse drug reactions, diagnosis change, or new procedure performed?: [x] No    [] Yes (see summary sheet for update)  Subjective functional status/changes:   [] No changes reported  Pt states that MD ordered MRI to see if RTC is torn/ \"to find out what is going on, he was worried because I can't lift it very high\" MRI scheduled for next 18.  \"He said be careful with the exercises, don't do anything that really hurts\"  She states that while performing exercises in PT she feels that the pain is \"achy, like its not strong enough\"; she denies sharp, severe pain while performing exercises    OBJECTIVE    Modality rationale: decrease edema, decrease inflammation and decrease pain to improve the patients ability to reach overhead   Min Type Additional Details    [] Estim: []Att   []Unatt        []TENS instruct                  []IFC  []Premod   []NMES                     []Other:  []w/US   []w/ice   []w/heat  Position:  Location:    []  Traction: [] Cervical       []Lumbar                       [] Prone          []Supine                       []Intermittent   []Continuous Lbs:  [] before manual  [] after manual  []w/heat    []  Ultrasound: []Continuous   [] Pulsed at:                            []1MHz   []3MHz Location:  W/cm2:    []  Paraffin         Location:  []w/heat   To go [x]  Ice     []  Heat  []  Ice massage Position: seated, UE supported  Location: R shoulder    []  Laser  []  Other: Position:  Location:    []  Vasopneumatic Device Pressure:       [] lo [] med [] hi   Temperature:    [x] Skin assessment post-treatment:  [x]intact []redness- no adverse reaction    []redness  adverse reaction:     45 min Therapeutic Exercise:  [x] See flow sheet : added SWB liftoffs   Rationale: increase ROM and increase strength to improve the patients ability to reach overhead, lift objects, perform daily chores    10 min Manual Therapy:  PROM R shoulder flex, abd, IR, ER to tolerance   Gentle GH oscillations for pain control     Rationale: decrease pain, increase ROM and increase tissue extensibility  to improve the patients ability to perform daily activities, don/doff clothing          With   [x] TE   [] TA   [] neuro   [] other: Self Care/Patient Education: [x] Review HEP    [x] Progressed/Changed HEP based on: see chart  [] positioning   [] body mechanics   [] transfers   [] heat/ice application    [] other:     Other Objective/Functional Measures: NT               Pain Level (0-10 scale) post treatment: 3-4/10    ASSESSMENT/Changes in Function:   Cues to hold on IR/ER with tband as well as AROM flex/scap; encouraged pt to continue with stretches, rows/ext, and sidelying ER    Patient will continue to benefit from skilled PT services to modify and progress therapeutic interventions, address functional mobility deficits, address ROM deficits, address strength deficits, analyze and address soft tissue restrictions, analyze and cue movement patterns, analyze and modify body mechanics/ergonomics and assess and modify postural abnormalities to attain remaining goals. []  See Plan of Care  []  See progress note/recertification  []  See Discharge Summary         Progress towards goals / Updated goals:  Short Term Goals:  To be accomplished in 4-6 weeks:  1) Pt will be independent in initial HEP MET  2) Pt will report good compliance with ice regimen in order to decrease inflammation and manage pain levels MET  3) Pt will improve R shoulder PROM flexion to > or =145 deg in order to progress towards reaching into cabinets not met  4) Pt will improve R shoulder PROM ER to > or =60 deg in order to progress towards donning/doffing clothing with ease MET     Long Term Goals:  To be accomplished in 10-12 weeks:  1) Pt will improve R shoulder AROM flexion to > or =145 deg in order to reach into cabinets and perform ADL's with ease  2) Pt will improve R shoulder AROM ER to > or =70 deg in order to   3) Pt will report being able to don/doff clothing without R shoulder pain  4) Pt will demonstrate > or =4/5 R shoulder scaption strength in order to lift objects    PLAN  []  Upgrade activities as tolerated     [x]  Continue plan of care  []  Update interventions per flow sheet       []  Discharge due to:_  []  Other:_      Jono Byrd, PT 8/22/2018  10:34 AM

## 2018-08-27 ENCOUNTER — HOSPITAL ENCOUNTER (OUTPATIENT)
Dept: PHYSICAL THERAPY | Age: 63
End: 2018-08-27
Payer: COMMERCIAL

## 2018-08-27 ENCOUNTER — PATIENT OUTREACH (OUTPATIENT)
Dept: OTHER | Age: 63
End: 2018-08-27

## 2018-08-27 NOTE — PROGRESS NOTES
[facility]. Spoke with Ms. Alexandra López. Verified  and address for HIPAA security. * She remembers me from earlier calls. 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    * FT positions are being eliminated. * Arm still hurts/  Still attending PT.      - She does have the Care Card and will use that.      - ROM remains limited. Only about 90 degree arc. * Reviewed options when [employer] lapses:     - She is interested in applying for [facility]   [email or web address]       [address]    Monday-Friday, 8:30 a.m. to 4 p.m.  [phone]    [facility] Services  [phone] (Option 1)  [phone] (toll free)  Monday-Friday, 8 a.m. to 5 p.m. [facility] Care Coordination Team   [phone], 8:00 a.m. to 5 p.m.

## 2018-08-29 ENCOUNTER — HOSPITAL ENCOUNTER (OUTPATIENT)
Dept: PHYSICAL THERAPY | Age: 63
Discharge: HOME | End: 2018-08-29
Payer: COMMERCIAL

## 2018-08-29 PROCEDURE — 97110 THERAPEUTIC EXERCISES: CPT | Performed by: PHYSICAL THERAPIST

## 2018-08-29 PROCEDURE — 97140 MANUAL THERAPY 1/> REGIONS: CPT | Performed by: PHYSICAL THERAPIST

## 2018-08-29 NOTE — PROGRESS NOTES
PT DAILY TREATMENT NOTE 2-15 Patient Name: Kelly Freire Date:2018 : 1955 [x]  Patient  Verified Payor: Richa Vega / Plan: Cayal Damon / Product Type: PPO / In time: 10:10 AM  Out time: 11:00 AM 
Total Treatment Time (min): 50 (50 timed) Visit #: 30 
RTMD:  
 
Treatment Area: Right shoulder pain [M25.511] SUBJECTIVE Pain Level (0-10 scale): 5-6/10 Any medication changes, allergies to medications, adverse drug reactions, diagnosis change, or new procedure performed?: [x] No    [] Yes (see summary sheet for update) Subjective functional status/changes:   [] No changes reported Pt has MRI scheduled for Friday, . She states her brother is in the hospital and not doing well-- \"we almost lost him yesterday\"; she is asking to leave PT session early today so she can get to the hospital 
 
OBJECTIVE Modality rationale: decrease edema, decrease inflammation and decrease pain to improve the patients ability to reach overhead Min Type Additional Details  
 [] Estim: []Att   []Unatt        []TENS instruct []IFC  []Premod   []NMES []Other:  []w/US   []w/ice   []w/heat Position: Location:  
 []  Traction: [] Cervical       []Lumbar 
                     [] Prone          []Supine []Intermittent   []Continuous Lbs: 
[] before manual 
[] after manual 
[]w/heat  
 []  Ultrasound: []Continuous   [] Pulsed at:  
                         []1MHz   []3MHz Location: 
W/cm2:  
 []  Paraffin Location: 
[]w/heat To go [x]  Ice     []  Heat 
[]  Ice massage Position: seated, UE supported Location: R shoulder  
 []  Laser 
[]  Other: Position: Location:  
 []  Vasopneumatic Device Pressure:       [] lo [] med [] hi  
Temperature:   
[x] Skin assessment post-treatment:  [x]intact []redness- no adverse reaction 
  []redness  adverse reaction: 40 min Therapeutic Exercise:  [x] See flow sheet : modified due to upcoming MRI Rationale: increase ROM and increase strength to improve the patients ability to reach overhead, lift objects, perform daily chores 10 min Manual Therapy:  PROM R shoulder flex, abd, IR, ER to tolerance Gentle GH oscillations for pain control Rationale: decrease pain, increase ROM and increase tissue extensibility  to improve the patients ability to perform daily activities, don/doff clothing With 
 [x] TE 
 [] TA 
 [] neuro 
 [] other: Self Care/Patient Education: [x] Review HEP [x] Progressed/Changed HEP based on: see chart 
[] positioning   [] body mechanics   [] transfers   [] heat/ice application   
[] other:  
 
Other Objective/Functional Measures: NT            
 
Pain Level (0-10 scale) post treatment: \"a little better\" ASSESSMENT/Changes in Function:  
Continued with modified exercises today due to upcoming R shoulder MRI on Friday to assess integrity of RTC repair. Patient will continue to benefit from skilled PT services to modify and progress therapeutic interventions, address functional mobility deficits, address ROM deficits, address strength deficits, analyze and address soft tissue restrictions, analyze and cue movement patterns, analyze and modify body mechanics/ergonomics and assess and modify postural abnormalities to attain remaining goals. []  See Plan of Care 
[]  See progress note/recertification 
[]  See Discharge Summary Progress towards goals / Updated goals: 
Short Term Goals: To be accomplished in 4-6 weeks: 
1) Pt will be independent in initial HEP MET 2) Pt will report good compliance with ice regimen in order to decrease inflammation and manage pain levels MET 3) Pt will improve R shoulder PROM flexion to > or =145 deg in order to progress towards reaching into cabinets not met 4) Pt will improve R shoulder PROM ER to > or =60 deg in order to progress towards donning/doffing clothing with ease MET 
  
Long Term Goals: To be accomplished in 10-12 weeks: 
1) Pt will improve R shoulder AROM flexion to > or =145 deg in order to reach into cabinets and perform ADL's with ease 2) Pt will improve R shoulder AROM ER to > or =70 deg in order to 3) Pt will report being able to don/doff clothing without R shoulder pain 4) Pt will demonstrate > or =4/5 R shoulder scaption strength in order to lift objects PLAN 
[]  Upgrade activities as tolerated     [x]  Continue plan of care 
[]  Update interventions per flow sheet      
[]  Discharge due to:_ 
[]  Other:_   
 
Tegan Lock, PT 8/29/2018  10:15 AM

## 2018-08-31 ENCOUNTER — HOSPITAL ENCOUNTER (OUTPATIENT)
Dept: MRI IMAGING | Age: 63
Discharge: HOME | End: 2018-08-31
Attending: ORTHOPAEDIC SURGERY
Payer: COMMERCIAL

## 2018-08-31 DIAGNOSIS — M25.511 RIGHT SHOULDER PAIN: ICD-10-CM

## 2018-08-31 PROCEDURE — 73221 MRI JOINT UPR EXTREM W/O DYE: CPT

## 2018-09-05 ENCOUNTER — HOSPITAL ENCOUNTER (OUTPATIENT)
Dept: PHYSICAL THERAPY | Age: 63
Discharge: HOME | End: 2018-09-05
Payer: COMMERCIAL

## 2018-09-05 PROCEDURE — 97140 MANUAL THERAPY 1/> REGIONS: CPT | Performed by: PHYSICAL THERAPY ASSISTANT

## 2018-09-05 PROCEDURE — 97110 THERAPEUTIC EXERCISES: CPT | Performed by: PHYSICAL THERAPY ASSISTANT

## 2018-09-05 NOTE — PROGRESS NOTES
PT DAILY TREATMENT NOTE 2-15 Patient Name: Victorino Arambual Date:2018 : 1955 [x]  Patient  Verified Payor: Leticia Master / Plan: Bart Grass / Product Type: PPO / In time: 10:20 AM  Out time: 11:00 AM 
Total Treatment Time (min): 40 (40 timed) Visit #: 31 
RTMD:  
 
Treatment Area: Right shoulder pain [M25.511] SUBJECTIVE Pain Level (0-10 scale): 6/10 Any medication changes, allergies to medications, adverse drug reactions, diagnosis change, or new procedure performed?: [x] No    [] Yes (see summary sheet for update) Subjective functional status/changes:   [] No changes reported Pt states \"I have good news, my RTC repair isn't torn just the muscles are weak. He told me to continue with PT.\" States she will bring in her updated Rx next visit. States she continues to experience the pulling of the anchors which can be painful at times. OBJECTIVE Modality rationale: decrease edema, decrease inflammation and decrease pain to improve the patients ability to reach overhead Min Type Additional Details  
 [] Estim: []Att   []Unatt        []TENS instruct []IFC  []Premod   []NMES []Other:  []w/US   []w/ice   []w/heat Position: Location:  
 []  Traction: [] Cervical       []Lumbar 
                     [] Prone          []Supine []Intermittent   []Continuous Lbs: 
[] before manual 
[] after manual 
[]w/heat  
 []  Ultrasound: []Continuous   [] Pulsed at:  
                         []1MHz   []3MHz Location: 
W/cm2:  
 []  Paraffin Location: 
[]w/heat To go [x]  Ice     []  Heat 
[]  Ice massage Position: seated, UE supported Location: R shoulder  
 []  Laser 
[]  Other: Position: Location:  
 []  Vasopneumatic Device Pressure:       [] lo [] med [] hi  
Temperature:   
[x] Skin assessment post-treatment:  [x]intact []redness- no adverse reaction 
  []redness  adverse reaction: 30 min Therapeutic Exercise:  [x] See flow sheet :   
Rationale: increase ROM and increase strength to improve the patients ability to reach overhead, lift objects, perform daily chores 10 min Manual Therapy:  PROM R shoulder flex, abd, IR, ER to tolerance Gentle GH oscillations for pain control Rationale: decrease pain, increase ROM and increase tissue extensibility  to improve the patients ability to perform daily activities, don/doff clothing With 
 [x] TE 
 [] TA 
 [] neuro 
 [] other: Self Care/Patient Education: [x] Review HEP [x] Progressed/Changed HEP based on: see chart 
[] positioning   [] body mechanics   [] transfers   [] heat/ice application   
[] other:  
 
Other Objective/Functional Measures: NT            
 
Pain Level (0-10 scale) post treatment: \"a little better\" ASSESSMENT/Changes in Function:  
Small click noted during PROM ER but patient denies any sharp pain states \"That's the normal click that I feel and I tried to get Dr. Shalini Hancock to feel/hear it. \"  
 
Patient will continue to benefit from skilled PT services to modify and progress therapeutic interventions, address functional mobility deficits, address ROM deficits, address strength deficits, analyze and address soft tissue restrictions, analyze and cue movement patterns, analyze and modify body mechanics/ergonomics and assess and modify postural abnormalities to attain remaining goals. []  See Plan of Care 
[]  See progress note/recertification 
[]  See Discharge Summary Progress towards goals / Updated goals: 
Short Term Goals: To be accomplished in 4-6 weeks: 
1) Pt will be independent in initial HEP MET 2) Pt will report good compliance with ice regimen in order to decrease inflammation and manage pain levels MET 3) Pt will improve R shoulder PROM flexion to > or =145 deg in order to progress towards reaching into cabinets not met 4) Pt will improve R shoulder PROM ER to > or =60 deg in order to progress towards donning/doffing clothing with ease MET 
  
Long Term Goals: To be accomplished in 10-12 weeks: 
1) Pt will improve R shoulder AROM flexion to > or =145 deg in order to reach into cabinets and perform ADL's with ease 2) Pt will improve R shoulder AROM ER to > or =70 deg in order to 3) Pt will report being able to don/doff clothing without R shoulder pain 4) Pt will demonstrate > or =4/5 R shoulder scaption strength in order to lift objects PLAN 
[]  Upgrade activities as tolerated     [x]  Continue plan of care 
[]  Update interventions per flow sheet      
[]  Discharge due to:_ 
[x]  Other:progress strengthening when patient brings updated Rx.    
 
Tequila Gonzalez PTA 9/5/2018  10:20 AM

## 2018-09-10 ENCOUNTER — HOSPITAL ENCOUNTER (OUTPATIENT)
Dept: PHYSICAL THERAPY | Age: 63
Discharge: HOME | End: 2018-09-10
Payer: COMMERCIAL

## 2018-09-10 PROCEDURE — 97110 THERAPEUTIC EXERCISES: CPT | Performed by: PHYSICAL THERAPIST

## 2018-09-10 PROCEDURE — 97140 MANUAL THERAPY 1/> REGIONS: CPT | Performed by: PHYSICAL THERAPIST

## 2018-09-10 NOTE — PROGRESS NOTES
PT DAILY TREATMENT NOTE 2-15 Patient Name: Lina Ray Date:9/10/2018 : 1955 [x]  Patient  Verified Payor: Nenita Salinas / Plan: Olga Nguyen / Product Type: PPO / In time: 10:30 AM  Out time: 11:45 AM 
Total Treatment Time (min): 75 (75 timed) Visit #: 32 
RTMD:  
 
Treatment Area: Right shoulder pain [M25.511] SUBJECTIVE Pain Level (0-10 scale): 6/10 Any medication changes, allergies to medications, adverse drug reactions, diagnosis change, or new procedure performed?: [x] No    [] Yes (see summary sheet for update) Subjective functional status/changes:   [] No changes reported Pt states good compliance with HEP. She still is having \"the anchors catch\" at times which cause pain OBJECTIVE Modality rationale: decrease edema, decrease inflammation and decrease pain to improve the patients ability to reach overhead Min Type Additional Details  
 [] Estim: []Att   []Unatt        []TENS instruct []IFC  []Premod   []NMES []Other:  []w/US   []w/ice   []w/heat Position: Location:  
 []  Traction: [] Cervical       []Lumbar 
                     [] Prone          []Supine []Intermittent   []Continuous Lbs: 
[] before manual 
[] after manual 
[]w/heat  
 []  Ultrasound: []Continuous   [] Pulsed at:  
                         []1MHz   []3MHz Location: 
W/cm2:  
 []  Paraffin Location: 
[]w/heat To go [x]  Ice     []  Heat 
[]  Ice massage Position: seated, UE supported Location: R shoulder  
 []  Laser 
[]  Other: Position: Location:  
 []  Vasopneumatic Device Pressure:       [] lo [] med [] hi  
Temperature:   
[x] Skin assessment post-treatment:  [x]intact []redness- no adverse reaction 
  []redness  adverse reaction:  
 
60 min Therapeutic Exercise:  [x] See flow sheet :   
Rationale: increase ROM and increase strength to improve the patients ability to reach overhead, lift objects, perform daily chores 15 min Manual Therapy:  PROM R shoulder flex, abd, IR, ER to tolerance Gentle GH oscillations for pain control Rhythmic stabilization at 90 deg Rationale: decrease pain, increase ROM and increase tissue extensibility  to improve the patients ability to perform daily activities, don/doff clothing With 
 [x] TE 
 [] TA 
 [] neuro 
 [] other: Self Care/Patient Education: [x] Review HEP [x] Progressed/Changed HEP based on: see chart 
[] positioning   [] body mechanics   [] transfers   [] heat/ice application   
[] other:  
 
Other Objective/Functional Measures: NT            
 
Pain Level (0-10 scale) post treatment: \"tired! \" ASSESSMENT/Changes in Function:  
Progressed strengthening exercises today-- advised pt on muscle fatigue vs pain. Strongly advised performance of HEP for RTC strengthening. Patient will continue to benefit from skilled PT services to modify and progress therapeutic interventions, address functional mobility deficits, address ROM deficits, address strength deficits, analyze and address soft tissue restrictions, analyze and cue movement patterns, analyze and modify body mechanics/ergonomics and assess and modify postural abnormalities to attain remaining goals. []  See Plan of Care 
[]  See progress note/recertification 
[]  See Discharge Summary Progress towards goals / Updated goals: 
Short Term Goals: To be accomplished in 4-6 weeks: 
1) Pt will be independent in initial HEP MET 2) Pt will report good compliance with ice regimen in order to decrease inflammation and manage pain levels MET 3) Pt will improve R shoulder PROM flexion to > or =145 deg in order to progress towards reaching into cabinets not met 4) Pt will improve R shoulder PROM ER to > or =60 deg in order to progress towards donning/doffing clothing with ease MET 
  
Long Term Goals: To be accomplished in 10-12 weeks: 1) Pt will improve R shoulder AROM flexion to > or =145 deg in order to reach into cabinets and perform ADL's with ease 2) Pt will improve R shoulder AROM ER to > or =70 deg in order to 3) Pt will report being able to don/doff clothing without R shoulder pain 4) Pt will demonstrate > or =4/5 R shoulder scaption strength in order to lift objects PLAN 
[]  Upgrade activities as tolerated     [x]  Continue plan of care 
[]  Update interventions per flow sheet      
[]  Discharge due to:_ 
[x]  Other:progress strengthening when patient brings updated Rx.    
 
Herminia Hope, PT 9/10/2018  11:01 AM

## 2018-09-12 ENCOUNTER — HOSPITAL ENCOUNTER (OUTPATIENT)
Dept: PHYSICAL THERAPY | Age: 63
Discharge: HOME | End: 2018-09-12
Payer: COMMERCIAL

## 2018-09-12 PROCEDURE — 97110 THERAPEUTIC EXERCISES: CPT | Performed by: PHYSICAL THERAPIST

## 2018-09-12 PROCEDURE — 97140 MANUAL THERAPY 1/> REGIONS: CPT | Performed by: PHYSICAL THERAPIST

## 2018-09-12 NOTE — PROGRESS NOTES
PT DAILY TREATMENT NOTE 2-15 Patient Name: Delilah Maier Date:2018 : 1955 [x]  Patient  Verified Payor: Octavia Hernandez / Plan: Lb Dasilva / Product Type: PPO / In time: 10:00 AM  Out time: 11:15 AM 
Total Treatment Time (min): 75 (75 timed) Visit #: 34 
RTMD:  
 
Treatment Area: Right shoulder pain [M25.511] SUBJECTIVE Pain Level (0-10 scale): 6/10 Any medication changes, allergies to medications, adverse drug reactions, diagnosis change, or new procedure performed?: [x] No    [] Yes (see summary sheet for update) Subjective functional status/changes:   [] No changes reported Pt states good compliance with HEP. Soreness did not last long after last PT visit. She feels like she can raise her arm further overhead OBJECTIVE Modality rationale: decrease edema, decrease inflammation and decrease pain to improve the patients ability to reach overhead Min Type Additional Details  
 [] Estim: []Att   []Unatt        []TENS instruct []IFC  []Premod   []NMES []Other:  []w/US   []w/ice   []w/heat Position: Location:  
 []  Traction: [] Cervical       []Lumbar 
                     [] Prone          []Supine []Intermittent   []Continuous Lbs: 
[] before manual 
[] after manual 
[]w/heat  
 []  Ultrasound: []Continuous   [] Pulsed at:  
                         []1MHz   []3MHz Location: 
W/cm2:  
 []  Paraffin Location: 
[]w/heat To go [x]  Ice     []  Heat 
[]  Ice massage Position: seated, UE supported Location: R shoulder  
 []  Laser 
[]  Other: Position: Location:  
 []  Vasopneumatic Device Pressure:       [] lo [] med [] hi  
Temperature:   
[x] Skin assessment post-treatment:  [x]intact []redness- no adverse reaction 
  []redness  adverse reaction:  
 
60 min Therapeutic Exercise:  [x] See flow sheet :   
Rationale: increase ROM and increase strength to improve the patients ability to reach overhead, lift objects, perform daily chores 15 min Manual Therapy:  PROM R shoulder flex, abd, IR, ER to tolerance Gentle GH oscillations for pain control Rhythmic stabilization at 90 deg Rationale: decrease pain, increase ROM and increase tissue extensibility  to improve the patients ability to perform daily activities, don/doff clothing With 
 [x] TE 
 [] TA 
 [] neuro 
 [] other: Self Care/Patient Education: [x] Review HEP [x] Progressed/Changed HEP based on: see chart 
[] positioning   [] body mechanics   [] transfers   [] heat/ice application   
[] other:  
 
Other Objective/Functional Measures: NT            
 
Pain Level (0-10 scale) post treatment: 3/10\"tired! \" ASSESSMENT/Changes in Function:  
Progressing with strengthening as tolerated Patient will continue to benefit from skilled PT services to modify and progress therapeutic interventions, address functional mobility deficits, address ROM deficits, address strength deficits, analyze and address soft tissue restrictions, analyze and cue movement patterns, analyze and modify body mechanics/ergonomics and assess and modify postural abnormalities to attain remaining goals. []  See Plan of Care 
[]  See progress note/recertification 
[]  See Discharge Summary Progress towards goals / Updated goals: 
Short Term Goals: To be accomplished in 4-6 weeks: 
1) Pt will be independent in initial HEP MET 2) Pt will report good compliance with ice regimen in order to decrease inflammation and manage pain levels MET 3) Pt will improve R shoulder PROM flexion to > or =145 deg in order to progress towards reaching into cabinets not met 4) Pt will improve R shoulder PROM ER to > or =60 deg in order to progress towards donning/doffing clothing with ease MET 
  
Long Term Goals:  To be accomplished in 10-12 weeks: 
1) Pt will improve R shoulder AROM flexion to > or =145 deg in order to reach into cabinets and perform ADL's with ease 2) Pt will improve R shoulder AROM ER to > or =70 deg in order to 3) Pt will report being able to don/doff clothing without R shoulder pain 4) Pt will demonstrate > or =4/5 R shoulder scaption strength in order to lift objects PLAN 
[]  Upgrade activities as tolerated     [x]  Continue plan of care 
[]  Update interventions per flow sheet      
[]  Discharge due to:_ 
[x]  Other:progress strengthening Herminia Hope PT 9/12/2018  10:21 AM

## 2018-09-17 ENCOUNTER — APPOINTMENT (OUTPATIENT)
Dept: PHYSICAL THERAPY | Age: 63
End: 2018-09-17
Payer: COMMERCIAL

## 2018-09-19 ENCOUNTER — APPOINTMENT (OUTPATIENT)
Dept: PHYSICAL THERAPY | Age: 63
End: 2018-09-19
Payer: COMMERCIAL

## 2018-09-21 ENCOUNTER — APPOINTMENT (OUTPATIENT)
Dept: PHYSICAL THERAPY | Age: 63
End: 2018-09-21
Payer: COMMERCIAL

## 2018-09-24 ENCOUNTER — HOSPITAL ENCOUNTER (OUTPATIENT)
Dept: PHYSICAL THERAPY | Age: 63
Discharge: HOME | End: 2018-09-24
Payer: COMMERCIAL

## 2018-09-24 PROCEDURE — 97110 THERAPEUTIC EXERCISES: CPT | Performed by: PHYSICAL THERAPIST

## 2018-09-24 PROCEDURE — 97140 MANUAL THERAPY 1/> REGIONS: CPT | Performed by: PHYSICAL THERAPIST

## 2018-09-24 NOTE — PROGRESS NOTES
PT DAILY TREATMENT NOTE 2-15 Patient Name: Shahnaz Adan Date:2018 : 1955 [x]  Patient  Verified Payor: Sheri Morrow / Plan: Mel Gomez / Product Type: PPO / In time: 1005 AM  Out time: 11:05 AM 
Total Treatment Time (min): 60 (60 timed) Visit #: 34 
RTMD:  
 
Treatment Area: Right shoulder pain [M25.511] SUBJECTIVE Pain Level (0-10 scale): 6/10 Any medication changes, allergies to medications, adverse drug reactions, diagnosis change, or new procedure performed?: [x] No    [] Yes (see summary sheet for update) Subjective functional status/changes:   [] No changes reported Pt states reports that she feels she can lift her arm higher. She has been compliant with HEP-- however reports has been performing mostly ROM vs strengthening exercises more consistently. OBJECTIVE Modality rationale: decrease edema, decrease inflammation and decrease pain to improve the patients ability to reach overhead Min Type Additional Details  
 [] Estim: []Att   []Unatt        []TENS instruct []IFC  []Premod   []NMES []Other:  []w/US   []w/ice   []w/heat Position: Location:  
 []  Traction: [] Cervical       []Lumbar 
                     [] Prone          []Supine []Intermittent   []Continuous Lbs: 
[] before manual 
[] after manual 
[]w/heat  
 []  Ultrasound: []Continuous   [] Pulsed at:  
                         []1MHz   []3MHz Location: 
W/cm2:  
 []  Paraffin Location: 
[]w/heat To go [x]  Ice     []  Heat 
[]  Ice massage Position: seated, UE supported Location: R shoulder  
 []  Laser 
[]  Other: Position: Location:  
 []  Vasopneumatic Device Pressure:       [] lo [] med [] hi  
Temperature:   
[x] Skin assessment post-treatment:  [x]intact []redness- no adverse reaction 
  []redness  adverse reaction:  
 
45 min Therapeutic Exercise:  [x] See flow sheet :   
 Rationale: increase ROM and increase strength to improve the patients ability to reach overhead, lift objects, perform daily chores 15 min Manual Therapy:  PROM R shoulder flex, abd, IR, ER to tolerance Gentle GH oscillations for pain control Rhythmic stabilization at 90 deg Rationale: decrease pain, increase ROM and increase tissue extensibility  to improve the patients ability to perform daily activities, don/doff clothing With 
 [x] TE 
 [] TA 
 [] neuro 
 [] other: Self Care/Patient Education: [x] Review HEP [x] Progressed/Changed HEP based on: see chart 
[] positioning   [] body mechanics   [] transfers   [] heat/ice application   
[] other:  
 
Other Objective/Functional Measures: NT            
 
Pain Level (0-10 scale) post treatment: 3/10 ASSESSMENT/Changes in Function:  
Pt fatigued at end of therapy visit. Gave pt copy of recent HEP handout and strongly advised on daily performance of strengthening exercises Patient will continue to benefit from skilled PT services to modify and progress therapeutic interventions, address functional mobility deficits, address ROM deficits, address strength deficits, analyze and address soft tissue restrictions, analyze and cue movement patterns, analyze and modify body mechanics/ergonomics and assess and modify postural abnormalities to attain remaining goals. []  See Plan of Care 
[]  See progress note/recertification 
[]  See Discharge Summary Progress towards goals / Updated goals: 
Short Term Goals: To be accomplished in 4-6 weeks: 
1) Pt will be independent in initial HEP MET 2) Pt will report good compliance with ice regimen in order to decrease inflammation and manage pain levels MET 3) Pt will improve R shoulder PROM flexion to > or =145 deg in order to progress towards reaching into cabinets not met 4) Pt will improve R shoulder PROM ER to > or =60 deg in order to progress towards donning/doffing clothing with ease MET 
   
Long Term Goals: To be accomplished in 10-12 weeks: 
1) Pt will improve R shoulder AROM flexion to > or =145 deg in order to reach into cabinets and perform ADL's with ease 2) Pt will improve R shoulder AROM ER to > or =70 deg in order to 3) Pt will report being able to don/doff clothing without R shoulder pain 4) Pt will demonstrate > or =4/5 R shoulder scaption strength in order to lift objects PLAN 
[]  Upgrade activities as tolerated     [x]  Continue plan of care 
[]  Update interventions per flow sheet      
[]  Discharge due to:_ 
[x]  Other:progress strengthening Jono Byrd, SUSAN 9/24/2018  10:19 AM

## 2018-09-26 ENCOUNTER — APPOINTMENT (OUTPATIENT)
Dept: PHYSICAL THERAPY | Age: 63
End: 2018-09-26
Payer: COMMERCIAL

## 2018-09-26 ENCOUNTER — TELEPHONE (OUTPATIENT)
Dept: INTERNAL MEDICINE CLINIC | Age: 63
End: 2018-09-26

## 2018-09-26 DIAGNOSIS — F41.9 ANXIETY: ICD-10-CM

## 2018-09-26 RX ORDER — ALPRAZOLAM 0.5 MG/1
0.5 TABLET ORAL
Qty: 30 TAB | Refills: 2 | Status: SHIPPED | OUTPATIENT
Start: 2018-09-26 | End: 2019-01-31 | Stop reason: SDUPTHER

## 2018-09-26 NOTE — TELEPHONE ENCOUNTER
Pt would like a new prescription for Alprazolam 0.5 mg tablet #30 tab,take 1 tablet by mouth 2 times a day as needed

## 2018-09-26 NOTE — TELEPHONE ENCOUNTER
Spoke with patient. Advised patient that medication requested has been sent to pharmacy.  Patient verbalized understanding

## 2018-10-02 ENCOUNTER — APPOINTMENT (OUTPATIENT)
Dept: PHYSICAL THERAPY | Age: 63
End: 2018-10-02
Payer: SUBSIDIZED

## 2018-10-03 ENCOUNTER — HOSPITAL ENCOUNTER (OUTPATIENT)
Dept: PHYSICAL THERAPY | Age: 63
Discharge: HOME | End: 2018-10-03
Payer: SUBSIDIZED

## 2018-10-03 PROCEDURE — 97140 MANUAL THERAPY 1/> REGIONS: CPT | Performed by: PHYSICAL THERAPIST

## 2018-10-03 PROCEDURE — 97110 THERAPEUTIC EXERCISES: CPT | Performed by: PHYSICAL THERAPIST

## 2018-10-03 NOTE — PROGRESS NOTES
PT DAILY TREATMENT NOTE 2-15 Patient Name: Lalo Washington Date:10/3/2018 : 1955 [x]  Patient  Verified Payor: Jumana Hodges / Plan: Jason Canela / Product Type: PPO / In time: 12:30 PM  Out time: 1:25 PM 
Total Treatment Time (min): 55 (55 timed) Visit #: 35 
RTMD:  
 
Treatment Area: Right shoulder pain [M25.511] SUBJECTIVE Pain Level (0-10 scale): 5-6/10 Any medication changes, allergies to medications, adverse drug reactions, diagnosis change, or new procedure performed?: [x] No    [] Yes (see summary sheet for update) Subjective functional status/changes:   [] No changes reported Pt reports good compliance with strengthening exercises; she feels she can lift her arm higher. \"those anchors are really pulling today\" Pt has to leave by close to 1:30 today because she is leaving with her daughter to  her granddaughter OBJECTIVE Modality rationale: decrease edema, decrease inflammation and decrease pain to improve the patients ability to reach overhead Min Type Additional Details  
 [] Estim: []Att   []Unatt        []TENS instruct []IFC  []Premod   []NMES []Other:  []w/US   []w/ice   []w/heat Position: Location:  
 []  Traction: [] Cervical       []Lumbar 
                     [] Prone          []Supine []Intermittent   []Continuous Lbs: 
[] before manual 
[] after manual 
[]w/heat  
 []  Ultrasound: []Continuous   [] Pulsed at:  
                         []1MHz   []3MHz Location: 
W/cm2:  
 []  Paraffin Location: 
[]w/heat To go [x]  Ice     []  Heat 
[]  Ice massage Position: seated, UE supported Location: R shoulder  
 []  Laser 
[]  Other: Position: Location:  
 []  Vasopneumatic Device Pressure:       [] lo [] med [] hi  
Temperature:   
[x] Skin assessment post-treatment:  [x]intact []redness- no adverse reaction 
  []redness  adverse reaction: 40 min Therapeutic Exercise:  [x] See flow sheet :   
Rationale: increase ROM and increase strength to improve the patients ability to reach overhead, lift objects, perform daily chores 15 min Manual Therapy:  PROM R shoulder flex, abd, IR, ER to tolerance Gentle GH oscillations for pain control Rhythmic stabilization at 90 deg Rationale: decrease pain, increase ROM and increase tissue extensibility  to improve the patients ability to perform daily activities, don/doff clothing With 
 [x] TE 
 [] TA 
 [] neuro 
 [] other: Self Care/Patient Education: [x] Review HEP [x] Progressed/Changed HEP based on: see chart 
[] positioning   [] body mechanics   [] transfers   [] heat/ice application   
[] other:  
 
Other Objective/Functional Measures: NT            
 
Pain Level (0-10 scale) post treatment: not reported ASSESSMENT/Changes in Function:  
Max cueing when instructing on prone shoulder ext today. Pt given updated handout to add to HEP. Patient will continue to benefit from skilled PT services to modify and progress therapeutic interventions, address functional mobility deficits, address ROM deficits, address strength deficits, analyze and address soft tissue restrictions, analyze and cue movement patterns, analyze and modify body mechanics/ergonomics and assess and modify postural abnormalities to attain remaining goals. []  See Plan of Care 
[]  See progress note/recertification 
[]  See Discharge Summary Progress towards goals / Updated goals: 
Short Term Goals: To be accomplished in 4-6 weeks: 
1) Pt will be independent in initial HEP MET 2) Pt will report good compliance with ice regimen in order to decrease inflammation and manage pain levels MET 3) Pt will improve R shoulder PROM flexion to > or =145 deg in order to progress towards reaching into cabinets not met 4) Pt will improve R shoulder PROM ER to > or =60 deg in order to progress towards donning/doffing clothing with ease MET 
  
Long Term Goals: To be accomplished in 10-12 weeks: 
1) Pt will improve R shoulder AROM flexion to > or =145 deg in order to reach into cabinets and perform ADL's with ease 2) Pt will improve R shoulder AROM ER to > or =70 deg in order to 3) Pt will report being able to don/doff clothing without R shoulder pain 4) Pt will demonstrate > or =4/5 R shoulder scaption strength in order to lift objects PLAN 
[]  Upgrade activities as tolerated     [x]  Continue plan of care 
[]  Update interventions per flow sheet      
[]  Discharge due to:_ 
[x]  Other:progress strengthening Analy Johnson, PT 10/3/2018  1:16 PM

## 2018-10-04 ENCOUNTER — APPOINTMENT (OUTPATIENT)
Dept: PHYSICAL THERAPY | Age: 63
End: 2018-10-04
Payer: SUBSIDIZED

## 2018-10-08 ENCOUNTER — APPOINTMENT (OUTPATIENT)
Dept: PHYSICAL THERAPY | Age: 63
End: 2018-10-08
Payer: SUBSIDIZED

## 2018-10-10 ENCOUNTER — HOSPITAL ENCOUNTER (OUTPATIENT)
Dept: PHYSICAL THERAPY | Age: 63
Discharge: HOME | End: 2018-10-10
Payer: SUBSIDIZED

## 2018-10-10 PROCEDURE — 97110 THERAPEUTIC EXERCISES: CPT | Performed by: PHYSICAL THERAPIST

## 2018-10-10 PROCEDURE — 97140 MANUAL THERAPY 1/> REGIONS: CPT | Performed by: PHYSICAL THERAPIST

## 2018-10-10 NOTE — PROGRESS NOTES
PT DAILY TREATMENT/Progress NOTE 2-15- Patient Name: Tonio Cortes Date:10/10/2018 : 1955 [x]  Patient  Verified Payor: Ariana Rivera / Plan: Harmeet Baldwin / Product Type: PPO / In time: 9:55 AM  Out time: 11:10 AM 
Total Treatment Time (min): 70 (70 timed) Visit #: 39 
RTMD:  
 
Treatment Area: Right shoulder pain [M25.511] SUBJECTIVE Pain Level (0-10 scale): 6/10 Any medication changes, allergies to medications, adverse drug reactions, diagnosis change, or new procedure performed?: [x] No    [] Yes (see summary sheet for update) Subjective functional status/changes:   [] No changes reported See MD note. OBJECTIVE Modality rationale: decrease edema, decrease inflammation and decrease pain to improve the patients ability to reach overhead Min Type Additional Details  
 [] Estim: []Att   []Unatt        []TENS instruct []IFC  []Premod   []NMES []Other:  []w/US   []w/ice   []w/heat Position: Location:  
 []  Traction: [] Cervical       []Lumbar 
                     [] Prone          []Supine []Intermittent   []Continuous Lbs: 
[] before manual 
[] after manual 
[]w/heat  
 []  Ultrasound: []Continuous   [] Pulsed at:  
                         []1MHz   []3MHz Location: 
W/cm2:  
 []  Paraffin Location: 
[]w/heat To go [x]  Ice     []  Heat 
[]  Ice massage Position: seated, UE supported Location: R shoulder  
 []  Laser 
[]  Other: Position: Location:  
 []  Vasopneumatic Device Pressure:       [] lo [] med [] hi  
Temperature:   
[x] Skin assessment post-treatment:  [x]intact []redness- no adverse reaction 
  []redness  adverse reaction:  
 
60 min Therapeutic Exercise:  [x] See flow sheet :   
Rationale: increase ROM and increase strength to improve the patients ability to reach overhead, lift objects, perform daily chores 10 min Manual Therapy:  PROM R shoulder flex, abd, IR, ER to tolerance Gentle GH oscillations for pain control Rhythmic stabilization at 90 deg Rationale: decrease pain, increase ROM and increase tissue extensibility  to improve the patients ability to perform daily activities, don/doff clothing With 
 [x] TE 
 [] TA 
 [] neuro 
 [] other: Self Care/Patient Education: [x] Review HEP [x] Progressed/Changed HEP based on: see chart 
[] positioning   [] body mechanics   [] transfers   [] heat/ice application   
[] other:  
 
Other Objective/Functional Measures:  
See MD note. Pain Level (0-10 scale) post treatment: \"tired! \" ASSESSMENT/Changes in Function:  
See MD note. Max cueing during prone shoulder ext for proper scap retraction vs shoulder elevation. Progressed strengthening today Patient will continue to benefit from skilled PT services to modify and progress therapeutic interventions, address functional mobility deficits, address ROM deficits, address strength deficits, analyze and address soft tissue restrictions, analyze and cue movement patterns, analyze and modify body mechanics/ergonomics and assess and modify postural abnormalities to attain remaining goals. []  See Plan of Care 
[]  See progress note/recertification 
[]  See Discharge Summary Progress towards goals / Updated goals: 
Short Term Goals: To be accomplished in 4-6 weeks: 
1) Pt will be independent in initial HEP MET 2) Pt will report good compliance with ice regimen in order to decrease inflammation and manage pain levels MET 3) Pt will improve R shoulder PROM flexion to > or =145 deg in order to progress towards reaching into cabinets not met 4) Pt will improve R shoulder PROM ER to > or =60 deg in order to progress towards donning/doffing clothing with ease MET 
  
Long Term Goals:  To be accomplished in 10-12 weeks: 
1) Pt will improve R shoulder AROM flexion to > or =145 deg in order to reach into cabinets and perform ADL's with ease 2) Pt will improve R shoulder AROM ER to > or =70 deg in order to 3) Pt will report being able to don/doff clothing without R shoulder pain 4) Pt will demonstrate > or =4/5 R shoulder scaption strength in order to lift objects PLAN 
[]  Upgrade activities as tolerated     [x]  Continue plan of care 
[]  Update interventions per flow sheet      
[]  Discharge due to:_ 
[x]  Other:progress strengthening Ludy Gregory PT 10/10/2018  9:57 AM

## 2018-10-10 NOTE — PROGRESS NOTES
PT to MD Note Patient Name: Milana Espinal Date:10/10/2018 : 1955 Visit #: 36 
 
Treatment Area: Right shoulder pain [M25.511] SUBJECTIVE Pain Level (0-10 scale): 6/10 Pt reports that her \"anchors are still pulling\". She reports she has been very compliant with HEP. She feels that she can lift her arm higher overhead. She still has difficulty with functional activities, such as donning/doffing clothing, washing hair. OBJECTIVE PROM R shoulder: 
Flexion= 155 deg (p!) Abduction= 165 deg (p!) 
ER at 45 deg= 84 deg (p!) 
IR at 90 deg= 79 deg (p!) AROM R shoulder Flex= 109 deg (p!) Abd= 96 deg (p!) ASSESSMENT 
ROM and strength continuing to progress. Pt making gains slower than expected. Strongly encouraging pt to perform HEP daily. She continues to report high, consistent pain levels, however is able to perform all exercises in therapy without inc'd symptoms. Pt reports shoulder fatigue after PT visits. Plan to continue PT 2x/wk for continued strengthening, ROM. Please advise, thank you!  
 
Erica Smith, PT 10/10/2018  10:09 AM

## 2018-10-17 ENCOUNTER — HOSPITAL ENCOUNTER (OUTPATIENT)
Dept: PHYSICAL THERAPY | Age: 63
Discharge: HOME | End: 2018-10-17
Payer: SUBSIDIZED

## 2018-10-17 PROCEDURE — 97110 THERAPEUTIC EXERCISES: CPT | Performed by: PHYSICAL THERAPIST

## 2018-10-17 PROCEDURE — 97140 MANUAL THERAPY 1/> REGIONS: CPT | Performed by: PHYSICAL THERAPIST

## 2018-10-17 NOTE — PROGRESS NOTES
PT DAILY TREATMENT/Progress NOTE 2-15- Patient Name: Malik Shelley Date:10/17/2018 : 1955 [x]  Patient  Verified Payor: Christian Mazariegos / Plan: [address] / Product Type: PPO / In time: 10:10 AM  Out time: 11:15 AM 
Total Treatment Time (min): 65 (65 timed) Visit #: 40 
RTMD:  
 
Treatment Area: Right shoulder pain [M25.511] SUBJECTIVE Pain Level (0-10 scale): 6/10 Any medication changes, allergies to medications, adverse drug reactions, diagnosis change, or new procedure performed?: [x] No    [] Yes (see summary sheet for update) Subjective functional status/changes:   [] No changes reported Pt states that MD told her to \"continue the therapy\". She states that her \"anchors are still pulling\"-- she is taking tylenol now vs tramadol Her sister went into hospital this AM for a bleed OBJECTIVE Modality rationale: decrease edema, decrease inflammation and decrease pain to improve the patients ability to reach overhead Type Additional Details  
[] Estim: []Att   []Unatt        []TENS instruct []IFC  []Premod   []NMES []Other:  []w/US   []w/ice   []w/heat Position: Location:  
[]  Traction: [] Cervical       []Lumbar 
                     [] Prone          []Supine []Intermittent   []Continuous Lbs: 
[] before manual 
[] after manual 
[]w/heat  
[]  Ultrasound: []Continuous   [] Pulsed at:  
                         []1MHz   []3MHz Location: 
W/cm2:  
[]  Paraffin Location: 
[]w/heat  
[x]  Ice- to go     []  Heat 
[]  Ice massage Position: seated, UE supported Location: R shoulder  
[]  Laser 
[]  Other: Position: Location:  
[]  Vasopneumatic Device Pressure:       [] lo [] med [] hi  
Temperature:   
[x] Skin assessment post-treatment:  [x]intact []redness- no adverse reaction 
  []redness  adverse reaction:  
 
55 min Therapeutic Exercise:  [x] See flow sheet :   
 Rationale: increase ROM and increase strength to improve the patients ability to reach overhead, lift objects, perform daily chores 10 min Manual Therapy:  PROM R shoulder flex, abd, IR, ER to tolerance Gentle GH oscillations for pain control Rhythmic stabilization at 90 deg Rationale: decrease pain, increase ROM and increase tissue extensibility  to improve the patients ability to perform daily activities, don/doff clothing With 
 [x] TE 
 [] TA 
 [] neuro 
 [] other: Self Care/Patient Education: [x] Review HEP [x] Progressed/Changed HEP based on: see chart 
[] positioning   [] body mechanics   [] transfers   [] heat/ice application   
[] other:  
 
Other Objective/Functional Measures:  
n/a Pain Level (0-10 scale) post treatment: 6/10 ASSESSMENT/Changes in Function:  
Continues to requires cues for proper scapular positioning as well as shoulder ER vs elbow ext during tband ER. Progressing with strengthening as tolerated Patient will continue to benefit from skilled PT services to modify and progress therapeutic interventions, address functional mobility deficits, address ROM deficits, address strength deficits, analyze and address soft tissue restrictions, analyze and cue movement patterns, analyze and modify body mechanics/ergonomics and assess and modify postural abnormalities to attain remaining goals. []  See Plan of Care 
[]  See progress note/recertification 
[]  See Discharge Summary Progress towards goals / Updated goals: 
Short Term Goals: To be accomplished in 4-6 weeks: 
1) Pt will be independent in initial HEP MET 2) Pt will report good compliance with ice regimen in order to decrease inflammation and manage pain levels MET 3) Pt will improve R shoulder PROM flexion to > or =145 deg in order to progress towards reaching into cabinets not met 4) Pt will improve R shoulder PROM ER to > or =60 deg in order to progress towards donning/doffing clothing with ease MET 
  
Long Term Goals: To be accomplished in 10-12 weeks: 
1) Pt will improve R shoulder AROM flexion to > or =145 deg in order to reach into cabinets and perform ADL's with ease 2) Pt will improve R shoulder AROM ER to > or =70 deg in order to 3) Pt will report being able to don/doff clothing without R shoulder pain 4) Pt will demonstrate > or =4/5 R shoulder scaption strength in order to lift objects PLAN 
[]  Upgrade activities as tolerated     [x]  Continue plan of care 
[]  Update interventions per flow sheet      
[]  Discharge due to:_ 
[x]  Other:progress strengthening Dana Gaffney PT 10/17/2018  10:08 AM

## 2018-10-22 ENCOUNTER — APPOINTMENT (OUTPATIENT)
Dept: PHYSICAL THERAPY | Age: 63
End: 2018-10-22
Payer: SUBSIDIZED

## 2018-10-24 ENCOUNTER — HOSPITAL ENCOUNTER (OUTPATIENT)
Dept: PHYSICAL THERAPY | Age: 63
Discharge: HOME | End: 2018-10-24
Payer: SUBSIDIZED

## 2018-10-24 PROCEDURE — 97110 THERAPEUTIC EXERCISES: CPT | Performed by: PHYSICAL THERAPIST

## 2018-10-24 PROCEDURE — 97140 MANUAL THERAPY 1/> REGIONS: CPT | Performed by: PHYSICAL THERAPIST

## 2018-10-24 NOTE — PROGRESS NOTES
PT DAILY TREATMENT/Progress NOTE 2-15- Patient Name: Tequila Aleman Date:10/24/2018 : 1955 [x]  Patient  Verified Payor: Sammie Chun / Plan: [employer] / Product Type: PPO / In time: 10:00 AM  Out time: 11:05 AM 
Total Treatment Time (min): 65 (65 timed) Visit #: 45 
RTMD:  
 
Treatment Area: Right shoulder pain [M25.511] SUBJECTIVE Pain Level (0-10 scale): 5/10 Any medication changes, allergies to medications, adverse drug reactions, diagnosis change, or new procedure performed?: [x] No    [] Yes (see summary sheet for update) Subjective functional status/changes:   [] No changes reported Pt reports good compliance with HEP over the weekend. \"I think I can still feel those anchors\" OBJECTIVE Modality rationale: decrease edema, decrease inflammation and decrease pain to improve the patients ability to reach overhead Type Additional Details  
[] Estim: []Att   []Unatt        []TENS instruct []IFC  []Premod   []NMES []Other:  []w/US   []w/ice   []w/heat Position: Location:  
[]  Traction: [] Cervical       []Lumbar 
                     [] Prone          []Supine []Intermittent   []Continuous Lbs: 
[] before manual 
[] after manual 
[]w/heat  
[]  Ultrasound: []Continuous   [] Pulsed at:  
                         []1MHz   []3MHz Location: 
W/cm2:  
[]  Paraffin Location: 
[]w/heat  
[x]  Ice- to go     []  Heat 
[]  Ice massage Position: seated, UE supported Location: R shoulder  
[]  Laser 
[]  Other: Position: Location:  
[]  Vasopneumatic Device Pressure:       [] lo [] med [] hi  
Temperature:   
[x] Skin assessment post-treatment:  [x]intact []redness- no adverse reaction 
  []redness  adverse reaction:  
 
55 min Therapeutic Exercise:  [x] See flow sheet :   
Rationale: increase ROM and increase strength to improve the patients ability to reach overhead, lift objects, perform daily chores 10 min Manual Therapy:  PROM R shoulder flex, abd, IR, ER to tolerance Gentle GH oscillations for pain control Rhythmic stabilization at 90 deg Rationale: decrease pain, increase ROM and increase tissue extensibility  to improve the patients ability to perform daily activities, don/doff clothing With 
 [x] TE 
 [] TA 
 [] neuro 
 [] other: Self Care/Patient Education: [x] Review HEP [x] Progressed/Changed HEP based on: see chart 
[] positioning   [] body mechanics   [] transfers   [] heat/ice application   
[] other:  
 
Other Objective/Functional Measures:  
n/a Pain Level (0-10 scale) post treatment: 2/10 ASSESSMENT/Changes in Function:  
Progressing as tolerated. Cues for relaxation during PROM Patient will continue to benefit from skilled PT services to modify and progress therapeutic interventions, address functional mobility deficits, address ROM deficits, address strength deficits, analyze and address soft tissue restrictions, analyze and cue movement patterns, analyze and modify body mechanics/ergonomics and assess and modify postural abnormalities to attain remaining goals. []  See Plan of Care 
[]  See progress note/recertification 
[]  See Discharge Summary Progress towards goals / Updated goals: 
Short Term Goals: To be accomplished in 4-6 weeks: 
1) Pt will be independent in initial HEP MET 2) Pt will report good compliance with ice regimen in order to decrease inflammation and manage pain levels MET 3) Pt will improve R shoulder PROM flexion to > or =145 deg in order to progress towards reaching into cabinets not met 4) Pt will improve R shoulder PROM ER to > or =60 deg in order to progress towards donning/doffing clothing with ease MET 
  
Long Term Goals:  To be accomplished in 10-12 weeks: 
1) Pt will improve R shoulder AROM flexion to > or =145 deg in order to reach into cabinets and perform ADL's with ease 2) Pt will improve R shoulder AROM ER to > or =70 deg in order to 3) Pt will report being able to don/doff clothing without R shoulder pain 4) Pt will demonstrate > or =4/5 R shoulder scaption strength in order to lift objects PLAN 
[]  Upgrade activities as tolerated     [x]  Continue plan of care 
[]  Update interventions per flow sheet      
[]  Discharge due to:_ 
[x]  Other:progress strengthening Akua Donis, PT 10/24/2018  9:57 AM

## 2018-10-29 ENCOUNTER — HOSPITAL ENCOUNTER (OUTPATIENT)
Dept: PHYSICAL THERAPY | Age: 63
Discharge: HOME | End: 2018-10-29
Payer: SUBSIDIZED

## 2018-10-29 PROCEDURE — 97140 MANUAL THERAPY 1/> REGIONS: CPT | Performed by: PHYSICAL THERAPIST

## 2018-10-29 PROCEDURE — 97110 THERAPEUTIC EXERCISES: CPT | Performed by: PHYSICAL THERAPIST

## 2018-10-29 NOTE — PROGRESS NOTES
PT DAILY TREATMENT NOTE 2-15- Patient Name: Kermit Ni Date:10/29/2018 : 1955 [x]  Patient  Verified Payor: Sara Tracy / Plan: Ricardo Waldron / Product Type: PPO / In time: 10:00 AM  Out time: 11:05 AM 
Total Treatment Time (min): 65 (65 timed) Visit #: 44 
RTMD: 18 Treatment Area: Right shoulder pain [M25.511] SUBJECTIVE Pain Level (0-10 scale): 5/10 Any medication changes, allergies to medications, adverse drug reactions, diagnosis change, or new procedure performed?: [x] No    [] Yes (see summary sheet for update) Subjective functional status/changes:   [] No changes reported Pt states that she moved her arm \"just a little this morning and those anchors really started pulling! \" OBJECTIVE Modality rationale: decrease edema, decrease inflammation and decrease pain to improve the patients ability to reach overhead Type Additional Details  
[] Estim: []Att   []Unatt        []TENS instruct []IFC  []Premod   []NMES []Other:  []w/US   []w/ice   []w/heat Position: Location:  
[]  Traction: [] Cervical       []Lumbar 
                     [] Prone          []Supine []Intermittent   []Continuous Lbs: 
[] before manual 
[] after manual 
[]w/heat  
[]  Ultrasound: []Continuous   [] Pulsed at:  
                         []1MHz   []3MHz Location: 
W/cm2:  
[]  Paraffin Location: 
[]w/heat  
[x]  Ice- to go     []  Heat 
[]  Ice massage Position: seated, UE supported Location: R shoulder  
[]  Laser 
[]  Other: Position: Location:  
[]  Vasopneumatic Device Pressure:       [] lo [] med [] hi  
Temperature:   
[x] Skin assessment post-treatment:  [x]intact []redness- no adverse reaction 
  []redness  adverse reaction:  
 
55 min Therapeutic Exercise:  [x] See flow sheet :   
Rationale: increase ROM and increase strength to improve the patients ability to reach overhead, lift objects, perform daily chores 10 min Manual Therapy:  PROM R shoulder flex, abd, IR, ER to tolerance Gentle GH oscillations for pain control Rhythmic stabilization at 90 deg Rationale: decrease pain, increase ROM and increase tissue extensibility  to improve the patients ability to perform daily activities, don/doff clothing With 
 [x] TE 
 [] TA 
 [] neuro 
 [] other: Self Care/Patient Education: [x] Review HEP [x] Progressed/Changed HEP based on: see chart 
[] positioning   [] body mechanics   [] transfers   [] heat/ice application   
[] other:  
 
Other Objective/Functional Measures:  
n/a Pain Level (0-10 scale) post treatment: \"better. Tired! \" ASSESSMENT/Changes in Function: Pt continues to require max cueing for proper form during exercises, rajesh tband IR/ER. Patient will continue to benefit from skilled PT services to modify and progress therapeutic interventions, address functional mobility deficits, address ROM deficits, address strength deficits, analyze and address soft tissue restrictions, analyze and cue movement patterns, analyze and modify body mechanics/ergonomics and assess and modify postural abnormalities to attain remaining goals. []  See Plan of Care 
[]  See progress note/recertification 
[]  See Discharge Summary Progress towards goals / Updated goals: 
Short Term Goals: To be accomplished in 4-6 weeks: 
1) Pt will be independent in initial HEP MET 2) Pt will report good compliance with ice regimen in order to decrease inflammation and manage pain levels MET 3) Pt will improve R shoulder PROM flexion to > or =145 deg in order to progress towards reaching into cabinets not met 4) Pt will improve R shoulder PROM ER to > or =60 deg in order to progress towards donning/doffing clothing with ease MET 
  
Long Term Goals: To be accomplished in 10-12 weeks: 1) Pt will improve R shoulder AROM flexion to > or =145 deg in order to reach into cabinets and perform ADL's with ease 2) Pt will improve R shoulder AROM ER to > or =70 deg in order to 3) Pt will report being able to don/doff clothing without R shoulder pain 4) Pt will demonstrate > or =4/5 R shoulder scaption strength in order to lift objects PLAN 
[]  Upgrade activities as tolerated     [x]  Continue plan of care 
[]  Update interventions per flow sheet      
[]  Discharge due to:_ 
[x]  Other:progress strengthening Karen Grubbs PT 10/29/2018  10:03 AM

## 2018-10-31 ENCOUNTER — HOSPITAL ENCOUNTER (OUTPATIENT)
Dept: PHYSICAL THERAPY | Age: 63
Discharge: HOME | End: 2018-10-31
Payer: SUBSIDIZED

## 2018-10-31 PROCEDURE — 97110 THERAPEUTIC EXERCISES: CPT | Performed by: PHYSICAL THERAPIST

## 2018-10-31 PROCEDURE — 97140 MANUAL THERAPY 1/> REGIONS: CPT | Performed by: PHYSICAL THERAPIST

## 2018-11-05 ENCOUNTER — HOSPITAL ENCOUNTER (OUTPATIENT)
Dept: PHYSICAL THERAPY | Age: 63
Discharge: HOME | End: 2018-11-05
Payer: SUBSIDIZED

## 2018-11-05 PROCEDURE — 97110 THERAPEUTIC EXERCISES: CPT | Performed by: PHYSICAL THERAPIST

## 2018-11-05 PROCEDURE — 97140 MANUAL THERAPY 1/> REGIONS: CPT | Performed by: PHYSICAL THERAPIST

## 2018-11-05 NOTE — PROGRESS NOTES
PT DAILY TREATMENT/progress NOTE 2-15- Patient Name: Lalo Washington Date:2018 : 1955 [x]  Patient  Verified Payor: Priyank Sullivan / Plan: [facility] TIER 3 / Product Type: Sylvia / In time: 10:05 AM  Out time: 10:55 AM 
Total Treatment Time (min): 50 (50 timed) Visit #: 76 
RTMD: 18 Treatment Area: Right shoulder pain [M25.511] SUBJECTIVE Pain Level (0-10 scale): 5/10 Any medication changes, allergies to medications, adverse drug reactions, diagnosis change, or new procedure performed?: [x] No    [] Yes (see summary sheet for update) Subjective functional status/changes:   [] No changes reported See MD note. OBJECTIVE Modality rationale: decrease edema, decrease inflammation and decrease pain to improve the patients ability to reach overhead Type Additional Details  
[] Estim: []Att   []Unatt        []TENS instruct []IFC  []Premod   []NMES []Other:  []w/US   []w/ice   []w/heat Position: Location:  
[]  Traction: [] Cervical       []Lumbar 
                     [] Prone          []Supine []Intermittent   []Continuous Lbs: 
[] before manual 
[] after manual 
[]w/heat  
[]  Ultrasound: []Continuous   [] Pulsed at:  
                         []1MHz   []3MHz Location: 
W/cm2:  
[]  Paraffin Location: 
[]w/heat  
[x]  Ice- to go     []  Heat 
[]  Ice massage Position: seated, UE supported Location: R shoulder  
[]  Laser 
[]  Other: Position: Location:  
[]  Vasopneumatic Device Pressure:       [] lo [] med [] hi  
Temperature:   
[x] Skin assessment post-treatment:  [x]intact []redness- no adverse reaction 
  []redness  adverse reaction:  
 
40 min Therapeutic Exercise:  [x] See flow sheet :   
Rationale: increase ROM and increase strength to improve the patients ability to reach overhead, lift objects, perform daily chores 10 min Manual Therapy:  PROM R shoulder flex, abd, IR, ER to tolerance Gentle GH oscillations for pain control Rhythmic stabilization at 90 deg Rationale: decrease pain, increase ROM and increase tissue extensibility  to improve the patients ability to perform daily activities, don/doff clothing With 
 [x] TE 
 [] TA 
 [] neuro 
 [] other: Self Care/Patient Education: [x] Review HEP [x] Progressed/Changed HEP based on: see chart 
[] positioning   [] body mechanics   [] transfers   [] heat/ice application   
[] other:  
 
Other Objective/Functional Measures: (taken at last visit) PROM R shoulder: 
Flexion= 160 deg (p!) Abduction= 165 deg (p!) 
ER at 45 deg= 84 deg (p!) 
IR at 90 deg= 79 deg (p!) AROM R shoulder Flex= 112 deg (p!) Abd= 90 deg (p!) Pain Level (0-10 scale) post treatment: 5/10 ASSESSMENT/Changes in Function:  
See MD note. Patient will continue to benefit from skilled PT services to modify and progress therapeutic interventions, address functional mobility deficits, address ROM deficits, address strength deficits, analyze and address soft tissue restrictions, analyze and cue movement patterns, analyze and modify body mechanics/ergonomics and assess and modify postural abnormalities to attain remaining goals. []  See Plan of Care 
[]  See progress note/recertification 
[]  See Discharge Summary Progress towards goals / Updated goals: 
Short Term Goals: To be accomplished in 4-6 weeks: 
1) Pt will be independent in initial HEP MET 2) Pt will report good compliance with ice regimen in order to decrease inflammation and manage pain levels MET 3) Pt will improve R shoulder PROM flexion to > or =145 deg in order to progress towards reaching into cabinets not met 4) Pt will improve R shoulder PROM ER to > or =60 deg in order to progress towards donning/doffing clothing with ease MET 
  
Long Term Goals:  To be accomplished in 10-12 weeks: 
1) Pt will improve R shoulder AROM flexion to > or =145 deg in order to reach into cabinets and perform ADL's with ease 2) Pt will improve R shoulder AROM ER to > or =70 deg in order to 3) Pt will report being able to don/doff clothing without R shoulder pain 4) Pt will demonstrate > or =4/5 R shoulder scaption strength in order to lift objects PLAN 
[]  Upgrade activities as tolerated     [x]  Continue plan of care 
[]  Update interventions per flow sheet      
[]  Discharge due to:_ 
[x]  Other:progress strengthening Jacqueline To, PT 11/5/2018  10:16 AM

## 2018-11-05 NOTE — PROGRESS NOTES
PT to MD NOTE 2-15- Patient Name: Leydi Wilson Date:2018 : 1955 Visit #: 17 
RTMD: 18 Treatment Area: Right shoulder pain [M25.511] SUBJECTIVE Pain Level (0-10 scale): 5/10 Pt is trying to do more daily chores with her R arm/shoulder. \"I can feel those anchors\" OBJECTIVE PROM R shoulder: 
Flexion= 160 deg (p!) Abduction= 165 deg (p!) 
ER at 45 deg= 84 deg (p!) 
IR at 90 deg= 79 deg (p!) AROM R shoulder Flex= 112 deg (p!) Abd= 90 deg (p!) Pain Level (0-10 scale) post treatment: 5/10 ASSESSMENT/Changes in Function:  
Slowly progressing with strengthening. Continues to require cues for proper form during exercises. Please advise, thank you!  
 
Luis Angel Noe, PT 2018  10:17 AM

## 2018-11-07 ENCOUNTER — HOSPITAL ENCOUNTER (OUTPATIENT)
Dept: PHYSICAL THERAPY | Age: 63
Discharge: HOME | End: 2018-11-07
Payer: SUBSIDIZED

## 2018-11-07 PROCEDURE — 97140 MANUAL THERAPY 1/> REGIONS: CPT | Performed by: PHYSICAL THERAPIST

## 2018-11-07 PROCEDURE — 97110 THERAPEUTIC EXERCISES: CPT | Performed by: PHYSICAL THERAPIST

## 2018-11-07 NOTE — PROGRESS NOTES
PT DAILY TREATMENT/progress NOTE 2-15- Patient Name: Barrett Caro Date:2018 : 1955 [x]  Patient  Verified Payor: Angel Edgar / Plan: [facility] TIER 3 / Product Type: Sylvia / In time: 955 AM  Out time: 1100 AM 
Total Treatment Time (min): 65 (65 timed) Visit #: 43 
RTMD:  
 
Treatment Area: Right shoulder pain [M25.511] SUBJECTIVE Pain Level (0-10 scale): 5/10 Any medication changes, allergies to medications, adverse drug reactions, diagnosis change, or new procedure performed?: [x] No    [] Yes (see summary sheet for update) Subjective functional status/changes:   [] No changes reported Pt states that MD appt went well, Dr. Carey Mina gave her a new script to continue PT. \"It's getting there\" She would like to get a part-time or PRN job after her shoulder starts to feel better OBJECTIVE Modality rationale: decrease edema, decrease inflammation and decrease pain to improve the patients ability to reach overhead Type Additional Details  
[] Estim: []Att   []Unatt        []TENS instruct []IFC  []Premod   []NMES []Other:  []w/US   []w/ice   []w/heat Position: Location:  
[]  Traction: [] Cervical       []Lumbar 
                     [] Prone          []Supine []Intermittent   []Continuous Lbs: 
[] before manual 
[] after manual 
[]w/heat  
[]  Ultrasound: []Continuous   [] Pulsed at:  
                         []1MHz   []3MHz Location: 
W/cm2:  
[]  Paraffin Location: 
[]w/heat  
[x]  Ice- to go     []  Heat 
[]  Ice massage Position: seated, UE supported Location: R shoulder  
[]  Laser 
[]  Other: Position: Location:  
[]  Vasopneumatic Device Pressure:       [] lo [] med [] hi  
Temperature:   
[x] Skin assessment post-treatment:  [x]intact []redness- no adverse reaction 
  []redness  adverse reaction:  
 
50 min Therapeutic Exercise:  [x] See flow sheet :   
 Rationale: increase ROM and increase strength to improve the patients ability to reach overhead, lift objects, perform daily chores 15 min Manual Therapy:  PROM R shoulder flex, abd, IR, ER to tolerance Gentle GH oscillations for pain control Rhythmic stabilization at 90 deg MRE flexion in supine Rationale: decrease pain, increase ROM and increase tissue extensibility  to improve the patients ability to perform daily activities, don/doff clothing With 
 [x] TE 
 [] TA 
 [] neuro 
 [] other: Self Care/Patient Education: [x] Review HEP [x] Progressed/Changed HEP based on: see chart 
[] positioning   [] body mechanics   [] transfers   [] heat/ice application   
[] other:  
 
Other Objective/Functional Measures: (taken at last visit) n/a Pain Level (0-10 scale) post treatment: 5/10 ASSESSMENT/Changes in Function:  
Progressing strengthening >90 deg-- including cone stacking, wall washing, and SWB liftoffs. Added these exercises to HEP. Progressing with strengthening as hannah Patient will continue to benefit from skilled PT services to modify and progress therapeutic interventions, address functional mobility deficits, address ROM deficits, address strength deficits, analyze and address soft tissue restrictions, analyze and cue movement patterns, analyze and modify body mechanics/ergonomics and assess and modify postural abnormalities to attain remaining goals. []  See Plan of Care 
[]  See progress note/recertification 
[]  See Discharge Summary Progress towards goals / Updated goals: 
Short Term Goals: To be accomplished in 4-6 weeks: 
1) Pt will be independent in initial HEP MET 2) Pt will report good compliance with ice regimen in order to decrease inflammation and manage pain levels MET 3) Pt will improve R shoulder PROM flexion to > or =145 deg in order to progress towards reaching into cabinets not met 4) Pt will improve R shoulder PROM ER to > or =60 deg in order to progress towards donning/doffing clothing with ease MET 
  
Long Term Goals: To be accomplished in 10-12 weeks: 
1) Pt will improve R shoulder AROM flexion to > or =145 deg in order to reach into cabinets and perform ADL's with ease 2) Pt will improve R shoulder AROM ER to > or =70 deg in order to 3) Pt will report being able to don/doff clothing without R shoulder pain 4) Pt will demonstrate > or =4/5 R shoulder scaption strength in order to lift objects PLAN 
[]  Upgrade activities as tolerated     [x]  Continue plan of care 
[]  Update interventions per flow sheet      
[]  Discharge due to:_ 
[x]  Other: 
 
Joao Jackson, PT 11/7/2018  10:21 AM

## 2018-11-12 ENCOUNTER — HOSPITAL ENCOUNTER (OUTPATIENT)
Dept: PHYSICAL THERAPY | Age: 63
Discharge: HOME | End: 2018-11-12
Payer: SUBSIDIZED

## 2018-11-12 PROCEDURE — 97140 MANUAL THERAPY 1/> REGIONS: CPT | Performed by: PHYSICAL THERAPIST

## 2018-11-12 PROCEDURE — 97110 THERAPEUTIC EXERCISES: CPT | Performed by: PHYSICAL THERAPIST

## 2018-11-14 ENCOUNTER — APPOINTMENT (OUTPATIENT)
Dept: PHYSICAL THERAPY | Age: 63
End: 2018-11-14
Payer: SUBSIDIZED

## 2018-11-19 ENCOUNTER — HOSPITAL ENCOUNTER (OUTPATIENT)
Dept: PHYSICAL THERAPY | Age: 63
Discharge: HOME | End: 2018-11-19
Payer: SUBSIDIZED

## 2018-11-19 PROCEDURE — 97140 MANUAL THERAPY 1/> REGIONS: CPT | Performed by: PHYSICAL THERAPIST

## 2018-11-19 PROCEDURE — 97110 THERAPEUTIC EXERCISES: CPT | Performed by: PHYSICAL THERAPIST

## 2018-11-21 ENCOUNTER — HOSPITAL ENCOUNTER (OUTPATIENT)
Dept: PHYSICAL THERAPY | Age: 63
Discharge: HOME | End: 2018-11-21
Payer: SUBSIDIZED

## 2018-11-21 PROCEDURE — 97110 THERAPEUTIC EXERCISES: CPT | Performed by: PHYSICAL THERAPIST

## 2018-11-21 PROCEDURE — 97140 MANUAL THERAPY 1/> REGIONS: CPT | Performed by: PHYSICAL THERAPIST

## 2018-11-21 NOTE — PROGRESS NOTES
PT DAILY TREATMENT NOTE 2-15- Patient Name: Jaime Winkler Date:2018 : 1955 [x]  Patient  Verified Payor: Maddy Luque / Plan: [facility] TIER 3 / Product Type: Sylvia / In time: 1000 AM  Out time: 11:05 AM 
Total Treatment Time (min): 65 (65 timed) Visit #: 39 
RTMD:  
 
Treatment Area: Right shoulder pain [M25.511] SUBJECTIVE Pain Level (0-10 scale): 5/10 Any medication changes, allergies to medications, adverse drug reactions, diagnosis change, or new procedure performed?: [x] No    [] Yes (see summary sheet for update) Subjective functional status/changes:   [] No changes reported Pt continuing to report \"those anchors pull and click sometimes\"; reports good compliance with HEP OBJECTIVE Modality rationale: decrease edema, decrease inflammation and decrease pain to improve the patients ability to reach overhead Type Additional Details  
[] Estim: []Att   []Unatt        []TENS instruct []IFC  []Premod   []NMES []Other:  []w/US   []w/ice   []w/heat Position: Location:  
[]  Traction: [] Cervical       []Lumbar 
                     [] Prone          []Supine []Intermittent   []Continuous Lbs: 
[] before manual 
[] after manual 
[]w/heat  
[]  Ultrasound: []Continuous   [] Pulsed at:  
                         []1MHz   []3MHz Location: 
W/cm2:  
[]  Paraffin Location:  
[]w/heat  
[x]  Ice- to go     []  Heat 
[]  Ice massage Position: seated, UE supported Location: R shoulder  
[]  Laser 
[]  Other: Position: Location:  
[]  Vasopneumatic Device Pressure:       [] lo [] med [] hi  
Temperature:   
[x] Skin assessment post-treatment:  [x]intact []redness- no adverse reaction 
  []redness  adverse reaction:  
 
50 min Therapeutic Exercise:  [x] See flow sheet :   
Rationale: increase ROM and increase strength to improve the patients ability to reach overhead, lift objects, perform daily chores 15 min Manual Therapy:  PROM R shoulder flex, abd, IR, ER to tolerance Gentle GH oscillations for pain control Rhythmic stabilization at 90 deg MRE flexion in supine Rationale: decrease pain, increase ROM and increase tissue extensibility  to improve the patients ability to perform daily activities, don/doff clothing With 
 [x] TE 
 [] TA 
 [] neuro 
 [] other: Self Care/Patient Education: [x] Review HEP [x] Progressed/Changed HEP based on: see chart 
[] positioning   [] body mechanics   [] transfers   [] heat/ice application   
[] other:  
 
Other Objective/Functional Measures: 
n/a Pain Level (0-10 scale) post treatment: 5/10 ASSESSMENT/Changes in Function:  
Progressing as hannah; strongly encouraged use of R UE for daily, everyday activities as hannah for improved strengthening Patient will continue to benefit from skilled PT services to modify and progress therapeutic interventions, address functional mobility deficits, address ROM deficits, address strength deficits, analyze and address soft tissue restrictions, analyze and cue movement patterns, analyze and modify body mechanics/ergonomics and assess and modify postural abnormalities to attain remaining goals. []  See Plan of Care 
[]  See progress note/recertification 
[]  See Discharge Summary Progress towards goals / Updated goals: 
Short Term Goals: To be accomplished in 4-6 weeks: 
1) Pt will be independent in initial HEP MET 2) Pt will report good compliance with ice regimen in order to decrease inflammation and manage pain levels MET 3) Pt will improve R shoulder PROM flexion to > or =145 deg in order to progress towards reaching into cabinets not met 4) Pt will improve R shoulder PROM ER to > or =60 deg in order to progress towards donning/doffing clothing with ease MET 
  
Long Term Goals:  To be accomplished in 10-12 weeks: 
1) Pt will improve R shoulder AROM flexion to > or =145 deg in order to reach into cabinets and perform ADL's with ease 2) Pt will improve R shoulder AROM ER to > or =70 deg in order to 3) Pt will report being able to don/doff clothing without R shoulder pain 4) Pt will demonstrate > or =4/5 R shoulder scaption strength in order to lift objects PLAN 
[]  Upgrade activities as tolerated     [x]  Continue plan of care 
[]  Update interventions per flow sheet      
[]  Discharge due to:_ 
[x]  Other: 
 
Kayleigh Bejarano, PT 11/21/2018  10:21 AM

## 2018-11-26 ENCOUNTER — APPOINTMENT (OUTPATIENT)
Dept: PHYSICAL THERAPY | Age: 63
End: 2018-11-26
Payer: SUBSIDIZED

## 2018-11-28 ENCOUNTER — HOSPITAL ENCOUNTER (OUTPATIENT)
Dept: PHYSICAL THERAPY | Age: 63
Discharge: HOME | End: 2018-11-28
Payer: SUBSIDIZED

## 2018-11-28 PROCEDURE — 97110 THERAPEUTIC EXERCISES: CPT | Performed by: PHYSICAL THERAPIST

## 2018-11-28 PROCEDURE — 97140 MANUAL THERAPY 1/> REGIONS: CPT | Performed by: PHYSICAL THERAPIST

## 2018-11-28 NOTE — PROGRESS NOTES
PT DAILY TREATMENT NOTE 2-15- Patient Name: Doreen Ortiz Date:2018 : 1955 [x]  Patient  Verified Payor: Valdemar Serrato / Plan: [facility] TIER 3 / Product Type: Sylvia / In time: 1015 AM  Out time: 1130 AM 
Total Treatment Time (min): 90 (90 timed) Visit #: 55 
RTMD: 18 Treatment Area: Right shoulder pain [M25.511] SUBJECTIVE Pain Level (0-10 scale): 5/10 Any medication changes, allergies to medications, adverse drug reactions, diagnosis change, or new procedure performed?: [x] No    [] Yes (see summary sheet for update) Subjective functional status/changes:   [] No changes reported Pt states that she would like to return to [employer] work in Feb. \"I know I need to be careful though, those anchors are still pulling\" OBJECTIVE Modality rationale: decrease edema, decrease inflammation and decrease pain to improve the patients ability to reach overhead Type Additional Details  
[] Estim: []Att   []Unatt        []TENS instruct []IFC  []Premod   []NMES []Other:  []w/US   []w/ice   []w/heat Position: Location:  
[]  Traction: [] Cervical       []Lumbar 
                     [] Prone          []Supine []Intermittent   []Continuous Lbs: 
[] before manual 
[] after manual 
[]w/heat  
[]  Ultrasound: []Continuous   [] Pulsed at:  
                         []1MHz   []3MHz Location: 
W/cm2:  
[]  Paraffin Location:  
[]w/heat  
[x]  Ice- to go     []  Heat 
[]  Ice massage Position: seated, UE supported Location: R shoulder  
[]  Laser 
[]  Other: Position: Location:  
[]  Vasopneumatic Device Pressure:       [] lo [] med [] hi  
Temperature:   
[x] Skin assessment post-treatment:  [x]intact []redness- no adverse reaction 
  []redness  adverse reaction:  
 
75 min Therapeutic Exercise:  [x] See flow sheet :   
Rationale: increase ROM and increase strength to improve the patients ability to reach overhead, lift objects, perform daily chores 15 min Manual Therapy:  PROM R shoulder flex, abd, IR, ER to tolerance Gentle GH oscillations for pain control Rhythmic stabilization at 90 deg MRE flexion in supine Rationale: decrease pain, increase ROM and increase tissue extensibility  to improve the patients ability to perform daily activities, don/doff clothing With 
 [x] TE 
 [] TA 
 [] neuro 
 [] other: Self Care/Patient Education: [x] Review HEP [x] Progressed/Changed HEP based on: see chart 
[] positioning   [] body mechanics   [] transfers   [] heat/ice application   
[] other:  
 
Other Objective/Functional Measures: 
n/a Pain Level (0-10 scale) post treatment: 5/10 ASSESSMENT/Changes in Function:  
Progressing with strengthening as hannah Patient will continue to benefit from skilled PT services to modify and progress therapeutic interventions, address functional mobility deficits, address ROM deficits, address strength deficits, analyze and address soft tissue restrictions, analyze and cue movement patterns, analyze and modify body mechanics/ergonomics and assess and modify postural abnormalities to attain remaining goals. []  See Plan of Care 
[]  See progress note/recertification 
[]  See Discharge Summary Progress towards goals / Updated goals: 
Short Term Goals: To be accomplished in 4-6 weeks: 
1) Pt will be independent in initial HEP MET 2) Pt will report good compliance with ice regimen in order to decrease inflammation and manage pain levels MET 3) Pt will improve R shoulder PROM flexion to > or =145 deg in order to progress towards reaching into cabinets not met 4) Pt will improve R shoulder PROM ER to > or =60 deg in order to progress towards donning/doffing clothing with ease MET 
  
Long Term Goals:  To be accomplished in 10-12 weeks: 
1) Pt will improve R shoulder AROM flexion to > or =145 deg in order to reach into cabinets and perform ADL's with ease 2) Pt will improve R shoulder AROM ER to > or =70 deg in order to 3) Pt will report being able to don/doff clothing without R shoulder pain 4) Pt will demonstrate > or =4/5 R shoulder scaption strength in order to lift objects PLAN 
[]  Upgrade activities as tolerated     [x]  Continue plan of care 
[]  Update interventions per flow sheet      
[]  Discharge due to:_ 
[x]  Other: 
 
Zane Hoffmann PT 11/28/2018  10:40 AM

## 2018-12-03 ENCOUNTER — HOSPITAL ENCOUNTER (OUTPATIENT)
Dept: PHYSICAL THERAPY | Age: 63
Discharge: HOME | End: 2018-12-03
Payer: SUBSIDIZED

## 2018-12-03 PROCEDURE — 97110 THERAPEUTIC EXERCISES: CPT | Performed by: PHYSICAL THERAPIST

## 2018-12-03 PROCEDURE — 97140 MANUAL THERAPY 1/> REGIONS: CPT | Performed by: PHYSICAL THERAPIST

## 2018-12-03 NOTE — PROGRESS NOTES
PT DAILY TREATMENT NOTE 2-15- Patient Name: Milana Espinal Date:12/3/2018 : 1955 [x]  Patient  Verified Payor: Guillermo Sanchez / Plan: [facility] TIER 3 / Product Type: Sylvia / In time: 1030 A Out time: 1150 A Total Treatment Time (min): 80 Visit #: 52 
 
Treatment Area: Right shoulder pain [M25.511] SUBJECTIVE Pain Level (0-10 scale):6/10 Any medication changes, allergies to medications, adverse drug reactions, diagnosis change, or new procedure performed?: [x] No    [] Yes (see summary sheet for update) Subjective functional status/changes:   [] No changes reported Pt reports she's glad she did PT-feels stronger OBJECTIVE Modality rationale: decrease edema, decrease inflammation and decrease pain to improve the patients ability to reach overhead Type Additional Details  
[] Estim: []Att   []Unatt        []TENS instruct []IFC  []Premod   []NMES []Other:  []w/US   []w/ice   []w/heat Position: Location:  
[]  Traction: [] Cervical       []Lumbar 
                     [] Prone          []Supine []Intermittent   []Continuous Lbs: 
[] before manual 
[] after manual 
[]w/heat  
[]  Ultrasound: []Continuous   [] Pulsed at:  
                         []1MHz   []3MHz Location: 
W/cm2:  
[]  Paraffin Location:  
[]w/heat  
[x]  Ice- to go     []  Heat 
[]  Ice massage Position: seated, UE supported Location: R shoulder  
[]  Laser 
[]  Other: Position: Location:  
[]  Vasopneumatic Device Pressure:       [] lo [] med [] hi  
Temperature:   
[x] Skin assessment post-treatment:  [x]intact []redness- no adverse reaction 
  []redness  adverse reaction:  
 
55 min Therapeutic Exercise:  [x] See flow sheet :   
Rationale: increase ROM and increase strength to improve the patients ability to reach overhead, lift objects, perform daily chores 15 min Manual Therapy:  SL MFR infraspinatus, teres major/minor, prox biceps PROM R shoulder flex, abd, IR, ER to tolerance Rhythmic stabilization at 90 deg MRE flexion in supine Rationale: decrease pain, increase ROM and increase tissue extensibility  to improve the patients ability to perform daily activities, don/doff clothing With 
 [x] TE 
 [] TA 
 [] neuro 
 [] other: Self Care/Patient Education: [x] Review HEP [x] Progressed/Changed HEP based on: see chart 
[] positioning   [] body mechanics   [] transfers   [] heat/ice application   
[] other:  
 
Other Objective/Functional Measures: 
Please see status report Pain Level (0-10 scale) post treatment: 5/10 ASSESSMENT/Changes in Function:  
 
      
Progress towards goals / Updated goals: 
Short Term Goals: To be accomplished in 4-6 weeks: 
1) Pt will be independent in initial HEP MET 2) Pt will report good compliance with ice regimen in order to decrease inflammation and manage pain levels MET 3) Pt will improve R shoulder PROM flexion to > or =145 deg in order to progress towards reaching into cabinets not met 4) Pt will improve R shoulder PROM ER to > or =60 deg in order to progress towards donning/doffing clothing with ease MET 
  
Long Term Goals: To be accomplished in 10-12 weeks: 
1) Pt will improve R shoulder AROM flexion to > or =145 deg in order to reach into cabinets and perform ADL's with ease-PROGRESSING 2) Pt will improve R shoulder AROM ER to > or =70 deg in order to  -PROGRESSING 3) Pt will report being able to don/doff clothing without R shoulder pain-MET 4) Pt will demonstrate > or =4/5 R shoulder scaption strength in order to lift objects-PROGRESSING Pt with PROM WNL. Strength progressing. Will benefit from further PT to work on functional strengthening/stability and work on pain control.   
 
PLAN 
[]  Upgrade activities as tolerated     [x]  Continue plan of care 
[]  Update interventions per flow sheet      
[]  Discharge due to:_ 
 [x]  Other:Patient to follow-up with MD.  Status report faxed and hard copy given to patient. Await further orders and instruction. Janice Andino.  Ronda, PT 12/3/2018  10:40 AM

## 2018-12-03 NOTE — PROGRESS NOTES
Status Report Patient Name: García Oscar Date:12/3/2018 : 1955 Visit #: 52 
Diagnosis: s/p (R) mini open RTC repair (DOS: 3/22/18) OBJECTIVE PROM R shoulder WNL all planes AROM R shoulder Flex= 115  deg Ngde=881 deg ER= 3 inches less than contralat side reaching behind neck IR=8 inches less than contralt side reaching behind back Pain levels 2-6/10 ASSESSMENT Pt continues to progress slowly with strengthening. Please advise re: continued therapy. Thank you! Devan Albrecht. Ronda, PT 12/3/2018

## 2018-12-05 ENCOUNTER — HOSPITAL ENCOUNTER (OUTPATIENT)
Dept: PHYSICAL THERAPY | Age: 63
Discharge: HOME | End: 2018-12-05
Payer: SUBSIDIZED

## 2018-12-05 PROCEDURE — 97110 THERAPEUTIC EXERCISES: CPT | Performed by: PHYSICAL THERAPIST

## 2018-12-05 PROCEDURE — 97140 MANUAL THERAPY 1/> REGIONS: CPT | Performed by: PHYSICAL THERAPIST

## 2018-12-05 NOTE — PROGRESS NOTES
PT DAILY TREATMENT NOTE 2-15- Patient Name: García Serum Date:2018 : 1955 [x]  Patient  Verified Payor: Mayelin Hernandez / Plan: [facility] TIER 3 / Product Type: Sylvia / In time: 1015 A Out time: 1125 A Total Treatment Time (min): 70 (70 timed) Visit #: 50 
RTMD: 19 Treatment Area: Right shoulder pain [M25.511] SUBJECTIVE Pain Level (0-10 scale):  510 Any medication changes, allergies to medications, adverse drug reactions, diagnosis change, or new procedure performed?: [x] No    [] Yes (see summary sheet for update) Subjective functional status/changes:   [] No changes reported Pt states that she was told to continue with PT and was given another script. \"He said it is still weak and that as I continue to strengthen it I will be able to get it higher\" She states that MD told her the \"anchors would stop pulling if it gets stronger\" She is still planning to return to work in Feb  
 
OBJECTIVE Modality rationale: decrease edema, decrease inflammation and decrease pain to improve the patients ability to reach overhead Type Additional Details  
[] Estim: []Att   []Unatt        []TENS instruct []IFC  []Premod   []NMES []Other:  []w/US   []w/ice   []w/heat Position: Location:  
[]  Traction: [] Cervical       []Lumbar 
                     [] Prone          []Supine []Intermittent   []Continuous Lbs: 
[] before manual 
[] after manual 
[]w/heat  
[]  Ultrasound: []Continuous   [] Pulsed at:  
                         []1MHz   []3MHz Location: 
W/cm2:  
[]  Paraffin Location:  
[]w/heat  
[x]  Ice- to go     []  Heat 
[]  Ice massage Position: seated, UE supported Location: R shoulder  
[]  Laser 
[]  Other: Position: Location:  
[]  Vasopneumatic Device Pressure:       [] lo [] med [] hi  
Temperature:   
[x] Skin assessment post-treatment:  [x]intact []redness- no adverse reaction []redness  adverse reaction:  
 
55 min Therapeutic Exercise:  [x] See flow sheet :   
Rationale: increase ROM and increase strength to improve the patients ability to reach overhead, lift objects, perform daily chores 15 min Manual Therapy: PROM R shoulder flex, abd, IR, ER to tolerance Rhythmic stabilization at 90 deg MRE flexion in supine Rationale: decrease pain, increase ROM and increase tissue extensibility  to improve the patients ability to perform daily activities, don/doff clothing With 
 [x] TE 
 [] TA 
 [] neuro 
 [] other: Self Care/Patient Education: [x] Review HEP [x] Progressed/Changed HEP based on: see chart 
[] positioning   [] body mechanics   [] transfers   [] heat/ice application   
[] other:  
 
Other Objective/Functional Measures: 
n/a Pain Level (0-10 scale) post treatment: 5/10 ASSESSMENT/Changes in Function:  
Progressing as hannah Progress towards goals / Updated goals: 
Short Term Goals: To be accomplished in 4-6 weeks: 
1) Pt will be independent in initial HEP MET 2) Pt will report good compliance with ice regimen in order to decrease inflammation and manage pain levels MET 3) Pt will improve R shoulder PROM flexion to > or =145 deg in order to progress towards reaching into cabinets not met 4) Pt will improve R shoulder PROM ER to > or =60 deg in order to progress towards donning/doffing clothing with ease MET 
  
Long Term Goals: To be accomplished in 10-12 weeks: 
1) Pt will improve R shoulder AROM flexion to > or =145 deg in order to reach into cabinets and perform ADL's with ease-PROGRESSING 2) Pt will improve R shoulder AROM ER to > or =70 deg in order to  -PROGRESSING 3) Pt will report being able to don/doff clothing without R shoulder pain-MET 4) Pt will demonstrate > or =4/5 R shoulder scaption strength in order to lift objects-PROGRESSING 
 
PLAN 
[]  Upgrade activities as tolerated     [x]  Continue plan of care []  Update interventions per flow sheet      
[]  Discharge due to:_ 
[]  Other:   
 
 
Claudette Chacon PT 12/5/2018  10:39 AM

## 2018-12-10 ENCOUNTER — APPOINTMENT (OUTPATIENT)
Dept: PHYSICAL THERAPY | Age: 63
End: 2018-12-10
Payer: SUBSIDIZED

## 2018-12-12 ENCOUNTER — APPOINTMENT (OUTPATIENT)
Dept: PHYSICAL THERAPY | Age: 63
End: 2018-12-12
Payer: SUBSIDIZED

## 2018-12-17 ENCOUNTER — HOSPITAL ENCOUNTER (OUTPATIENT)
Dept: PHYSICAL THERAPY | Age: 63
Discharge: HOME | End: 2018-12-17
Payer: SUBSIDIZED

## 2018-12-17 PROCEDURE — 97110 THERAPEUTIC EXERCISES: CPT | Performed by: PHYSICAL THERAPIST

## 2018-12-17 PROCEDURE — 97140 MANUAL THERAPY 1/> REGIONS: CPT | Performed by: PHYSICAL THERAPIST

## 2018-12-17 NOTE — PROGRESS NOTES
PT DAILY TREATMENT NOTE 2-15-    Patient Name: Reshma Pritchard  Date:2018  : 1955  [x]  Patient  Verified  Payor: Yonatan Oh / Plan: [facility] TIER 3 / Product Type: Shaun Mistry /    In time: 1025 A Out time: 1135 A   Total Treatment Time (min): 70 (70 timed)  Visit #: 52   RTMD: 19    Treatment Area: Right shoulder pain [M25.511]    SUBJECTIVE  Pain Level (0-10 scale):  5/10  Any medication changes, allergies to medications, adverse drug reactions, diagnosis change, or new procedure performed?: [x] No    [] Yes (see summary sheet for update)  Subjective functional status/changes:   [] No changes reported  Patient reports that her shoulder pain feels about the same. Reports improved reaching with her R shoulder.      OBJECTIVE    Modality rationale: decrease edema, decrease inflammation and decrease pain to improve the patients ability to reach overhead   Type Additional Details   [] Estim: []Att   []Unatt        []TENS instruct                  []IFC  []Premod   []NMES                     []Other:  []w/US   []w/ice   []w/heat  Position:  Location:   []  Traction: [] Cervical       []Lumbar                       [] Prone          []Supine                       []Intermittent   []Continuous Lbs:  [] before manual  [] after manual  []w/heat   []  Ultrasound: []Continuous   [] Pulsed at:                            []1MHz   []3MHz Location:  W/cm2:   []  Paraffin         Location:   []w/heat   [x]  Ice- to go     []  Heat  []  Ice massage Position: seated, UE supported  Location: R shoulder   []  Laser  []  Other: Position:  Location:   []  Vasopneumatic Device Pressure:       [] lo [] med [] hi   Temperature:    [x] Skin assessment post-treatment:  [x]intact []redness- no adverse reaction    []redness  adverse reaction:     55 min Therapeutic Exercise:  [x] See flow sheet :    Rationale: increase ROM and increase strength to improve the patients ability to reach overhead, lift objects, perform daily chores    15 min Manual Therapy:    PROM R shoulder flex, abd, IR, ER to tolerance   Rhythmic stabilization at 90 deg  MRE flexion in supine   Rationale: decrease pain, increase ROM and increase tissue extensibility  to improve the patients ability to perform daily activities, don/doff clothing          With   [x] TE   [] TA   [] neuro   [] other: Self Care/Patient Education: [x] Review HEP    [x] Progressed/Changed HEP based on: see chart  [] positioning   [] body mechanics   [] transfers   [] heat/ice application    [] other:     Other Objective/Functional Measures:  n/a    Pain Level (0-10 scale) post treatment: 5/10    ASSESSMENT/Changes in Function:   Patient tolerated treatment well today. Progressing slowly with R shoulder AROM. Progress towards goals / Updated goals:  Short Term Goals: To be accomplished in 4-6 weeks:  1) Pt will be independent in initial HEP MET  2) Pt will report good compliance with ice regimen in order to decrease inflammation and manage pain levels MET  3) Pt will improve R shoulder PROM flexion to > or =145 deg in order to progress towards reaching into cabinets MET  4) Pt will improve R shoulder PROM ER to > or =60 deg in order to progress towards donning/doffing clothing with ease MET     Long Term Goals:  To be accomplished in 10-12 weeks:  1) Pt will improve R shoulder AROM flexion to > or =145 deg in order to reach into cabinets and perform ADL's with ease-PROGRESSING  2) Pt will improve R shoulder AROM ER to > or =70 deg in order to  -PROGRESSING  3) Pt will report being able to don/doff clothing without R shoulder pain-MET  4) Pt will demonstrate > or =4/5 R shoulder scaption strength in order to lift objects-PROGRESSING    PLAN  []  Upgrade activities as tolerated     [x]  Continue plan of care  []  Update interventions per flow sheet       []  Discharge due to:_  []  Other:        Randy Mendez, PT 12/17/2018  10:22 AM

## 2018-12-19 ENCOUNTER — HOSPITAL ENCOUNTER (OUTPATIENT)
Dept: PHYSICAL THERAPY | Age: 63
Discharge: HOME | End: 2018-12-19
Payer: SUBSIDIZED

## 2018-12-19 PROCEDURE — 97140 MANUAL THERAPY 1/> REGIONS: CPT | Performed by: PHYSICAL THERAPIST

## 2018-12-19 PROCEDURE — 97110 THERAPEUTIC EXERCISES: CPT | Performed by: PHYSICAL THERAPIST

## 2018-12-19 NOTE — PROGRESS NOTES
PT DAILY TREATMENT NOTE 2-15-    Patient Name: Doreen Ortiz  Date:2018  : 1955  [x]  Patient  Verified  Payor: ONEIDA / Plan: [facility] FAP TIER 3 / Product Type: Ferdie Bidding /    In time: 1010 A Out time:1120 A  Total Treatment Time (min): 70  Timed:  70  Visit #: 48  RTMD: 19    Treatment Area: Right shoulder pain [M25.511]    SUBJECTIVE  Pain Level (0-10 scale):  5/10  Any medication changes, allergies to medications, adverse drug reactions, diagnosis change, or new procedure performed?: [x] No    [] Yes (see summary sheet for update)  Subjective functional status/changes:   [] No changes reported  Pt with continued shoulder pain at rest and with activity    OBJECTIVE    Modality rationale: decrease edema, decrease inflammation and decrease pain to improve the patients ability to reach overhead   Type Additional Details   [x]  Ice- to go     []  Heat  []  Ice massage Position: seated, UE supported  Location: R shoulder   []  Laser  []  Other: Position:  Location:   []  Vasopneumatic Device Pressure:       [] lo [] med [] hi   Temperature:    [x] Skin assessment post-treatment:  [x]intact []redness- no adverse reaction    []redness  adverse reaction:     60 min Therapeutic Exercise:  [x] See flow sheet :    Rationale: increase ROM and increase strength to improve the patients ability to reach overhead, lift objects, perform daily chores    10 min Manual Therapy: Rhythmic stabilization at 90 deg and 120 deg   Rationale: decrease pain, increase ROM and increase tissue extensibility  to improve the patients ability to perform daily activities, don/doff clothing          With   [x] TE   [] TA   [] neuro   [] other: Self Care/Patient Education: [x] Review HEP    [x] Progressed/Changed HEP based on: see chart  [] positioning   [] body mechanics   [] transfers   [] heat/ice application    [] other:     Other Objective/Functional Measures:      Pain Level (0-10 scale) post treatment: 5/10    ASSESSMENT/Changes in Function:            Progress towards goals / Updated goals:  Short Term Goals: To be accomplished in 4-6 weeks:  1) Pt will be independent in initial HEP MET  2) Pt will report good compliance with ice regimen in order to decrease inflammation and manage pain levels MET  3) Pt will improve R shoulder PROM flexion to > or =145 deg in order to progress towards reaching into cabinets MET  4) Pt will improve R shoulder PROM ER to > or =60 deg in order to progress towards donning/doffing clothing with ease MET     Long Term Goals: To be accomplished in 10-12 weeks:  1) Pt will improve R shoulder AROM flexion to > or =145 deg in order to reach into cabinets and perform ADL's with ease-PROGRESSING  2) Pt will improve R shoulder AROM ER to > or =70 deg in order to  -PROGRESSING  3) Pt will report being able to don/doff clothing without R shoulder pain-MET  4) Pt will demonstrate > or =4/5 R shoulder scaption strength in order to lift objects-PROGRESSING    Pt with continued difficulty with AROM and strength progression secondary to high pain levels. Pt with sig difficulty activating serratus anterior without UT utilization despite verbal and tactile cues. Trial of decreasing weight for exercises today and omitting any passive stretching that caused end range pain. PLAN  []  Upgrade activities as tolerated     [x]  Continue plan of care  []  Update interventions per flow sheet       []  Discharge due to:_  [x]  Other:   Assess dec weight for exercises today. Em Bond, PT 12/19/2018  10:22 AM

## 2018-12-20 NOTE — PERIOP NOTES
Daily Note     Today's date: 2018  Patient name: Sarah Ruiz  : 1940  MRN: [id number]  Referring provider: Greg Sandoval MD  Dx:   No diagnosis found  Subjective:  Pt reports that he feels ready to transition to HEP      Objective: See treatment diary below    Precautions: osteop    Daily Treatment Diary     Manual                                                                               Exercise Diary          UBE BW 10' 10' 10' 10' x        TB: row Gr 20x Gr x30 Blue x30 Blue x30 x        ext Gr 20x Gr x30 Blue x30 Blue x30 x        ER Gr 20x Gr x30 Gr x30 Gr x30 x                     Elbow curls 10# 20x 10# 20x #10 3x10 #10 3x10 x        AROM flx, abd 1# 30x #2 x20 #2 x20 #2 x20 x        Ball on wall to fatigue 2#  2x 2#  2x 2#  2x 2#  2x x        FW bent row 10# 20x 10# 30x 10# 30x 10# 30x x        Wt to shelves 4# 30x 4# 30x 4# 30x #5 x30 x        Farmer carry 10# 2x #10 30ft x4 #10 30ft x6 #15 30ft x6 x                     Supine punch 2# 20x #2x20 #2x20 #3x30 x        T stand facing wall 20x x20           Cervical ret self OP    x15 x15                                                                              Modalities              Ice at end L shoulder   x10min x10min                                     X=same as last visit    Assessment:  Doing very well with ex  ADL are improved        Plan: D/C to home program Spoke with volunteer and pt daughter, Rx to be picked up and filled by inpatient pharmacy. 1116: Patient and family in Phase II. Discharge instructions reviewed, opportunity for questions given. Prescriptions given to family member, side effects discussed. IV removed and all belongings returned to patient. Patient is ready for discharge.

## 2018-12-26 ENCOUNTER — HOSPITAL ENCOUNTER (OUTPATIENT)
Dept: PHYSICAL THERAPY | Age: 63
Discharge: HOME | End: 2018-12-26
Payer: SUBSIDIZED

## 2018-12-26 PROCEDURE — 97140 MANUAL THERAPY 1/> REGIONS: CPT | Performed by: PHYSICAL THERAPIST

## 2018-12-26 PROCEDURE — 97110 THERAPEUTIC EXERCISES: CPT | Performed by: PHYSICAL THERAPIST

## 2018-12-26 NOTE — PROGRESS NOTES
PT DAILY TREATMENT NOTE 2-15-    Patient Name: Gaurav Walton  Date:2018  : 1955  [x]  Patient  Verified  Payor: ONEIDA / Plan: [facility] TIER 3 / Product Type: Melissa Spivey /    In time: 1010 A Out time:1120 A  Total Treatment Time (min): 70  Timed:  70  Visit #: 46  RTMD: 19    Treatment Area: Right shoulder pain [M25.511]    SUBJECTIVE  Pain Level (0-10 scale):  5/10  Any medication changes, allergies to medications, adverse drug reactions, diagnosis change, or new procedure performed?: [x] No    [] Yes (see summary sheet for update)  Subjective functional status/changes:   [] No changes reported  Patient reports continued R shoulder pain, but improved mobility. She was able to cook yesterday without limitations from her shoulder. OBJECTIVE    Modality rationale: decrease edema, decrease inflammation and decrease pain to improve the patients ability to reach overhead   Type Additional Details   [x]  Ice- to go     []  Heat  []  Ice massage Position: seated, UE supported  Location: R shoulder   []  Laser  []  Other: Position:  Location:   []  Vasopneumatic Device Pressure:       [] lo [] med [] hi   Temperature:    [x] Skin assessment post-treatment:  [x]intact []redness- no adverse reaction    []redness  adverse reaction:     60 min Therapeutic Exercise:  [x] See flow sheet :    Rationale: increase ROM and increase strength to improve the patients ability to reach overhead, lift objects, perform daily chores    10 min Manual Therapy: Rhythmic stabilization at 90 deg and 120 deg, inferior R GH glides, PROM R shoulder flexion and ER.     Rationale: decrease pain, increase ROM and increase tissue extensibility  to improve the patients ability to perform daily activities, don/doff clothing          With   [x] TE   [] TA   [] neuro   [] other: Self Care/Patient Education: [x] Review HEP    [x] Progressed/Changed HEP based on: see chart  [] positioning   [] body mechanics   [] transfers   [] heat/ice application    [] other:     Other Objective/Functional Measures:      Pain Level (0-10 scale) post treatment: 2/10    ASSESSMENT/Changes in Function:   Patient tolerated treatment well today with few UT compensations noted today. Progress towards goals / Updated goals:  Short Term Goals: To be accomplished in 4-6 weeks:  1) Pt will be independent in initial HEP MET  2) Pt will report good compliance with ice regimen in order to decrease inflammation and manage pain levels MET  3) Pt will improve R shoulder PROM flexion to > or =145 deg in order to progress towards reaching into cabinets MET  4) Pt will improve R shoulder PROM ER to > or =60 deg in order to progress towards donning/doffing clothing with ease MET     Long Term Goals:  To be accomplished in 10-12 weeks:  1) Pt will improve R shoulder AROM flexion to > or =145 deg in order to reach into cabinets and perform ADL's with ease-PROGRESSING  2) Pt will improve R shoulder AROM ER to > or =70 deg in order to  -PROGRESSING  3) Pt will report being able to don/doff clothing without R shoulder pain-MET  4) Pt will demonstrate > or =4/5 R shoulder scaption strength in order to lift objects-PROGRESSING        PLAN  []  Upgrade activities as tolerated     [x]  Continue plan of care  []  Update interventions per flow sheet       []  Discharge due to:_  [x]  Other:        Betina Miles, PT 12/26/2018  10:22 AM

## 2018-12-31 ENCOUNTER — HOSPITAL ENCOUNTER (OUTPATIENT)
Dept: PHYSICAL THERAPY | Age: 63
Discharge: HOME | End: 2018-12-31
Payer: SUBSIDIZED

## 2018-12-31 PROCEDURE — 97140 MANUAL THERAPY 1/> REGIONS: CPT | Performed by: PHYSICAL THERAPIST

## 2018-12-31 PROCEDURE — 97110 THERAPEUTIC EXERCISES: CPT | Performed by: PHYSICAL THERAPIST

## 2018-12-31 NOTE — PROGRESS NOTES
PT DAILY TREATMENT NOTE 2-15- Patient Name: Alexandra Frances Date:2018 : 1955 [x]  Patient  Verified Payor: Joelle Chacon / Plan: [facility] TIER 3 / Product Type: Sylvia / In time: 1000 A Out time:1110 A Total Treatment Time (min): 70 Timed:  70 Visit #: 46 
RTMD: 19 Treatment Area: Right shoulder pain [M25.511] SUBJECTIVE Pain Level (0-10 scale):  5/10 Any medication changes, allergies to medications, adverse drug reactions, diagnosis change, or new procedure performed?: [x] No    [] Yes (see summary sheet for update) Subjective functional status/changes:   [] No changes reported Patient reports pain in her R UT and lateral R arm which occurs at rest and is separate from the \"anchor pain. \" OBJECTIVE Modality rationale: decrease edema, decrease inflammation and decrease pain to improve the patients ability to reach overhead Type Additional Details [x]  Ice- to go     []  Heat 
[]  Ice massage Position: seated, UE supported Location: R shoulder  
[]  Laser 
[]  Other: Position: Location:  
[]  Vasopneumatic Device Pressure:       [] lo [] med [] hi  
Temperature:   
[x] Skin assessment post-treatment:  [x]intact []redness- no adverse reaction 
  []redness  adverse reaction:  
 
60 min Therapeutic Exercise:  [x] See flow sheet :   
Rationale: increase ROM and increase strength to improve the patients ability to reach overhead, lift objects, perform daily chores 10 min Manual Therapy: Rhythmic stabilization at 90 deg and 120 deg, inferior R GH glides, PROM R shoulder flexion and ER. Rationale: decrease pain, increase ROM and increase tissue extensibility  to improve the patients ability to perform daily activities, don/doff clothing With 
 [x] TE 
 [] TA 
 [] neuro 
 [] other: Self Care/Patient Education: [x] Review HEP [x] Progressed/Changed HEP based on: see chart 
[] positioning   [] body mechanics   [] transfers   [] heat/ice application [] other:  
 
Other Objective/Functional Measures: 
 
 
Pain Level (0-10 scale) post treatment: 2/10 ASSESSMENT/Changes in Function:  
Added UT stretch and levator stretching to address neck pain. Progress towards goals / Updated goals: 
Short Term Goals: To be accomplished in 4-6 weeks: 
1) Pt will be independent in initial HEP MET 2) Pt will report good compliance with ice regimen in order to decrease inflammation and manage pain levels MET 3) Pt will improve R shoulder PROM flexion to > or =145 deg in order to progress towards reaching into cabinets MET 4) Pt will improve R shoulder PROM ER to > or =60 deg in order to progress towards donning/doffing clothing with ease MET 
  
Long Term Goals: To be accomplished in 10-12 weeks: 
1) Pt will improve R shoulder AROM flexion to > or =145 deg in order to reach into cabinets and perform ADL's with ease-PROGRESSING 2) Pt will improve R shoulder AROM ER to > or =70 deg in order to  -PROGRESSING 3) Pt will report being able to don/doff clothing without R shoulder pain-MET 4) Pt will demonstrate > or =4/5 R shoulder scaption strength in order to lift objects-PROGRESSING 
 
 
 
PLAN 
[]  Upgrade activities as tolerated     [x]  Continue plan of care 
[]  Update interventions per flow sheet      
[]  Discharge due to:_ 
[x]  Other:   
 
 
Tono Sr, PT 12/31/2018  10:07 AM

## 2019-01-02 ENCOUNTER — APPOINTMENT (OUTPATIENT)
Dept: PHYSICAL THERAPY | Age: 64
End: 2019-01-02
Payer: MEDICAID

## 2019-01-07 ENCOUNTER — HOSPITAL ENCOUNTER (OUTPATIENT)
Dept: PHYSICAL THERAPY | Age: 64
Discharge: HOME | End: 2019-01-07
Payer: MEDICAID

## 2019-01-07 PROCEDURE — 97140 MANUAL THERAPY 1/> REGIONS: CPT | Performed by: PHYSICAL THERAPY ASSISTANT

## 2019-01-07 PROCEDURE — 97110 THERAPEUTIC EXERCISES: CPT | Performed by: PHYSICAL THERAPY ASSISTANT

## 2019-01-07 NOTE — PROGRESS NOTES
PT to MD NOTE 2-15- Patient Name: Kim Guerrero Date:2019 : 1955 [x]  Patient  Verified Payor: [facility] / Plan: [facility] % / Product Type: Sylvia /    
 
 
Treatment Area: Right shoulder pain [M25.511] SUBJECTIVE Pain Level (0-10 scale):  5/10 Any medication changes, allergies to medications, adverse drug reactions, diagnosis change, or new procedure performed?: [x] No    [] Yes (see summary sheet for update) Subjective functional status/changes:   [] No changes reported Patient states reaching is getting easier \"I still can't reach to the high shelf though. \" states she continues to have R UT pain that radiates to her R elbow. OBJECTIVE With 
 [x] TE 
 [] TA 
 [] neuro 
 [] other: Self Care/Patient Education: [x] Review HEP [x] Progressed/Changed HEP based on: see chart 
[] positioning   [] body mechanics   [] transfers   [x] heat/ice application   
[x] other:use of heat along R cervical spine and shoulder before stretching to reduce pain and improve ROM. Other Objective/Functional Measures: AROM R shoulder Flex= 120  deg Cxwh=570 deg ER= 3 inches less than contralat side reaching behind neck IR=8 inches less than contralt side reaching behind back 
  
Pain levels 3-6/10 Palpation: Moderate TTP R UT and Levator scapula muscles Mild TTP R supraspinatus insertion, infraspinatus tendon and muscle belly. Pain Level (0-10 scale) post treatment: 2/10 ASSESSMENT/Changes in Function:  
Slight improvement R shoulder flexion and scaption AROM, no change in ER or IR AROM compared to last reassessment, strength slowly progressing. Patient reporting increased functional use R UE but continues to report pain radiating down R UE. Encouraged to continue with upper trapezius and levator scapula stretches to improve ROM and to decrease symptoms. .  
      
Progress towards goals / Updated goals: 
Short Term Goals: To be accomplished in 4-6 weeks: 1) Pt will be independent in initial HEP MET 2) Pt will report good compliance with ice regimen in order to decrease inflammation and manage pain levels MET 3) Pt will improve R shoulder PROM flexion to > or =145 deg in order to progress towards reaching into cabinets MET 4) Pt will improve R shoulder PROM ER to > or =60 deg in order to progress towards donning/doffing clothing with ease MET 
  
Long Term Goals: To be accomplished in 10-12 weeks: 
1) Pt will improve R shoulder AROM flexion to > or =145 deg in order to reach into cabinets and perform ADL's with ease-PROGRESSING 2) Pt will improve R shoulder AROM ER to > or =70 deg in order to  -PROGRESSING 3) Pt will report being able to don/doff clothing without R shoulder pain-MET 4) Pt will demonstrate > or =4/5 R shoulder scaption strength in order to lift objects-PROGRESSING 
 
 
 
PLAN 
[]  Upgrade activities as tolerated     [x]  Continue plan of care 
[]  Update interventions per flow sheet      
[]  Discharge due to:_ 
[x]  Other:  Continue per MD recommendations Harpal Ch, SABINO 1/7/2019  10:00 AM

## 2019-01-07 NOTE — PROGRESS NOTES
PT DAILY TREATMENT NOTE 2-15- Patient Name: Blank Evans Date:2019 : 1955 [x]  Patient  Verified Payor: Maurizio Toro / Plan: [facility] % / Product Type: Beto Parra / In time: 1000 A Out time:1110 A Total Treatment Time (min): 70 Timed:  70 Visit #: 48 
RTMD: 19 Treatment Area: Right shoulder pain [M25.511] SUBJECTIVE Pain Level (0-10 scale):  5/10 Any medication changes, allergies to medications, adverse drug reactions, diagnosis change, or new procedure performed?: [x] No    [] Yes (see summary sheet for update) Subjective functional status/changes:   [] No changes reported Patient states reaching is getting easier \"I still can't reach to the high shelf though. \" states she continues to have R UT pain that radiates to her R elbow. OBJECTIVE Modality rationale: decrease edema, decrease inflammation and decrease pain to improve the patients ability to reach overhead Type Additional Details [x]  Ice- to go     []  Heat 
[]  Ice massage Position: seated, UE supported Location: R shoulder  
[]  Laser 
[]  Other: Position: Location:  
[]  Vasopneumatic Device Pressure:       [] lo [] med [] hi  
Temperature:   
[x] Skin assessment post-treatment:  [x]intact []redness- no adverse reaction 
  []redness  adverse reaction:  
 
60 min Therapeutic Exercise:  [x] See flow sheet :   
Rationale: increase ROM and increase strength to improve the patients ability to reach overhead, lift objects, perform daily chores 10 min Manual Therapy: Rhythmic stabilization at 90 deg and 120 deg, inferior R GH glides, PROM R shoulder flexion and ER. Rationale: decrease pain, increase ROM and increase tissue extensibility  to improve the patients ability to perform daily activities, don/doff clothing With 
 [x] TE 
 [] TA 
 [] neuro 
 [] other: Self Care/Patient Education: [x] Review HEP [x] Progressed/Changed HEP based on: see chart [] positioning   [] body mechanics   [] transfers   [x] heat/ice application   
[x] other:use of heat along R cervical spine and shoulder before stretching to reduce pain and improve ROM. Other Objective/Functional Measures: AROM R shoulder Flex= 120  deg Rmdg=529 deg ER= 3 inches less than contralat side reaching behind neck IR=8 inches less than contralt side reaching behind back 
  
Pain levels 3-6/10 Palpation: Moderate TTP R UT and Levator scapula muscles Mild TTP R supraspinatus insertion, infraspinatus tendon and muscle belly. Pain Level (0-10 scale) post treatment: 2/10 ASSESSMENT/Changes in Function:  
Slight improvement R shoulder AROM, strength slowly progressing. Patient reporting increased functional use R UE but continues to report pain radiating down R UE. Encouraged to continue with upper trapezius and levator scapula stretches to improve ROM and to decrease symptoms. .  
      
Progress towards goals / Updated goals: 
Short Term Goals: To be accomplished in 4-6 weeks: 
1) Pt will be independent in initial HEP MET 2) Pt will report good compliance with ice regimen in order to decrease inflammation and manage pain levels MET 3) Pt will improve R shoulder PROM flexion to > or =145 deg in order to progress towards reaching into cabinets MET 4) Pt will improve R shoulder PROM ER to > or =60 deg in order to progress towards donning/doffing clothing with ease MET 
  
Long Term Goals: To be accomplished in 10-12 weeks: 
1) Pt will improve R shoulder AROM flexion to > or =145 deg in order to reach into cabinets and perform ADL's with ease-PROGRESSING 2) Pt will improve R shoulder AROM ER to > or =70 deg in order to  -PROGRESSING 3) Pt will report being able to don/doff clothing without R shoulder pain-MET 4) Pt will demonstrate > or =4/5 R shoulder scaption strength in order to lift objects-PROGRESSING 
 
 
 
PLAN 
 []  Upgrade activities as tolerated     [x]  Continue plan of care 
[]  Update interventions per flow sheet      
[]  Discharge due to:_ 
[x]  Other:  Continue per MD recommendations Noelle Madison, PTA 1/7/2019  10:00 AM

## 2019-01-09 ENCOUNTER — HOSPITAL ENCOUNTER (OUTPATIENT)
Dept: PHYSICAL THERAPY | Age: 64
Discharge: HOME | End: 2019-01-09
Payer: MEDICAID

## 2019-01-09 PROCEDURE — 97110 THERAPEUTIC EXERCISES: CPT | Performed by: PHYSICAL THERAPY ASSISTANT

## 2019-01-09 PROCEDURE — 97140 MANUAL THERAPY 1/> REGIONS: CPT | Performed by: PHYSICAL THERAPY ASSISTANT

## 2019-01-09 NOTE — PROGRESS NOTES
PT DAILY TREATMENT NOTE 2-15- Patient Name: Lulu Galicia Date:2019 : 1955 [x]  Patient  Verified Payor: Angelica Tracey / Plan: [facility] % / Product Type: Deyvi Pippins / In time: 9:50 A Out time:1100 A Total Treatment Time (min): 70 Timed:  70 Visit #: 47 RTMD: 19 Treatment Area: Right shoulder pain [M25.511] SUBJECTIVE Pain Level (0-10 scale):  4/10 Any medication changes, allergies to medications, adverse drug reactions, diagnosis change, or new procedure performed?: [x] No    [] Yes (see summary sheet for update) Subjective functional status/changes:   [] No changes reported Patient states she saw Dr. Nj Franklin yesterday, states \"the moving around is the scar tissue and that he had to do a lot of repair so it's going to take some time to get my strength up. \" States she is to continue PT 2x week for 6 additional weeks then possibly reduce to 1x/week. She was advised to continue UT and levator scapula stretches. OBJECTIVE Modality rationale: decrease edema, decrease inflammation and decrease pain to improve the patients ability to reach overhead Type Additional Details [x]  Ice- to go     []  Heat 
[]  Ice massage Position: seated, UE supported Location: R shoulder  
[]  Laser 
[]  Other: Position: Location:  
[]  Vasopneumatic Device Pressure:       [] lo [] med [] hi  
Temperature:   
[x] Skin assessment post-treatment:  [x]intact []redness- no adverse reaction 
  []redness  adverse reaction:  
 
60 min Therapeutic Exercise:  [x] See flow sheet :   
Rationale: increase ROM and increase strength to improve the patients ability to reach overhead, lift objects, perform daily chores 10 min Manual Therapy: Rhythmic stabilization at 90 deg and 120 deg, IR/ER at 45 degrees 
 
 inferior R GH glides, PROM R shoulder flexion and ER. (omit) Rationale: decrease pain, increase ROM and increase tissue extensibility to improve the patients ability to perform daily activities, don/doff clothing With 
 [x] TE 
 [] TA 
 [] neuro 
 [] other: Self Care/Patient Education: [x] Review HEP [x] Progressed/Changed HEP based on: see chart 
[] positioning   [] body mechanics   [] transfers   [x] heat/ice application   
[x] other:   
 
Other Objective/Functional Measures:NT Pain Level (0-10 scale) post treatment: 2/10 ASSESSMENT/Changes in Function:  
Continues to require frequent cues for correct scapular positioning during strengthening exercises. Progress towards goals / Updated goals: 
Short Term Goals: To be accomplished in 4-6 weeks: 
1) Pt will be independent in initial HEP MET 2) Pt will report good compliance with ice regimen in order to decrease inflammation and manage pain levels MET 3) Pt will improve R shoulder PROM flexion to > or =145 deg in order to progress towards reaching into cabinets MET 4) Pt will improve R shoulder PROM ER to > or =60 deg in order to progress towards donning/doffing clothing with ease MET 
  
Long Term Goals: To be accomplished in 10-12 weeks: 
1) Pt will improve R shoulder AROM flexion to > or =145 deg in order to reach into cabinets and perform ADL's with ease-PROGRESSING 2) Pt will improve R shoulder AROM ER to > or =70 deg in order to  -PROGRESSING 3) Pt will report being able to don/doff clothing without R shoulder pain-MET 4) Pt will demonstrate > or =4/5 R shoulder scaption strength in order to lift objects-PROGRESSING 
 
 
 
PLAN 
[]  Upgrade activities as tolerated     [x]  Continue plan of care 
[]  Update interventions per flow sheet      
[]  Discharge due to:_ 
[x]  Other:   
 
 
Rajendra John, PTA 1/9/2019  10:00 AM

## 2019-01-14 ENCOUNTER — APPOINTMENT (OUTPATIENT)
Dept: PHYSICAL THERAPY | Age: 64
End: 2019-01-14
Payer: MEDICAID

## 2019-01-16 ENCOUNTER — APPOINTMENT (OUTPATIENT)
Dept: PHYSICAL THERAPY | Age: 64
End: 2019-01-16
Payer: MEDICAID

## 2019-01-18 ENCOUNTER — HOSPITAL ENCOUNTER (OUTPATIENT)
Dept: MAMMOGRAPHY | Age: 64
Discharge: HOME | End: 2019-01-18

## 2019-01-18 DIAGNOSIS — Z12.31 VISIT FOR SCREENING MAMMOGRAM: ICD-10-CM

## 2019-01-21 ENCOUNTER — APPOINTMENT (OUTPATIENT)
Dept: PHYSICAL THERAPY | Age: 64
End: 2019-01-21
Payer: MEDICAID

## 2019-01-23 ENCOUNTER — HOSPITAL ENCOUNTER (OUTPATIENT)
Dept: PHYSICAL THERAPY | Age: 64
Discharge: HOME | End: 2019-01-23
Payer: MEDICAID

## 2019-01-23 PROCEDURE — 97140 MANUAL THERAPY 1/> REGIONS: CPT | Performed by: PHYSICAL THERAPIST

## 2019-01-23 PROCEDURE — 97110 THERAPEUTIC EXERCISES: CPT | Performed by: PHYSICAL THERAPIST

## 2019-01-23 NOTE — PROGRESS NOTES
PT DAILY TREATMENT NOTE 2-15- Patient Name: Macho Polk Date:2019 : 1955 [x]  Patient  Verified Payor: Kaden Ear / Plan: [facility] % / Product Type: Pal Backers / In time: 1010 A Out time:1105 A Total Treatment Time (min): 55 Timed:  55 Visit #: 54 
RTMD: 19 Treatment Area: Right shoulder pain [M25.511] SUBJECTIVE Pain Level (0-10 scale):  4/10 Any medication changes, allergies to medications, adverse drug reactions, diagnosis change, or new procedure performed?: [x] No    [] Yes (see summary sheet for update) Subjective functional status/changes:   [] No changes reported \" I can get my arm up a little further\" OBJECTIVE 
 
ROM:  
R shoulder AROM: 130 degrees flexion , 130 degrees abduction, IR to level of L2  
R shoulder PROM: 170 degrees flexion, 85 degrees ER, 170 degrees abduction Modality rationale: decrease edema, decrease inflammation and decrease pain to improve the patients ability to reach overhead Type Additional Details [x]  Ice- to go     []  Heat 
[]  Ice massage Position: seated, UE supported Location: R shoulder  
[]  Laser 
[]  Other: Position: Location:  
[]  Vasopneumatic Device Pressure:       [] lo [] med [] hi  
Temperature:   
[x] Skin assessment post-treatment:  [x]intact []redness- no adverse reaction 
  []redness  adverse reaction:  
 
45 min Therapeutic Exercise:  [x] See flow sheet :   
Rationale: increase ROM and increase strength to improve the patients ability to reach overhead, lift objects, perform daily chores 10 min Manual Therapy: Rhythmic stabilization at 90 deg and 120 deg, IR/ER at 45 degrees 
 
 inferior R GH glides, PROM R shoulder flexion and ER. Rationale: decrease pain, increase ROM and increase tissue extensibility  to improve the patients ability to perform daily activities, don/doff clothing With 
 [x] TE 
 [] TA 
 [] neuro 
 [] other: Self Care/Patient Education: [x] Review HEP   
 [x] Progressed/Changed HEP based on: see chart 
[] positioning   [] body mechanics   [] transfers   [x] heat/ice application   
[x] other:   
 
 
 
Pain Level (0-10 scale) post treatment: 2/10 ASSESSMENT/Changes in Function:  
Progressing slowly with increased R shoulder AROM. Progress towards goals / Updated goals: 
Short Term Goals: To be accomplished in 4-6 weeks: 
1) Pt will be independent in initial HEP MET 2) Pt will report good compliance with ice regimen in order to decrease inflammation and manage pain levels MET 3) Pt will improve R shoulder PROM flexion to > or =145 deg in order to progress towards reaching into cabinets MET 4) Pt will improve R shoulder PROM ER to > or =60 deg in order to progress towards donning/doffing clothing with ease MET 
  
Long Term Goals: To be accomplished in 10-12 weeks: 
1) Pt will improve R shoulder AROM flexion to > or =145 deg in order to reach into cabinets and perform ADL's with ease-PROGRESSING 2) Pt will improve R shoulder AROM ER to > or =70 deg in order to  -PROGRESSING 3) Pt will report being able to don/doff clothing without R shoulder pain-MET 4) Pt will demonstrate > or =4/5 R shoulder scaption strength in order to lift objects-PROGRESSING 
 
 
 
PLAN 
[]  Upgrade activities as tolerated     [x]  Continue plan of care 
[]  Update interventions per flow sheet      
[]  Discharge due to:_ 
[]  Other:   
 
 
Russell Alba, PT 1/23/2019  10:13 AM

## 2019-01-28 ENCOUNTER — APPOINTMENT (OUTPATIENT)
Dept: PHYSICAL THERAPY | Age: 64
End: 2019-01-28
Payer: MEDICAID

## 2019-01-30 ENCOUNTER — APPOINTMENT (OUTPATIENT)
Dept: PHYSICAL THERAPY | Age: 64
End: 2019-01-30
Payer: MEDICAID

## 2019-01-31 ENCOUNTER — APPOINTMENT (OUTPATIENT)
Dept: GENERAL RADIOLOGY | Age: 64
End: 2019-01-31
Attending: PHYSICIAN ASSISTANT
Payer: MEDICAID

## 2019-01-31 ENCOUNTER — HOSPITAL ENCOUNTER (EMERGENCY)
Age: 64
Discharge: HOME | End: 2019-01-31
Attending: EMERGENCY MEDICINE
Payer: MEDICAID

## 2019-01-31 ENCOUNTER — OFFICE VISIT (OUTPATIENT)
Dept: INTERNAL MEDICINE CLINIC | Age: 64
End: 2019-01-31

## 2019-01-31 VITALS
DIASTOLIC BLOOD PRESSURE: 74 MMHG | HEIGHT: 65 IN | SYSTOLIC BLOOD PRESSURE: 125 MMHG | BODY MASS INDEX: 33.76 KG/M2 | TEMPERATURE: 98.4 F | RESPIRATION RATE: 18 BRPM | HEART RATE: 62 BPM | WEIGHT: 202.6 LBS | OXYGEN SATURATION: 98 %

## 2019-01-31 VITALS
TEMPERATURE: 98.5 F | SYSTOLIC BLOOD PRESSURE: 142 MMHG | RESPIRATION RATE: 16 BRPM | DIASTOLIC BLOOD PRESSURE: 81 MMHG | HEART RATE: 81 BPM | OXYGEN SATURATION: 98 %

## 2019-01-31 DIAGNOSIS — S69.92XA HAND INJURY, LEFT, INITIAL ENCOUNTER: ICD-10-CM

## 2019-01-31 DIAGNOSIS — I10 ESSENTIAL HYPERTENSION: Primary | ICD-10-CM

## 2019-01-31 DIAGNOSIS — F41.9 ANXIETY: ICD-10-CM

## 2019-01-31 DIAGNOSIS — S69.92XA WRIST INJURY, LEFT, INITIAL ENCOUNTER: Primary | ICD-10-CM

## 2019-01-31 DIAGNOSIS — E03.9 ACQUIRED HYPOTHYROIDISM: ICD-10-CM

## 2019-01-31 DIAGNOSIS — E66.9 OBESITY (BMI 30.0-34.9): ICD-10-CM

## 2019-01-31 PROCEDURE — 74011250637 HC RX REV CODE- 250/637: Performed by: PHYSICIAN ASSISTANT

## 2019-01-31 PROCEDURE — 73130 X-RAY EXAM OF HAND: CPT

## 2019-01-31 PROCEDURE — 99283 EMERGENCY DEPT VISIT LOW MDM: CPT

## 2019-01-31 PROCEDURE — 75810000053 HC SPLINT APPLICATION

## 2019-01-31 PROCEDURE — 73090 X-RAY EXAM OF FOREARM: CPT

## 2019-01-31 RX ORDER — FLUCONAZOLE 150 MG/1
150 TABLET ORAL DAILY
Qty: 1 TAB | Refills: 0 | Status: SHIPPED | OUTPATIENT
Start: 2019-01-31 | End: 2019-02-01

## 2019-01-31 RX ORDER — OXYCODONE AND ACETAMINOPHEN 5; 325 MG/1; MG/1
2 TABLET ORAL
Status: COMPLETED | OUTPATIENT
Start: 2019-01-31 | End: 2019-01-31

## 2019-01-31 RX ORDER — OXYCODONE AND ACETAMINOPHEN 5; 325 MG/1; MG/1
1 TABLET ORAL
Qty: 20 TAB | Refills: 0 | Status: SHIPPED | OUTPATIENT
Start: 2019-01-31 | End: 2019-03-21

## 2019-01-31 RX ORDER — ALPRAZOLAM 0.5 MG/1
0.5 TABLET ORAL
Qty: 30 TAB | Refills: 2 | Status: SHIPPED | OUTPATIENT
Start: 2019-01-31 | End: 2019-05-31 | Stop reason: SDUPTHER

## 2019-01-31 RX ORDER — NAPROXEN 500 MG/1
500 TABLET ORAL
Qty: 20 TAB | Refills: 0 | Status: SHIPPED | OUTPATIENT
Start: 2019-01-31 | End: 2019-03-21

## 2019-01-31 RX ORDER — DICYCLOMINE HYDROCHLORIDE 10 MG/1
10 CAPSULE ORAL
COMMUNITY
End: 2021-08-18 | Stop reason: SDUPTHER

## 2019-01-31 RX ORDER — HYDROCORTISONE 25 MG/G
1 CREAM TOPICAL
Qty: 30 G | Refills: 2 | Status: SHIPPED | OUTPATIENT
Start: 2019-01-31 | End: 2019-01-31

## 2019-01-31 RX ORDER — ONDANSETRON 4 MG/1
4 TABLET, ORALLY DISINTEGRATING ORAL
Status: DISCONTINUED | OUTPATIENT
Start: 2019-01-31 | End: 2019-02-01 | Stop reason: HOSPADM

## 2019-01-31 RX ORDER — DICLOFENAC SODIUM 75 MG/1
75 TABLET, DELAYED RELEASE ORAL
COMMUNITY

## 2019-01-31 RX ADMIN — ONDANSETRON 4 MG: 4 TABLET, ORALLY DISINTEGRATING ORAL at 22:25

## 2019-01-31 RX ADMIN — OXYCODONE AND ACETAMINOPHEN 2 TABLET: 5; 325 TABLET ORAL at 22:26

## 2019-01-31 NOTE — PROGRESS NOTES
HISTORY OF PRESENT ILLNESS  Torie Cason is a 58 y.o. female. Job was eliminated in June after 30 years while she was out for surgery of repair of right rotator cuff repair (3/22) with prolonged recovery. Very stress and tired. Not sleeping well. Applying for her SS benefits. Also applying to Medicaid. Medicare starts 5/1/19. Considering coming back to the sitter pool once arm is feeling better. Care card runs out today - will re-apply. Orthopedist, Dr. Shanna Hicks feels recovery is taking so long because muscles were very week prior to repair. Also now feels scar tissue is contributing. Taking Diclofenac. Has been having chest pains over the summer. Had Stress test through Dr. Tejal Slater which was negative. Placed on nifedipine and omeprazole for esophageal spasms. This has helped. No longer having crushing chest pains but still having some mild chest pain. HTN - off chlorthalidone for over 2 months. Blood pressure has been well controlled. Had vaginal discharge - white and thick. Tried otc meds wihtout improvement. GYN called in diflucan which helped but now with mild discharge has returned. Current Outpatient Medications:     dicyclomine (BENTYL) 10 mg capsule, Take 10 mg by mouth 4 times daily (before meals and nightly). , Disp: , Rfl:     diclofenac EC (VOLTAREN) 75 mg EC tablet, Take 75 mg by mouth two (2) times a day., Disp: , Rfl:     ALPRAZolam (XANAX) 0.5 mg tablet, Take 1 Tab by mouth two (2) times daily as needed. , Disp: 30 Tab, Rfl: 2    NIFEdipine ER (PROCARDIA XL) 60 mg ER tablet, Take 60 mg by mouth daily. , Disp: , Rfl:     Omeprazole delayed release (PRILOSEC D/R) 20 mg tablet, Take 1 Tab by mouth daily. daily before breakfast, Disp: 30 Tab, Rfl: 5    levothyroxine (SYNTHROID) 112 mcg tablet, Take 1 Tab by mouth Daily (before breakfast). , Disp: 90 Tab, Rfl: 3    valsartan (DIOVAN) 160 mg tablet, TAKE ONE TABLET BY MOUTH EVERY DAY, Disp: 90 Tab, Rfl: 3   ondansetron hcl (ZOFRAN) 4 mg tablet, Take 4 mg by mouth every eight (8) hours as needed for Nausea., Disp: , Rfl:     prednisoLONE acetate (PRED FORTE) 1 % ophthalmic suspension, Administer 1 Drop to both eyes four (4) times daily. , Disp: , Rfl:     calcium carbonate (TUMS) 200 mg calcium (500 mg) chew, Take 1 Tab by mouth as needed. , Disp: , Rfl:     docusate sodium (COLACE) 100 mg capsule, Take 100 mg by mouth two (2) times daily as needed for Constipation. 1-2 tabs PRN constipation, Disp: , Rfl:     hydrocortisone (PROCTOZONE-HC) 2.5 % rectal cream, Insert 1 g into rectum once as needed. , Disp: , Rfl:     ketorolac (ACULAR) 0.5 % ophthalmic solution, Administer 2 Drops to both eyes four (4) times daily as needed. , Disp: 1 Bottle, Rfl: 0    /74 (BP 1 Location: Left arm, BP Patient Position: At rest)   Pulse 62   Temp 98.4 °F (36.9 °C) (Oral)   Resp 18   Ht 5' 5\" (1.651 m)   Wt 202 lb 9.6 oz (91.9 kg)   SpO2 98%   BMI 33.71 kg/m²           ROS  See above  Physical Exam   Constitutional: She appears well-developed and well-nourished. HENT:   Head: Normocephalic and atraumatic. Neck: Neck supple. No thyromegaly present. Cardiovascular: Normal rate and regular rhythm. Exam reveals no gallop and no friction rub. No murmur heard. Pulmonary/Chest: Effort normal and breath sounds normal.   Abdominal: Soft. Bowel sounds are normal. She exhibits no distension and no mass. There is no tenderness. Musculoskeletal: She exhibits no edema. Lymphadenopathy:     She has no cervical adenopathy. Vitals reviewed. ASSESSMENT and PLAN  htn - controlled without chlorthalidone. Continue same meds. Hypothyroidism -utd thyroid levels. Clinically euthyroid. Continue same  Obesity - continue to work on diet and exercise for weight loss. Has been walking more  Anxiety - increased after let go from job. Continue xanax prn. Has plan set for future employment and medical coverage. Improving. Hemorrhoids - continue. Med renewed. Vaginal yeast infection - given 1 additional diflucan.     Orders Placed This Encounter    dicyclomine (BENTYL) 10 mg capsule    diclofenac EC (VOLTAREN) 75 mg EC tablet    ALPRAZolam (XANAX) 0.5 mg tablet    hydrocortisone (PROCTOZONE-HC) 2.5 % rectal cream     Follow-up Disposition:  Return in about 4 months (around 5/31/2019) for welcome to medicare PE.

## 2019-01-31 NOTE — PROGRESS NOTES
1. Have you been to the ER, urgent care clinic since your last visit? Hospitalized since your last visit? No    2. Have you seen or consulted any other health care providers outside of the [address] [place] [address] since your last visit? Include any pap smears or colon screening. Yes    Chief Complaint   Patient presents with    Fatigue    Insomnia     Fasting    Abuse Screening Questionnaire 1/31/2019   Do you ever feel afraid of your partner? N   Are you in a relationship with someone who physically or mentally threatens you? N   Is it safe for you to go home?  Y     PHQ over the last two weeks 1/31/2019   Little interest or pleasure in doing things Not at all   Feeling down, depressed, irritable, or hopeless Not at all   Total Score PHQ 2 0

## 2019-02-01 NOTE — ED PROVIDER NOTES
58 y.o. female with past medical history significant for HTN, GERD, duodenal ulcer, ulcerative colitis, hypothyroidism, arthritis, and arrhythmia presents ambulatory to ED with chief complaint of left hand and wrist pain and swelling, 10/10 in severity, s/p GLF at home today at 3 AM. Pt explains that she tripped and fell onto her left side, injuring her hand/wrist in the process. Pt notes that she saw her PCP today, but that her symptoms are progressively worsening, prompting her to come to the ED. She reports associated mild left elbow and shoulder pain. Pt also notes that she tried ice and a pain-relieving gel without relief. Pt denies being on any anticoagulation or antiplatelet therapy. Of note, the pt last ate at 2 PM. Pt denies any head trauma, back pain, neck pain, or abd pain currently. There are no other acute medical concerns at this time. Social hx: Patient denies Tobacco use. Reports occasional EtOH use. Denies illicit drug abuse. PCP: Marcelo Gimlan MD 
Ortho: Dr. Carl Brown MD 
 
Note written by Tammi Marsh, as dictated by CLINTON Glez, 8:57 PM 
 
 
 
The history is provided by the patient. No  was used. Past Medical History:  
Diagnosis Date  Arrhythmia \"PALPATATIONS, FLUTTERING, MEDS PER DR. Chung Sing"  Arthritis   
 knees, back, SHOULDERS, R HIP  Chest pain 03/16/2018 PT STATES SHE'S BEEN ASSESSED FOR AND DETERMINED TO BE ESOPHAGEAL SPASMS PER FREYA LUNA AND DR. Author Belle  Chronic pain LOWER BACK, R HIP  Duodenal ulcer 2002  GERD (gastroesophageal reflux disease)  H/O blood clots 2007 TO RIGHT EYE  Hypertension  Iritis 2007  
 both eyes  Nausea & vomiting  Other ill-defined conditions(799.89)   
 back pain/spasm  PONV (postoperative nausea and vomiting)  Thyroid disease   
 hypothroid  Ulcerative colitis 2002 Past Surgical History:  
Procedure Laterality Date  COLONOSCOPY N/A 5/25/2016 COLONOSCOPY performed by Agnes Lynch MD at [address] RECONSTRUCTION  2003  
 right  HX BREAST BIOPSY Left 12/30/2016 MARKER   
 HX CHOLECYSTECTOMY  2008  HX HEENT  2003  
 benign mass removed from uvula  HX KNEE REPLACEMENT Right 05/01/2013  
 right  HX KNEE REPLACEMENT  05/31/2017 Left  HX ORTHOPAEDIC  2009  
 exc mass right elbow - benign  HX ORTHOPAEDIC Right 08/03/2017 Knee reconstruction and excision of fibroma  HX OTHER SURGICAL  2003  
 growth removed from back of leg - benign  HX ISABELL AND BSO  2004  HX TONSILLECTOMY  2003  
 done at the time mass removed from uvula  LAP, PLACE ADJUST GASTR BAND  2008  
 fluid removed/band removed Family History:  
Problem Relation Age of Onset  Stroke Mother   
     cerebral hemorrhage  Post-op Nausea/Vomiting Mother  Hypertension Mother  Heart Disease Father PACEMAKER  
 Heart Attack Father 68  
 Other Sister PAD  Other Brother PAD  Cancer Paternal Grandmother   
     colon  Thyroid Disease Daughter HYPO  
 Post-op Cognitive Dysfunction Daughter Social History Socioeconomic History  Marital status: SINGLE Spouse name: Not on file  Number of children: Not on file  Years of education: Not on file  Highest education level: Not on file Social Needs  Financial resource strain: Not on file  Food insecurity - worry: Not on file  Food insecurity - inability: Not on file  Transportation needs - medical: Not on file  Transportation needs - non-medical: Not on file Occupational History  Not on file Tobacco Use  Smoking status: Never Smoker  Smokeless tobacco: Never Used Substance and Sexual Activity  Alcohol use: Yes Alcohol/week: 0.0 - 0.6 oz  
  Comment: 2 drinks per month  Drug use: No  
 Sexual activity: No  
Other Topics Concern  Not on file Social History Narrative  Not on file ALLERGIES: Adhesive tape-silicones; Lidoderm [lidocaine]; and Other medication Review of Systems Constitutional: Negative. HENT: Negative for ear discharge. Eyes: Negative for photophobia, pain, discharge and visual disturbance. Respiratory: Negative for apnea, cough, chest tightness and shortness of breath. Cardiovascular: Negative for chest pain, palpitations and leg swelling. Gastrointestinal: Negative for abdominal distention, abdominal pain and blood in stool. Genitourinary: Negative for difficulty urinating, dysuria, flank pain, frequency and hematuria. Musculoskeletal: Positive for arthralgias (left shoulder, elbow, wrist, and hand) and joint swelling (left wrist and fingers). Negative for back pain, gait problem, myalgias and neck pain. Skin: Negative for color change and pallor. Neurological: Negative for dizziness, syncope, weakness, numbness and headaches. Psychiatric/Behavioral: Negative for behavioral problems and confusion. The patient is not nervous/anxious. All other systems reviewed and are negative. Vitals:  
 01/31/19 2000 01/31/19 2055 BP:  178/84 Pulse: 88 87 Resp:  18 Temp:  98.6 °F (37 °C) SpO2: 100% 98% Physical Exam  
Constitutional: She is oriented to person, place, and time. She appears well-developed and well-nourished. No distress. HENT:  
Head: Normocephalic and atraumatic. Right Ear: External ear normal.  
Left Ear: External ear normal.  
Nose: Nose normal.  
Mouth/Throat: Oropharynx is clear and moist.  
Eyes: Conjunctivae and EOM are normal. Pupils are equal, round, and reactive to light. Right eye exhibits no discharge. Left eye exhibits no discharge. Neck: Normal range of motion. Neck supple. Cardiovascular: Normal rate, regular rhythm, normal heart sounds and intact distal pulses. Pulses: 
     Radial pulses are 2+ on the left side. Pulmonary/Chest: Effort normal and breath sounds normal.  
Abdominal: Soft. Bowel sounds are normal. She exhibits no distension. There is no tenderness. There is no rebound and no guarding. Musculoskeletal: She exhibits no edema. Left wrist: She exhibits tenderness. Pt has swelling and a deformity to left wrist. Swelling to left 4th and 5th digits of left hand. Neurological: She is alert and oriented to person, place, and time. No cranial nerve deficit. Coordination normal.  
Skin: Skin is warm and dry. No rash noted. Psychiatric: She has a normal mood and affect. Her behavior is normal. Judgment and thought content normal.  
Nursing note and vitals reviewed. Note written by Barbara Cárdenas, as dictated by CLINTON Ruvalcaba, 8:57 PM  
 
MDM Procedures Tiger text with Dr Manuel Hinojosa; Ortho; states to splint and Dr Keita Doctor to see. Discussed case with attending Physician Kenan Conde; reviewed xrays. Agrees with care and will D/C with follow up. Patient has been reassessed. Feeling much better. Reviewed labs, medications and radiographics with patient. Ready to discharge home. Assessed splint; Splint in place; + vascular intact. CLINTON Yousif Patient's results have been reviewed with them. Patient and/or family have verbally conveyed their understanding and agreement of the patient's signs, symptoms, diagnosis, treatment and prognosis and additionally agree to follow up as recommended or return to the Emergency Room should their condition change prior to follow-up. Discharge instructions have also been provided to the patient with some educational information regarding their diagnosis as well a list of reasons why they would want to return to the ER prior to their follow-up appointment should their condition change.  
CLINTON Yousif

## 2019-02-01 NOTE — ED TRIAGE NOTES
Patient presents to the emergency department escorted by family. Patient reports she had a trip and fall this afternoon. Patient reports pain to the left 4th and 5th fingers, and wrist.  Patient reports pain radiates to other fingers and up the arm.

## 2019-02-01 NOTE — DISCHARGE INSTRUCTIONS
Wrist Sprain: Care Instructions  Your Care Instructions    Your wrist hurts because you have stretched or torn ligaments, which connect the bones in your wrist.  Wrist sprains usually take from 2 to 10 weeks to heal, but some take longer. Usually, the more pain you have, the more severe your wrist sprain is and the longer it will take to heal. You can heal faster and regain strength in your wrist with good home treatment. Follow-up care is a key part of your treatment and safety. Be sure to make and go to all appointments, and call your doctor if you are having problems. It's also a good idea to know your test results and keep a list of the medicines you take. How can you care for yourself at home? · Prop up your arm on a pillow when you ice it or anytime you sit or lie down for the next 3 days. Try to keep your wrist above the level of your heart. This will help reduce swelling. · Put ice or cold packs on your wrist for 10 to 20 minutes at a time. Try to do this every 1 to 2 hours for the next 3 days (when you are awake) or until the swelling goes down. Put a thin cloth between the ice pack and your skin. · After 2 or 3 days, if your swelling is gone, apply a heating pad set on low or a warm cloth to your wrist. This helps keep your wrist flexible. Some doctors suggest that you go back and forth between hot and cold. · If you have an elastic bandage, keep it on for the next 24 to 36 hours. The bandage should be snug but not so tight that it causes numbness or tingling. To rewrap the wrist, wrap the bandage around the hand a few times, beginning at the fingers. Then wrap it around the hand between the thumb and index finger, ending by circling the wrist several times. · If your doctor gave you a splint or brace, wear it as directed to protect your wrist until it has healed. · Take pain medicines exactly as directed. ? If the doctor gave you a prescription medicine for pain, take it as prescribed. ?  If you are not taking a prescription pain medicine, ask your doctor if you can take an over-the-counter medicine. · Try not to use your injured wrist and hand. When should you call for help? Call your doctor now or seek immediate medical care if:    · Your hand or fingers are cool or pale or change color.    Watch closely for changes in your health, and be sure to contact your doctor if:    · Your pain gets worse.     · Your wrist has not improved after 1 week. Where can you learn more? Go to http://leandra-henrique.info/. Enter G541 in the search box to learn more about \"Wrist Sprain: Care Instructions. \"  Current as of: September 20, 2018  Content Version: 11.9  © 5450-7569 [employer]. Care instructions adapted under license by [employer] (which disclaims liability or warranty for this information). If you have questions about a medical condition or this instruction, always ask your healthcare professional. [address] any warranty or liability for your use of this information.

## 2019-02-04 ENCOUNTER — HOSPITAL ENCOUNTER (OUTPATIENT)
Dept: PHYSICAL THERAPY | Age: 64
Discharge: HOME | End: 2019-02-04
Payer: MEDICAID

## 2019-02-04 PROCEDURE — 97110 THERAPEUTIC EXERCISES: CPT | Performed by: PHYSICAL THERAPIST

## 2019-02-04 PROCEDURE — 97140 MANUAL THERAPY 1/> REGIONS: CPT | Performed by: PHYSICAL THERAPIST

## 2019-02-04 NOTE — PROGRESS NOTES
PT DAILY TREATMENT NOTE-RE-ASSESSMENT -2-15- Patient Name: Dimas Patel Date:2019 : 1955 [x]  Patient  Verified Payor: [address] [place] [address] / Plan: 1500   / Product Type: Medicaid / In time: 1000 A Out time: 1110 A Total Treatment Time (min): 70 Timed:  70 Visit #: 64 
RTMD: 19 Treatment Area: Right shoulder pain [M25.511] SUBJECTIVE Pain Level (0-10 scale):  5/10 Any medication changes, allergies to medications, adverse drug reactions, diagnosis change, or new procedure performed?: [x] No    [] Yes (see summary sheet for update) Subjective functional status/changes:   [] No changes reported Pt reports she fell and landed on her left arm. Pt reports she did not land on her (R) arm at all, and feels thankful for that. MD said she likely fractured her left wrist so she has a follow up with Orthopedics for week this week. OBJECTIVE PROM R shoulder WNL all planes 
  
AROM R shoulder Flex= 130  deg Tvcq=026 deg ER= 1 inch less than contralat side reaching behind neck IR=8 inches less than contralt side reaching behind back [x] Skin assessment post-treatment:  [x]intact []redness- no adverse reaction 
  []redness  adverse reaction:  
 
60 min Therapeutic Exercise:  [x] See flow sheet :   
Rationale: increase ROM and increase strength to improve the patients ability to reach overhead, lift objects, perform daily chores 10 min Manual Therapy: Rhythmic stabilization at 90 deg and 120 deg, IR/ER at 45 degrees Rationale: decrease pain, increase ROM and increase tissue extensibility  to improve the patients ability to perform daily activities, don/doff clothing With 
 [x] TE 
 [] TA 
 [] neuro 
 [] other: Self Care/Patient Education: [x] Review HEP [x] Progressed/Changed HEP based on: see chart 
[] positioning   [] body mechanics   [] transfers   [x] heat/ice application   
[] other: Pain Level (0-10 scale) post treatment: 2/10 ASSESSMENT/Changes in Function:  
 
Progress towards goals / Updated goals: 
Short Term Goals: To be accomplished in 4-6 weeks: 
1) Pt will be independent in initial HEP MET 2) Pt will report good compliance with ice regimen in order to decrease inflammation and manage pain levels MET 3) Pt will improve R shoulder PROM flexion to > or =145 deg in order to progress towards reaching into cabinets MET 4) Pt will improve R shoulder PROM ER to > or =60 deg in order to progress towards donning/doffing clothing with ease MET 
  
Long Term Goals: To be accomplished in 10-12 weeks: 
1) Pt will improve R shoulder AROM flexion to > or =145 deg in order to reach into cabinets and perform ADL's with ease-PROGRESSING 2) Pt will improve R shoulder AROM ER to > or =70 deg in order to  -MET 3) Pt will report being able to don/doff clothing without R shoulder pain-MET 4) Pt will demonstrate > or =4/5 R shoulder scaption strength in order to lift objects-PROGRESSING Improved AROM flex, scap, and ER improved since last re-assessment. AROM IR unchanged since last re-assessment. Strength of RTC and shoulder musculature progressing slowly, yet steadily. PLAN 
[]  Upgrade activities as tolerated     [x]  Continue plan of care 
[]  Update interventions per flow sheet      
[]  Discharge due to:_ 
[x]  Other:   Continue 2x/week for 6 weeks per MD instruction Chelle Covarrubias.  SUSAN Bond 2/4/2019  10:13 AM

## 2019-02-06 ENCOUNTER — HOSPITAL ENCOUNTER (OUTPATIENT)
Dept: PHYSICAL THERAPY | Age: 64
Discharge: HOME | End: 2019-02-06
Payer: MEDICAID

## 2019-02-06 PROCEDURE — 97110 THERAPEUTIC EXERCISES: CPT | Performed by: PHYSICAL THERAPIST

## 2019-02-06 NOTE — PROGRESS NOTES
PT DAILY TREATMENT NOTE Patient Name: Rocky De Luna Date:2019 : 1955 [x]  Patient  Verified Payor: [address] [place] [address] / Plan: 1500 / Product Type: Medicaid / In time: 1000 A Out time: 1035 A session abbreviated today per pt request as she has to go to an appt with her daughter. Total Treatment Time (min): 35 Timed:  35 Visit #: 62 
RTMD: 19 Treatment Area: Right shoulder pain [M25.511] SUBJECTIVE Pain Level (0-10 scale):  5/10 Any medication changes, allergies to medications, adverse drug reactions, diagnosis change, or new procedure performed?: [x] No    [] Yes (see summary sheet for update) Subjective functional status/changes:   [] No changes reported Pt got the report back for her left wrist.  It's not fractured. 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 
  []redness  adverse reaction:  
 
35 min Therapeutic Exercise:  [x] See flow sheet :   
Rationale: increase ROM and increase strength to improve the patients ability to reach overhead, lift objects, perform daily chores 
 
omit min Manual Therapy: Rhythmic stabilization at 90 deg and 120 deg, IR/ER at 45 degrees Rationale: decrease pain, increase ROM and increase tissue extensibility  to improve the patients ability to perform daily activities, don/doff clothing With 
 [x] TE 
 [] TA 
 [] neuro 
 [] other: Self Care/Patient Education: [x] Review HEP [x] Progressed/Changed HEP based on: see chart 
[] positioning   [] body mechanics   [] transfers   [x] heat/ice application   
[] other:   
 
Pain Level (0-10 scale) post treatment: 2/10 ASSESSMENT/Changes in Function:  
 
Progress towards goals / Updated goals: PLAN 
 []  Upgrade activities as tolerated     [x]  Continue plan of care 
[]  Update interventions per flow sheet      
[]  Discharge due to:_ 
[x]  Other:   Continue 2x/week for 6 weeks per MD instruction Braulio Baez.  Ronda, PT 2/6/2019  10:13 AM

## 2019-02-11 ENCOUNTER — HOSPITAL ENCOUNTER (OUTPATIENT)
Dept: PHYSICAL THERAPY | Age: 64
Discharge: HOME | End: 2019-02-11
Payer: MEDICAID

## 2019-02-11 PROCEDURE — 97110 THERAPEUTIC EXERCISES: CPT | Performed by: PHYSICAL THERAPIST

## 2019-02-11 PROCEDURE — 97140 MANUAL THERAPY 1/> REGIONS: CPT | Performed by: PHYSICAL THERAPIST

## 2019-02-11 NOTE — PROGRESS NOTES
PT DAILY TREATMENT NOTE Patient Name: Sarmad Rose Date:2019 : 1955 [x]  Patient  Verified Payor: [address] [place] [address] / Plan: 1500 / Product Type: Medicaid / In time: 1020 A  Out time:1100A session abbreviated today per pt request as she has to go see her brother in the hospital 
Total Treatment Time (min):40 Timed:  40 Visit #: 62 
RTMD: 19 Treatment Area: Right shoulder pain [M25.511] SUBJECTIVE Pain Level (0-10 scale):  5/10 Any medication changes, allergies to medications, adverse drug reactions, diagnosis change, or new procedure performed?: [x] No    [] Yes (see summary sheet for update) Subjective functional status/changes:   [] No changes reported Pt  Says she feels like is more confident on the exercises now OBJECTIVE [x] Skin assessment post-treatment:  [x]intact []redness- no adverse reaction 
  []redness  adverse reaction:  
 
35 min Therapeutic Exercise:  [x] See flow sheet :   
Rationale: increase ROM and increase strength to improve the patients ability to reach overhead, lift objects, perform daily chores 5 min Manual Therapy: Rhythmic stabilization at 90 deg and 120 deg, IR/ER at 45 degrees Rationale: decrease pain, increase ROM and increase tissue extensibility  to improve the patients ability to perform daily activities, don/doff clothing With 
 [x] TE 
 [] TA 
 [] neuro 
 [] other: Self Care/Patient Education: [x] Review HEP [x] Progressed/Changed HEP based on: see chart 
[] positioning   [] body mechanics   [] transfers   [x] heat/ice application   
[x] other: Pt still complains of \"anchors pulling\" but this has remained unchanged since start of therapy. Advised pt that this likely will not change with continued PT treatment Pain Level (0-10 scale) post treatment: 2/10 ASSESSMENT/Changes in Function:  
 
Progress towards goals / Updated goals: PLAN 
 []  Upgrade activities as tolerated     [x]  Continue plan of care 
[]  Update interventions per flow sheet      
[]  Discharge due to:_ 
[x]  Other:   Plan to transition to HEP with goal of HEP independent by the end of February. Raymond Perry.  Ronda, PT 2/11/2019  10:13 AM

## 2019-02-12 NOTE — PROGRESS NOTES
[facility] [address] [facility], [address]  Phone: [phone] Fax: [phone] Continued Plan of Care/ Re-certification for Physical Therapy Services Yani Iniguez [id number] Mike Vincent MD   Provider # [id number] Diagnosis: Right shoulder pain [M25.511] Onset Date: 3/22/18 Prior Hospitalization: see connect care Visits from Start of Care: 50 Missed Visits: see connect care Start of Care: 4/12/18 Prior Level of Function: able to use (R) UE without limitation The Plan of Care and following information is based on the patient's current status: 
Progress towards goals / Updated goals: 
 
Short Term Goals: 
1) Pt will be independent in initial HEP MET 2) Pt will report good compliance with ice regimen in order to decrease inflammation and manage pain levels MET 3) Pt will improve R shoulder PROM flexion to > or =145 deg in order to progress towards reaching into cabinets not met 4) Pt will improve R shoulder PROM ER to > or =60 deg in order to progress towards donning/doffing clothing with ease MET Long Term Goals:  
1) Pt will improve R shoulder AROM flexion to > or =145 deg in order to reach into cabinets and perform ADL's with ease-PROGRESSING 2) Pt will improve R shoulder AROM ER to > or =70 deg in order to  -PROGRESSING 3) Pt will report being able to don/doff clothing without R shoulder pain-MET 4) Pt will demonstrate > or =4/5 R shoulder scaption strength in order to lift objects-PROGRESSING Key functional changes: PROM R shoulder WNL all planes AROM R shoulder Flex= 115  deg Wdta=711 deg ER= 3 inches less than contralat side reaching behind neck IR=8 inches less than contralt side reaching behind back Problems/ barriers to goal attainment:Pain levels 2-6/10 Problem List: pain affecting function, decrease ROM, decrease strength, decrease ADL/ functional abilitiies and decrease activity tolerance Treatment Plan: Therapeutic exercise, Therapeutic activities, Neuromuscular re-education, Physical agent/modality, Manual therapy, Patient education and Self Care training Patient Goal (s) has been updated and includes:  
Continuation toward above stated goals Goals for this certification period to be accomplished in 12 weeks: 
 
Long Term Goals:  
-Pt will improve R shoulder AROM flexion to > or =145 deg in order to reach into cabinets and perform ADL's with ease-PROGRESSING 
- Pt will improve R shoulder AROM ER to > or =70 deg in order to  -PROGRESSING 
-Pt will demonstrate > or =4/5 R shoulder scaption strength in order to lift objects-PROGRESSING Frequency / Duration: Patient to be seen 2 times per week for 12 weeks: 
Assessment / Recommendations:Pt with PROM progressing well, AROM and strength progressing slowly. Damico by patient's subjective pain levels. 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 Certification Period: 1/1/9/4/1/19 Richie Bond, PT 2/12/2019  
 
________________________________________________________________________ I certify that the above Therapy Services are being furnished while the patient is under my care. I agree with the treatment plan and certify that this therapy is necessary. Y or N I have read the above and request that my patient continue as recommended. Y or N I have read the above report and request that my patient continue therapy with the following changes/special instructions Y or N I have read the above report and request that my patient be discharged from therapy [address] [place] [address] Signature:_________________ Date:___________Time:__________

## 2019-02-13 ENCOUNTER — HOSPITAL ENCOUNTER (OUTPATIENT)
Dept: PHYSICAL THERAPY | Age: 64
Discharge: HOME | End: 2019-02-13
Payer: MEDICAID

## 2019-02-13 PROCEDURE — 97140 MANUAL THERAPY 1/> REGIONS: CPT | Performed by: PHYSICAL THERAPIST

## 2019-02-13 PROCEDURE — 97110 THERAPEUTIC EXERCISES: CPT | Performed by: PHYSICAL THERAPIST

## 2019-02-13 NOTE — PROGRESS NOTES
PT DAILY TREATMENT NOTE Patient Name: Selina Soulier Date:2019 : 1955 [x]  Patient  Verified Payor: [address] [place] [address] / Plan: 1500 / Product Type: Medicaid / In time: 1010 A  Out time:1100A Total Treatment Time (min):50 Timed:  50 Visit #: 61 
RTMD: 19 Treatment Area: Right shoulder pain [M25.511] SUBJECTIVE Pain Level (0-10 scale):  5/10 Any medication changes, allergies to medications, adverse drug reactions, diagnosis change, or new procedure performed?: [x] No    [] Yes (see summary sheet for update) Subjective functional status/changes:   [] No changes reported Still having \"anchors pulling\" OBJECTIVE [x] Skin assessment post-treatment:  [x]intact []redness- no adverse reaction 
  []redness  adverse reaction:  
 
40 min Therapeutic Exercise:  [x] See flow sheet :   
Rationale: increase ROM and increase strength to improve the patients ability to reach overhead, lift objects, perform daily chores 10 min Manual Therapy: Rhythmic stabilization at 90 deg and 120 deg, IR/ER at 45 degrees Rationale: decrease pain, increase ROM and increase tissue extensibility  to improve the patients ability to perform daily activities, don/doff clothing With 
 [x] TE 
 [] TA 
 [] neuro 
 [] other: Self Care/Patient Education: [x] Review HEP [x] Progressed/Changed HEP based on: see chart 
[] positioning   [] body mechanics   [] transfers   [x] heat/ice application   
[] other:   
 
Pain Level (0-10 scale) post treatment: 2/10 ASSESSMENT/Changes in Function:  
 
Progress towards goals / Updated goals: PLAN 
[]  Upgrade activities as tolerated     [x]  Continue plan of care 
[]  Update interventions per flow sheet      
[]  Discharge due to:_ 
[x]  Other:   Plan to transition to HEP with goal of HEP independent by the end of February. Lane Bond, PT 2019  10:13 AM

## 2019-02-18 ENCOUNTER — HOSPITAL ENCOUNTER (OUTPATIENT)
Dept: PHYSICAL THERAPY | Age: 64
Discharge: HOME | End: 2019-02-18
Payer: MEDICAID

## 2019-02-18 PROCEDURE — 97140 MANUAL THERAPY 1/> REGIONS: CPT | Performed by: PHYSICAL THERAPIST

## 2019-02-18 PROCEDURE — 97110 THERAPEUTIC EXERCISES: CPT | Performed by: PHYSICAL THERAPIST

## 2019-02-18 NOTE — PROGRESS NOTES
Status Report Patient Name: Dimas Patel Date:2019 : 1955 Visit #: 89 Diagnosis: s/p (R) mini open RTC repair (DOS: 3/22/18) OBJECTIVE PROM R shoulder WNL all planes AROM R shoulder Flex= 130  deg Ecwp=767 deg ER= 1 inch less than contralat side reaching behind neck IR=8 inches less than contralt side reaching behind back Pain levels 2-6/10  Pt reporting \"anchors are pulling\" ASSESSMENT Pt has plateaued at this point and is independent with her HEP. Due to insurance visit limitations recommend D/C to HEP at this time. Please advise. Thank you! Whitley Henderson. Ronda, PT 2019

## 2019-02-18 NOTE — PROGRESS NOTES
PT DAILY TREATMENT NOTE Patient Name: Makayla Campbell Date:2019 : 1955 [x]  Patient  Verified Payor: [address] [place] [address] / Plan: 1500 / Product Type: Medicaid / In time: 1000 A  Out time:1100A Total Treatment Time (min):60 Timed:  60 Visit #: 61 
RTMD: 19 Treatment Area: Right shoulder pain [M25.511] SUBJECTIVE Pain Level (0-10 scale):  4/10 Any medication changes, allergies to medications, adverse drug reactions, diagnosis change, or new procedure performed?: [x] No    [] Yes (see summary sheet for update) Subjective functional status/changes:   [] No changes reported \"feeling okay today. \" OBJECTIVE [x] Skin assessment post-treatment:  [x]intact []redness- no adverse reaction 
  []redness  adverse reaction:  
 
45 min Therapeutic Exercise:  [x] See flow sheet :   
Rationale: increase ROM and increase strength to improve the patients ability to reach overhead, lift objects, perform daily chores 15 min Manual Therapy: Rhythmic stabilization at 90 deg and 120 deg, IR/ER at 45 degrees Rationale: decrease pain, increase ROM and increase tissue extensibility  to improve the patients ability to perform daily activities, don/doff clothing With 
 [x] TE 
 [] TA 
 [] neuro 
 [] other: Self Care/Patient Education: [x] Review HEP [x] Progressed/Changed HEP based on: see chart 
[] positioning   [] body mechanics   [] transfers   [x] heat/ice application   
[] other:   
 
Pain Level (0-10 scale) post treatment: 4/10 See status report for details ASSESSMENT/Changes in Function:  
 
Progress towards goals / Updated goals: PLAN 
[]  Upgrade activities as tolerated     [x]  Continue plan of care 
[]  Update interventions per flow sheet      
[]  Discharge due to:_ 
[x]  Other:  Patient to follow-up with MD.  Status report faxed and hard copy given to patient. Await further orders and instruction. Todd Finn.  Ronda, PT 2/18/2019  10:13 AM

## 2019-02-25 ENCOUNTER — APPOINTMENT (OUTPATIENT)
Dept: PHYSICAL THERAPY | Age: 64
End: 2019-02-25
Payer: MEDICAID

## 2019-02-27 ENCOUNTER — HOSPITAL ENCOUNTER (OUTPATIENT)
Dept: PHYSICAL THERAPY | Age: 64
End: 2019-02-27
Payer: MEDICAID

## 2019-03-14 ENCOUNTER — HOSPITAL ENCOUNTER (OUTPATIENT)
Dept: MRI IMAGING | Age: 64
Discharge: HOME | End: 2019-03-14
Attending: ORTHOPAEDIC SURGERY
Payer: MEDICAID

## 2019-03-14 DIAGNOSIS — M25.512 LEFT SHOULDER PAIN: ICD-10-CM

## 2019-03-14 PROCEDURE — 73221 MRI JOINT UPR EXTREM W/O DYE: CPT

## 2019-03-21 ENCOUNTER — HOSPITAL ENCOUNTER (OUTPATIENT)
Dept: PREADMISSION TESTING | Age: 64
Discharge: HOME | End: 2019-03-21
Payer: MEDICAID

## 2019-03-21 VITALS
HEART RATE: 80 BPM | TEMPERATURE: 98.3 F | WEIGHT: 196.5 LBS | RESPIRATION RATE: 14 BRPM | DIASTOLIC BLOOD PRESSURE: 78 MMHG | BODY MASS INDEX: 32.74 KG/M2 | SYSTOLIC BLOOD PRESSURE: 130 MMHG | HEIGHT: 65 IN

## 2019-03-21 LAB
ATRIAL RATE: 50 BPM
CALCULATED P AXIS, ECG09: 76 DEGREES
CALCULATED R AXIS, ECG10: -12 DEGREES
CALCULATED T AXIS, ECG11: 15 DEGREES
CREAT SERPL-MCNC: 0.75 MG/DL (ref 0.55–1.02)
DIAGNOSIS, 93000: NORMAL
HGB BLD-MCNC: 11.9 G/DL (ref 11.5–16)
P-R INTERVAL, ECG05: 164 MS
POTASSIUM SERPL-SCNC: 3.7 MMOL/L (ref 3.5–5.1)
Q-T INTERVAL, ECG07: 448 MS
QRS DURATION, ECG06: 80 MS
QTC CALCULATION (BEZET), ECG08: 408 MS
VENTRICULAR RATE, ECG03: 50 BPM

## 2019-03-21 PROCEDURE — 84132 ASSAY OF SERUM POTASSIUM: CPT

## 2019-03-21 PROCEDURE — 93005 ELECTROCARDIOGRAM TRACING: CPT

## 2019-03-21 PROCEDURE — 82565 ASSAY OF CREATININE: CPT

## 2019-03-21 PROCEDURE — 85018 HEMOGLOBIN: CPT

## 2019-03-21 PROCEDURE — 36415 COLL VENOUS BLD VENIPUNCTURE: CPT

## 2019-03-21 NOTE — PERIOP NOTES
Preoperative instructions reviewed with patient. Patient given six packs of CHG wipes. Instructions reviewed  on use of CHG wipes. Patient given SSI infection FAQS sheet. Patient was given the opportunity to ask questions on the information provided.

## 2019-03-22 PROBLEM — M75.122 COMPLETE TEAR OF LEFT ROTATOR CUFF: Status: ACTIVE | Noted: 2019-03-22

## 2019-03-22 NOTE — H&P
Chief Complaint: Follow-up of the Left Shoulder 
  
Oral Shannan comes in to the clinic today for follow-up of her left shoulder. On 01/31/19, patient fell with her left arm outstretched and felt that she injured her shoulder. We initially treated the patient with steroid injection and home conservative measures and she did not improve with this. We then became concerned of a rotator cuff tear so we sent her to obtain a Mri. She is here today to review her Mri results. She continues to have left shoulder pain. Shoulder symptoms were similar to what she felt with the contralateral shoulder prior to her surgery. No neck pain. No radicular symptoms. No systemic symptoms of infection.  
  
Of note, we've also been following the patient for her right shoulder. On 03/22/18, patient underwent right shoulder arthroscopy with subacromial decompression and mini open rotator cuff repair. Overall, she feels that she was making steady progress with the shoulder. She is aware that she did have a large tear to the rotator cuff and that the slow progress was expected.  
  
  
Review of Systems 3/15/2019 
  
Constitutional: Unexplained: Negative Genitourinary: Frequent Urination: Negative HEENT: Vision Loss: Negative Neurological: Memory Loss: Negative Integumentary: Rash: Negative Cardiovascular: Palpatations: Negative Hematologic: Bruises/Bleeds Easily: Negative Gastrointestinal: Constipation: Negative Immunological: Seasonal Allergies: Positive Musculoskeletal: Joint Pain: Negative 
  
Medical History Past Medical History:  
Diagnosis Date  Hypertension    
  
  
  
Surgical History Past Surgical History:  
Procedure Laterality Date  GASTRIC BYPASS      
 JOINT REPLACEMENT      
 KNEE SURGERY      
  
  
  
Objective:  
  
There were no vitals filed for this visit. 
  
Constitutional:  No acute distress. Well nourished. Well developed. Eyes:  Sclera are nonicteric. Respiratory:  No labored breathing. Cardiovascular:  No marked edema. Skin:  No marked skin ulcers. Neurological:  No marked sensory loss noted. Psychiatric: Alert and oriented x3. Musculoskeletal  
  
On exam of the left shoulder reveals passive elevation to 160 degrees. Reluctant to actively elevate beyond 140 degrees due to subacromial pain. Marked crepitus subacromial crepitus with ROM. Mild weakness to external rotation. positive impingement findings. No neck pain. No numbness or paresthesias in the hands. No effusion. No sign of infection. No cellulitis or rash. I detect no motor or sensory deficits in the upper extremities. The neurovascular status of the upper extremities is intact. 
  
  
Imaging/Studies:  
I personally reviewed and interpreted the Mri of the left shoulder taken on 03/14/19.  
  
Impression: 1. Rotator cuff tendinopathy with articular sided partial thickness tearing of infraspinatus. 2. Widened appearance of acromioclavicular joint space.  
  
  
Assessment:  
  
   
Patient Active Problem List  
Diagnosis  Postoperative seroma of skin after non-dermatologic procedure  
  
  
    ICD-10-CM 1. Incomplete tear of left rotator cuff M75.112  
2. Impingement syndrome of left shoulder M75.42  
  
  
  
Plan: I reviewed my findings and the Mri with the patient. The Mri revealed a partial tear and impingement findings to the left rotator cuff.  
  
We discussed treatment options. Overall, the patient feels that she has failed conservative treatment which includes steroid injection and home conservative measures. Patient was worried that her left shoulder symptoms were similar to right shoulder symptoms prior to her shoulder repair. She was not interested of another steroid injection as the previous one was not helpful.  I explained that the only treatment modality that would provide her long term symptomatic relief would left shoulder arthroscopy with attention to intra-articular pathology, subacromial decompression and mini open repair. Patient was in agreement with the proposed plan.  
  
I explained the procedure of arthroscopy and mini open rotator cuff repair. I explained the hospitalization, post-op and rehabilitation for the procedure. The patient is aware of all complications associated with the surgery, including the chance of infection and recurrent tear. She understands that she would be on antibiotics following surgery to prevent infection and that she would undergo a course of post-op physical therapy for rehabilitation. She understands the period of immobilization following rotator cuff repair and the extent of physical therapy following the operation. PROCEDURE: Left shoulder arthroscopy, subacromial decompression and mini-open rotator cuff repair. Date of Surgery Update: 
Sarmad Rose was seen and examined. Past Medical History:  
Diagnosis Date  Arrhythmia 2018 \"PALPATATIONS, FLUTTERING, MEDS PER DR. Agustin Has"  Arthritis   
 knees, back, SHOULDERS, R HIP  Chest pain 03/16/2018 PT STATES SHE'S BEEN ASSESSED FOR AND DETERMINED TO BE ESOPHAGEAL SPASMS PER FREYA LUNA AND DR. Baldev Saenz  Chronic pain LOWER BACK, R HIP, SHOULDERS  
 Duodenal ulcer 2002 HOSPITALIZED AND TREATED WITH MEDICATION  
 GERD (gastroesophageal reflux disease)  H/O blood clots 2007 TO RIGHT EYE  Hypertension  Iritis 2007  
 both eyes  Nausea & vomiting  Other ill-defined conditions(799.89)   
 back pain/spasm  PONV (postoperative nausea and vomiting)  Thyroid disease   
 hypothroid  Ulcerative colitis 2002 Prior to Admission Medications Prescriptions Last Dose Informant Patient Reported? Taking? ALPRAZolam (XANAX) 0.5 mg tablet 3/14/2019  No No  
Sig: Take 1 Tab by mouth two (2) times daily as needed. NIFEdipine ER (PROCARDIA XL) 60 mg ER tablet 3/28/2019 at 0700  Yes Yes Sig: Take 60 mg by mouth daily. Omeprazole delayed release (PRILOSEC D/R) 20 mg tablet 3/28/2019 at Unknown time  No Yes Sig: Take 1 Tab by mouth daily. daily before breakfast  
Patient taking differently: Take 20 mg by mouth two (2) times a day. daily before breakfast  
calcium carbonate (TUMS) 200 mg calcium (500 mg) chew Not Taking at Unknown time  Yes No  
Sig: Take 1 Tab by mouth as needed. diclofenac EC (VOLTAREN) 75 mg EC tablet 3/25/2019  Yes No  
Sig: Take 75 mg by mouth two (2) times a day. dicyclomine (BENTYL) 10 mg capsule 3/27/2019 at Unknown time  Yes Yes Sig: Take 10 mg by mouth three (3) times daily as needed. docusate sodium (COLACE) 100 mg capsule Unknown at Unknown time  Yes No  
Sig: Take 100 mg by mouth two (2) times daily as needed for Constipation. 1-2 tabs PRN constipation  
ketorolac (ACULAR) 0.5 % ophthalmic solution 3/28/2019 at Unknown time  Yes Yes Sig: Administer 2 Drops to both eyes four (4) times daily as needed. levothyroxine (SYNTHROID) 112 mcg tablet 3/28/2019 at Unknown time  No Yes Sig: Take 1 Tab by mouth Daily (before breakfast). ondansetron hcl (ZOFRAN) 4 mg tablet 3/21/2019 at Unknown time  Yes Yes Sig: Take 4 mg by mouth every eight (8) hours as needed for Nausea. prednisoLONE acetate (PRED FORTE) 1 % ophthalmic suspension Not Taking at Unknown time  Yes No  
Sig: Administer 1 Drop to both eyes three (3) times daily as needed. valsartan (DIOVAN) 160 mg tablet 3/27/2019 at Unknown time  No Yes Sig: TAKE ONE TABLET BY MOUTH EVERY DAY Patient taking differently: Take 160 mg by mouth daily. TAKE ONE TABLET BY MOUTH EVERY DAY Facility-Administered Medications: None Allergy to: Adhesive tape-silicones; Lidoderm [lidocaine]; and Other medication Physical Examination: General appearance - alert, well appearing, and in no distress History and physical has been reviewed. The patient has been examined. There have been no significant clinical changes since the completion of the originally dated History and Physical. 
 
Signed By: Power Navarro PA-C  March 28, 2019 10:03 AM

## 2019-03-27 ENCOUNTER — ANESTHESIA EVENT (OUTPATIENT)
Dept: SURGERY | Age: 64
End: 2019-03-27
Payer: MEDICAID

## 2019-03-28 ENCOUNTER — HOSPITAL ENCOUNTER (OUTPATIENT)
Age: 64
Setting detail: OUTPATIENT SURGERY
Discharge: HOME | End: 2019-03-28
Attending: ORTHOPAEDIC SURGERY | Admitting: ORTHOPAEDIC SURGERY
Payer: MEDICAID

## 2019-03-28 ENCOUNTER — ANESTHESIA (OUTPATIENT)
Dept: SURGERY | Age: 64
End: 2019-03-28
Payer: MEDICAID

## 2019-03-28 VITALS
DIASTOLIC BLOOD PRESSURE: 70 MMHG | RESPIRATION RATE: 20 BRPM | HEART RATE: 65 BPM | SYSTOLIC BLOOD PRESSURE: 120 MMHG | BODY MASS INDEX: 32.79 KG/M2 | OXYGEN SATURATION: 95 % | TEMPERATURE: 98 F | HEIGHT: 65 IN | WEIGHT: 196.8 LBS

## 2019-03-28 DIAGNOSIS — M75.122 COMPLETE TEAR OF LEFT ROTATOR CUFF: Primary | ICD-10-CM

## 2019-03-28 PROCEDURE — 77030004451 HC BUR SHV S&N -B: Performed by: ORTHOPAEDIC SURGERY

## 2019-03-28 PROCEDURE — 77030013079 HC BLNKT BAIR HGGR 3M -A: Performed by: NURSE ANESTHETIST, CERTIFIED REGISTERED

## 2019-03-28 PROCEDURE — 77030003598 HC NDL MULT/FIRE ARTH -C: Performed by: ORTHOPAEDIC SURGERY

## 2019-03-28 PROCEDURE — 74011250637 HC RX REV CODE- 250/637

## 2019-03-28 PROCEDURE — 77030018788 HC NDL SUT ANCH -A: Performed by: ORTHOPAEDIC SURGERY

## 2019-03-28 PROCEDURE — 76210000021 HC REC RM PH II 0.5 TO 1 HR: Performed by: ORTHOPAEDIC SURGERY

## 2019-03-28 PROCEDURE — 77030006884 HC BLD SHV INCIS S&N -B: Performed by: ORTHOPAEDIC SURGERY

## 2019-03-28 PROCEDURE — C1713 ANCHOR/SCREW BN/BN,TIS/BN: HCPCS | Performed by: ORTHOPAEDIC SURGERY

## 2019-03-28 PROCEDURE — 77030008753 HC TU IRR IN/OUT FLO S&N -B: Performed by: ORTHOPAEDIC SURGERY

## 2019-03-28 PROCEDURE — 74011250637 HC RX REV CODE- 250/637: Performed by: PHYSICIAN ASSISTANT

## 2019-03-28 PROCEDURE — 74011250636 HC RX REV CODE- 250/636: Performed by: ORTHOPAEDIC SURGERY

## 2019-03-28 PROCEDURE — 77030018835 HC SOL IRR LR ICUM -A: Performed by: ORTHOPAEDIC SURGERY

## 2019-03-28 PROCEDURE — 77030011640 HC PAD GRND REM COVD -A: Performed by: ORTHOPAEDIC SURGERY

## 2019-03-28 PROCEDURE — 77030002919 HC SUT FBRTAPE ARTH -B: Performed by: ORTHOPAEDIC SURGERY

## 2019-03-28 PROCEDURE — 74011000250 HC RX REV CODE- 250

## 2019-03-28 PROCEDURE — 74011250636 HC RX REV CODE- 250/636

## 2019-03-28 PROCEDURE — 74011250636 HC RX REV CODE- 250/636: Performed by: ANESTHESIOLOGY

## 2019-03-28 PROCEDURE — 76210000016 HC OR PH I REC 1 TO 1.5 HR: Performed by: ORTHOPAEDIC SURGERY

## 2019-03-28 PROCEDURE — 76060000034 HC ANESTHESIA 1.5 TO 2 HR: Performed by: ORTHOPAEDIC SURGERY

## 2019-03-28 PROCEDURE — 77030011263 HC ELECTRD ACRPLSTY CNMD -B: Performed by: ORTHOPAEDIC SURGERY

## 2019-03-28 PROCEDURE — 77030031139 HC SUT VCRL2 J&J -A: Performed by: ORTHOPAEDIC SURGERY

## 2019-03-28 PROCEDURE — 77030008684 HC TU ET CUF COVD -B: Performed by: NURSE ANESTHETIST, CERTIFIED REGISTERED

## 2019-03-28 PROCEDURE — 77030026438 HC STYL ET INTUB CARD -A: Performed by: NURSE ANESTHETIST, CERTIFIED REGISTERED

## 2019-03-28 PROCEDURE — 77030002986 HC SUT PROL J&J -A: Performed by: ORTHOPAEDIC SURGERY

## 2019-03-28 PROCEDURE — 77030032490 HC SLV COMPR SCD KNE COVD -B: Performed by: ORTHOPAEDIC SURGERY

## 2019-03-28 PROCEDURE — 76010000153 HC OR TIME 1.5 TO 2 HR: Performed by: ORTHOPAEDIC SURGERY

## 2019-03-28 DEVICE — ANCHOR SUTURE BIOCOMP 4.75X19.1 MM SWIVELOCK C: Type: IMPLANTABLE DEVICE | Site: SHOULDER | Status: FUNCTIONAL

## 2019-03-28 RX ORDER — ONDANSETRON 4 MG/1
8 TABLET, ORALLY DISINTEGRATING ORAL
Qty: 12 TAB | Refills: 0 | Status: SHIPPED | OUTPATIENT
Start: 2019-03-28

## 2019-03-28 RX ORDER — CEFAZOLIN SODIUM/WATER 2 G/20 ML
2 SYRINGE (ML) INTRAVENOUS EVERY 8 HOURS
Status: DISCONTINUED | OUTPATIENT
Start: 2019-03-28 | End: 2019-03-28

## 2019-03-28 RX ORDER — MIDAZOLAM HYDROCHLORIDE 1 MG/ML
0.5 INJECTION, SOLUTION INTRAMUSCULAR; INTRAVENOUS
Status: DISCONTINUED | OUTPATIENT
Start: 2019-03-28 | End: 2019-03-28 | Stop reason: HOSPADM

## 2019-03-28 RX ORDER — SCOLOPAMINE TRANSDERMAL SYSTEM 1 MG/1
PATCH, EXTENDED RELEASE TRANSDERMAL AS NEEDED
Status: DISCONTINUED | OUTPATIENT
Start: 2019-03-28 | End: 2019-03-28 | Stop reason: HOSPADM

## 2019-03-28 RX ORDER — SODIUM CHLORIDE 0.9 % (FLUSH) 0.9 %
5-40 SYRINGE (ML) INJECTION AS NEEDED
Status: DISCONTINUED | OUTPATIENT
Start: 2019-03-28 | End: 2019-03-28 | Stop reason: HOSPADM

## 2019-03-28 RX ORDER — MIDAZOLAM HYDROCHLORIDE 1 MG/ML
1 INJECTION, SOLUTION INTRAMUSCULAR; INTRAVENOUS AS NEEDED
Status: DISCONTINUED | OUTPATIENT
Start: 2019-03-28 | End: 2019-03-28 | Stop reason: HOSPADM

## 2019-03-28 RX ORDER — ROCURONIUM BROMIDE 10 MG/ML
INJECTION, SOLUTION INTRAVENOUS AS NEEDED
Status: DISCONTINUED | OUTPATIENT
Start: 2019-03-28 | End: 2019-03-28 | Stop reason: HOSPADM

## 2019-03-28 RX ORDER — HYDROMORPHONE HYDROCHLORIDE 2 MG/1
2-4 TABLET ORAL
Qty: 36 TAB | Refills: 0 | Status: SHIPPED | OUTPATIENT
Start: 2019-03-28 | End: 2019-04-04

## 2019-03-28 RX ORDER — PROPOFOL 10 MG/ML
INJECTION, EMULSION INTRAVENOUS AS NEEDED
Status: DISCONTINUED | OUTPATIENT
Start: 2019-03-28 | End: 2019-03-28 | Stop reason: HOSPADM

## 2019-03-28 RX ORDER — CEFAZOLIN SODIUM/WATER 2 G/20 ML
2 SYRINGE (ML) INTRAVENOUS ONCE
Status: COMPLETED | OUTPATIENT
Start: 2019-03-28 | End: 2019-03-28

## 2019-03-28 RX ORDER — PHENYLEPHRINE HCL IN 0.9% NACL 0.4MG/10ML
SYRINGE (ML) INTRAVENOUS AS NEEDED
Status: DISCONTINUED | OUTPATIENT
Start: 2019-03-28 | End: 2019-03-28 | Stop reason: HOSPADM

## 2019-03-28 RX ORDER — NEOSTIGMINE METHYLSULFATE 1 MG/ML
INJECTION INTRAVENOUS AS NEEDED
Status: DISCONTINUED | OUTPATIENT
Start: 2019-03-28 | End: 2019-03-28 | Stop reason: HOSPADM

## 2019-03-28 RX ORDER — HYDROMORPHONE HYDROCHLORIDE 2 MG/1
2 TABLET ORAL ONCE
Status: COMPLETED | OUTPATIENT
Start: 2019-03-28 | End: 2019-03-28

## 2019-03-28 RX ORDER — FENTANYL CITRATE 50 UG/ML
INJECTION, SOLUTION INTRAMUSCULAR; INTRAVENOUS AS NEEDED
Status: DISCONTINUED | OUTPATIENT
Start: 2019-03-28 | End: 2019-03-28 | Stop reason: HOSPADM

## 2019-03-28 RX ORDER — OXYCODONE AND ACETAMINOPHEN 5; 325 MG/1; MG/1
1 TABLET ORAL AS NEEDED
Status: DISCONTINUED | OUTPATIENT
Start: 2019-03-28 | End: 2019-03-28 | Stop reason: HOSPADM

## 2019-03-28 RX ORDER — MIDAZOLAM HYDROCHLORIDE 1 MG/ML
INJECTION, SOLUTION INTRAMUSCULAR; INTRAVENOUS AS NEEDED
Status: DISCONTINUED | OUTPATIENT
Start: 2019-03-28 | End: 2019-03-28 | Stop reason: HOSPADM

## 2019-03-28 RX ORDER — SODIUM CHLORIDE 9 MG/ML
50 INJECTION, SOLUTION INTRAVENOUS CONTINUOUS
Status: DISCONTINUED | OUTPATIENT
Start: 2019-03-28 | End: 2019-03-28 | Stop reason: HOSPADM

## 2019-03-28 RX ORDER — HYDROMORPHONE HYDROCHLORIDE 1 MG/ML
0.2 INJECTION, SOLUTION INTRAMUSCULAR; INTRAVENOUS; SUBCUTANEOUS
Status: DISCONTINUED | OUTPATIENT
Start: 2019-03-28 | End: 2019-03-28 | Stop reason: HOSPADM

## 2019-03-28 RX ORDER — SODIUM CHLORIDE 0.9 % (FLUSH) 0.9 %
5-40 SYRINGE (ML) INJECTION EVERY 8 HOURS
Status: DISCONTINUED | OUTPATIENT
Start: 2019-03-28 | End: 2019-03-28 | Stop reason: HOSPADM

## 2019-03-28 RX ORDER — FENTANYL CITRATE 50 UG/ML
50 INJECTION, SOLUTION INTRAMUSCULAR; INTRAVENOUS AS NEEDED
Status: DISCONTINUED | OUTPATIENT
Start: 2019-03-28 | End: 2019-03-28 | Stop reason: HOSPADM

## 2019-03-28 RX ORDER — SODIUM CHLORIDE, SODIUM LACTATE, POTASSIUM CHLORIDE, CALCIUM CHLORIDE 600; 310; 30; 20 MG/100ML; MG/100ML; MG/100ML; MG/100ML
75 INJECTION, SOLUTION INTRAVENOUS CONTINUOUS
Status: DISCONTINUED | OUTPATIENT
Start: 2019-03-28 | End: 2019-03-28 | Stop reason: HOSPADM

## 2019-03-28 RX ORDER — SUCCINYLCHOLINE CHLORIDE 20 MG/ML
INJECTION INTRAMUSCULAR; INTRAVENOUS AS NEEDED
Status: DISCONTINUED | OUTPATIENT
Start: 2019-03-28 | End: 2019-03-28 | Stop reason: HOSPADM

## 2019-03-28 RX ORDER — GLYCOPYRROLATE 0.2 MG/ML
INJECTION INTRAMUSCULAR; INTRAVENOUS AS NEEDED
Status: DISCONTINUED | OUTPATIENT
Start: 2019-03-28 | End: 2019-03-28 | Stop reason: HOSPADM

## 2019-03-28 RX ORDER — FENTANYL CITRATE 50 UG/ML
25 INJECTION, SOLUTION INTRAMUSCULAR; INTRAVENOUS
Status: DISCONTINUED | OUTPATIENT
Start: 2019-03-28 | End: 2019-03-28 | Stop reason: HOSPADM

## 2019-03-28 RX ORDER — MORPHINE SULFATE 10 MG/ML
2 INJECTION, SOLUTION INTRAMUSCULAR; INTRAVENOUS
Status: DISCONTINUED | OUTPATIENT
Start: 2019-03-28 | End: 2019-03-28 | Stop reason: HOSPADM

## 2019-03-28 RX ORDER — DIPHENHYDRAMINE HYDROCHLORIDE 50 MG/ML
12.5 INJECTION, SOLUTION INTRAMUSCULAR; INTRAVENOUS AS NEEDED
Status: DISCONTINUED | OUTPATIENT
Start: 2019-03-28 | End: 2019-03-28 | Stop reason: HOSPADM

## 2019-03-28 RX ORDER — ROPIVACAINE HYDROCHLORIDE 2 MG/ML
INJECTION, SOLUTION EPIDURAL; INFILTRATION; PERINEURAL
Status: COMPLETED | OUTPATIENT
Start: 2019-03-28 | End: 2019-03-28

## 2019-03-28 RX ORDER — ONDANSETRON 2 MG/ML
4 INJECTION INTRAMUSCULAR; INTRAVENOUS AS NEEDED
Status: DISCONTINUED | OUTPATIENT
Start: 2019-03-28 | End: 2019-03-28 | Stop reason: HOSPADM

## 2019-03-28 RX ORDER — DEXAMETHASONE SODIUM PHOSPHATE 4 MG/ML
INJECTION, SOLUTION INTRA-ARTICULAR; INTRALESIONAL; INTRAMUSCULAR; INTRAVENOUS; SOFT TISSUE AS NEEDED
Status: DISCONTINUED | OUTPATIENT
Start: 2019-03-28 | End: 2019-03-28 | Stop reason: HOSPADM

## 2019-03-28 RX ORDER — ONDANSETRON 2 MG/ML
INJECTION INTRAMUSCULAR; INTRAVENOUS AS NEEDED
Status: DISCONTINUED | OUTPATIENT
Start: 2019-03-28 | End: 2019-03-28 | Stop reason: HOSPADM

## 2019-03-28 RX ADMIN — ROCURONIUM BROMIDE 10 MG: 10 INJECTION, SOLUTION INTRAVENOUS at 10:36

## 2019-03-28 RX ADMIN — ONDANSETRON 4 MG: 2 INJECTION INTRAMUSCULAR; INTRAVENOUS at 10:42

## 2019-03-28 RX ADMIN — PROPOFOL 50 MG: 10 INJECTION, EMULSION INTRAVENOUS at 10:37

## 2019-03-28 RX ADMIN — Medication 40 MCG: at 11:03

## 2019-03-28 RX ADMIN — Medication 2 G: at 10:42

## 2019-03-28 RX ADMIN — FENTANYL CITRATE 25 MCG: 50 INJECTION INTRAMUSCULAR; INTRAVENOUS at 12:55

## 2019-03-28 RX ADMIN — SUCCINYLCHOLINE CHLORIDE 120 MG: 20 INJECTION INTRAMUSCULAR; INTRAVENOUS at 10:36

## 2019-03-28 RX ADMIN — GLYCOPYRROLATE 0.4 MG: 0.2 INJECTION INTRAMUSCULAR; INTRAVENOUS at 12:03

## 2019-03-28 RX ADMIN — Medication 40 MCG: at 10:51

## 2019-03-28 RX ADMIN — ROCURONIUM BROMIDE 20 MG: 10 INJECTION, SOLUTION INTRAVENOUS at 10:43

## 2019-03-28 RX ADMIN — DEXAMETHASONE SODIUM PHOSPHATE 8 MG: 4 INJECTION, SOLUTION INTRA-ARTICULAR; INTRALESIONAL; INTRAMUSCULAR; INTRAVENOUS; SOFT TISSUE at 10:42

## 2019-03-28 RX ADMIN — MIDAZOLAM HYDROCHLORIDE 2 MG: 1 INJECTION, SOLUTION INTRAMUSCULAR; INTRAVENOUS at 10:30

## 2019-03-28 RX ADMIN — MIDAZOLAM HYDROCHLORIDE 2 MG: 1 INJECTION, SOLUTION INTRAMUSCULAR; INTRAVENOUS at 10:23

## 2019-03-28 RX ADMIN — FENTANYL CITRATE 50 MCG: 50 INJECTION, SOLUTION INTRAMUSCULAR; INTRAVENOUS at 11:14

## 2019-03-28 RX ADMIN — ROPIVACAINE HYDROCHLORIDE 20 MG: 2 INJECTION, SOLUTION EPIDURAL; INFILTRATION; PERINEURAL at 10:49

## 2019-03-28 RX ADMIN — NEOSTIGMINE METHYLSULFATE 3 MG: 1 INJECTION INTRAVENOUS at 12:03

## 2019-03-28 RX ADMIN — ONDANSETRON 4 MG: 2 INJECTION INTRAMUSCULAR; INTRAVENOUS at 13:10

## 2019-03-28 RX ADMIN — HYDROMORPHONE HYDROCHLORIDE 2 MG: 2 TABLET ORAL at 13:10

## 2019-03-28 RX ADMIN — SCOLOPAMINE TRANSDERMAL SYSTEM 1 PATCH: 1 PATCH, EXTENDED RELEASE TRANSDERMAL at 10:16

## 2019-03-28 RX ADMIN — GLYCOPYRROLATE 0.2 MG: 0.2 INJECTION INTRAMUSCULAR; INTRAVENOUS at 11:01

## 2019-03-28 RX ADMIN — PROPOFOL 150 MG: 10 INJECTION, EMULSION INTRAVENOUS at 10:36

## 2019-03-28 RX ADMIN — SODIUM CHLORIDE, SODIUM LACTATE, POTASSIUM CHLORIDE, AND CALCIUM CHLORIDE 75 ML/HR: 600; 310; 30; 20 INJECTION, SOLUTION INTRAVENOUS at 10:18

## 2019-03-28 RX ADMIN — FENTANYL CITRATE 50 MCG: 50 INJECTION, SOLUTION INTRAMUSCULAR; INTRAVENOUS at 10:36

## 2019-03-28 RX ADMIN — ROCURONIUM BROMIDE 10 MG: 10 INJECTION, SOLUTION INTRAVENOUS at 11:14

## 2019-03-28 RX ADMIN — FENTANYL CITRATE 25 MCG: 50 INJECTION INTRAMUSCULAR; INTRAVENOUS at 13:00

## 2019-03-28 RX ADMIN — FENTANYL CITRATE 50 MCG: 50 INJECTION, SOLUTION INTRAMUSCULAR; INTRAVENOUS at 10:23

## 2019-03-28 RX ADMIN — Medication 40 MCG: at 10:46

## 2019-03-28 RX ADMIN — Medication 40 MCG: at 11:45

## 2019-03-28 NOTE — BRIEF OP NOTE
BRIEF OPERATIVE NOTE Date of Procedure: 3/28/2019 Preoperative Diagnosis: RCT LEFT SHOULDER Postoperative Diagnosis: RCT LEFT SHOULDER Procedure(s): LEFT SHOULDER ARTHROSCOPY DECOMPRESSION, ROTATOR CUFF REPAIR MINI OPEN REPAIR AND BICEP TENOTOMY Surgeon(s) and Role: * Yazan Persaud MD - Primary Surgical Assistant: Jaclyn Ann PA-C Surgical Staff: 
Circ-1: Tish Simpson Circ-Relief: Flores Dimas RN Physician Assistant: Haily Andujar PA-C Scrub Tech-1: Cheri Green Scrub RN-Relief: Nesha Cunha RN Event Time In Time Out Incision Start 1107 Incision Close 1213 Anesthesia: General  
Estimated Blood Loss: 25 mL Specimens: * No specimens in log * Findings: Left rotator cuff tear Complications: None. Implants:  
Implant Name Type Inv. Item Serial No.  Lot No. LRB No. Used Action ANCHOR BIOCOMP [id number] -- [employer]VENT - SNA  ANCHOR BIOCOMP [id number] -- [employer] NA ARTHREX [id number] Left 1 Implanted

## 2019-03-28 NOTE — PERIOP NOTES
10:23am timeout done with Dr. Wu Handing on a left shoulder for patient having a supraclavicular nerve block for a left shoulder arthroscopy. Patient placed on monitors and oxygen and then taken into OR soon as block was done. 20 ml/ 0.2% of ropivacaine was used. 2mg of versed and 50 mcg of fentanyl given for the sedation.

## 2019-03-28 NOTE — DISCHARGE INSTRUCTIONS
-You have a scope patch behind your right ear. Please discard with a tissue on Sunday March 31, 2019 with a tissue. Wash hands after.   -You had 1 Dilaudid pill (for pain) at 1:10pm    [address] Cuff Repair - Shoulder    1. First meal at home should be clear liquids. Progress to regular diet as tolerated. 2. Apply an ice bag or use cold therapy device for 20-30 minutes 4 times a day for the first 48-72 hours to reduce swelling and pain and then use as desired. 3. You may remove the bulky dressing 24 hours after surgery and at that time it is ok to shower. If there are steri-strips, please DO NOT remove them until seen in the office; otherwise, apply band-aids over the small incisions and change daily after showers. 4. You will use a sling with a pillow until your first post-op visit. You can remove the sling to shower and to let your arm hang down by your side. 5. You may perform pendulum arm exercises that you may begin 1-2 days after surgery. Remove your sling to do these exercises several times a day. We will discuss therapy at your first post-op visit. 6. A recliner position is often more comfortable for sleeping the first several days/weeks after surgery. 7. A prescription for pain medication will be provided before you are discharged home. Please take 1 aspirin a day for one week after surgery, beginning tomorrow. 8. A post-op appointment should be scheduled for ten days post-op, earlier as needed. Please call the office to schedule your appointment time ([phone]). 9. If you develop a fever greater than 101, unexpected redness or swelling in your arm, please call the office immediately. Some bleeding and or drainage can be expected the first few days after surgery. Thank you for following the above instructions.   If you have any questions, please call the office ([phone]). ______________________________________________________________________    Anesthesia Discharge Instructions    After general anesthesia or intervenous sedation, for 24 hours or while taking prescription Narcotics:  · Limit your activities  · Do not drive or operate hazardous machinery  · If you have not urinated within 8 hours after discharge, please contact your surgeon on call. · Do not make important personal or business decisions  · Do not drink alcoholic beverages    Report the following to your surgeon:  · Excessive pain, swelling, redness or odor of or around the surgical area  · Temperature over 100.5 degrees  · Nausea and vomiting lasting longer than 4 hours or if unable to take medication  · Any signs of decreased circulation or nerve impairment to extremity:  Change in color, persistent numbness, tingling, coldness or increased pain.   · Any questions

## 2019-03-28 NOTE — ANESTHESIA PREPROCEDURE EVALUATION
Relevant Problems No relevant active problems Anesthetic History No history of anesthetic complications PONV Review of Systems / Medical History Patient summary reviewed, nursing notes reviewed and pertinent labs reviewed Pulmonary Within defined limits Neuro/Psych Within defined limits Cardiovascular Within defined limits Hypertension GI/Hepatic/Renal 
Within defined limits PUD Endo/Other Within defined limits Hypothyroidism Obesity Other Findings Physical Exam 
 
Airway Mallampati: II 
TM Distance: > 6 cm Neck ROM: normal range of motion Mouth opening: Normal 
 
 Cardiovascular Regular rate and rhythm,  S1 and S2 normal,  no murmur, click, rub, or gallop Dental 
No notable dental hx Pulmonary Breath sounds clear to auscultation Abdominal 
GI exam deferred Other Findings Anesthetic Plan ASA: 2 Anesthesia type: general 
 
 
Post-op pain plan if not by surgeon: peripheral nerve block single Induction: Intravenous Anesthetic plan and risks discussed with: Patient

## 2019-03-28 NOTE — ANESTHESIA POSTPROCEDURE EVALUATION
Procedure(s): LEFT SHOULDER ARTHROSCOPY DECOMPRESSION, ROTATOR CUFF REPAIR MINI OPEN REPAIR AND BICEP TENOTOMY. general 
 
Anesthesia Post Evaluation Patient location during evaluation: PACU Patient participation: complete - patient participated Level of consciousness: awake and alert Pain management: adequate Airway patency: patent Anesthetic complications: no 
Cardiovascular status: acceptable Respiratory status: acceptable Hydration status: acceptable Comments: Seen and ready for discharge Post anesthesia nausea and vomiting:  none Vitals Value Taken Time /59 3/28/2019  1:30 PM  
Temp 36.5 °C (97.7 °F) 3/28/2019 12:55 PM  
Pulse 50 3/28/2019  1:33 PM  
Resp 16 3/28/2019  1:33 PM  
SpO2 97 % 3/28/2019  1:33 PM  
Vitals shown include unvalidated device data.

## 2019-03-28 NOTE — ANESTHESIA PROCEDURE NOTES
Peripheral Block Start time: 3/28/2019 10:15 AM 
End time: 3/28/2019 10:21 AM 
Performed by: Meghana Wallace MD 
Authorized by: Claudia Escoto DO  
 
 
Pre-procedure: Indications: at surgeon's request and post-op pain management Preanesthetic Checklist: patient identified, risks and benefits discussed, site marked, timeout performed, anesthesia consent given and patient being monitored Block Type:  
Block Type:  Supraclavicular Laterality:  Left Monitoring:  Standard ASA monitoring, continuous pulse ox, frequent vital sign checks, heart rate, responsive to questions and oxygen Injection Technique:  Single shot Procedures: ultrasound guided and nerve stimulator Patient Position: supine Prep: betadine and povidone-iodine 7.5% surgical scrub Location:  Supraclavicular Needle Type:  Stimuplex Needle Gauge:  22 G Needle Localization:  Nerve stimulator and ultrasound guidance Motor Response: minimal motor response >0.4 mA Assessment: 
Number of attempts:  1 Injection Assessment:  Incremental injection every 5 mL, local visualized surrounding nerve on ultrasound, negative aspiration for blood, no intravascular symptoms, negative aspiration for CSF, no paresthesia and ultrasound image on chart Patient tolerance:  Patient tolerated the procedure well with no immediate complications

## 2019-03-29 NOTE — OP NOTES
[facility]  OPERATIVE REPORT    Name:  Pola Noyola  MR#:  [id number]  :  1955  ACCOUNT #:  [de-identified]  DATE OF SERVICE:  2019      PREOPERATIVE DIAGNOSES:  1. Left shoulder impingement. 2.  Rotator cuff tear. POSTOPERATIVE DIAGNOSES:  1. Impingement, left shoulder. 2.  Rotator cuff tear. 3.  Biceps tendon tear. PROCEDURE:  1. Left shoulder arthroscopy with biceps tenotomy and labral debridement. 2.  Subacromial decompression. 3.  Mini-open rotator cuff repair. SURGEON:  Valentino Peng, MD    ASSISTANT:  Delray Hodgkins, PA-C    ANESTHESIA:  Scalene block plus general.    ESTIMATED BLOOD LOSS:  Minimal.    DRAINS:  None. COMPLICATIONS:  None. SPECIMENS:  None. IMPLANTS:  SwiveLock anchor x1. FINDINGS:  Biceps tendon tear and rotator cuff tear. INDICATIONS:  The patient underwent right shoulder surgery one year ago and had a quite large rotator cuff tear at that time. She now presents for the above procedure for the left shoulder. PROCEDURE:  On the day of operation, the patient was taken to the holding area. Scalene block administered. The patient was taken to operating room and placed in supine position. General anesthesia administered. The patient was placed in the semi-sitting position. The left shoulder was prepped and draped in the usual fashion. A needle was placed posteriorly for insufflation of the joint with saline. Arthroscope brought to the posterior portal.  The glenoid was intact. The humeral head was basically intact. The biceps tendon had a significant partial tear with marked fraying and there was some fraying of the anterior labrum as well. An anterior portal was established. It was felt that the biceps tenotomy would be appropriate and therefore accomplished with electrocautery. There was significant tearing of the supraspinatus tendon. Arthroscope was brought to the subacromial space.   There was impingement of the acromion and a lateral portal was established. An acromionizer bur was used to perform an acromioplasty and felt to adequately decompress the subacromial space. The rotator cuff was again examined and noted to have a significant tear. Saline and instruments removed. Anterior and portals closed with 3-0 Prolene suture. Surgeon's and assistant's gloves changed. The lateral portal was extended to small degree with dissection down to the subacromial space. The supraspinatus tendon was evaluated and there were fenestration indicating full-thickness tearing of the supraspinatus tendon. The edges were freshened. Both arms and the TigerTapes brought through the supraspinatus tendon and brought laterally secured with a SwiveLock anchor, and a complete repair achieved. Incisions sterilely irrigated. Coagulation achieved with electrocautery. The fascia was closed with 0 Vicryl, 2-0 Vicryl was used for subcutaneous tissue. Skin was closed in subcuticular fashion using 3-0 Prolene suture. Sterile dressings applied and the patient was taken to the recovery room in satisfactory condition. The assistant, Bhaskar Kelley PA-C, assisted me with positioning, retraction, implant installation, and incision closure. Georgia Pike MD      [id number]  D:  03/29/2019 8:32  T:  03/29/2019 8:37  JOB #:  [id number]  CC:   MD Abner Ayoub MD

## 2019-04-01 NOTE — DISCHARGE SUMMARY
Discharge Summary    Physician: Aries Guzman MD    Physician Assistant: Shelbie Styles PA-C    Admit Diagnosis:  RCT LEFT SHOULDER    Final Diagnosis:   1. Impingement, left shoulder. 2.  Rotator cuff tear. 3.  Biceps tendon tear.     PROCEDURE:  1. Left shoulder arthroscopy with biceps tenotomy and labral debridement. 2.  Subacromial decompression. 3.  Mini-open rotator cuff repair    Procedure: Left shoulder arthroscopy with biceps tenotomy and labral debridement, Subacromial decompression and Mini-open rotator cuff repair    Complications: None. History of Present Illness: The patient has a history of pain within the left shoulder . The patient has a left shoulder rotator cuff tear and has exhausted conservative therapy at this time. The patient presents for the above-mentioned procedure. The patient has been cleared from a medical and cardiac standpoint. Past Medical History:  Recorded in the chart. Physical Examination: Also recorded in the chart. Examination of the left shoulder reveals passive elevation to 160 degrees. Reluctant to actively elevate beyond 140 degrees due to subacromial pain. Marked crepitus subacromial crepitus with ROM. Mild weakness to external rotation. positive impingement findings. No neck pain. No numbness or paresthesias in the hands. No effusion. No sign of infection. No cellulitis or rash. I detect no motor or sensory deficits in the upper extremities. The neurovascular status of the upper extremities is intact. MRI reveals 1. Rotator cuff tendinopathy with articular sided partial thickness tearing of infraspinatus. 2. Widened appearance of acromioclavicular joint space. Course in the Hospital:  The patient was admitted to the hospital.  The Pt. Underwent a Left shoulder arthroscopy with biceps tenotomy and labral debridement, Subacromial decompression and Mini-open rotator cuff repair. Postoperatively, the pt. did well. The pt.  Remained stable from a medical standpoint. At the time of discharge, the patient's left shoulder incision appeared with sutures/steri-strips intact. No signs of infection or inflammation noted. The patient will follow up in our office in ten days post-operatively. DC med list:  Discharge Medication List as of 3/28/2019  1:52 PM      START taking these medications    Details   HYDROmorphone (DILAUDID) 2 mg tablet Take 1-2 Tabs by mouth every four (4) hours as needed for Pain for up to 7 days. Max Daily Amount: 24 mg., Print, Disp-36 Tab, R-0      ondansetron (ZOFRAN ODT) 4 mg disintegrating tablet Take 2 Tabs by mouth every eight (8) hours as needed for Nausea. , Print, Disp-12 Tab, R-0         CONTINUE these medications which have NOT CHANGED    Details   dicyclomine (BENTYL) 10 mg capsule Take 10 mg by mouth three (3) times daily as needed., Historical Med      diclofenac EC (VOLTAREN) 75 mg EC tablet Take 75 mg by mouth two (2) times a day., Historical Med      ALPRAZolam (XANAX) 0.5 mg tablet Take 1 Tab by mouth two (2) times daily as needed. , Print, Disp-30 Tab, R-2      NIFEdipine ER (PROCARDIA XL) 60 mg ER tablet Take 60 mg by mouth daily. , Historical Med      Omeprazole delayed release (PRILOSEC D/R) 20 mg tablet Take 1 Tab by mouth daily. daily before breakfast, Normal, Disp-30 Tab, R-5      levothyroxine (SYNTHROID) 112 mcg tablet Take 1 Tab by mouth Daily (before breakfast). , Normal, Disp-90 Tab, R-3      valsartan (DIOVAN) 160 mg tablet TAKE ONE TABLET BY MOUTH EVERY DAY, Normal, Disp-90 Tab, R-3      ondansetron hcl (ZOFRAN) 4 mg tablet Take 4 mg by mouth every eight (8) hours as needed for Nausea., Historical Med      prednisoLONE acetate (PRED FORTE) 1 % ophthalmic suspension Administer 1 Drop to both eyes three (3) times daily as needed., Historical Med      calcium carbonate (TUMS) 200 mg calcium (500 mg) chew Take 1 Tab by mouth as needed., Historical Med      docusate sodium (COLACE) 100 mg capsule Take 100 mg by mouth two (2) times daily as needed for Constipation.  1-2 tabs PRN constipation, Historical Med      ketorolac (ACULAR) 0.5 % ophthalmic solution Administer 2 Drops to both eyes four (4) times daily as needed., Historical Med, Disp-1 Bottle, R-0             Patient Disposition: Home  Followup Care:  Discharge Condition: good  Activity as tolerated in slingshot brace  Regular Diet  As directed    Follow-up Information     Follow up With Specialties Details Why Contact Divine Aquino MD Internal Medicine   [address] Casey Khan MD Orthopedic Surgery Schedule an appointment as soon as possible for a visit in 8 day(s)  [address] [facility] [address] [place] [address] [address]                Efren Gil PA-C

## 2019-04-15 ENCOUNTER — APPOINTMENT (OUTPATIENT)
Dept: PHYSICAL THERAPY | Age: 64
End: 2019-04-15

## 2019-04-17 ENCOUNTER — HOSPITAL ENCOUNTER (OUTPATIENT)
Dept: PHYSICAL THERAPY | Age: 64
Discharge: HOME | End: 2019-04-17
Payer: MEDICAID

## 2019-04-17 PROCEDURE — 97161 PT EVAL LOW COMPLEX 20 MIN: CPT | Performed by: PHYSICAL THERAPIST

## 2019-04-17 PROCEDURE — 97140 MANUAL THERAPY 1/> REGIONS: CPT | Performed by: PHYSICAL THERAPIST

## 2019-04-17 PROCEDURE — 97110 THERAPEUTIC EXERCISES: CPT | Performed by: PHYSICAL THERAPIST

## 2019-04-17 NOTE — PROGRESS NOTES
[facility] Physical Therapy and Sports Medicine [address], [place], [address] Phone: [phone]  Fax: [phone] PT INITIAL EVALUATION NOTE - [facility] 2-15 Patient Name: Milana Mosquera Date:2019 : 1955 [x]  Patient  Verified Payor: [employer] MEDICAID / Plan: [facility] / Product Type: Managed Care Medicaid / In time: 1110 AM  Out time:1150 AM 
Total Treatment Time (min): 40 Total Timed Codes (min): 25 
1:1 Treatment Time (MC only): 40 Visit #: 1 Treatment Area: Left shoulder pain [M25.512] SUBJECTIVE Any medication changes, allergies to medications, adverse drug reactions, diagnosis change, or new procedure performed?: [] No    [x] Yes (see summary sheet for update) Current symptoms/chief complaint and date of onset/injury: Pt presents to PT s/p L partial RTC repair on 3/28/19. She states prior to surgery she had injection without relief. She states \"only one anchor\". She saw Dr. Anu Houston last week- \"he said to take the pillow out of my sling and that I could let it dangle\". Next f/u with him on 19. Aggravated by:  movement Eased by: rest 
 
Pain Level (0-10 scale): 10/10 max  8/10 today Location of symptoms: L shoulder PMH: Significant for L TKR, revision of R TKR, thyroid issues, high BP, arthritis, R mini-open RTC repair 2018 
  
Social/Recreation/Work: Not currently working. 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 Prior level of function: difficulty performing ADL's, donning/doffing clothing, reaching/lifting Patient goal(s): \"to get my mobility back, have decreased pain\" Objective:   
 
Observation/posture: Pt presents to PT with slight on L UE 
 
ROM: 
PROM: 
L shoulder flex= 118 deg L shoulder scap= 140 deg L shoulder ER at 0= 64 deg L shoulder IR at 45= 53 deg Outcome Measure: Patient presents with an initial FOTO score of next visit.  
 
Today's Treatment[de-identified] 
 
 10 min Therapeutic Exercise:  [x] See flow sheet : UT stretch, AROM elbow flex/ext, pendulums Rationale: increase ROM and increase proprioception to improve the patients ability to reach, lift for objects 15 min Manual Therapy:  PROM L shoulder flex, scap, ER, IR to tolerance Gentle GH oscillations to promote relaxation Rationale: decrease pain, increase ROM and increase tissue extensibility to improve the patients ability to perform ADL's Ice - to go. Pt requested to leave early/take ice to go due to another doctors appointment With 
 [x] TE 
 [] TA 
 [] neuro 
 [] other: Patient Education: [x] Review HEP [] Progressed/Changed HEP based on:  
[x] positioning   [] body mechanics   [] transfers   [x] heat/ice application   
[x] other: renee/phys; PT POC; importance of performing HEP in order to maximize benefit of PT; use of ice daily in order to decrease pain levels; reviewed MD protocol-- PROM only at this time. Pain Level (0-10 scale) post treatment: \"good\" Assessment:  
[x] See POC Plan:   
[x] See POC Jono Byrd PT, DPT  
4/17/2019 11:15 AM

## 2019-04-17 NOTE — PROGRESS NOTES
[facility] Physical Therapy [address] [place], [address] [facility] Phone: [phone]  Fax: [phone] Plan of Care/Statement of Necessity for Physical Therapy Services  2-15 Patient name: Janna Ferguson  : 1955  Provider#: [id number] Referral source: Charles Elizabeth MD     
Medical/Treatment Diagnosis: Left shoulder pain [M25.512] Prior Hospitalization: see medical history Comorbidities: see evaluation Prior Level of Function: see evaluation Medications: Verified on Patient Summary List 
 
Start of Care: 19      Onset Date: DOS 3/28/19 The Plan of Care and following information is based on the information from the initial evaluation. Assessment/ key information: Pt is a 58year old female s/p L mini-open RTC repair, subacromial decompression, and arthroscopy with biceps tenotomy and labral debridement on 3/28/19. She will benefit from PT to address problem list below Problem List: pain affecting function, decrease ROM, decrease strength, edema affecting function, decrease ADL/ functional abilitiies, decrease activity tolerance and decrease flexibility/ joint mobility Treatment Plan may include any combination of the following: Therapeutic exercise, Therapeutic activities, Neuromuscular re-education, Physical agent/modality, Manual therapy, Patient education, Self Care training, Functional mobility training and Home safety training Patient / Family readiness to learn indicated by: asking questions, trying to perform skills and interest 
Persons(s) to be included in education: patient (P) Barriers to Learning/Limitations: None Patient Goal (s): see evaluation Patient Self Reported Health Status: excellent Rehabilitation Potential: good Short Term Goals: To be accomplished in 3-4 weeks: 
1) Pt will be independent in initial HEP 
2) Pt will improve L shoulder PROM flex to >/=130 deg in order to aid in reaching 3) Pt will improve L shoulder PROM ER to >/=70 deg in order to aid in performing ADL's 
4) Pt will report compliance with ice regimen Long Term Goals: To be accomplished in 8-12 weeks: 
1) Pt will improve L shoulder AROM flex to >/=130 deg in order to aid in reaching 2) Pt will report being able to don/doff clothing with </=2/10 L shoulder pain 3) Pt will demonstrate >/= 4/5 L shoulder flex, scap strength in order to aid in lifting 4) Pt will demonstrate >/= 4/5 L shoulder ER and IR strength in order to aid in performing ADL's 
 
Frequency / Duration: Patient to be seen 2-3 times per week for 8-12 weeks. Patient/ Caregiver education and instruction: self care, activity modification and exercises 
 
[x]  Plan of care has been reviewed with SABINO Muñiz, PT 4/17/2019  
________________________________________________________________________ I certify that the above Therapy Services are being furnished while the patient is under my care. I agree with the treatment plan and certify that this therapy is necessary. [de-identified] Signature:____________________  Date:____________Time: _________

## 2019-04-19 ENCOUNTER — HOSPITAL ENCOUNTER (OUTPATIENT)
Dept: PHYSICAL THERAPY | Age: 64
Discharge: HOME | End: 2019-04-19
Payer: MEDICAID

## 2019-04-19 PROCEDURE — 97140 MANUAL THERAPY 1/> REGIONS: CPT | Performed by: PHYSICAL THERAPIST

## 2019-04-19 NOTE — PROGRESS NOTES
PT DAILY TREATMENT NOTE 2-15 Patient Name: Lina Ray Date:2019 : 1955 [x]  Patient  Verified Payor: [facility] MEDICAID / [facility] / Product Type: Managed Care Medicaid / In time: 1030 AM  Out time:11:15 AM 
Total Treatment Time (min): 45 (35 timed) 1:1 Treatment Time (min): 30 Visit #: 2 Treatment Area: Left shoulder pain [M25.512] SUBJECTIVE Pain Level (0-10 scale): 6 Any medication changes, allergies to medications, adverse drug reactions, diagnosis change, or new procedure performed?: [x] No    [] Yes (see summary sheet for update) Subjective functional status/changes:   [] No changes reported Pt states she has been compliant with HEP; \"I'm working on relaxing, its getting better\" OBJECTIVE Modality rationale: decrease inflammation and decrease pain to improve the patients ability to perform ADL's, daily chores, reach, lift Min Type Additional Details  
  
 [] Estim: []Att   []Unatt    []TENS instruct []IFC  []Premod   []NMES []Other:  []w/US   []w/ice   []w/heat Position: Location:  
  
 []  Traction: [] Cervical       []Lumbar 
                     [] Prone          []Supine []Intermittent   []Continuous Lbs: 
[] before manual 
[] after manual 
[]w/heat  
 []  Ultrasound: []Continuous   [] Pulsed  
                    at: []1MHz   []3MHz Location: 
W/cm2:  
 [] Paraffin Location:  
[]w/heat  
10 [x]  Ice- to go     [x]  Heat- pre (10) 
[]  Ice massage Position: seated Location: L shoulder  
 []  Laser 
[]  Other: Position: Location:  
 
 []  Vasopneumatic Device Pressure:       [] lo [] med [] hi  
Temperature:   
[x] Skin assessment post-treatment:  [x]intact []redness- no adverse reaction 
  []redness  adverse reaction:  
 
 
 
10 min Therapeutic Exercise:  [] See flow sheet :  
Rationale: increase ROM and increase strength to improve the patients ability to aid in lifting, reaching 25 min Manual Therapy:  PROM L shoulder flex, abd, IR, ER to tolerance Gentle GH oscillations to promote relaxation Rationale: increase ROM and increase tissue extensibility  to improve the patients ability to aid in lifting, performing ADL's, Donning/doffing clothing With 
 [] TE 
 [] TA 
 [] neuro 
 [] other: Patient Education: [x] Review HEP [] Progressed/Changed HEP based on:  
[] positioning   [] body mechanics   [] transfers   [] heat/ice application   
[] other:   
 
Other Objective/Functional Measures: n/a Pain Level (0-10 scale) post treatment: 6 
 
ASSESSMENT/Changes in Function:  
Max cues for relaxation during PROM. Patient will continue to benefit from skilled PT services to modify and progress therapeutic interventions, address functional mobility deficits, address ROM deficits, address strength deficits, analyze and address soft tissue restrictions, analyze and cue movement patterns, analyze and modify body mechanics/ergonomics and assess and modify postural abnormalities to attain remaining goals. []  See Plan of Care 
[]  See progress note/recertification 
[]  See Discharge Summary Progress towards goals / Updated goals: 
nt 
 
PLAN 
[]  Upgrade activities as tolerated     [x]  Continue plan of care 
[]  Update interventions per flow sheet      
[]  Discharge due to:_ 
[]  Other:_   
 
Oscar Goldstein, PT 4/19/2019

## 2019-04-22 ENCOUNTER — HOSPITAL ENCOUNTER (OUTPATIENT)
Dept: PHYSICAL THERAPY | Age: 64
Discharge: HOME | End: 2019-04-22
Payer: MEDICAID

## 2019-04-22 PROCEDURE — 97110 THERAPEUTIC EXERCISES: CPT | Performed by: PHYSICAL THERAPIST

## 2019-04-22 PROCEDURE — 97140 MANUAL THERAPY 1/> REGIONS: CPT | Performed by: PHYSICAL THERAPIST

## 2019-04-22 NOTE — PROGRESS NOTES
PT DAILY TREATMENT NOTE 2-15 Patient Name: Ajay Paredes Date:2019 : 1955 [x]  Patient  Verified Payor: [facility] MEDICAID [facility] / Product Type: Managed Care Medicaid / In time: 1030 AM  Out time:11:20 AM 
Total Treatment Time (min): 50 (40 timed) 1:1 Treatment Time (min): 40 Visit #: 3 RTMD: 19 Treatment Area: Left shoulder pain [M25.512] SUBJECTIVE Pain Level (0-10 scale): 6 Any medication changes, allergies to medications, adverse drug reactions, diagnosis change, or new procedure performed?: [x] No    [] Yes (see summary sheet for update) Subjective functional status/changes:   [] No changes reported Pt states that she is having \"shooting pains\" that \"come and go\". She has been compliant with wearing sling OBJECTIVE Modality rationale: decrease inflammation and decrease pain to improve the patients ability to perform ADL's, daily chores, reach, lift Min Type Additional Details  
  
 [] Estim: []Att   []Unatt    []TENS instruct []IFC  []Premod   []NMES []Other:  []w/US   []w/ice   []w/heat Position: Location:  
  
 []  Traction: [] Cervical       []Lumbar 
                     [] Prone          []Supine []Intermittent   []Continuous Lbs: 
[] before manual 
[] after manual 
[]w/heat  
 []  Ultrasound: []Continuous   [] Pulsed  
                    at: []1MHz   []3MHz Location: 
W/cm2:  
 [] Paraffin Location:  
[]w/heat  
10 [x]  Ice- to go     [x]  Heat- pre (10) 
[]  Ice massage Position: seated Location: L shoulder  
 []  Laser 
[]  Other: Position: Location:  
 
 []  Vasopneumatic Device Pressure:       [] lo [] med [] hi  
Temperature:   
[x] Skin assessment post-treatment:  [x]intact []redness- no adverse reaction 
  []redness  adverse reaction:  
 
 
 
15 min Therapeutic Exercise:  [] See flow sheet :  
 Rationale: increase ROM and increase strength to improve the patients ability to aid in lifting, reaching 25 min Manual Therapy:  PROM L shoulder flex, abd, IR, ER to tolerance Gentle GH oscillations to promote relaxation Rationale: increase ROM and increase tissue extensibility  to improve the patients ability to aid in lifting, performing ADL's, Donning/doffing clothing With 
 [] TE 
 [] TA 
 [] neuro 
 [] other: Patient Education: [x] Review HEP [] Progressed/Changed HEP based on:  
[] positioning   [] body mechanics   [] transfers   [] heat/ice application   
[] other:   
 
Other Objective/Functional Measures: n/a Pain Level (0-10 scale) post treatment: 6 
 
ASSESSMENT/Changes in Function:  
Continues to require max cues for relaxation during PROM. ROM improving. Patient will continue to benefit from skilled PT services to modify and progress therapeutic interventions, address functional mobility deficits, address ROM deficits, address strength deficits, analyze and address soft tissue restrictions, analyze and cue movement patterns, analyze and modify body mechanics/ergonomics and assess and modify postural abnormalities to attain remaining goals. []  See Plan of Care 
[]  See progress note/recertification 
[]  See Discharge Summary Progress towards goals / Updated goals: 
nt 
 
PLAN 
[]  Upgrade activities as tolerated     [x]  Continue plan of care 
[]  Update interventions per flow sheet      
[]  Discharge due to:_ 
[]  Other:_   
 
Samuel Smith, PT 4/22/2019

## 2019-04-24 ENCOUNTER — HOSPITAL ENCOUNTER (OUTPATIENT)
Dept: PHYSICAL THERAPY | Age: 64
Discharge: HOME | End: 2019-04-24
Payer: MEDICAID

## 2019-04-24 PROCEDURE — 97140 MANUAL THERAPY 1/> REGIONS: CPT | Performed by: PHYSICAL THERAPY ASSISTANT

## 2019-04-24 NOTE — PROGRESS NOTES
PT DAILY TREATMENT NOTE 2-15 Patient Name: Ajay Paredes Date:2019 : 1955 [x]  Patient  Verified Payor: [facility] MEDICAID / Plan: [facility] LOVE [facility] / Product Type: Managed Care Medicaid / In time: 1030 AM  Out time:11:20 AM 
Total Treatment Time (min): 50 (40 timed) 1:1 Treatment Time (min): 25 Visit #: 4 RTMD: 19 Treatment Area: Left shoulder pain [M25.512] SUBJECTIVE Pain Level (0-10 scale): 6 Any medication changes, allergies to medications, adverse drug reactions, diagnosis change, or new procedure performed?: [x] No    [] Yes (see summary sheet for update) Subjective functional status/changes:   [] No changes reported Pt states \"when the anchors are pulling it's an 8-9/10 but they have calmed down some. \" States she is to see Dr. Catherine Coronel tomorrow. OBJECTIVE Modality rationale: decrease inflammation and decrease pain to improve the patients ability to perform ADL's, daily chores, reach, lift Min Type Additional Details  
  
 [] Estim: []Att   []Unatt    []TENS instruct []IFC  []Premod   []NMES []Other:  []w/US   []w/ice   []w/heat Position: Location:  
  
 []  Traction: [] Cervical       []Lumbar 
                     [] Prone          []Supine []Intermittent   []Continuous Lbs: 
[] before manual 
[] after manual 
[]w/heat  
 []  Ultrasound: []Continuous   [] Pulsed  
                    at: []1MHz   []3MHz Location: 
W/cm2:  
 [] Paraffin Location:  
[]w/heat  
10 [x]  Ice- to go     [x]  Heat- pre (10) 
[]  Ice massage Position: seated Location: L shoulder  
 []  Laser 
[]  Other: Position: Location:  
 
 []  Vasopneumatic Device Pressure:       [] lo [] med [] hi  
Temperature:   
 
[x] Skin assessment post-treatment:  [x]intact []redness- no adverse reaction 
  []redness  adverse reaction:  
 
 
 
15 min Therapeutic Exercise:  [] See flow sheet :  
 Rationale: increase ROM and increase strength to improve the patients ability to aid in lifting, reaching 25 min Manual Therapy:  PROM L shoulder flex, abd, IR, ER to tolerance Gentle GH oscillations to promote relaxation Rationale: increase ROM and increase tissue extensibility  to improve the patients ability to aid in lifting, performing ADL's, Donning/doffing clothing With 
 [] TE 
 [] TA 
 [] neuro 
 [] other: Patient Education: [x] Review HEP [] Progressed/Changed HEP based on:  
[] positioning   [] body mechanics   [] transfers   [] heat/ice application   
[] other:   
 
Other Objective/Functional Measures: n/a Pain Level (0-10 scale) post treatment: 6 
 
ASSESSMENT/Changes in Function:  
Frequent cues for muscle relaxation during PROM. Reviewed sling wear and avoid carrying items in L hand. Patient will continue to benefit from skilled PT services to modify and progress therapeutic interventions, address functional mobility deficits, address ROM deficits, address strength deficits, analyze and address soft tissue restrictions, analyze and cue movement patterns, analyze and modify body mechanics/ergonomics and assess and modify postural abnormalities to attain remaining goals. []  See Plan of Care 
[]  See progress note/recertification 
[]  See Discharge Summary Progress towards goals / Updated goals: 
nt 
 
PLAN 
[]  Upgrade activities as tolerated     [x]  Continue plan of care 
[]  Update interventions per flow sheet      
[]  Discharge due to:_ 
[]  Other:_   
 
Estefania Watts, PTA 4/24/2019

## 2019-04-26 ENCOUNTER — APPOINTMENT (OUTPATIENT)
Dept: PHYSICAL THERAPY | Age: 64
End: 2019-04-26
Payer: MEDICAID

## 2019-04-29 ENCOUNTER — APPOINTMENT (OUTPATIENT)
Dept: PHYSICAL THERAPY | Age: 64
End: 2019-04-29
Payer: MEDICAID

## 2019-05-01 ENCOUNTER — APPOINTMENT (OUTPATIENT)
Dept: PHYSICAL THERAPY | Age: 64
End: 2019-05-01
Payer: MEDICARE

## 2019-05-03 ENCOUNTER — HOSPITAL ENCOUNTER (OUTPATIENT)
Dept: PHYSICAL THERAPY | Age: 64
Discharge: HOME | End: 2019-05-03
Payer: MEDICARE

## 2019-05-03 PROCEDURE — 97110 THERAPEUTIC EXERCISES: CPT | Performed by: PHYSICAL THERAPIST

## 2019-05-03 PROCEDURE — 97014 ELECTRIC STIMULATION THERAPY: CPT | Performed by: PHYSICAL THERAPIST

## 2019-05-03 PROCEDURE — 97140 MANUAL THERAPY 1/> REGIONS: CPT | Performed by: PHYSICAL THERAPIST

## 2019-05-03 NOTE — PROGRESS NOTES
PT DAILY TREATMENT NOTE 2-15 Patient Name: Alcides Conde Date:5/3/2019 : 1955 [x]  Patient  Verified Payor: [facility] MEDICAID / [facility]: [facility] / Product Type: Managed Care Medicaid / In time: 830a  Out time: 593K Total Treatment Time (min): 60 Visit #:  4 Treatment Area: Left shoulder pain [M25.512] SUBJECTIVE Pain Level (0-10 scale): 6/10 Any medication changes, allergies to medications, adverse drug reactions, diagnosis change, or new procedure performed?: [x] No    [] Yes (see summary sheet for update) Subjective functional status/changes:   [] No changes reported Shoulder is doing okay today. OBJECTIVE Modality rationale: decrease pain and increase tissue extensibility to improve the patients ability to restore function Min Type Additional Details 15 [x] Estim: []Att   [x]Unatt    []TENS instruct [x]IFC  []Premod   []NMES []Other:  []w/US   [x]w/ice   []w/heat Position: reclined Location: shoulder  
  
 []  Traction: [] Cervical       []Lumbar 
                     [] Prone          []Supine []Intermittent   []Continuous Lbs: 
[] before manual 
[] after manual 
[]w/heat  
 []  Ultrasound: []Continuous   [] Pulsed  
                    at: []1MHz   []3MHz Location: 
W/cm2:  
 [] Paraffin Location:  
[]w/heat  
 []  Ice     []  Heat 
[]  Ice massage Position: Location:  
 []  Laser 
[]  Other: Position: Location:  
 
 []  Vasopneumatic Device Pressure:       [] lo [] med [] hi  
Temperature:   
 
[x] Skin assessment post-treatment:  [x]intact []redness- no adverse reaction 
  []redness  adverse reaction:  
 
10 min Therapeutic Exercise:  [] See flow sheet :  
Rationale: increase ROM and increase strength to improve the patients ability to aid in lifting, reaching 
  
30 min Manual Therapy:  PROM L shoulder flex, abd, IR, ER to tolerance Gentle anterior shoulder STM Rationale: increase ROM and increase tissue extensibility  to improve the patients ability to aid in lifting, performing ADL's, Donning/doffing clothing With 
 [] TE 
 [] TA 
 [] neuro 
 [] other: Patient Education: [x] Review HEP [] Progressed/Changed HEP based on:  
[] positioning   [] body mechanics   [] transfers   [] heat/ice application   
[] other:   
 
Other Objective/Functional Measures: --  
 
Pain Level (0-10 scale) post treatment: 0/10 ASSESSMENT/Changes in Function:  
ROM return looks good for post-op status. Advised again regarding maintaining post-op status with passive movements only of shoulder as she was observed carrying her bag in her L UE and moving her shoulder around actively. Patient will continue to benefit from skilled PT services to modify and progress therapeutic interventions, address functional mobility deficits, address ROM deficits, address strength deficits, analyze and address soft tissue restrictions and analyze and cue movement patterns to attain remaining goals. []  See Plan of Care 
[]  See progress note/recertification 
[]  See Discharge Summary Progress towards goals / Updated goals: 
Short Term Goals: To be accomplished in 3-4 weeks: 
1) Pt will be independent in initial HEP 
2) Pt will improve L shoulder PROM flex to >/=130 deg in order to aid in reaching 3) Pt will improve L shoulder PROM ER to >/=70 deg in order to aid in performing ADL's 
4) Pt will report compliance with ice regimen 
  
Long Term Goals: To be accomplished in 8-12 weeks: 
1) Pt will improve L shoulder AROM flex to >/=130 deg in order to aid in reaching 2) Pt will report being able to don/doff clothing with </=2/10 L shoulder pain 3) Pt will demonstrate >/= 4/5 L shoulder flex, scap strength in order to aid in lifting 4) Pt will demonstrate >/= 4/5 L shoulder ER and IR strength in order to aid in performing ADL's PLAN 
 [x]  Upgrade activities as tolerated     [x]  Continue plan of care 
[]  Update interventions per flow sheet      
[]  Discharge due to:_ 
[]  Other:_ Gordo Johnson, PT 5/3/2019

## 2019-05-06 ENCOUNTER — APPOINTMENT (OUTPATIENT)
Dept: PHYSICAL THERAPY | Age: 64
End: 2019-05-06
Payer: MEDICARE

## 2019-05-08 ENCOUNTER — APPOINTMENT (OUTPATIENT)
Dept: PHYSICAL THERAPY | Age: 64
End: 2019-05-08
Payer: MEDICARE

## 2019-05-13 ENCOUNTER — HOSPITAL ENCOUNTER (OUTPATIENT)
Dept: PHYSICAL THERAPY | Age: 64
Discharge: HOME | End: 2019-05-13
Payer: MEDICARE

## 2019-05-13 PROCEDURE — 97124 MASSAGE THERAPY: CPT | Performed by: PHYSICAL THERAPIST

## 2019-05-13 PROCEDURE — 97014 ELECTRIC STIMULATION THERAPY: CPT | Performed by: PHYSICAL THERAPIST

## 2019-05-13 PROCEDURE — 97110 THERAPEUTIC EXERCISES: CPT | Performed by: PHYSICAL THERAPIST

## 2019-05-13 NOTE — PROGRESS NOTES
PT DAILY TREATMENT NOTE 2-15 Patient Name: Lina Ray Date:2019 : 1955 [x]  Patient  Verified Payor: [place] [facility] / Plan: Delilah Tamayoy / Product Type: Medicare / In time: 1035a  Out time: 1226X Total Treatment Time (min): 79 Visit #:  4 Treatment Area: Left shoulder pain [M25.512] SUBJECTIVE Pain Level (0-10 scale): 6/10 Any medication changes, allergies to medications, adverse drug reactions, diagnosis change, or new procedure performed?: [x] No    [] Yes (see summary sheet for update) Subjective functional status/changes:   [] No changes reported Pain was a \"10\" last night, but it is down to a 6 now. Says that she feels like the shoulder is getting better. OBJECTIVE Modality rationale: decrease pain and increase tissue extensibility to improve the patients ability to restore function Min Type Additional Details 15 [x] Estim: []Att   [x]Unatt    []TENS instruct [x]IFC  []Premod   []NMES []Other:  []w/US   [x]w/ice   []w/heat Position: reclined Location: shoulder  
  
  []  Traction: [] Cervical       []Lumbar 
                     [] Prone          []Supine []Intermittent   []Continuous Lbs: 
[] before manual 
[] after manual 
[]w/heat  
  []  Ultrasound: []Continuous   [] Pulsed  
                    at: []1MHz   []3MHz Location: 
W/cm2:  
  [] Paraffin Location:  
[]w/heat  
  []  Ice     []  Heat 
[]  Ice massage Position: Location:  
  []  Laser 
[]  Other: Position: Location:  
  
  []  Vasopneumatic Device Pressure:       [] lo [] med [] hi  
Temperature:   
  
[x] Skin assessment post-treatment:  [x]intact []redness- no adverse reaction 
  []redness  adverse reaction:  
  
20 min Therapeutic Exercise:  [] See flow sheet :  
Rationale: increase ROM and increase strength to improve the patients ability to aid in lifting, reaching 
  
 30 min Manual Therapy:  PROM L shoulder flex, abd, IR, ER to tolerance Gentle anterior shoulder STM Rationale: increase ROM and increase tissue extensibility  to improve the patients ability to aid in lifting, performing ADL's, Donning/doffing clothing With 
 [] TE 
 [] TA 
 [] neuro 
 [] other: Patient Education: [x] Review HEP [] Progressed/Changed HEP based on:  
[] positioning   [] body mechanics   [] transfers   [] heat/ice application   
[] other:   
 
Other Objective/Functional Measures: Shoulder PROM Flex: 165deg Abd: 165deg ER: 70deg IR: 70deg Pain Level (0-10 scale) post treatment: 0/10 ASSESSMENT/Changes in Function:  
ROM measure significantly improved at this time and on track for 6 weeks post-op. RTMD scheduled for 5/21, so will reach out regarding weaning patient out of sling at this time. Patient will continue to benefit from skilled PT services to modify and progress therapeutic interventions, address functional mobility deficits, address ROM deficits, address strength deficits, analyze and address soft tissue restrictions and analyze and cue movement patterns to attain remaining goals. []  See Plan of Care 
[]  See progress note/recertification 
[]  See Discharge Summary Progress towards goals / Updated goals: 
Short Term Goals: To be accomplished in 3-4 weeks: 
1) Pt will be independent in initial HEP 
2) Pt will improve L shoulder PROM flex to >/=130 deg in order to aid in reaching MET 3) Pt will improve L shoulder PROM ER to >/=70 deg in order to aid in performing ADL's MET 4) Pt will report compliance with ice regimen  
  
Long Term Goals: To be accomplished in 8-12 weeks: 
1) Pt will improve L shoulder AROM flex to >/=130 deg in order to aid in reaching 2) Pt will report being able to don/doff clothing with </=2/10 L shoulder pain 3) Pt will demonstrate >/= 4/5 L shoulder flex, scap strength in order to aid in lifting 4) Pt will demonstrate >/= 4/5 L shoulder ER and IR strength in order to aid in performing ADL's PLAN [x]  Upgrade activities as tolerated     [x]  Continue plan of care 
[]  Update interventions per flow sheet      
[]  Discharge due to:_ 
[]  Other:_ Cheyenne Reid, PT 5/13/2019

## 2019-05-15 ENCOUNTER — APPOINTMENT (OUTPATIENT)
Dept: PHYSICAL THERAPY | Age: 64
End: 2019-05-15
Payer: MEDICARE

## 2019-05-16 ENCOUNTER — HOSPITAL ENCOUNTER (OUTPATIENT)
Dept: PHYSICAL THERAPY | Age: 64
Discharge: HOME | End: 2019-05-16
Payer: MEDICARE

## 2019-05-16 PROCEDURE — 97014 ELECTRIC STIMULATION THERAPY: CPT | Performed by: PHYSICAL THERAPIST

## 2019-05-16 PROCEDURE — 97110 THERAPEUTIC EXERCISES: CPT | Performed by: PHYSICAL THERAPIST

## 2019-05-16 PROCEDURE — 97140 MANUAL THERAPY 1/> REGIONS: CPT | Performed by: PHYSICAL THERAPIST

## 2019-05-16 NOTE — PROGRESS NOTES
PT DAILY TREATMENT NOTE 2-15 Patient Name: Kenny Galicia Date:2019 : 1955 [x]  Patient  Verified Payor: [place] [facility] / Plan: Delilah Tamayoy / Product Type: Medicare / In time: 1018a  Out time:1120a Total Treatment Time (min): 62 Total Timed Codes: 40 
1:1 Treatment Time: 40 Visit #:  6 Treatment Area: Left shoulder pain [M25.512] SUBJECTIVE Pain Level (0-10 scale): 6/10 Any medication changes, allergies to medications, adverse drug reactions, diagnosis change, or new procedure performed?: [x] No    [] Yes (see summary sheet for update) Subjective functional status/changes:   [] No changes reported Reports the shoulder is doing fairly well; having some pain in the night but no major changes. OBJECTIVE Modality rationale: decrease pain and increase tissue extensibility to improve the patients ability to restore function Min Type Additional Details 15 [x] Estim: []Att   [x]Unatt    []TENS instruct [x]IFC  []Premod   []NMES []Other:  []w/US   []w/ice   [x]w/heat Position: Location: shoulder  
  
 []  Traction: [] Cervical       []Lumbar 
                     [] Prone          []Supine []Intermittent   []Continuous Lbs: 
[] before manual 
[] after manual 
[]w/heat  
 []  Ultrasound: []Continuous   [] Pulsed  
                    at: []1MHz   []3MHz Location: 
W/cm2:  
 [] Paraffin Location:  
[]w/heat  
 []  Ice     []  Heat 
[]  Ice massage Position: Location:  
 []  Laser 
[]  Other: Position: Location:  
 
 []  Vasopneumatic Device Pressure:       [] lo [] med [] hi  
Temperature:   
[x] Skin assessment post-treatment:  [x]intact []redness- no adverse reaction 
  []redness  adverse reaction:  
 
 
30 min Therapeutic Exercise:  [x] See flow sheet : PROM L shoulder flex, abd, IR, ER to tolerance Rationale: increase ROM and increase strength to improve the patients ability to aid in lifting, reaching 
  
10 min Manual Therapy:   
Gentle anterior shoulder STM, GH post/inf mobilizations GII-III Rationale: increase ROM and increase tissue extensibility  to improve the patients ability to aid in lifting, performing ADL's, Donning/doffing clothing With 
 [x] TE 
 [] TA 
 [] neuro 
 [] other: Patient Education: [x] Review HEP [] Progressed/Changed HEP based on:  
[] positioning   [] body mechanics   [] transfers   [] heat/ice application   
[] other:   
 
Other Objective/Functional Measures: --  
 
Pain Level (0-10 scale) post treatment: 2/10 ASSESSMENT/Changes in Function:  
ROM levels progressing well. Progressed to AAROM exercises without pain. Will re-assess prior to RTMD next week Patient will continue to benefit from skilled PT services to modify and progress therapeutic interventions, address functional mobility deficits, address ROM deficits, address strength deficits, analyze and address soft tissue restrictions and analyze and cue movement patterns to attain remaining goals. []  See Plan of Care 
[]  See progress note/recertification 
[]  See Discharge Summary PLAN [x]  Upgrade activities as tolerated     [x]  Continue plan of care 
[]  Update interventions per flow sheet      
[]  Discharge due to:_ 
[]  Other:_ Cindy Perez, PT 5/16/2019

## 2019-05-20 ENCOUNTER — HOSPITAL ENCOUNTER (OUTPATIENT)
Dept: PHYSICAL THERAPY | Age: 64
Discharge: HOME | End: 2019-05-20
Payer: MEDICARE

## 2019-05-20 PROCEDURE — 97014 ELECTRIC STIMULATION THERAPY: CPT | Performed by: PHYSICAL THERAPIST

## 2019-05-20 PROCEDURE — 97110 THERAPEUTIC EXERCISES: CPT | Performed by: PHYSICAL THERAPIST

## 2019-05-20 NOTE — PROGRESS NOTES
PT DAILY TREATMENT NOTE - [facility] 2-15 Patient Name: Kelly Freire Date:2019 : 1955 [x]  Patient  Verified Payor: [place] / Plan: Delilah Tamayoy / Product Type: Medicare / In time:1012a  Out time: [address] Total Treatment Time (min): 61 Total Timed Codes (min): 45 
1:1 Treatment Time ( only): 45 Visit #:  4 Treatment Area: Left shoulder pain [M25.512] SUBJECTIVE Pain Level (0-10 scale): 5/10 Any medication changes, allergies to medications, adverse drug reactions, diagnosis change, or new procedure performed?: [x] No    [] Yes (see summary sheet for update) Subjective functional status/changes:   [] No changes reported Shoulder is doing better today. OBJECTIVE Modality rationale: decrease inflammation and decrease pain to improve the patients ability to restore function Min Type Additional Details 15 [x] Estim: []Att   [x]Unatt    []TENS instruct [x]IFC  []Premod   []NMES []Other:  []w/US   [x]w/ice   []w/heat Position: Location: shoulder  
  
 []  Traction: [] Cervical       []Lumbar 
                     [] Prone          []Supine []Intermittent   []Continuous Lbs: 
[] before manual 
[] after manual 
[]w/heat  
 []  Ultrasound: []Continuous   [] Pulsed  
                    at: []1MHz   []3MHz Location: 
W/cm2:  
 [] Paraffin Location:  
[]w/heat  
 []  Ice     []  Heat 
[]  Ice massage Position: Location:  
 []  Laser 
[]  Other: Position: Location:  
 
 []  Vasopneumatic Device Pressure:       [] lo [] med [] hi  
Temperature:   
 
[x] Skin assessment post-treatment:  [x]intact []redness- no adverse reaction 
  []redness  adverse reaction:  
 
45 min Therapeutic Exercise:  [x] See flow sheet : PROM L shoulder flex, abd, IR, ER to tolerance Rationale: increase ROM and increase strength to improve the patients ability to aid in lifting, reaching With 
 [] TE 
 [] TA 
 [] neuro 
 [] other: Patient Education: [x] Review HEP [] Progressed/Changed HEP based on:  
[] positioning   [] body mechanics   [] transfers   [] heat/ice application   
[] other:   
 
Other Objective/Functional Measures: --  
 
Pain Level (0-10 scale) post treatment: 0/10 ASSESSMENT/Changes in Function:  
[]  See Plan of Care [x]  See progress note/recertification 
[]  See Discharge Summary PLAN [x]  Upgrade activities as tolerated     [x]  Continue plan of care 
[]  Update interventions per flow sheet      
[]  Discharge due to:_ 
[]  Other:_ Bridger Carter, PT 5/20/2019

## 2019-05-20 NOTE — PROGRESS NOTES
[facility] Physical [facility], [address] [facility] Phone: [phone] Fax: [phone] Progress Note Name: Brinda Gay : 1955 MD: Radha Tripp MD 
  
 
Treatment Diagnosis: Left shoulder pain [M25.512] Start of Care: 19 Visits from Start of Care: 8 Missed Visits: 1 Summary of Care: Ms. Carol Ann Mendez has been progressing nicely s/p RTC repair. She is now 7 1/2 weeks post op, and has essentially full PROM and is tolerating AAROM and transition to light AROM well without increasing shoulder pain. Will continue to progress per post op protocol, and will alter plan of care per MD instructions. Shoulder PROM Flex: 170deg Abd: 165deg ER: 78deg IR: 70deg Shoulder AROM Flex: 80deg (beginning UT compensation) Abd: 90deg (with flexed elbow) IR: L2 
ER: T1 Short Term Goals: To be accomplished in 3-4 weeks: 
1) Pt will be independent in initial HEP 
2) Pt will improve L shoulder PROM flex to >/=130 deg in order to aid in reaching MET 3) Pt will improve L shoulder PROM ER to >/=70 deg in order to aid in performing ADL's MET 4) Pt will report compliance with ice regimen PARTIALLY MET 
  
Long Term Goals: To be accomplished in 8-12 weeks: 
1) Pt will improve L shoulder AROM flex to >/=130 deg in order to aid in reaching 2) Pt will report being able to don/doff clothing with </=2/10 L shoulder pain 3) Pt will demonstrate >/= 4/5 L shoulder flex, scap strength in order to aid in lifting 4) Pt will demonstrate >/= 4/5 L shoulder ER and IR strength in order to aid in performing ADL's 
  
 
Assessment / Recommendations: Other: Would continue to benefit from PT 2x/weekly for further 6 weeks in order to regain functional AROM and begin light shoulder strengthening Micheal Nielsen, PT 2019  
 
________________________________________________________________________ NOTE TO PHYSICIAN:  Please complete the following and fax to: [facility]: Fax: [phone] . Yennifer Craven Retain this original for your records. If you are unable to process this request in 24 hours, please contact our office. ____ I have read the above report and request that my patient continue therapy with the following changes/special instructions: 
____ I have read the above report and request that my patient be discharged from therapy [de-identified] Signature:_________________ Date:___________Time:__________

## 2019-05-21 ENCOUNTER — HOSPITAL ENCOUNTER (OUTPATIENT)
Dept: MAMMOGRAPHY | Age: 64
Discharge: HOME | End: 2019-05-21
Attending: INTERNAL MEDICINE
Payer: MEDICARE

## 2019-05-21 DIAGNOSIS — Z12.31 VISIT FOR SCREENING MAMMOGRAM: ICD-10-CM

## 2019-05-21 PROCEDURE — 77067 SCR MAMMO BI INCL CAD: CPT

## 2019-05-22 ENCOUNTER — APPOINTMENT (OUTPATIENT)
Dept: PHYSICAL THERAPY | Age: 64
End: 2019-05-22
Payer: MEDICARE

## 2019-05-23 ENCOUNTER — HOSPITAL ENCOUNTER (OUTPATIENT)
Dept: PHYSICAL THERAPY | Age: 64
Discharge: HOME | End: 2019-05-23
Payer: MEDICARE

## 2019-05-23 PROCEDURE — 97110 THERAPEUTIC EXERCISES: CPT | Performed by: PHYSICAL THERAPIST

## 2019-05-23 PROCEDURE — 97014 ELECTRIC STIMULATION THERAPY: CPT | Performed by: PHYSICAL THERAPIST

## 2019-05-23 NOTE — PROGRESS NOTES
PT DAILY TREATMENT NOTE - [facility] 2-15    Patient Name: Lakeisha Brambial  Date:2019  : 1955  [x]  Patient  Verified  Payor: [place] / Plan: [place] MEDICARE PART A & B / Product Type: Medicare /    In time: 1000a  Out time: 1110a  Total Treatment Time (min): 70  Total Timed Codes (min): 45  1:1 Treatment Time ([address] only): 45   Visit #:  8    Treatment Area: Left shoulder pain [M25.512]    SUBJECTIVE  Pain Level (0-10 scale): 6/10  Any medication changes, allergies to medications, adverse drug reactions, diagnosis change, or new procedure performed?: [x] No    [] Yes (see summary sheet for update)  Subjective functional status/changes:   [] No changes reported  Shoulder doing fairly well. Says that she saw Dr. Aditya Nelson and he was pleased with her progress and wants her to start doing more movements with her arm actively.      OBJECTIVE    Modality rationale: decrease inflammation and decrease pain to improve the patients ability to restore function    Min Type Additional Details      15 [x] Estim: []Att   [x]Unatt    []TENS instruct                  [x]IFC  []Premod   []NMES                     []Other:  []w/US   [x]w/ice   []w/heat  Position:  Location: shoulder        []  Traction: [] Cervical       []Lumbar                       [] Prone          []Supine                       []Intermittent   []Continuous Lbs:  [] before manual  [] after manual  []w/heat     []  Ultrasound: []Continuous   [] Pulsed                       at: []1MHz   []3MHz Location:  W/cm2:     [] Paraffin         Location:   []w/heat     []  Ice     []  Heat  []  Ice massage Position:  Location:     []  Laser  []  Other: Position:  Location:        []  Vasopneumatic Device Pressure:       [] lo [] med [] hi   Temperature:       [x] Skin assessment post-treatment:  [x]intact []redness- no adverse reaction    []redness  adverse reaction:      45 min Therapeutic Exercise:  [x] See flow sheet : PROM L shoulder flex, abd, IR, ER to tolerance Rationale: increase ROM and increase strength to improve the patients ability to aid in lifting, reaching          With   [] TE   [] TA   [] neuro   [] other: Patient Education: [x] Review HEP    [] Progressed/Changed HEP based on:   [] positioning   [] body mechanics   [] transfers   [] heat/ice application    [] other:      Other Objective/Functional Measures: --     Pain Level (0-10 scale) post treatment: 3/10    ASSESSMENT/Changes in Function:   Improving with AAROM flexion to at least 125deg today. Will continue to progress her shoulder AROM per tolerance. Patient will continue to benefit from skilled PT services to modify and progress therapeutic interventions, address functional mobility deficits, address ROM deficits, address strength deficits, analyze and address soft tissue restrictions, analyze and cue movement patterns and analyze and modify body mechanics/ergonomics to attain remaining goals.      []  See Plan of Care  []  See progress note/recertification  []  See Discharge Summary         Progress towards goals / Updated goals:  Short Term Goals: To be accomplished in 3-4 weeks:  1) Pt will be independent in initial HEP  2) Pt will improve L shoulder PROM flex to >/=130 deg in order to aid in reaching MET  3) Pt will improve L shoulder PROM ER to >/=70 deg in order to aid in performing ADL's MET  4) Pt will report compliance with ice regimen PARTIALLY MET     Long Term Goals: To be accomplished in 8-12 weeks:  1) Pt will improve L shoulder AROM flex to >/=130 deg in order to aid in reaching  2) Pt will report being able to don/doff clothing with </=2/10 L shoulder pain  3) Pt will demonstrate >/= 4/5 L shoulder flex, scap strength in order to aid in lifting  4) Pt will demonstrate >/= 4/5 L shoulder ER and IR strength in order to aid in performing ADL's        PLAN  [x]  Upgrade activities as tolerated     [x]  Continue plan of care  []  Update interventions per flow sheet       []  Discharge due to:_  []  Other:_      Dominique Cornell, PT 5/23/2019

## 2019-05-27 ENCOUNTER — APPOINTMENT (OUTPATIENT)
Dept: PHYSICAL THERAPY | Age: 64
End: 2019-05-27
Payer: MEDICARE

## 2019-05-29 ENCOUNTER — APPOINTMENT (OUTPATIENT)
Dept: PHYSICAL THERAPY | Age: 64
End: 2019-05-29
Payer: MEDICARE

## 2019-05-30 ENCOUNTER — HOSPITAL ENCOUNTER (OUTPATIENT)
Dept: PHYSICAL THERAPY | Age: 64
Discharge: HOME | End: 2019-05-30
Payer: MEDICARE

## 2019-05-30 PROCEDURE — 97110 THERAPEUTIC EXERCISES: CPT | Performed by: PHYSICAL THERAPIST

## 2019-05-30 PROCEDURE — 97014 ELECTRIC STIMULATION THERAPY: CPT | Performed by: PHYSICAL THERAPIST

## 2019-05-30 PROCEDURE — 97140 MANUAL THERAPY 1/> REGIONS: CPT | Performed by: PHYSICAL THERAPIST

## 2019-05-30 NOTE — PROGRESS NOTES
PT DAILY TREATMENT NOTE 2-15    Patient Name: Alcides Conde  Date:2019  : 1955  [x]  Patient  Verified  Payor: [place] MED[place] / Plan: [place] MEDICARE PART A & B / Product Type: Medicare /    In time: 802Y  Out time: 1050a  Total Treatment Time (min): 65  Visit #:  9    Treatment Area: Left shoulder pain [M25.512]    SUBJECTIVE  Pain Level (0-10 scale): 5/10  Any medication changes, allergies to medications, adverse drug reactions, diagnosis change, or new procedure performed?: [x] No    [] Yes (see summary sheet for update)  Subjective functional status/changes:   [] No changes reported  Reports her shoulder is doing better overall with motion. OBJECTIVE    Modality rationale: decrease inflammation and decrease pain to improve the patients ability to restore function   Min Type Additional Details      15 [x] Estim: []Att   [x]Unatt    []TENS instruct                  [x]IFC  []Premod   []NMES                     []Other:  []w/US   [x]w/ice   []w/heat  Position:  Location: shoulder       []  Traction: [] Cervical       []Lumbar                       [] Prone          []Supine                       []Intermittent   []Continuous Lbs:  [] before manual  [] after manual  []w/heat    []  Ultrasound: []Continuous   [] Pulsed                       at: []1MHz   []3MHz Location:  W/cm2:    [] Paraffin         Location:   []w/heat    []  Ice     []  Heat  []  Ice massage Position:  Location:    []  Laser  []  Other: Position:  Location:      []  Vasopneumatic Device Pressure:       [] lo [] med [] hi   Temperature:      [x] Skin assessment post-treatment:  [x]intact []redness- no adverse reaction    []redness  adverse reaction:     40 min Therapeutic Exercise:  [x] See flow sheet : PROM for flexion, ER, IR, scaption   Rationale: increase ROM and increase strength to improve the patients ability to restore function    10 min Manual Therapy:  STM to anterior shoulder, proximal biceps region.  [address] post/inf mobilizations GII-III   Rationale: decrease pain, increase ROM and increase tissue extensibility  to improve the patients ability to restore function          With   [] TE   [] TA   [] neuro   [] other: Patient Education: [x] Review HEP    [] Progressed/Changed HEP based on:   [] positioning   [] body mechanics   [] transfers   [] heat/ice application    [] other:      Other Objective/Functional Measures: --     Pain Level (0-10 scale) post treatment: 0/10    ASSESSMENT/Changes in Function:   Continues to show good progress with transition to AAROM and AROM. Demonstrates mild scapular hike, however was able to minimize in mirror with scaption to 90deg  Patient will continue to benefit from skilled PT services to modify and progress therapeutic interventions, address functional mobility deficits, address ROM deficits, address strength deficits, analyze and address soft tissue restrictions and analyze and cue movement patterns to attain remaining goals.      []  See Plan of Care  []  See progress note/recertification  []  See Discharge Summary         Progress towards goals / Updated goals:  Short Term Goals: To be accomplished in 3-4 weeks:  1) Pt will be independent in initial HEP  2) Pt will improve L shoulder PROM flex to >/=130 deg in order to aid in reaching MET  3) Pt will improve L shoulder PROM ER to >/=70 deg in order to aid in performing ADL's MET  4) Pt will report compliance with ice regimen PARTIALLY MET     Long Term Goals: To be accomplished in 8-12 weeks:  1) Pt will improve L shoulder AROM flex to >/=130 deg in order to aid in reaching  2) Pt will report being able to don/doff clothing with </=2/10 L shoulder pain  3) Pt will demonstrate >/= 4/5 L shoulder flex, scap strength in order to aid in lifting  4) Pt will demonstrate >/= 4/5 L shoulder ER and IR strength in order to aid in performing ADL's    PLAN  [x]  Upgrade activities as tolerated     [x]  Continue plan of care  []  Update interventions per flow sheet       []  Discharge due to:_  []  Other:_      Aparna Garcia, PT 5/30/2019

## 2019-05-31 ENCOUNTER — OFFICE VISIT (OUTPATIENT)
Dept: INTERNAL MEDICINE CLINIC | Age: 64
End: 2019-05-31

## 2019-05-31 VITALS
OXYGEN SATURATION: 99 % | TEMPERATURE: 98.7 F | HEART RATE: 68 BPM | HEIGHT: 65 IN | WEIGHT: 190 LBS | BODY MASS INDEX: 31.65 KG/M2 | SYSTOLIC BLOOD PRESSURE: 127 MMHG | DIASTOLIC BLOOD PRESSURE: 68 MMHG

## 2019-05-31 DIAGNOSIS — K22.4 ESOPHAGEAL SPASM: ICD-10-CM

## 2019-05-31 DIAGNOSIS — E78.9 BORDERLINE HIGH CHOLESTEROL: ICD-10-CM

## 2019-05-31 DIAGNOSIS — E66.9 OBESITY (BMI 30.0-34.9): ICD-10-CM

## 2019-05-31 DIAGNOSIS — F41.9 ANXIETY: ICD-10-CM

## 2019-05-31 DIAGNOSIS — R53.83 FATIGUE, UNSPECIFIED TYPE: ICD-10-CM

## 2019-05-31 DIAGNOSIS — R63.4 WEIGHT LOSS, ABNORMAL: ICD-10-CM

## 2019-05-31 DIAGNOSIS — E03.9 ACQUIRED HYPOTHYROIDISM: ICD-10-CM

## 2019-05-31 DIAGNOSIS — I10 ESSENTIAL HYPERTENSION: Primary | ICD-10-CM

## 2019-05-31 RX ORDER — HYDROCORTISONE 25 MG/G
CREAM TOPICAL 4 TIMES DAILY
Qty: 30 G | Refills: 0 | Status: SHIPPED | OUTPATIENT
Start: 2019-05-31 | End: 2021-05-10 | Stop reason: SDUPTHER

## 2019-05-31 RX ORDER — ALPRAZOLAM 0.5 MG/1
.25-.5 TABLET ORAL
Qty: 30 TAB | Refills: 2 | Status: SHIPPED | OUTPATIENT
Start: 2019-05-31 | End: 2020-12-02 | Stop reason: SDUPTHER

## 2019-05-31 RX ORDER — TRAMADOL HYDROCHLORIDE 50 MG/1
50 TABLET ORAL
COMMUNITY
End: 2020-02-06 | Stop reason: ALTCHOICE

## 2019-05-31 RX ORDER — MOMETASONE FUROATE 1 MG/G
CREAM TOPICAL DAILY
Qty: 30 G | Refills: 0 | Status: SHIPPED | OUTPATIENT
Start: 2019-05-31 | End: 2020-08-17

## 2019-05-31 NOTE — PROGRESS NOTES
Subjective:     Lakeisha Brambila is a 71 y.o. female who presents for follow up of hypertension. Diet and Lifestyle: generally follows a low sodium diet  Home BP Monitoring: is not measured at home    Cardiovascular ROS: taking medications as instructed, no medication side effects noted, no TIA's, no chest pain on exertion, no dyspnea on exertion, no swelling of ankles, no orthostatic dizziness or lightheadedness, no palpitations. New concerns: Had repair of partial tear of left rotator cuff end of March with Dr. Aditya Nelson. Currently in PT 2 times a week but still having a lot of pain in her shoulder. Right shoulder was repaired 3/2018 - has good rom but pain has improved  She has been feeling very tired with decreased appetite and weight loss. Nothing tastes good anymore. Some pain in RLQ which comes with her Right lower back pain. No pain with eating. GI, Julia Livingston, recently changed her to Omeprazole 20mg bid and Dicyclomine for lower cramping and Odansetron for nausea. No reflux of indigestion since changing meds. No vomiting, nausea approx 3 days a week. Bowels alternate between diarrhea and constipation. Dr. Reginald Bosch put her on Nifedipine for her blood pressure and chest pain/spasms. The CP has resolved since starting the Nifedipine. In January she weighted 202. Dropped to 180 but trying to eat more and weight came back to 190. Itching in left neck. Had been on proctosone for hemorrhoids. Current Outpatient Medications   Medication Sig Dispense Refill    traMADol (ULTRAM) 50 mg tablet Take 50 mg by mouth every six (6) hours as needed for Pain.  ondansetron (ZOFRAN ODT) 4 mg disintegrating tablet Take 2 Tabs by mouth every eight (8) hours as needed for Nausea. 12 Tab 0    dicyclomine (BENTYL) 10 mg capsule Take 10 mg by mouth three (3) times daily as needed.  diclofenac EC (VOLTAREN) 75 mg EC tablet Take 75 mg by mouth two (2) times a day.       ALPRAZolam Eleazar Murguia) 0.5 mg tablet Take 1 Tab by mouth two (2) times daily as needed. 30 Tab 2    NIFEdipine ER (PROCARDIA XL) 60 mg ER tablet Take 60 mg by mouth daily.  Omeprazole delayed release (PRILOSEC D/R) 20 mg tablet Take 1 Tab by mouth daily. daily before breakfast (Patient taking differently: Take 20 mg by mouth two (2) times a day. daily before breakfast) 30 Tab 5    levothyroxine (SYNTHROID) 112 mcg tablet Take 1 Tab by mouth Daily (before breakfast). 90 Tab 3    valsartan (DIOVAN) 160 mg tablet TAKE ONE TABLET BY MOUTH EVERY DAY (Patient taking differently: Take 160 mg by mouth daily. TAKE ONE TABLET BY MOUTH EVERY DAY) 90 Tab 3    calcium carbonate (TUMS) 200 mg calcium (500 mg) chew Take 1 Tab by mouth as needed.  ketorolac (ACULAR) 0.5 % ophthalmic solution Administer 2 Drops to both eyes four (4) times daily as needed. 1 Bottle 0    prednisoLONE acetate (PRED FORTE) 1 % ophthalmic suspension Administer 1 Drop to both eyes three (3) times daily as needed. Lab Results   Component Value Date/Time    ALT (SGPT) 22 01/10/2018 12:36 PM    AST (SGOT) 15 01/10/2018 12:36 PM    Alk. phosphatase 83 01/10/2018 12:36 PM    Bilirubin, total 0.7 01/10/2018 12:36 PM    Albumin 3.8 01/10/2018 12:36 PM    Protein, total 6.7 01/10/2018 12:36 PM    INR 1.3 (H) 08/05/2017 02:49 AM    INR POC 1.1 08/14/2017 12:33 PM    Prothrombin time 13.0 (H) 08/05/2017 02:49 AM    PLATELET 528 69/54/3888 12:36 PM     Lab Results   Component Value Date/Time    GFR est non-AA >60 03/21/2019 11:17 AM    GFR est AA >60 03/21/2019 11:17 AM    Creatinine 0.75 03/21/2019 11:17 AM    BUN 19 01/10/2018 12:36 PM    Sodium 141 01/10/2018 12:36 PM    Potassium 3.7 03/21/2019 11:17 AM    Chloride 105 01/10/2018 12:36 PM    CO2 31 01/10/2018 12:36 PM        Review of Systems, additional:  Pertinent items are noted in HPI.     Objective:     Visit Vitals  /68   Pulse 68   Temp 98.7 °F (37.1 °C) (Oral)   Ht 5' 5\" (1.651 m)   Wt 190 lb (86.2 kg)   SpO2 99%   BMI 31.62 kg/m²     Appearance: alert, well appearing, and in no distress and oriented to person, place, and time. General exam:   Nares - mild congestion. NECK: supple, no lad, no bruit, no tm  LUNGS: cta bilat  CV rrr, no m/g/r  ABD: soft, nt, nd, nabs  EXT: no c/c/e    Assessment/Plan:     hypertension well controlled. current treatment plan is effective, no change in therapy. Hypothyroid - with fatigue and recent weight loss ? Control. Repeat labs and adjust meds prn    Abnormal weight loss - has put some weight back on. Most likely secondary to anesthesia and pain meds as improving. Esophageal spasms - controlled with nifedipine, cont same    Obesity  Discussed healthy weight loss through diet and exercise    Anxiety - controlled, cont same. Rare use of xanax - usually only takes 1/2 tab prn    Fatigue - secondary to all of the above. Check labs for other causes.       Orders Placed This Encounter    METABOLIC PANEL, COMPREHENSIVE    CBC W/O DIFF    TSH 3RD GENERATION    T4, FREE    LIPID PANEL    traMADol (ULTRAM) 50 mg tablet    mometasone (ELOCON) 0.1 % topical cream    hydrocortisone (PROCTOSOL HC) 2.5 % rectal cream    ALPRAZolam (XANAX) 0.5 mg tablet

## 2019-06-03 ENCOUNTER — HOSPITAL ENCOUNTER (OUTPATIENT)
Dept: PHYSICAL THERAPY | Age: 64
Discharge: HOME | End: 2019-06-03
Payer: MEDICARE

## 2019-06-03 PROCEDURE — 97014 ELECTRIC STIMULATION THERAPY: CPT | Performed by: PHYSICAL THERAPIST

## 2019-06-03 PROCEDURE — 97110 THERAPEUTIC EXERCISES: CPT | Performed by: PHYSICAL THERAPIST

## 2019-06-03 PROCEDURE — 97140 MANUAL THERAPY 1/> REGIONS: CPT | Performed by: PHYSICAL THERAPIST

## 2019-06-03 NOTE — PROGRESS NOTES
PT DAILY TREATMENT NOTE 2-15    Patient Name: Rockey Favre  Date:6/3/2019  : 1955  [x]  Patient  Verified  Payor: [place] MEDICARE / Plan: [place] MEDICARE PART A & B / Product Type: Medicare /    In time:1000a  Out time: 1105a  Total Treatment Time (min): 65  Visit #:  10    Treatment Area: Left shoulder pain [M25.512]    SUBJECTIVE  Pain Level (0-10 scale): 6/10  Any medication changes, allergies to medications, adverse drug reactions, diagnosis change, or new procedure performed?: [x] No    [] Yes (see summary sheet for update)  Subjective functional status/changes:   [] No changes reported  Says that over the weekend her shoulder was really painful and she has having a lot of issues with it. Doesn't feel she did anything out of the ordinary, but was so painful she couldn't sleep.      OBJECTIVE    Modality rationale: decrease inflammation and decrease pain to improve the patients ability to restore function    Min Type Additional Details      15 [x] Estim: []Att   [x]Unatt    []TENS instruct                  [x]IFC  []Premod   []NMES                     []Other:  []w/US   [x]w/ice   []w/heat  Position:  Location: shoulder        []  Traction: [] Cervical       []Lumbar                       [] Prone          []Supine                       []Intermittent   []Continuous Lbs:  [] before manual  [] after manual  []w/heat     []  Ultrasound: []Continuous   [] Pulsed                       at: []1MHz   []3MHz Location:  W/cm2:     [] Paraffin         Location:   []w/heat     []  Ice     []  Heat  []  Ice massage Position:  Location:     []  Laser  []  Other: Position:  Location:        []  Vasopneumatic Device Pressure:       [] lo [] med [] hi   Temperature:       [x] Skin assessment post-treatment:  [x]intact []redness- no adverse reaction    []redness  adverse reaction:      35 min Therapeutic Exercise:  [x] See flow sheet : PROM for flexion, ER, IR, scaption   Rationale: increase ROM and increase strength to improve the patients ability to restore function     10 min Manual Therapy:  STM to anterior shoulder, proximal biceps region. [address] post/inf mobilizations GII-III   Rationale: decrease pain, increase ROM and increase tissue extensibility  to improve the patients ability to restore function          With   [] TE   [] TA   [] neuro   [] other: Patient Education: [x] Review HEP    [] Progressed/Changed HEP based on:   [] positioning   [] body mechanics   [] transfers   [] heat/ice application    [] other:      Other Objective/Functional Measures: --     Pain Level (0-10 scale) post treatment: 2/10    ASSESSMENT/Changes in Function:   Patient's reported pain location over distal anterior deltoid and biceps. Given intermittent nature of pain and continued progress with mobility, counseled patient the likelihood of a re-tear seems to be low. Will need to progress into more AROM exercises next session as she is nearing 10 weeks post-op. Patient will continue to benefit from skilled PT services to modify and progress therapeutic interventions, address functional mobility deficits, address ROM deficits, address strength deficits, analyze and address soft tissue restrictions and analyze and cue movement patterns to attain remaining goals.      []  See Plan of Care  []  See progress note/recertification  []  See Discharge Summary         Progress towards goals / Updated goals:  Short Term Goals: To be accomplished in 3-4 weeks:  1) Pt will be independent in initial HEP MET  2) Pt will improve L shoulder PROM flex to >/=130 deg in order to aid in reaching MET  3) Pt will improve L shoulder PROM ER to >/=70 deg in order to aid in performing ADL's MET  4) Pt will report compliance with ice regimen MET     Long Term Goals: To be accomplished in 8-12 weeks:  1) Pt will improve L shoulder AROM flex to >/=130 deg in order to aid in reaching  2) Pt will report being able to don/doff clothing with </=2/10 L shoulder pain  3) Pt will demonstrate >/= 4/5 L shoulder flex, scap strength in order to aid in lifting  4) Pt will demonstrate >/= 4/5 L shoulder ER and IR strength in order to aid in performing ADL's    PLAN  [x]  Upgrade activities as tolerated     [x]  Continue plan of care  []  Update interventions per flow sheet       []  Discharge due to:_  []  Other:_      Darrin Nageotte, PT 6/3/2019

## 2019-06-05 ENCOUNTER — TELEPHONE (OUTPATIENT)
Dept: INTERNAL MEDICINE CLINIC | Age: 64
End: 2019-06-05

## 2019-06-05 LAB
ALBUMIN SERPL-MCNC: 4.2 G/DL (ref 3.6–4.8)
ALBUMIN/GLOB SERPL: 1.8 {RATIO} (ref 1.2–2.2)
ALP SERPL-CCNC: 94 IU/L (ref 39–117)
ALT SERPL-CCNC: 15 IU/L (ref 0–32)
AST SERPL-CCNC: 16 IU/L (ref 0–40)
BILIRUB SERPL-MCNC: 0.7 MG/DL (ref 0–1.2)
BUN SERPL-MCNC: 15 MG/DL (ref 8–27)
BUN/CREAT SERPL: 20 (ref 12–28)
CALCIUM SERPL-MCNC: 9.6 MG/DL (ref 8.7–10.3)
CHLORIDE SERPL-SCNC: 102 MMOL/L (ref 96–106)
CHOLEST SERPL-MCNC: 177 MG/DL (ref 100–199)
CO2 SERPL-SCNC: 29 MMOL/L (ref 20–29)
CREAT SERPL-MCNC: 0.75 MG/DL (ref 0.57–1)
ERYTHROCYTE [DISTWIDTH] IN BLOOD BY AUTOMATED COUNT: 14.3 % (ref 12.3–15.4)
GLOBULIN SER CALC-MCNC: 2.3 G/DL (ref 1.5–4.5)
GLUCOSE SERPL-MCNC: 78 MG/DL (ref 65–99)
HCT VFR BLD AUTO: 37.5 % (ref 34–46.6)
HDLC SERPL-MCNC: 58 MG/DL
HGB BLD-MCNC: 12.3 G/DL (ref 11.1–15.9)
INTERPRETATION, 910389: NORMAL
LDLC SERPL CALC-MCNC: 107 MG/DL (ref 0–99)
MCH RBC QN AUTO: 29.4 PG (ref 26.6–33)
MCHC RBC AUTO-ENTMCNC: 32.8 G/DL (ref 31.5–35.7)
MCV RBC AUTO: 90 FL (ref 79–97)
PLATELET # BLD AUTO: 255 X10E3/UL (ref 150–450)
POTASSIUM SERPL-SCNC: 4.6 MMOL/L (ref 3.5–5.2)
PROT SERPL-MCNC: 6.5 G/DL (ref 6–8.5)
RBC # BLD AUTO: 4.18 X10E6/UL (ref 3.77–5.28)
SODIUM SERPL-SCNC: 142 MMOL/L (ref 134–144)
T4 FREE SERPL-MCNC: 2.04 NG/DL (ref 0.82–1.77)
TRIGL SERPL-MCNC: 62 MG/DL (ref 0–149)
TSH SERPL DL<=0.005 MIU/L-ACNC: 0.04 UIU/ML (ref 0.45–4.5)
VLDLC SERPL CALC-MCNC: 12 MG/DL (ref 5–40)
WBC # BLD AUTO: 3.5 X10E3/UL (ref 3.4–10.8)

## 2019-06-05 RX ORDER — LEVOTHYROXINE SODIUM 100 UG/1
100 TABLET ORAL
Qty: 30 TAB | Refills: 11 | Status: SHIPPED | OUTPATIENT
Start: 2019-06-05 | End: 2020-01-09

## 2019-06-05 NOTE — TELEPHONE ENCOUNTER
tsh suppressed and free T4 elevated.   Decreasing levothyroxine to 100mcg every day  Pt notified via [facility]

## 2019-06-06 ENCOUNTER — HOSPITAL ENCOUNTER (OUTPATIENT)
Dept: PHYSICAL THERAPY | Age: 64
Discharge: HOME | End: 2019-06-06
Payer: MEDICARE

## 2019-06-06 PROCEDURE — 97014 ELECTRIC STIMULATION THERAPY: CPT | Performed by: PHYSICAL THERAPIST

## 2019-06-06 PROCEDURE — 97110 THERAPEUTIC EXERCISES: CPT | Performed by: PHYSICAL THERAPIST

## 2019-06-06 NOTE — PROGRESS NOTES
PT DAILY TREATMENT NOTE 2-15    Patient Name: Lina Ray  Date:2019  : 1955  [x]  Patient  Verified  Payor: [place] MEDICARE / Plan: [place] MEDICARE PART A & B / Product Type: Medicare /    In time: 3040U  Out time:1105a  Total Treatment Time (min): 60  Visit #:  11    Treatment Area: Left shoulder pain [M25.512]    SUBJECTIVE  Pain Level (0-10 scale): 4/10  Any medication changes, allergies to medications, adverse drug reactions, diagnosis change, or new procedure performed?: [x] No    [] Yes (see summary sheet for update)  Subjective functional status/changes:   [] No changes reported  Pt reports she is doing fairly well today. Still getting pains, particularly at night, but has been cutting a painkiller in half and that has helped.      OBJECTIVE           Modality rationale: decrease inflammation and decrease pain to improve the patients ability to restore function     Min Type Additional Details      15 [x] Estim: []Att   [x]Unatt    []TENS instruct                  [x]IFC  []Premod   []NMES                     []Other:  []w/US   [x]w/ice   []w/heat  Position:  Location: shoulder        []  Traction: [] Cervical       []Lumbar                       [] Prone          []Supine                       []Intermittent   []Continuous Lbs:  [] before manual  [] after manual  []w/heat     []  Ultrasound: []Continuous   [] Pulsed                       at: []1MHz   []3MHz Location:  W/cm2:     [] Paraffin     Location:   []w/heat     []  Ice     []  Heat  []  Ice massage Position:  Location:     []  Laser  []  Other: Position:  Location:        []  Vasopneumatic Device Pressure:       [] lo [] med [] hi   Temperature:       [x] Skin assessment post-treatment:  [x]intact []redness- no adverse reaction    []redness  adverse reaction:      40 min Therapeutic Exercise:  [x] See flow sheet : PROM for flexion, ER, IR, scaption   Rationale: increase ROM and increase strength to improve the patients ability to restore function     5 min Manual Therapy:  STM to anterior shoulder, proximal biceps region. [address] post/inf mobilizations GII-III   Rationale: decrease pain, increase ROM and increase tissue extensibility  to improve the patients ability to restore function    With   [] TE   [] TA   [] neuro   [] other: Patient Education: [x] Review HEP    [] Progressed/Changed HEP based on:   [] positioning   [] body mechanics   [] transfers   [] heat/ice application    [] other:      Other Objective/Functional Measures: Shoulder flexion AROM: 135deg     Pain Level (0-10 scale) post treatment: 1/10    ASSESSMENT/Changes in Function:   Continues to make progress with gaining AROM. She continues to report pain and hesitation with AROM, however mobility looks good. Patient will continue to benefit from skilled PT services to modify and progress therapeutic interventions, address functional mobility deficits, address ROM deficits, address strength deficits, analyze and address soft tissue restrictions and analyze and cue movement patterns to attain remaining goals.      []  See Plan of Care  []  See progress note/recertification  []  See Discharge Summary         Progress towards goals / Updated goals:  Short Term Goals: To be accomplished in 3-4 weeks:  1) Pt will be independent in initial HEP MET  2) Pt will improve L shoulder PROM flex to >/=130 deg in order to aid in reaching MET  3) Pt will improve L shoulder PROM ER to >/=70 deg in order to aid in performing ADL's MET  4) Pt will report compliance with ice regimen MET     Long Term Goals: To be accomplished in 8-12 weeks:  1) Pt will improve L shoulder AROM flex to >/=130 deg in order to aid in reaching PROGRESSING  2) Pt will report being able to don/doff clothing with </=2/10 L shoulder pain  3) Pt will demonstrate >/= 4/5 L shoulder flex, scap strength in order to aid in lifting  4) Pt will demonstrate >/= 4/5 L shoulder ER and IR strength in order to aid in performing ADL's    PLAN  [x]  Upgrade activities as tolerated     [x]  Continue plan of care  []  Update interventions per flow sheet       []  Discharge due to:_  []  Other:_      Ismael Traore, PT 6/6/2019

## 2019-06-10 ENCOUNTER — HOSPITAL ENCOUNTER (OUTPATIENT)
Dept: PHYSICAL THERAPY | Age: 64
Discharge: HOME | End: 2019-06-10
Payer: MEDICARE

## 2019-06-10 PROCEDURE — 97140 MANUAL THERAPY 1/> REGIONS: CPT | Performed by: PHYSICAL THERAPIST

## 2019-06-10 PROCEDURE — 97110 THERAPEUTIC EXERCISES: CPT | Performed by: PHYSICAL THERAPIST

## 2019-06-10 NOTE — PROGRESS NOTES
PT DAILY TREATMENT NOTE 2-15    Patient Name: Ana Luisa Matthews  Date:6/10/2019  : 1955  [x]  Patient  Verified  Payor: [place] MEDICARE / Plan: [place] MEDICARE PART A & B / Product Type: Medicare /    In time:  1000a  Out time:1055a  Total Treatment Time (min): 55  Visit #:  12    Treatment Area: Left shoulder pain [M25.512]    SUBJECTIVE  Pain Level (0-10 scale): 4/10  Any medication changes, allergies to medications, adverse drug reactions, diagnosis change, or new procedure performed?: [x] No    [] Yes (see summary sheet for update)  Subjective functional status/changes:   [] No changes reported  Reports her pain is still present about like it has been. Reports her pain is a \"10\" at night, but is better now.      OBJECTIVE           Modality rationale: decrease inflammation and decrease pain to improve the patients ability to restore function     Min Type Additional Details       [] Estim: []Att   [x]Unatt    []TENS instruct                  [x]IFC  []Premod   []NMES                     []Other:  []w/US   []w/ice   []w/heat  Position:  Location: shoulder        []  Traction: [] Cervical       []Lumbar                       [] Prone          []Supine                       []Intermittent   []Continuous Lbs:  [] before manual  [] after manual  []w/heat     []  Ultrasound: []Continuous   [] Pulsed                       at: []1MHz   []3MHz Location:  W/cm2:     [] Paraffin     Location:   []w/heat   To go  [x]  Ice     []  Heat  []  Ice massage Position:  Location:     []  Laser  []  Other: Position:  Location:        []  Vasopneumatic Device Pressure:       [] lo [] med [] hi   Temperature:       [x] Skin assessment post-treatment:  [x]intact []redness- no adverse reaction    []redness  adverse reaction:      45 min Therapeutic Exercise:  [x] See flow sheet : PROM for flexion, ER, IR, scaption   Rationale: increase ROM and increase strength to improve the patients ability to restore function     10 min Manual Therapy:  STM to anterior shoulder, proximal biceps region. [address] post/inf mobilizations GII-III   Rationale: decrease pain, increase ROM and increase tissue extensibility  to improve the patients ability to restore function          With   [] TE   [] TA   [] neuro   [] other: Patient Education: [x] Review HEP    [] Progressed/Changed HEP based on:   [] positioning   [] body mechanics   [] transfers   [] heat/ice application    [] other:      Other Objective/Functional Measures: --     Pain Level (0-10 scale) post treatment: 2/10    ASSESSMENT/Changes in Function:   Continues to report high pain levels, particularly at nighttime, however her function is continuing to improve post-operatively. Able to demonstrate nearly full scaption and flexion AROM with shortened lever arm (flexed elbow)  Patient will continue to benefit from skilled PT services to modify and progress therapeutic interventions, address functional mobility deficits, address ROM deficits, address strength deficits, analyze and address soft tissue restrictions and analyze and cue movement patterns to attain remaining goals.      []  See Plan of Care  []  See progress note/recertification  []  See Discharge Summary         Progress towards goals / Updated goals:  Good ROM progress, difficulty with pain management    PLAN  [x]  Upgrade activities as tolerated     [x]  Continue plan of care  []  Update interventions per flow sheet       []  Discharge due to:_  []  Other:_      Juan Gomes, PT 6/10/2019

## 2019-06-13 ENCOUNTER — APPOINTMENT (OUTPATIENT)
Dept: PHYSICAL THERAPY | Age: 64
End: 2019-06-13
Payer: MEDICARE

## 2019-06-17 ENCOUNTER — HOSPITAL ENCOUNTER (OUTPATIENT)
Dept: PHYSICAL THERAPY | Age: 64
Discharge: HOME | End: 2019-06-17
Payer: MEDICARE

## 2019-06-17 PROCEDURE — 97140 MANUAL THERAPY 1/> REGIONS: CPT | Performed by: PHYSICAL THERAPIST

## 2019-06-17 PROCEDURE — 97110 THERAPEUTIC EXERCISES: CPT | Performed by: PHYSICAL THERAPIST

## 2019-06-17 NOTE — PROGRESS NOTES
PT DAILY TREATMENT NOTE 2-15    Patient Name: Alcides Conde  Date:2019  : 1955  [x]  Patient  Verified  Payor: [place] MEDICARE / Plan: [place] MEDICARE PART A & B / Product Type: Medicare /    In time: 1000a  Out time:1050a  Total Treatment Time (min): 50  1:1 Treatment time: 38  Visit #:  13    Treatment Area: Left shoulder pain [M25.512]    SUBJECTIVE  Pain Level (0-10 scale): 4/10  Any medication changes, allergies to medications, adverse drug reactions, diagnosis change, or new procedure performed?: [x] No    [] Yes (see summary sheet for update)  Subjective functional status/changes:   [] No changes reported  Continues to report \"breakthrough pain\" in her shoulder, and feels like the anchor is 'pulling'. OBJECTIVE     40 min Therapeutic Exercise:  [x] See flow sheet : PROM for flexion, ER, IR, scaption   Rationale: increase ROM and increase strength to improve the patients ability to restore function     10 min Manual Therapy:  STM to anterior shoulder, proximal biceps region.  [address] post/inf mobilizations GII-III   Rationale: decrease pain, increase ROM and increase tissue extensibility  to improve the patients ability to restore function          With   [] TE   [] TA   [] neuro   [] other: Patient Education: [x] Review HEP    [] Progressed/Changed HEP based on:   [] positioning   [] body mechanics   [] transfers   [] heat/ice application    [] other:      Other Objective/Functional Measures: --     Pain Level (0-10 scale) post treatment: 10    ASSESSMENT/Changes in Function:   []  See Plan of Care  [x]  See progress note/recertification  []  See Discharge Summary         PLAN  [x]  Upgrade activities as tolerated     [x]  Continue plan of care  []  Update interventions per flow sheet       []  Discharge due to:_  []  Other:_      Christy Chaudhari, PT 2019

## 2019-06-17 NOTE — PROGRESS NOTES
[facility] Physical Therapy   [address] [address]  Ba[place] Armijo  Phone: [phone] Fax: [phone]    Progress Note    Name: Kelly Freire   : [id number]   MD: Daniella Arredondo MD       Treatment Diagnosis: Left shoulder pain [M25.512]  Start of Care: 19    Visits from Start of Care: 14  Missed Visits: 2    Summary of Care: Ms. Jhoan Moreno has been making good progress over the last month with PT. We have been transitioning from [facility] to AROM exercises, and she has demonstrated nearly full PROM. There are some scapular mechanics that will need to be improved to correct shoulder AROM, and still some pain, but still progressing well. Current AROM measures are as follows: Shoulder AROM:  Flex: 150deg  Abd: 135deg  ER: T2  IR: T10    She does still report relatively high pain levels, which has limited some of our progressions. As she is 12 weeks on 19, plan to continue transition to AROM and begin light strengthening exercises per tolerance. Short Term Goals: To be accomplished in 3-4 weeks:  1) Pt will be independent in initial HEP MET  2) Pt will improve L shoulder PROM flex to >/=130 deg in order to aid in reaching MET  3) Pt will improve L shoulder PROM ER to >/=70 deg in order to aid in performing ADL's MET  4) Pt will report compliance with ice regimen MET     Long Term Goals: To be accomplished in 8-12 weeks:  1) Pt will improve L shoulder AROM flex to >/=130 deg in order to aid in reaching MET  2) Pt will report being able to don/doff clothing with </=2/10 L shoulder pain  3) Pt will demonstrate >/= 4/5 L shoulder flex, scap strength in order to aid in lifting  4) Pt will demonstrate >/= 4/5 L shoulder ER and IR strength in order to aid in performing ADL's      Assessment / Recommendations: Other: Will continue PT 1-2x/weekly for further 4 weeks in order to restore functional ROM and begin shoulder strengthening.      Suresh Walters, PT 2019 ________________________________________________________________________  NOTE TO PHYSICIAN:  Please complete the following and fax to: [facility]: Fax: [phone] . Anitha Spivey Retain this original for your records. If you are unable to process this request in 24 hours, please contact our office.        ____ I have read the above report and request that my patient continue therapy with the following changes/special instructions:  ____ I have read the above report and request that my patient be discharged from therapy    Physician's Signature:_________________ Date:___________Time:__________

## 2019-06-24 ENCOUNTER — HOSPITAL ENCOUNTER (OUTPATIENT)
Dept: PHYSICAL THERAPY | Age: 64
Discharge: HOME | End: 2019-06-24
Payer: MEDICARE

## 2019-06-24 PROCEDURE — 97110 THERAPEUTIC EXERCISES: CPT | Performed by: PHYSICAL THERAPIST

## 2019-06-24 PROCEDURE — 97140 MANUAL THERAPY 1/> REGIONS: CPT | Performed by: PHYSICAL THERAPIST

## 2019-06-24 NOTE — PROGRESS NOTES
PT DAILY TREATMENT NOTE - [facility] 2-15    Patient Name: Delvis Aquino  Date:2019  : 1955  [x]  Patient  Verified  Payor: [place] MED[place]ARE / Plan: [place] MEDICARE PART A & B / Product Type: Medicare /    In time: 1000a  Out time:5a  Total Treatment Time (min): 55  Total Timed Codes (min): 55  1:1 Treatment Time ( [address] only): 55   Visit #: 14    Treatment Area: Left shoulder pain [M25.512]    SUBJECTIVE  Pain Level (0-10 scale): 3/10  Any medication changes, allergies to medications, adverse drug reactions, diagnosis change, or new procedure performed?: [x] No    [] Yes (see summary sheet for update)  Subjective functional status/changes:   [] No changes reported  Pain is slowly getting better. MD was pleased with her progress thus far and wants her to continue PT as indicated in progress note.      OBJECTIVE    Modality rationale: decrease inflammation and decrease pain to improve the patients ability to restore function   Min Type Additional Details       [] Estim: []Att   []Unatt    []TENS instruct                  []IFC  []Premod   []NMES                     []Other:  []w/US   []w/ice   []w/heat  Position:  Location:       []  Traction: [] Cervical       []Lumbar                       [] Prone          []Supine                       []Intermittent   []Continuous Lbs:  [] before manual  [] after manual  []w/heat    []  Ultrasound: []Continuous   [] Pulsed                       at: []1MHz   []3MHz Location:  W/cm2:    [] Paraffin         Location:   []w/heat   To go [x]  Ice     []  Heat  []  Ice massage Position:  Location: shoulder    []  Laser  []  Other: Position:  Location:      []  Vasopneumatic Device Pressure:       [] lo [] med [] hi   Temperature:      [x] Skin assessment post-treatment:  [x]intact []redness- no adverse reaction    []redness  adverse reaction:     45 min Therapeutic Exercise:  [x] See flow sheet : PROM for flexion, ER, IR, scaption   Rationale: increase ROM and increase strength to improve the patients ability to restore function     10 min Manual Therapy:  STM to anterior shoulder, proximal biceps region. [facility] post/inf mobilizations GII-III   Rationale: decrease pain, increase ROM and increase tissue extensibility  to improve the patients ability to restore function    With   [] TE   [] TA   [] neuro   [] other: Patient Education: [x] Review HEP    [] Progressed/Changed HEP based on:   [] positioning   [] body mechanics   [] transfers   [] heat/ice application    [] other:      Other Objective/Functional Measures: --     Pain Level (0-10 scale) post treatment: 2/10    ASSESSMENT/Changes in Function:   Able to progress to light weighted exercises today without discomfort. Exhibits painful arc at approximately 90deg flexion, and will need to work scapular positioning, as it is winging and protracted and elevated with movements right now. Patient will continue to benefit from skilled PT services to modify and progress therapeutic interventions, address functional mobility deficits, address ROM deficits, address strength deficits, analyze and address soft tissue restrictions and analyze and cue movement patterns to attain remaining goals.      []  See Plan of Care  []  See progress note/recertification  []  See Discharge Summary         Progress towards goals / Updated goals:  Short Term Goals: To be accomplished in 3-4 weeks:  1) Pt will be independent in initial HEP MET  2) Pt will improve L shoulder PROM flex to >/=130 deg in order to aid in reaching MET  3) Pt will improve L shoulder PROM ER to >/=70 deg in order to aid in performing ADL's MET  4) Pt will report compliance with ice regimen MET     Long Term Goals: To be accomplished in 8-12 weeks:  1) Pt will improve L shoulder AROM flex to >/=130 deg in order to aid in reaching MET  2) Pt will report being able to don/doff clothing with </=2/10 L shoulder pain  3) Pt will demonstrate >/= 4/5 L shoulder flex, scap strength in order to aid in lifting  4) Pt will demonstrate >/= 4/5 L shoulder ER and IR strength in order to aid in performing ADL's    PLAN  [x]  Upgrade activities as tolerated     [x]  Continue plan of care  []  Update interventions per flow sheet       []  Discharge due to:_  []  Other:_      Dina Robledo, PT 6/24/2019

## 2019-06-27 ENCOUNTER — HOSPITAL ENCOUNTER (OUTPATIENT)
Dept: PHYSICAL THERAPY | Age: 64
Discharge: HOME | End: 2019-06-27
Payer: MEDICARE

## 2019-06-27 PROCEDURE — 97110 THERAPEUTIC EXERCISES: CPT | Performed by: PHYSICAL THERAPIST

## 2019-06-27 PROCEDURE — 97140 MANUAL THERAPY 1/> REGIONS: CPT | Performed by: PHYSICAL THERAPIST

## 2019-06-27 NOTE — PROGRESS NOTES
PT DAILY TREATMENT NOTE - [facility] 2-15    Patient Name: Marjan Berg  Date:2019  : 1955  [x]  Patient  Verified  Payor: [place] MEDICARE / Plan: [place] MEDICARE PART A & B / Product Type: Medicare /    In time: 1000a  Out time:1050a  Total Treatment Time (min): 50  Total Timed Codes (min): 50  1:1 Treatment Time ([address] only): 40   Visit #:  15    Treatment Area: Left shoulder pain [M25.512]    SUBJECTIVE  Pain Level (0-10 scale): 4/10  Any medication changes, allergies to medications, adverse drug reactions, diagnosis change, or new procedure performed?: [x] No    [] Yes (see summary sheet for update)  Subjective functional status/changes:   [] No changes reported  Still feeling the 'anchor pulling' in her shoulder, but is working on her ROM.      OBJECTIVE      Modality rationale: decrease inflammation and decrease pain to improve the patients ability to restore function    Min Type Additional Details        [] Estim: []Att   []Unatt    []TENS instruct                  []IFC  []Premod   []NMES                     []Other:  []w/US   []w/ice   []w/heat  Position:  Location:        []  Traction: [] Cervical       []Lumbar                       [] Prone          []Supine                       []Intermittent   []Continuous Lbs:  [] before manual  [] after manual  []w/heat     []  Ultrasound: []Continuous   [] Pulsed                       at: []1MHz   []3MHz Location:  W/cm2:     [] Paraffin         Location:   []w/heat   To go [x]  Ice     []  Heat  []  Ice massage Position:  Location: shoulder     []  Laser  []  Other: Position:  Location:        []  Vasopneumatic Device Pressure:       [] lo [] med [] hi   Temperature:       [x] Skin assessment post-treatment:  [x]intact []redness- no adverse reaction    []redness  adverse reaction:      40 min Therapeutic Exercise:  [x] See flow sheet : PROM for flexion, ER, IR, scaption   Rationale: increase ROM and increase strength to improve the patients ability to restore function     10 min Manual Therapy:  STM to anterior shoulder, proximal biceps region. [facility] post/inf mobilizations GII-III   Rationale: decrease pain, increase ROM and increase tissue extensibility  to improve the patients ability to restore function          With   [] TE   [] TA   [] neuro   [] other: Patient Education: [x] Review HEP    [] Progressed/Changed HEP based on:   [] positioning   [] body mechanics   [] transfers   [] heat/ice application    [] other:      Other Objective/Functional Measures: --     Pain Level (0-10 scale) post treatment: 2/10    ASSESSMENT/Changes in Function:   Shoulder ROM is still making good improvement and she is adding some strengthening with minimal discomfort. Patient will continue to benefit from skilled PT services to modify and progress therapeutic interventions, address functional mobility deficits, address ROM deficits, address strength deficits, analyze and address soft tissue restrictions and analyze and cue movement patterns to attain remaining goals.      []  See Plan of Care  []  See progress note/recertification  []  See Discharge Summary         Progress towards goals / Updated goals:  Short Term Goals: To be accomplished in 3-4 weeks:  1) Pt will be independent in initial HEP MET  2) Pt will improve L shoulder PROM flex to >/=130 deg in order to aid in reaching MET  3) Pt will improve L shoulder PROM ER to >/=70 deg in order to aid in performing ADL's MET  4) Pt will report compliance with ice regimen MET     Long Term Goals: To be accomplished in 8-12 weeks:  1) Pt will improve L shoulder AROM flex to >/=130 deg in order to aid in reaching MET  2) Pt will report being able to don/doff clothing with </=2/10 L shoulder pain  3) Pt will demonstrate >/= 4/5 L shoulder flex, scap strength in order to aid in lifting  4) Pt will demonstrate >/= 4/5 L shoulder ER and IR strength in order to aid in performing ADL's    PLAN  [x]  Upgrade activities as tolerated     [x] Continue plan of care  []  Update interventions per flow sheet       []  Discharge due to:_  []  Other:_      Lorne Lara, PT 6/27/2019

## 2019-07-01 ENCOUNTER — HOSPITAL ENCOUNTER (OUTPATIENT)
Dept: PHYSICAL THERAPY | Age: 64
Discharge: HOME | End: 2019-07-01
Payer: MEDICARE

## 2019-07-01 PROCEDURE — 97140 MANUAL THERAPY 1/> REGIONS: CPT

## 2019-07-01 PROCEDURE — 97110 THERAPEUTIC EXERCISES: CPT

## 2019-07-01 NOTE — PROGRESS NOTES
PT DAILY TREATMENT NOTE - [facility] 2-15    Patient Name: Marylene Casco  Date:2019  : 1955  [x]  Patient  Verified  Payor: [place] MEDICARE / Plan: VA MEDICARE PART A & B / Product Type: Medicare /    In time:10:00A  Out time:10:40A  Total Treatment Time (min): 40  Total Timed Codes (min): 25  1:1 Treatment Time ( [address] only): --   Visit #: 17     Treatment Area: Left shoulder pain [M25.512]    SUBJECTIVE  Pain Level (0-10 scale): 4/10  Any medication changes, allergies to medications, adverse drug reactions, diagnosis change, or new procedure performed?: [x] No    [] Yes (see summary sheet for update)  Subjective functional status/changes:   [] No changes reported  Pt reported feeling a little more stiff this morning.      OBJECTIVE               Modality rationale: decrease inflammation and decrease pain to improve the patients ability to restore function     Min Type Additional Details        [] Estim: []Att   []Unatt    []TENS instruct                  []IFC  []Premod   []NMES                     []Other:  []w/US   []w/ice   []w/heat  Position:  Location:        []  Traction: [] Cervical       []Lumbar                       [] Prone          []Supine                       []Intermittent   []Continuous Lbs:  [] before manual  [] after manual  []w/heat     []  Ultrasound: []Continuous   [] Pulsed                       at: []1MHz   []3MHz Location:  W/cm2:     [] Paraffin     Location:   []w/heat   To go [x]  Ice     []  Heat  []  Ice massage Position:  Location: shoulder     []  Laser  []  Other: Position:  Location:        []  Vasopneumatic Device Pressure:       [] lo [] med [] hi   Temperature:       [x] Skin assessment post-treatment:  [x]intact []redness- no adverse reaction    []redness  adverse reaction:      25 min Therapeutic Exercise:  [x] See flow sheet : PROM for flexion, ER, IR, scaption   Rationale: increase ROM and increase strength to improve the patients ability to restore function     15 min Manual Therapy:  STM to anterior shoulder, proximal biceps region. [address] post/inf mobilizations GII-III, pec release   Rationale: decrease pain, increase ROM and increase tissue extensibility  to improve the patients ability to restore function                                                                 With   [] TE   [] TA   [] neuro   [] other: Patient Education: [x] Review HEP    [] Progressed/Changed HEP based on:   [] positioning   [] body mechanics   [] transfers   [] heat/ice application    [] other:       Other Objective/Functional Measures: --        Pain Level (0-10 scale) post treatment: 2/10     ASSESSMENT/Changes in Function:   Pt's program limited to time constrain today. Less guarded with manual therapy today. Patient will continue to benefit from skilled PT services to modify and progress therapeutic interventions, address functional mobility deficits, address ROM deficits, address strength deficits, analyze and address soft tissue restrictions and analyze and cue movement patterns to attain remaining goals.      [x]  See Plan of Care  []  See progress note/recertification  []  See Discharge Summary         Progress towards goals / Updated goals:  Short Term Goals: To be accomplished in 3-4 weeks:  1) Pt will be independent in initial HEP MET  2) Pt will improve L shoulder PROM flex to >/=130 deg in order to aid in reaching MET  3) Pt will improve L shoulder PROM ER to >/=70 deg in order to aid in performing ADL's MET  4) Pt will report compliance with ice regimen MET     Long Term Goals: To be accomplished in 8-12 weeks:  1) Pt will improve L shoulder AROM flex to >/=130 deg in order to aid in reaching MET  2) Pt will report being able to don/doff clothing with </=2/10 L shoulder pain  3) Pt will demonstrate >/= 4/5 L shoulder flex, scap strength in order to aid in lifting  4) Pt will demonstrate >/= 4/5 L shoulder ER and IR strength in order to aid in performing ADL's     PLAN  [x]  Upgrade activities as tolerated     [x]  Continue plan of care  []  Update interventions per flow sheet       []  Discharge due to:_  []  Other:_          Serafin Tomlinson PTA, [facility] 7/1/2019

## 2019-07-08 ENCOUNTER — APPOINTMENT (OUTPATIENT)
Dept: PHYSICAL THERAPY | Age: 64
End: 2019-07-08
Payer: MEDICARE

## 2019-07-11 ENCOUNTER — HOSPITAL ENCOUNTER (OUTPATIENT)
Dept: PHYSICAL THERAPY | Age: 64
Discharge: HOME | End: 2019-07-11
Payer: MEDICARE

## 2019-07-11 PROCEDURE — 97110 THERAPEUTIC EXERCISES: CPT | Performed by: PHYSICAL THERAPIST

## 2019-07-11 PROCEDURE — 97140 MANUAL THERAPY 1/> REGIONS: CPT | Performed by: PHYSICAL THERAPIST

## 2019-07-11 NOTE — PROGRESS NOTES
PT DAILY TREATMENT NOTE - [facility] 2-15    Patient Name: Russell Leggett  Date:2019  : 1955  [x]  Patient  Verified  Payor: Cheyenne Cutler / Plan: [place] MEDICARE PART A & B / Product Type: Medicare /    In time: 062F  Out time: 1020a  Total Treatment Time (min): 55  Total Timed Codes (min): 55  1:1 Treatment Time ( [address] only): 45   Visit #: 17    Treatment Area: Left shoulder pain [M25.512]    SUBJECTIVE  Pain Level (0-10 scale): 3/10  Any medication changes, allergies to medications, adverse drug reactions, diagnosis change, or new procedure performed?: [x] No    [] Yes (see summary sheet for update)  Subjective functional status/changes:   [] No changes reported  Shoulder is doing well overall. Less frequent and less intense pain.      OBJECTIVE                Modality rationale: decrease inflammation and decrease pain to improve the patients ability to restore function     Min Type Additional Details        [] Estim: []Att   []Unatt    []TENS instruct                  []IFC  []Premod   []NMES                     []Other:  []w/US   []w/ice   []w/heat  Position:  Location:        []  Traction: [] Cervical       []Lumbar                       [] Prone          []Supine                       []Intermittent   []Continuous Lbs:  [] before manual  [] after manual  []w/heat     []  Ultrasound: []Continuous   [] Pulsed                       at: []1MHz   []3MHz Location:  W/cm2:     [] Paraffin     Location:   []w/heat   To go [x]  Ice     []  Heat  []  Ice massage Position:  Location: shoulder     []  Laser  []  Other: Position:  Location:        []  Vasopneumatic Device Pressure:       [] lo [] med [] hi   Temperature:       [x] Skin assessment post-treatment:  [x]intact []redness- no adverse reaction    []redness  adverse reaction:      45 min Therapeutic Exercise:  [x] See flow sheet : PROM for flexion, ER, IR, scaption   Rationale: increase ROM and increase strength to improve the patients ability to restore function     10 min Manual Therapy:  STM to anterior shoulder, proximal biceps region. [address] post/inf mobilizations GII-III, pec release   Rationale: decrease pain, increase ROM and increase tissue extensibility  to improve the patients ability to restore function     With   [] TE   [] TA   [] neuro   [] other: Patient Education: [x] Review HEP    [] Progressed/Changed HEP based on:   [] positioning   [] body mechanics   [] transfers   [] heat/ice application    [] other:       Other Objective/Functional Measures: --        Pain Level (0-10 scale) post treatment: 2/10     ASSESSMENT/Changes in Function:   Continues to improved with activity tolerance, though still exhibits painful arc 90-110deg.    Patient will continue to benefit from skilled PT services to modify and progress therapeutic interventions, address functional mobility deficits, address ROM deficits, address strength deficits, analyze and address soft tissue restrictions and analyze and cue movement patterns to attain remaining goals.     [x]  See Plan of Care  []  See progress note/recertification  []  See Discharge Summary     Progress towards goals / Updated goals:  Short Term Goals: To be accomplished in 3-4 weeks:  1) Pt will be independent in initial HEP MET  2) Pt will improve L shoulder PROM flex to >/=130 deg in order to aid in reaching MET  3) Pt will improve L shoulder PROM ER to >/=70 deg in order to aid in performing ADL's MET  4) Pt will report compliance with ice regimen MET     Long Term Goals: To be accomplished in 8-12 weeks:  1) Pt will improve L shoulder AROM flex to >/=130 deg in order to aid in reaching MET  2) Pt will report being able to don/doff clothing with </=2/10 L shoulder pain  3) Pt will demonstrate >/= 4/5 L shoulder flex, scap strength in order to aid in lifting  4) Pt will demonstrate >/= 4/5 L shoulder ER and IR strength in order to aid in performing ADL's      PLAN  []  Upgrade activities as tolerated     []  Continue plan of care  []  Update interventions per flow sheet       []  Discharge due to:_  []  Other:_      Wei Santillan, PT 7/11/2019

## 2019-07-15 ENCOUNTER — HOSPITAL ENCOUNTER (OUTPATIENT)
Dept: PHYSICAL THERAPY | Age: 64
Discharge: HOME | End: 2019-07-15
Payer: MEDICARE

## 2019-07-15 PROCEDURE — 97110 THERAPEUTIC EXERCISES: CPT | Performed by: PHYSICAL THERAPIST

## 2019-07-15 NOTE — PROGRESS NOTES
[facility]  [address], [place], [address]  Phone: [phone]  Fax: [phone]    Progress Note    Name: Jacqueline Garcia   :    MD: Hilda Gilmore MD       Treatment Diagnosis: Left shoulder pain [M25.512]  Start of Care: 6/3/19    Visits from Start of Care: 19  Missed Visits: 4    Summary of Care: Ms. Shahram Nielsen has been making good progress over the last month of PT. She has essentially full ROM return, with a painful arc through 90-110deg. We have been progressing her shoulder strength well, with an emphasis on restoring scapular mechanics and RTC strength. Shoulder AROM:  Flex: 145deg  Abd: 140deg  ER: T4  IR: T9    Shoulder Strength  Flex: 4/5  Abd: 4/5  ER: 4+/5   IR: 4+/5    Short Term Goals: To be accomplished in 3-4 weeks:  1) Pt will be independent in initial HEP MET  2) Pt will improve L shoulder PROM flex to >/=130 deg in order to aid in reaching MET  3) Pt will improve L shoulder PROM ER to >/=70 deg in order to aid in performing ADL's MET  4) Pt will report compliance with ice regimen MET     Long Term Goals: To be accomplished in 8-12 weeks:  1) Pt will improve L shoulder AROM flex to >/=130 deg in order to aid in reaching MET  2) Pt will report being able to don/doff clothing with </=2/10 L shoulder pain PROGRESSING  3) Pt will demonstrate >/= 4/5 L shoulder flex, scap strength in order to aid in lifting PROGRESSING  4) Pt will demonstrate >/= 4/5 L shoulder ER and IR strength in order to aid in performing ADL's PROGRESSING      Assessment / Recommendations: Other: Would continue to benefit from PT 1-2x/weekly for further 3-4 weeks in order to regain functional strength to return to previous level of function      Hannah Parker, PT 7/15/2019 10:08 AM    ________________________________________________________________________  NOTE TO PHYSICIAN:  Please complete the following and fax to:   [facility]: [phone]  . Retain this original for your records. If you are unable to process this request in 24 hours, please contact our office.        ____ I have read the above report and request that my patient continue therapy with the following changes/special instructions:  ____ I have read the above report and request that my patient be discharged from therapy    Physician's Signature:_________________ Date:___________Time:__________

## 2019-07-15 NOTE — PROGRESS NOTES
PT DAILY TREATMENT NOTE - [facility] 2-15    Patient Name: Power Baldwin  Date:7/15/2019  : 1955  [x]  Patient  Verified  Payor: [place] MEDICARE / Plan: [place] MEDICARE PART A & B / Product Type: Medicare /    In time: 1000a  Out time: 1055a  Total Treatment Time (min): 55  Total Timed Codes (min): 55  1:1 Treatment Time ( [address] only): 55   Visit #: 18    Treatment Area: Left shoulder pain [M25.512]    SUBJECTIVE  Pain Level (0-10 scale): 4/10  Any medication changes, allergies to medications, adverse drug reactions, diagnosis change, or new procedure performed?: [x] No    [] Yes (see summary sheet for update)  Subjective functional status/changes:   [] No changes reported  Shoulder has been doing pretty well.      OBJECTIVE               Modality rationale: decrease inflammation and decrease pain to improve the patients ability to restore function     Min Type Additional Details        [] Estim: []Att   []Unatt    []TENS instruct                  []IFC  []Premod   []NMES                     []Other:  []w/US   []w/ice   []w/heat  Position:  Location:        []  Traction: [] Cervical       []Lumbar                       [] Prone          []Supine                       []Intermittent   []Continuous Lbs:  [] before manual  [] after manual  []w/heat     []  Ultrasound: []Continuous   [] Pulsed                       at: []1MHz   []3MHz Location:  W/cm2:     [] Paraffin     Location:   []w/heat   To go [x]  Ice     []  Heat  []  Ice massage Position:  Location: shoulder     []  Laser  []  Other: Position:  Location:        []  Vasopneumatic Device Pressure:       [] lo [] med [] hi   Temperature:       [x] Skin assessment post-treatment:  [x]intact []redness- no adverse reaction    []redness  adverse reaction:      55 min Therapeutic Exercise:  [x] See flow sheet : PROM for flexion, ER, IR, scaption   Rationale: increase ROM and increase strength to improve the patients ability to restore function          With   [] TE   [] TA   [] neuro   [] other: Patient Education: [x] Review HEP    [] Progressed/Changed HEP based on:   [] positioning   [] body mechanics   [] transfers   [] heat/ice application    [] other:      Other Objective/Functional Measures: --     Pain Level (0-10 scale) post treatment: 2/10    ASSESSMENT/Changes in Function:   []  See Plan of Care  [x]  See progress note/recertification  []  See Discharge Summary    PLAN  [x]  Upgrade activities as tolerated     [x]  Continue plan of care  []  Update interventions per flow sheet       []  Discharge due to:_  []  Other:_      Cj Dickens, PT 7/15/2019

## 2019-07-18 ENCOUNTER — HOSPITAL ENCOUNTER (OUTPATIENT)
Dept: PHYSICAL THERAPY | Age: 64
Discharge: HOME | End: 2019-07-18
Payer: MEDICARE

## 2019-07-18 PROCEDURE — 97140 MANUAL THERAPY 1/> REGIONS: CPT | Performed by: PHYSICAL THERAPIST

## 2019-07-18 PROCEDURE — 97110 THERAPEUTIC EXERCISES: CPT | Performed by: PHYSICAL THERAPIST

## 2019-07-18 NOTE — PROGRESS NOTES
PT DAILY TREATMENT NOTE - [facility] 2-15    Patient Name: Shamika Llamas  Date:2019  : 1955  [x]  Patient  Verified  Payor: [place] MEDICARE / Plan: [place] MEDICARE PART A & B / Product Type: Medicare /    In time:930a  Out time:1025a  Total Treatment Time (min): 55  Total Timed Codes (min): 55  1:1 Treatment Time ([address] only): 40   Visit #: 19    Treatment Area: Left shoulder pain [M25.512]    SUBJECTIVE  Pain Level (0-10 scale): 4/10  Any medication changes, allergies to medications, adverse drug reactions, diagnosis change, or new procedure performed?: [x] No    [] Yes (see summary sheet for update)  Subjective functional status/changes:   [] No changes reported  Shoulder is doing fairly well. Saw Roopa Pierson and he injected her back and gave her a Rx to do some PT for her back as well.      OBJECTIVE  Modality rationale: decrease inflammation and decrease pain to improve the patients ability to restore function     Min Type Additional Details        [] Estim: []Att   []Unatt    []TENS instruct                  []IFC  []Premod   []NMES                     []Other:  []w/US   []w/ice   []w/heat  Position:  Location:        []  Traction: [] Cervical       []Lumbar                       [] Prone          []Supine                       []Intermittent   []Continuous Lbs:  [] before manual  [] after manual  []w/heat     []  Ultrasound: []Continuous   [] Pulsed                       at: []1MHz   []3MHz Location:  W/cm2:     [] Paraffin     Location:   []w/heat   To go [x]  Ice     []  Heat  []  Ice massage Position:  Location: shoulder     []  Laser  []  Other: Position:  Location:        []  Vasopneumatic Device Pressure:       [] lo [] med [] hi   Temperature:       [x] Skin assessment post-treatment:  [x]intact []redness- no adverse reaction    []redness  adverse reaction:      45 min Therapeutic Exercise:  [x] See flow sheet : PROM for flexion, ER, IR, scaption   Rationale: increase ROM and increase strength to improve the patients ability to restore function                                    10 min Manual Therapy:  STM to anterior shoulder, proximal biceps region. [address] post/inf mobilizations GII-III, pec release   Rationale: decrease pain, increase ROM and increase tissue extensibility  to improve the patients ability to restore function                                  With   [] TE   [] TA   [] neuro   [] other: Patient Education: [x] Review HEP    [] Progressed/Changed HEP based on:   [] positioning   [] body mechanics   [] transfers   [] heat/ice application    [] other:       Other Objective/Functional Measures: --        Pain Level (0-10 scale) post treatment: 2/10    ASSESSMENT/Changes in Function:   Making improvements with strength in shoulder. Held back examination today, but begin to integrate next session.    Patient will continue to benefit from skilled PT services to modify and progress therapeutic interventions, address functional mobility deficits, address ROM deficits, address strength deficits, analyze and address soft tissue restrictions and analyze and cue movement patterns to attain remaining goals.     []  See Plan of Care  []  See progress note/recertification  []  See Discharge Summary     Progress towards goals / Updated goals:  Short Term Goals: To be accomplished in 3-4 weeks:  1) Pt will be independent in initial HEP MET  2) Pt will improve L shoulder PROM flex to >/=130 deg in order to aid in reaching MET  3) Pt will improve L shoulder PROM ER to >/=70 deg in order to aid in performing ADL's MET  4) Pt will report compliance with ice regimen MET     Long Term Goals: To be accomplished in 8-12 weeks:  1) Pt will improve L shoulder AROM flex to >/=130 deg in order to aid in reaching MET  2) Pt will report being able to don/doff clothing with </=2/10 L shoulder pain PROGRESSING  3) Pt will demonstrate >/= 4/5 L shoulder flex, scap strength in order to aid in lifting PROGRESSING  4) Pt will demonstrate >/= 4/5 L shoulder ER and IR strength in order to aid in performing ADL's [address]  [x]  Upgrade activities as tolerated     [x]  Continue plan of care  []  Update interventions per flow sheet       []  Discharge due to:_  []  Other:_      Jewel Jama, PT 7/18/2019

## 2019-07-22 ENCOUNTER — HOSPITAL ENCOUNTER (OUTPATIENT)
Dept: PHYSICAL THERAPY | Age: 64
Discharge: HOME | End: 2019-07-22
Payer: MEDICARE

## 2019-07-22 PROCEDURE — 97110 THERAPEUTIC EXERCISES: CPT

## 2019-07-22 PROCEDURE — 97140 MANUAL THERAPY 1/> REGIONS: CPT | Performed by: PHYSICAL THERAPIST

## 2019-07-22 PROCEDURE — 97110 THERAPEUTIC EXERCISES: CPT | Performed by: PHYSICAL THERAPIST

## 2019-07-22 NOTE — PROGRESS NOTES
PT DAILY TREATMENT NOTE - [facility] 2-15    Patient Name: Robb Paniagua  Date:2019  : 1955  [x]  Patient  Verified  Payor: Monda Ganser / Plan: [place] MEDICARE PART A & B / Product Type: Medicare /    In time:1000a  Out time:1100a  Total Treatment Time (min): 60  Total Timed Codes (min): 60  1:1 Treatment Time ([address] only): 60   Visit #: 20    Treatment Area: Left shoulder pain [M25.512]    SUBJECTIVE  Pain Level (0-10 scale): 3/10  Any medication changes, allergies to medications, adverse drug reactions, diagnosis change, or new procedure performed?: [x] No    [] Yes (see summary sheet for update)  Subjective functional status/changes:   [] No changes reported  Shoulder is feeling a little better. Back pain is a little better since the injection    OBJECTIVE    Modality rationale: decrease inflammation and decrease pain to improve the patients ability to restore function     Min Type Additional Details        [] Estim: []Att   []Unatt    []TENS instruct                  []IFC  []Premod   []NMES                     []Other:  []w/US   []w/ice   []w/heat  Position:  Location:        []  Traction: [] Cervical       []Lumbar                       [] Prone          []Supine                       []Intermittent   []Continuous Lbs:  [] before manual  [] after manual  []w/heat     []  Ultrasound: []Continuous   [] Pulsed                       at: []1MHz   []3MHz Location:  W/cm2:     [] Paraffin     Location:   []w/heat   To go [x]  Ice     []  Heat  []  Ice massage Position:  Location: shoulder     []  Laser  []  Other: Position:  Location:        []  Vasopneumatic Device Pressure:       [] lo [] med [] hi   Temperature:       [x] Skin assessment post-treatment:  [x]intact []redness- no adverse reaction    []redness  adverse reaction:      50 min Therapeutic Exercise:  [x] See flow sheet : PROM for flexion, ER, IR, scaption.  15 minutes performed under supervision of Ash Wilson PTA   Rationale: increase ROM and increase strength to improve the patients ability to restore function     10 min Manual Therapy:  STM to anterior shoulder, proximal biceps region. [facility] post/inf mobilizations GII-III, pec release   Rationale: decrease pain, increase ROM and increase tissue extensibility  to improve the patients ability to restore function                                  With   [] TE   [] TA   [] neuro   [] other: Patient Education: [x] Review HEP    [] Progressed/Changed HEP based on:   [] positioning   [] body mechanics   [] transfers   [] heat/ice application    [] other:       Other Objective/Functional Measures: Lumbar exam: TTP with concordant sign at R PSIS area. Exhibits flexion-bias. Added seated prayer, SKTC, and LTR to HEP to manage back pain        Pain Level (0-10 scale) post treatment: 2/10     ASSESSMENT/Changes in Function:   Still having difficulty with scapular recruitment, particularly via mid and lower trap. This results in downward scapular rotation and diminished retraction.    Patient will continue to benefit from skilled PT services to modify and progress therapeutic interventions, address functional mobility deficits, address ROM deficits, address strength deficits, analyze and address soft tissue restrictions and analyze and cue movement patterns to attain remaining goals.     []  See Plan of Care  []  See progress note/recertification  []  See Discharge Summary     Progress towards goals / Updated goals:  Short Term Goals: To be accomplished in 3-4 weeks:  1) Pt will be independent in initial HEP MET  2) Pt will improve L shoulder PROM flex to >/=130 deg in order to aid in reaching MET  3) Pt will improve L shoulder PROM ER to >/=70 deg in order to aid in performing ADL's MET  4) Pt will report compliance with ice regimen MET     Long Term Goals: To be accomplished in 8-12 weeks:  1) Pt will improve L shoulder AROM flex to >/=130 deg in order to aid in reaching MET  2) Pt will report being able to don/doff clothing with </=2/10 L shoulder pain PROGRESSING  3) Pt will demonstrate >/= 4/5 L shoulder flex, scap strength in order to aid in lifting PROGRESSING  4) Pt will demonstrate >/= 4/5 L shoulder ER and IR strength in order to aid in performing ADL's PROGRESSING      PLAN  []  Upgrade activities as tolerated     []  Continue plan of care  []  Update interventions per flow sheet       []  Discharge due to:_  []  Other:_      Burnell Peabody, PT 7/22/2019

## 2019-07-25 ENCOUNTER — HOSPITAL ENCOUNTER (OUTPATIENT)
Dept: PHYSICAL THERAPY | Age: 64
Discharge: HOME | End: 2019-07-25
Payer: MEDICARE

## 2019-07-25 PROCEDURE — 97110 THERAPEUTIC EXERCISES: CPT | Performed by: PHYSICAL THERAPIST

## 2019-07-25 PROCEDURE — 97140 MANUAL THERAPY 1/> REGIONS: CPT | Performed by: PHYSICAL THERAPIST

## 2019-07-25 NOTE — PROGRESS NOTES
PT DAILY TREATMENT NOTE - [facility] 2-15    Patient Name: Micah Siddiqi  Date:2019  : 1955  [x]  Patient  Verified  Payor: Lubna Preciado / Plan: [place] MEDICARE PART A & B / Product Type: Medicare /    In time: 955 A  Out time: 1055  Total Treatment Time (min): 60  Total Timed Codes (min): 60  1:1 Treatment Time ( only): 60   Visit #: 22    Treatment Area: Left shoulder pain [M25.512]    SUBJECTIVE  Pain Level (0-10 scale): 3/10  Any medication changes, allergies to medications, adverse drug reactions, diagnosis change, or new procedure performed?: [x] No    [] Yes (see summary sheet for update)  Subjective functional status/changes:   [] No changes reported  Pt overall doing well, states the exercises are helping her lower back    OBJECTIVE           Modality rationale: decrease inflammation and decrease pain to improve the patients ability to restore function     Min Type Additional Details        [] Estim: []Att   []Unatt    []TENS instruct                  []IFC  []Premod   []NMES                     []Other:  []w/US   []w/ice   []w/heat  Position:  Location:        []  Traction: [] Cervical       []Lumbar                       [] Prone          []Supine                       []Intermittent   []Continuous Lbs:  [] before manual  [] after manual  []w/heat     []  Ultrasound: []Continuous   [] Pulsed                       at: []1MHz   []3MHz Location:  W/cm2:     [] Paraffin     Location:   []w/heat   To go [x]  Ice     []  Heat  []  Ice massage Position:  Location: shoulder     []  Laser  []  Other: Position:  Location:        []  Vasopneumatic Device Pressure:       [] lo [] med [] hi   Temperature:       [x] Skin assessment post-treatment:  [x]intact []redness- no adverse reaction    []redness  adverse reaction:      50 min Therapeutic Exercise:  [x] See flow sheet :    Rationale: increase ROM and increase strength to improve the patients ability to restore function     10 min Manual Therapy:  STM to anterior shoulder, proximal biceps region. [address] post/inf mobilizations GII-III, pec release   Rationale: decrease pain, increase ROM and increase tissue extensibility  to improve the patients ability to restore function                                  With   [] TE   [] TA   [] neuro   [] other: Patient Education: [x] Review HEP    [] Progressed/Changed HEP based on:   [] positioning   [] body mechanics   [] transfers   [] heat/ice application    [] other:       Other Objective/Functional Measures: n/a     Pain Level (0-10 scale) post treatment: 3/10     ASSESSMENT/Changes in Function:   Pt overall hannah therapy session well.  Cues for proper scapular positioning during sidelying ER    Patient will continue to benefit from skilled PT services to modify and progress therapeutic interventions, address functional mobility deficits, address ROM deficits, address strength deficits, analyze and address soft tissue restrictions and analyze and cue movement patterns to attain remaining goals.     [x]  See Plan of Care  []  See progress note/recertification  []  See Discharge Summary     Progress towards goals / Updated goals:  Short Term Goals: To be accomplished in 3-4 weeks:  1) Pt will be independent in initial HEP MET  2) Pt will improve L shoulder PROM flex to >/=130 deg in order to aid in reaching MET  3) Pt will improve L shoulder PROM ER to >/=70 deg in order to aid in performing ADL's MET  4) Pt will report compliance with ice regimen MET     Long Term Goals: To be accomplished in 8-12 weeks:  1) Pt will improve L shoulder AROM flex to >/=130 deg in order to aid in reaching MET  2) Pt will report being able to don/doff clothing with </=2/10 L shoulder pain PROGRESSING  3) Pt will demonstrate >/= 4/5 L shoulder flex, scap strength in order to aid in lifting PROGRESSING  4) Pt will demonstrate >/= 4/5 L shoulder ER and IR strength in order to aid in performing ADL's PROGRESSING      PLAN  []  Upgrade activities as tolerated     [x]  Continue plan of care  []  Update interventions per flow sheet       []  Discharge due to:_  []  Other:_      Brandon Goddard, PT 7/25/2019

## 2019-07-29 ENCOUNTER — HOSPITAL ENCOUNTER (OUTPATIENT)
Dept: PHYSICAL THERAPY | Age: 64
Discharge: HOME | End: 2019-07-29
Payer: MEDICARE

## 2019-07-29 PROCEDURE — 97140 MANUAL THERAPY 1/> REGIONS: CPT | Performed by: PHYSICAL THERAPIST

## 2019-07-29 PROCEDURE — 97110 THERAPEUTIC EXERCISES: CPT | Performed by: PHYSICAL THERAPIST

## 2019-07-29 NOTE — PROGRESS NOTES
PT DAILY TREATMENT NOTE - [facility] 2-15    Patient Name: Robb Paniagua  Date:2019  : 1955  [x]  Patient  Verified  Payor: Monda Ganser / Plan: [place] MEDICARE PART A & B / Product Type: Medicare /    In time:1000a  Out time:5a  Total Treatment Time (min): 55  Total Timed Codes (min): 53  1:1 Treatment Time ( [address] only): 48   Visit #: 22    Treatment Area: Left shoulder pain [M25.512]    SUBJECTIVE  Pain Level (0-10 scale): 4/10  Any medication changes, allergies to medications, adverse drug reactions, diagnosis change, or new procedure performed?: [x] No    [] Yes (see summary sheet for update)  Subjective functional status/changes:   [] No changes reported  Shoulder is still doing pretty well. Still getting hte \"breaktrhough\" pain, but it's better now.      OBJECTIVE            Modality rationale: decrease inflammation and decrease pain to improve the patients ability to restore function     Min Type Additional Details        [] Estim: []Att   []Unatt    []TENS instruct                  []IFC  []Premod   []NMES                     []Other:  []w/US   []w/ice   []w/heat  Position:  Location:        []  Traction: [] Cervical       []Lumbar                       [] Prone          []Supine                       []Intermittent   []Continuous Lbs:  [] before manual  [] after manual  []w/heat     []  Ultrasound: []Continuous   [] Pulsed                       at: []1MHz   []3MHz Location:  W/cm2:     [] Paraffin     Location:   []w/heat   To go [x]  Ice     []  Heat  []  Ice massage Position:  Location: shoulder     []  Laser  []  Other: Position:  Location:        []  Vasopneumatic Device Pressure:       [] lo [] med [] hi   Temperature:       [x] Skin assessment post-treatment:  [x]intact []redness- no adverse reaction    []redness  adverse reaction:      45 min Therapeutic Exercise:  [x] See flow sheet :    Rationale: increase ROM and increase strength to improve the patients ability to restore function     8 min Manual Therapy:  STM to anterior shoulder, proximal biceps region. [address] post/inf mobilizations GII-III, pec release   Rationale: decrease pain, increase ROM and increase tissue extensibility  to improve the patients ability to restore function                                  With   [] TE   [] TA   [] neuro   [] other: Patient Education: [x] Review HEP    [] Progressed/Changed HEP based on:   [] positioning   [] body mechanics   [] transfers   [] heat/ice application    [] other:       Other Objective/Functional Measures: n/a     Pain Level (0-10 scale) post treatment: 3/10     ASSESSMENT/Changes in Function:   Still has poor scapular movement into retraction and upward rotation that remains her biggest limitation to full ROM. Will begin to work towards d/c in the next few weeks.    Patient will continue to benefit from skilled PT services to modify and progress therapeutic interventions, address functional mobility deficits, address ROM deficits, address strength deficits, analyze and address soft tissue restrictions and analyze and cue movement patterns to attain remaining goals.     [x]  See Plan of Care  []  See progress note/recertification  []  See Discharge Summary     Progress towards goals / Updated goals:  Short Term Goals: To be accomplished in 3-4 weeks:  1) Pt will be independent in initial HEP MET  2) Pt will improve L shoulder PROM flex to >/=130 deg in order to aid in reaching MET  3) Pt will improve L shoulder PROM ER to >/=70 deg in order to aid in performing ADL's MET  4) Pt will report compliance with ice regimen MET     Long Term Goals: To be accomplished in 8-12 weeks:  1) Pt will improve L shoulder AROM flex to >/=130 deg in order to aid in reaching MET  2) Pt will report being able to don/doff clothing with </=2/10 L shoulder pain PROGRESSING  3) Pt will demonstrate >/= 4/5 L shoulder flex, scap strength in order to aid in lifting PROGRESSING  4) Pt will demonstrate >/= 4/5 L shoulder ER and IR strength in order to aid in performing [address]  [x]  Upgrade activities as tolerated     [x]  Continue plan of care  []  Update interventions per flow sheet       []  Discharge due to:_  []  Other:_      Jewel Jama, PT 7/29/2019

## 2019-07-31 ENCOUNTER — ANESTHESIA EVENT (OUTPATIENT)
Dept: ENDOSCOPY | Age: 64
End: 2019-07-31
Payer: MEDICARE

## 2019-07-31 ENCOUNTER — ANESTHESIA (OUTPATIENT)
Dept: ENDOSCOPY | Age: 64
End: 2019-07-31
Payer: MEDICARE

## 2019-07-31 ENCOUNTER — HOSPITAL ENCOUNTER (OUTPATIENT)
Age: 64
Setting detail: OUTPATIENT SURGERY
Discharge: HOME | End: 2019-07-31
Attending: SPECIALIST | Admitting: SPECIALIST
Payer: MEDICARE

## 2019-07-31 VITALS
WEIGHT: 181 LBS | TEMPERATURE: 98.5 F | DIASTOLIC BLOOD PRESSURE: 64 MMHG | BODY MASS INDEX: 30.9 KG/M2 | HEIGHT: 64 IN | HEART RATE: 63 BPM | SYSTOLIC BLOOD PRESSURE: 124 MMHG | OXYGEN SATURATION: 100 % | RESPIRATION RATE: 20 BRPM

## 2019-07-31 PROCEDURE — 76060000031 HC ANESTHESIA FIRST 0.5 HR: Performed by: SPECIALIST

## 2019-07-31 PROCEDURE — 76040000019: Performed by: SPECIALIST

## 2019-07-31 PROCEDURE — 88305 TISSUE EXAM BY PATHOLOGIST: CPT

## 2019-07-31 PROCEDURE — 74011250636 HC RX REV CODE- 250/636

## 2019-07-31 PROCEDURE — 77030021593 HC FCPS BIOP ENDOSC BSC -A: Performed by: SPECIALIST

## 2019-07-31 PROCEDURE — 87077 CULTURE AEROBIC IDENTIFY: CPT | Performed by: SPECIALIST

## 2019-07-31 RX ORDER — SODIUM CHLORIDE 0.9 % (FLUSH) 0.9 %
5-40 SYRINGE (ML) INJECTION EVERY 8 HOURS
Status: DISCONTINUED | OUTPATIENT
Start: 2019-07-31 | End: 2019-07-31 | Stop reason: HOSPADM

## 2019-07-31 RX ORDER — SODIUM CHLORIDE 9 MG/ML
50 INJECTION, SOLUTION INTRAVENOUS CONTINUOUS
Status: DISCONTINUED | OUTPATIENT
Start: 2019-07-31 | End: 2019-07-31 | Stop reason: HOSPADM

## 2019-07-31 RX ORDER — PROPOFOL 10 MG/ML
INJECTION, EMULSION INTRAVENOUS AS NEEDED
Status: DISCONTINUED | OUTPATIENT
Start: 2019-07-31 | End: 2019-07-31 | Stop reason: HOSPADM

## 2019-07-31 RX ORDER — NALOXONE HYDROCHLORIDE 0.4 MG/ML
0.4 INJECTION, SOLUTION INTRAMUSCULAR; INTRAVENOUS; SUBCUTANEOUS
Status: DISCONTINUED | OUTPATIENT
Start: 2019-07-31 | End: 2019-07-31 | Stop reason: HOSPADM

## 2019-07-31 RX ORDER — ATROPINE SULFATE 0.1 MG/ML
0.5 INJECTION INTRAVENOUS
Status: DISCONTINUED | OUTPATIENT
Start: 2019-07-31 | End: 2019-07-31 | Stop reason: HOSPADM

## 2019-07-31 RX ORDER — EPINEPHRINE 0.1 MG/ML
1 INJECTION INTRACARDIAC; INTRAVENOUS
Status: DISCONTINUED | OUTPATIENT
Start: 2019-07-31 | End: 2019-07-31 | Stop reason: HOSPADM

## 2019-07-31 RX ORDER — FENTANYL CITRATE 50 UG/ML
12.5-5 INJECTION, SOLUTION INTRAMUSCULAR; INTRAVENOUS
Status: DISCONTINUED | OUTPATIENT
Start: 2019-07-31 | End: 2019-07-31 | Stop reason: HOSPADM

## 2019-07-31 RX ORDER — LORAZEPAM 2 MG/ML
2 INJECTION INTRAMUSCULAR AS NEEDED
Status: DISCONTINUED | OUTPATIENT
Start: 2019-07-31 | End: 2019-07-31 | Stop reason: HOSPADM

## 2019-07-31 RX ORDER — DEXTROMETHORPHAN/PSEUDOEPHED 2.5-7.5/.8
1.2 DROPS ORAL
Status: DISCONTINUED | OUTPATIENT
Start: 2019-07-31 | End: 2019-07-31 | Stop reason: HOSPADM

## 2019-07-31 RX ORDER — SODIUM CHLORIDE 9 MG/ML
INJECTION, SOLUTION INTRAVENOUS
Status: DISCONTINUED | OUTPATIENT
Start: 2019-07-31 | End: 2019-07-31 | Stop reason: HOSPADM

## 2019-07-31 RX ORDER — SODIUM CHLORIDE 0.9 % (FLUSH) 0.9 %
5-40 SYRINGE (ML) INJECTION AS NEEDED
Status: DISCONTINUED | OUTPATIENT
Start: 2019-07-31 | End: 2019-07-31 | Stop reason: HOSPADM

## 2019-07-31 RX ORDER — MIDAZOLAM HYDROCHLORIDE 1 MG/ML
.25-1 INJECTION, SOLUTION INTRAMUSCULAR; INTRAVENOUS
Status: DISCONTINUED | OUTPATIENT
Start: 2019-07-31 | End: 2019-07-31 | Stop reason: HOSPADM

## 2019-07-31 RX ORDER — FLUMAZENIL 0.1 MG/ML
0.2 INJECTION INTRAVENOUS
Status: DISCONTINUED | OUTPATIENT
Start: 2019-07-31 | End: 2019-07-31 | Stop reason: HOSPADM

## 2019-07-31 RX ADMIN — PROPOFOL 50 MG: 10 INJECTION, EMULSION INTRAVENOUS at 10:01

## 2019-07-31 RX ADMIN — SODIUM CHLORIDE: 9 INJECTION, SOLUTION INTRAVENOUS at 09:46

## 2019-07-31 RX ADMIN — PROPOFOL 50 MG: 10 INJECTION, EMULSION INTRAVENOUS at 10:08

## 2019-07-31 RX ADMIN — PROPOFOL 100 MG: 10 INJECTION, EMULSION INTRAVENOUS at 09:52

## 2019-07-31 NOTE — PROCEDURES
EGD Procedure Note    Indications:  Abdominal pain, epigastric, diarrhea, weight loss; reported hx of UC and celiac disease recent markers for the latter are (-)   Referring Physician: Connor Damon MD   Anesthesia/Sedation:MAC  Endoscopist:  Dr. Danny Gan  Assistant:  Endoscopy Technician-1: Emerson TOLEDO  Endoscopy RN-1: Gillian Middleton RN  Surgical Assistant: None  Implants: None      Preoperative diagnosis: ABNORMAL WEIGHT LOSS, ABDOMINAL TENDERNESS UNSPECIFIED SITE , NAUSEA, IRRITABLE BOWEL SYNDROME    Postoperative diagnosis: mild gastritis      Procedure in Detail:  Informed consent was obtained for the procedure, including sedation. Risks of perforation, hemorrhage, adverse drug reaction, and aspiration were discussed. The patient was placed in the left lateral decubitus position. Based on the pre-procedure assessment, including review of the patient's medical history, medications, allergies, and review of systems, she had been deemed to be an appropriate candidate for moderate sedation; she was therefore sedated with the medications listed above. The patient was monitored continuously with ECG tracing, pulse oximetry, blood pressure monitoring, and direct observations. An Olympus video endoscope was inserted into the mouth and advanced under direct vision to into the esophagus, then stomach and duodenum. A careful inspection was made as the gastroscope was withdrawn, including a retroflexed view of the proximal stomach; findings and interventions are described below. Findings:   Esophagus:normal  Stomach: minimal patchy erythema of antrum - Pyloritek and Bx for path, several fundic gland polyps - one Bx  Duodenum/jejunum: normal - Bx from 2 and 3rd part for path separate Bx from the bulb    Therapies:  See above    Specimens:See above           EBL: None    Complications:   None; patient tolerated the procedure well.            Recommendations:  -Continue acid suppression. , -Await pathology. , -Await ALEXANDRA test result and treat for Helicobacter pylori if positive. , -Follow up with me., -No NSAIDS, -please go for CT scan if not already done    Prisca Chou MD

## 2019-07-31 NOTE — DISCHARGE INSTRUCTIONS
Marjan Para  [id number]  1955    EGD DISCHARGE INSTRUCTIONS  Discomfort:  Sore throat-  warm salt water gargle  redness at IV site- apply warm compress to area; if redness or soreness persist- contact your physician  Gaseous discomfort- walking, belching will help relieve any discomfort  You may not operate a vehicle for 12 hours  You may not engage in an occupation involving machinery or appliances for rest of today. You may not drink alcoholic beverages for at least 12 hours  Avoid making any critical decisions for at least 24 hour  DIET  You may resume your regular diet - however -  remember your colon is empty and a heavy meal will produce gas. Avoid these foods:  vegetables, fried / greasy foods, carbonated drinks  ACTIVITY  You may resume your normal daily activities. Spend the remainder of the day resting -  avoid any strenuous activity. CALL M.D. ANY SIGN OF   Increasing pain, nausea, vomiting  Abdominal distension (swelling)  New increased bleeding (oral or rectal)  Fever (chills)  Pain in chest area  Bloody discharge from nose or mouth  Shortness of breath    You may not take any Advil, Aspirin, Ibuprofen, Motrin, Aleve, or Goodys, ONLY  Tylenol as needed for pain. Follow-up Instructions:   Call Dr. Wing Conway office to make appointment  Office telephone for problems or questions [phone]  Results of procedure / biopsy in 10-14 days   -Continue acid suppression. , -Await pathology. , -Await ALEXANDRA test result and treat for Helicobacter pylori if positive. , -Follow up with me., -No NSAIDS, -please go for CT scan if not already done

## 2019-07-31 NOTE — PROGRESS NOTES
Robb Siav  1955  [id number]    Situation:  Verbal report received from: monroe fofana rn  Procedure: Procedure(s):  ESOPHAGOGASTRODUODENOSCOPY (EGD)  ESOPHAGOGASTRODUODENAL (EGD) BIOPSY    Background:    Preoperative diagnosis: ABNORMAL WEIGHT LOSS, ABDOMINAL TENDERNESS UNSPECIFIED SITE , NAUSEA, IRRITABLE BOWEL SYNDROME  Postoperative diagnosis: mild gastritis    :  Dr. Joe Enrique  Assistant(s): Endoscopy Technician-1: Angel TOLEDO  Endoscopy RN-1: Justin Simms RN    Specimens:   ID Type Source Tests Collected by Time Destination   1 : pathology Preservative Duodenum  Shereen Sheffield MD 7/31/2019 1005 Pathology   2 : duodenal bulb Preservative   Shereen Sheffield MD 7/31/2019 1007 Pathology   3 : antrum Preservative Gastric  Shereen Sheffield MD 7/31/2019 1008 Pathology   4 : polyp Preservative Gastric  Shereen Sheffield MD 7/31/2019 1009 Pathology     H. Pylori  yes    Assessment:  Intra-procedure medications     Anesthesia gave intra-procedure sedation and medications, see anesthesia flow sheet yes    Intravenous fluids: NS@ KVO     Vital signs stable  yes    Abdominal assessment: round and soft  yes    Recommendation:  Discharge patient per MD order yes.   Family or Friend  yes  Permission to share finding with family or friend yes

## 2019-07-31 NOTE — ANESTHESIA POSTPROCEDURE EVALUATION
Post-Anesthesia Evaluation and Assessment    Patient: Alfonzo Louis MRN: [id number]  SSN: [id number]    YOB: 1955  Age: 71 y.o. Sex: female      I have evaluated the patient and they are stable and ready for discharge from the PACU. Cardiovascular Function/Vital Signs  Visit Vitals  /60   Pulse 70   Temp 36.9 °C (98.5 °F)   Resp 18   Ht 5' 4\" (1.626 m)   Wt 82.1 kg (181 lb)   SpO2 100%   Breastfeeding? No   BMI 31.07 kg/m²       Patient is status post MAC anesthesia for Procedure(s):  ESOPHAGOGASTRODUODENOSCOPY (EGD)  ESOPHAGOGASTRODUODENAL (EGD) BIOPSY. Nausea/Vomiting: None    Postoperative hydration reviewed and adequate. Pain:  Pain Scale 1: Numeric (0 - 10) (07/31/19 1024)  Pain Intensity 1: 4 (07/31/19 1016)   Managed    Neurological Status: At baseline    Mental Status, Level of Consciousness: Alert and  oriented to person, place, and time    Pulmonary Status:   O2 Device: Room air (07/31/19 1024)   Adequate oxygenation and airway patent    Complications related to anesthesia: None    Post-anesthesia assessment completed. No concerns    Signed By: Raza Tanner MD     July 31, 2019              Procedure(s):  ESOPHAGOGASTRODUODENOSCOPY (EGD)  ESOPHAGOGASTRODUODENAL (EGD) BIOPSY. MAC    <BSHSIANPOST>    Vitals Value Taken Time   BP 86/69 7/31/2019 10:26 AM   Temp     Pulse 68 7/31/2019 10:27 AM   Resp 34 7/31/2019 10:27 AM   SpO2 100 % 7/31/2019 10:27 AM   Vitals shown include unvalidated device data.

## 2019-07-31 NOTE — ANESTHESIA PREPROCEDURE EVALUATION
Relevant Problems   No relevant active problems       Anesthetic History   No history of anesthetic complications  PONV          Review of Systems / Medical History  Patient summary reviewed, nursing notes reviewed and pertinent labs reviewed    Pulmonary  Within defined limits                 Neuro/Psych   Within defined limits           Cardiovascular  Within defined limits  Hypertension                   GI/Hepatic/Renal  Within defined limits         PUD     Endo/Other  Within defined limits    Hypothyroidism  Obesity  Pertinent negatives: No morbid obesity   Other Findings              Physical Exam    Airway  Mallampati: II  TM Distance: > 6 cm  Neck ROM: normal range of motion   Mouth opening: Normal     Cardiovascular  Regular rate and rhythm,  S1 and S2 normal,  no murmur, click, rub, or gallop             Dental  No notable dental hx       Pulmonary  Breath sounds clear to auscultation               Abdominal  GI exam deferred       Other Findings            Anesthetic Plan    ASA: 2  Anesthesia type: general      Post-op pain plan if not by surgeon: peripheral nerve block single    Induction: Intravenous  Anesthetic plan and risks discussed with: Patient

## 2019-07-31 NOTE — H&P
Pre-endoscopy H and P    The patient was seen and examined. The airway was assessed and documented. The problem list, past medical history, and medications were reviewed. Patient Active Problem List   Diagnosis Code    Blisters of multiple sites R23.8    Right knee DJD M17.11    Abdominal pain, left upper quadrant R10.12    Dysphagia R13.10    Hypothyroid E03.9    UC (ulcerative colitis) ([facility] [address] K51.90    Iritis H20.9    Advance directive discussed with patient Z70.80    Primary osteoarthritis of left knee M17.12    Obesity E66.9    Mechanical complication of knee prosthesis (HCC) T84.098A, Z96.659    Obesity (BMI 30.0-34. 9) E66.9    Complete tear of right rotator cuff M75.121    Complete tear of left rotator cuff M75.122     Social History     Socioeconomic History    Marital status: SINGLE     Spouse name: Not on file    Number of children: Not on file    Years of education: Not on file    Highest education level: Not on file   Occupational History    Not on file   Social Needs    Financial resource strain: Not on file    Food insecurity:     Worry: Not on file     Inability: Not on file    Transportation needs:     Medical: Not on file     Non-medical: Not on file   Tobacco Use    Smoking status: Never Smoker    Smokeless tobacco: Never Used   Substance and Sexual Activity    Alcohol use:  Yes     Alcohol/week: 0.0 - 1.0 standard drinks     Comment: 2 drinks per month    Drug use: No    Sexual activity: Never   Lifestyle    Physical activity:     Days per week: Not on file     Minutes per session: Not on file    Stress: Not on file   Relationships    Social connections:     Talks on phone: Not on file     Gets together: Not on file     Attends Christianity service: Not on file     Active member of club or organization: Not on file     Attends meetings of clubs or organizations: Not on file     Relationship status: Not on file    Intimate partner violence:     Fear of current or ex partner: Not on file     Emotionally abused: Not on file     Physically abused: Not on file     Forced sexual activity: Not on file   Other Topics Concern    Not on file   Social History Narrative    Not on file     Past Medical History:   Diagnosis Date    Arrhythmia 2018    \"PALPATATIONS, FLUTTERING, MEDS PER DR. ZAYAS\"    Arthritis     knees, back, SHOULDERS, R HIP    Chest pain 03/16/2018    PT STATES SHE'S BEEN ASSESSED FOR AND DETERMINED TO BE ESOPHAGEAL SPASMS PER FREYA LUNA AND DR.MCGROARTY ODILON    Chronic pain     LOWER BACK, R HIP, SHOULDERS    Duodenal ulcer 2002    HOSPITALIZED AND TREATED WITH MEDICATION    GERD (gastroesophageal reflux disease)     H/O blood clots 2007    TO RIGHT EYE    Hypertension     Iritis 2007    both eyes    Nausea & vomiting     Other ill-defined conditions(339.92)     back pain/spasm    PONV (postoperative nausea and vomiting)     Thyroid disease     hypothroid    Ulcerative colitis 2002     The patient has a family history of na    Prior to Admission Medications   Prescriptions Last Dose Informant Patient Reported? Taking? ALPRAZolam (XANAX) 0.5 mg tablet Not Taking at Unknown time  No No   Sig: Take 0.5-1 Tabs by mouth two (2) times daily as needed for Anxiety. Max Daily Amount: 1 mg. NIFEdipine ER (PROCARDIA XL) 60 mg ER tablet 7/30/2019 at Unknown time  Yes Yes   Sig: Take 60 mg by mouth daily. Omeprazole delayed release (PRILOSEC D/R) 20 mg tablet 7/30/2019 at Unknown time  No Yes   Sig: Take 1 Tab by mouth daily. daily before breakfast   Patient taking differently: Take 20 mg by mouth two (2) times a day. daily before breakfast   calcium carbonate (TUMS) 200 mg calcium (500 mg) chew 7/24/2019 at Unknown time  Yes Yes   Sig: Take 1 Tab by mouth as needed. diclofenac EC (VOLTAREN) 75 mg EC tablet 7/24/2019 at Unknown time  Yes Yes   Sig: Take 75 mg by mouth two (2) times a day.    dicyclomine (BENTYL) 10 mg capsule 7/30/2019 at Unknown time  Yes Yes   Sig: Take 10 mg by mouth three (3) times daily as needed. hydrocortisone (PROCTOSOL HC) 2.5 % rectal cream 6/30/2019 at Unknown time  No Yes   Sig: Insert  into rectum four (4) times daily. ketorolac (ACULAR) 0.5 % ophthalmic solution 7/30/2019 at Unknown time  Yes Yes   Sig: Administer 2 Drops to both eyes four (4) times daily as needed. levothyroxine (SYNTHROID) 100 mcg tablet 7/30/2019 at Unknown time  No Yes   Sig: Take 1 Tab by mouth Daily (before breakfast). mometasone (ELOCON) 0.1 % topical cream Not Taking at Unknown time  No No   Sig: Apply  to affected area daily. ondansetron (ZOFRAN ODT) 4 mg disintegrating tablet 7/29/2019  No No   Sig: Take 2 Tabs by mouth every eight (8) hours as needed for Nausea. prednisoLONE acetate (PRED FORTE) 1 % ophthalmic suspension 7/30/2019 at Unknown time  Yes Yes   Sig: Administer 1 Drop to both eyes three (3) times daily as needed. traMADol (ULTRAM) 50 mg tablet Not Taking at Unknown time  Yes No   Sig: Take 50 mg by mouth every six (6) hours as needed for Pain.   valsartan (DIOVAN) 160 mg tablet 7/30/2019 at Unknown time  No Yes   Sig: TAKE ONE TABLET BY MOUTH DAILY      Facility-Administered Medications: None         The review of systems is:  negative for shortness of breath or chest pain      The heart, lungs and mental status were satisfactory for the administration of MAC sedation and for the procedure. Mallampati score: See Anesthesia. I discussed with the patient the objectives, risks, consequences and alternatives to the procedure. Plan: Endoscopic procedure with MAC sedation.     Cherie Quintero MD  7/31/2019  10:12 AM

## 2019-08-01 ENCOUNTER — HOSPITAL ENCOUNTER (OUTPATIENT)
Dept: PHYSICAL THERAPY | Age: 64
Discharge: HOME | End: 2019-08-01
Payer: MEDICARE

## 2019-08-01 PROCEDURE — 97110 THERAPEUTIC EXERCISES: CPT | Performed by: PHYSICAL THERAPIST

## 2019-08-01 PROCEDURE — 97140 MANUAL THERAPY 1/> REGIONS: CPT | Performed by: PHYSICAL THERAPIST

## 2019-08-01 NOTE — PROGRESS NOTES
PT DAILY TREATMENT NOTE - [facility] 2-15    Patient Name: Maxwell Merrill  Date:2019  : 1955  [x]  Patient  Verified  [place] / Plan: [place] MED[place] & B / Product Type: Medicare /    In time: a  Out time:  Total Treatment Time (min): 45  Total Timed Codes (min): 45  1:1 Treatment Time ( [address] only): 25   Visit #: 23    Treatment Area: Left shoulder pain [M25.512]    SUBJECTIVE  Pain Level (0-10 scale): 4/10  Any medication changes, allergies to medications, adverse drug reactions, diagnosis change, or new procedure performed?: [x] No    [] Yes (see summary sheet for update)  Subjective functional status/changes:   [] No changes reported  Shoulder has been holding up pretty well.     OBJECTIVE         Modality rationale: decrease inflammation and decrease pain to improve the patients ability to restore function     Min Type Additional Details        [] Estim: []Att   []Unatt    []TENS instruct                  []IFC  []Premod   []NMES                     []Other:  []w/US   []w/ice   []w/heat  Position:  Location:        []  Traction: [] Cervical       []Lumbar                       [] Prone          []Supine                       []Intermittent   []Continuous Lbs:  [] before manual  [] after manual  []w/heat     []  Ultrasound: []Continuous   [] Pulsed                       at: []1MHz   []3MHz Location:  W/cm2:     [] Paraffin     Location:   []w/heat   To go [x]  Ice     []  Heat  []  Ice massage Position:  Location: shoulder     []  Laser  []  Other: Position:  Location:        []  Vasopneumatic Device Pressure:       [] lo [] med [] hi   Temperature:       [x] Skin assessment post-treatment:  [x]intact []redness- no adverse reaction    []redness  adverse reaction:      35 min Therapeutic Exercise:  [x] See flow sheet :    Rationale: increase ROM and increase strength to improve the patients ability to restore function     10 min Manual Therapy:  STM to anterior/posterior shoulder, proximal biceps region. [address] post/inf mobilizations GII-III, pec release   Rationale: decrease pain, increase ROM and increase tissue extensibility  to improve the patients ability to restore function                                  With   [] TE   [] TA   [] neuro   [] other: Patient Education: [x] Review HEP    [] Progressed/Changed HEP based on:   [] positioning   [] body mechanics   [] transfers   [] heat/ice application    [] other:       Other Objective/Functional Measures: --     Pain Level (0-10 scale) post treatment: 2/10     ASSESSMENT/Changes in Function:   Making progress overall, but still working on scapular recruitment. Discussed d/c in next few weeks and patient is in agreement with this POC.    Patient will continue to benefit from skilled PT services to modify and progress therapeutic interventions, address functional mobility deficits, address ROM deficits, address strength deficits, analyze and address soft tissue restrictions and analyze and cue movement patterns to attain remaining goals.     [x]  See Plan of Care  []  See progress note/recertification  []  See Discharge Summary     Progress towards goals / Updated goals:  Short Term Goals: To be accomplished in 3-4 weeks:  1) Pt will be independent in initial HEP MET  2) Pt will improve L shoulder PROM flex to >/=130 deg in order to aid in reaching MET  3) Pt will improve L shoulder PROM ER to >/=70 deg in order to aid in performing ADL's MET  4) Pt will report compliance with ice regimen MET     Long Term Goals: To be accomplished in 8-12 weeks:  1) Pt will improve L shoulder AROM flex to >/=130 deg in order to aid in reaching MET  2) Pt will report being able to don/doff clothing with </=2/10 L shoulder pain PROGRESSING  3) Pt will demonstrate >/= 4/5 L shoulder flex, scap strength in order to aid in lifting PROGRESSING  4) Pt will demonstrate >/= 4/5 L shoulder ER and IR strength in order to aid in performing [address]  [] Upgrade activities as tolerated     []  Continue plan of care  []  Update interventions per flow sheet       []  Discharge due to:_  []  Other:_      Leonel Grant, PT 8/1/2019

## 2019-08-02 ENCOUNTER — HOSPITAL ENCOUNTER (OUTPATIENT)
Dept: CT IMAGING | Age: 64
Discharge: HOME | End: 2019-08-02
Attending: SPECIALIST
Payer: MEDICARE

## 2019-08-02 DIAGNOSIS — R11.0 NAUSEA: ICD-10-CM

## 2019-08-02 DIAGNOSIS — R10.819 ABDOMINAL TENDERNESS, UNSPECIFIED SITE: ICD-10-CM

## 2019-08-02 DIAGNOSIS — K58.9 IRRITABLE BOWEL SYNDROME: ICD-10-CM

## 2019-08-02 DIAGNOSIS — R63.4 LOSS OF WEIGHT: ICD-10-CM

## 2019-08-02 PROCEDURE — 74177 CT ABD & PELVIS W/CONTRAST: CPT

## 2019-08-02 PROCEDURE — 74011636320 HC RX REV CODE- 636/320: Performed by: RADIOLOGY

## 2019-08-02 PROCEDURE — 74011000258 HC RX REV CODE- 258: Performed by: RADIOLOGY

## 2019-08-02 RX ORDER — SODIUM CHLORIDE 0.9 % (FLUSH) 0.9 %
10 SYRINGE (ML) INJECTION
Status: COMPLETED | OUTPATIENT
Start: 2019-08-02 | End: 2019-08-02

## 2019-08-02 RX ADMIN — IOPAMIDOL 100 ML: 755 INJECTION, SOLUTION INTRAVENOUS at 10:10

## 2019-08-02 RX ADMIN — Medication 10 ML: at 10:10

## 2019-08-02 RX ADMIN — SODIUM CHLORIDE 100 ML: 900 INJECTION, SOLUTION INTRAVENOUS at 10:10

## 2019-08-05 ENCOUNTER — HOSPITAL ENCOUNTER (OUTPATIENT)
Dept: PHYSICAL THERAPY | Age: 64
Discharge: HOME | End: 2019-08-05
Payer: MEDICARE

## 2019-08-05 PROCEDURE — 97110 THERAPEUTIC EXERCISES: CPT | Performed by: PHYSICAL THERAPIST

## 2019-08-05 PROCEDURE — 97140 MANUAL THERAPY 1/> REGIONS: CPT | Performed by: PHYSICAL THERAPIST

## 2019-08-05 PROCEDURE — 97530 THERAPEUTIC ACTIVITIES: CPT | Performed by: PHYSICAL THERAPIST

## 2019-08-05 NOTE — PROGRESS NOTES
PT DAILY TREATMENT NOTE - [facility] 2-15    Patient Name: Nino Valles  Date:2019  : 1955  [x]  Patient  Verified  Payor: [place] MEDICARE / Plan: [place] MEDICARE PART A & B / Product Type: Medicare /    In time:1005a  Out time:1100a  Total Treatment Time (min): 55  Total Timed Codes (min): 55  1:1 Treatment Time ([facility] only): 54   Visit #: 24    Treatment Area: Left shoulder pain [M25.512]    SUBJECTIVE  Pain Level (0-10 scale): 4/10  Any medication changes, allergies to medications, adverse drug reactions, diagnosis change, or new procedure performed?: [x] No    [] Yes (see summary sheet for update)  Subjective functional status/changes:   [] No changes reported  Shoulder has been doing pretty well.      OBJECTIVE               Modality rationale: decrease inflammation and decrease pain to improve the patients ability to restore function     Min Type Additional Details        [] Estim: []Att   []Unatt    []TENS instruct                  []IFC  []Premod   []NMES                     []Other:  []w/US   []w/ice   []w/heat  Position:  Location:        []  Traction: [] Cervical       []Lumbar                       [] Prone          []Supine                       []Intermittent   []Continuous Lbs:  [] before manual  [] after manual  []w/heat     []  Ultrasound: []Continuous   [] Pulsed                       at: []1MHz   []3MHz Location:  W/cm2:     [] Paraffin     Location:   []w/heat   To go [x]  Ice     []  Heat  []  Ice massage Position:  Location: shoulder     []  Laser  []  Other: Position:  Location:        []  Vasopneumatic Device Pressure:       [] lo [] med [] hi   Temperature:       [x] Skin assessment post-treatment:  [x]intact []redness- no adverse reaction    []redness  adverse reaction:      30 min Therapeutic Exercise:  [x] See flow sheet :    Rationale: increase ROM and increase strength to improve the patients ability to restore function     10 min Therapeutic Activities:  [x] See flow sheet : pushing chair down hallway to simulate patient transport   Rationale: increase ROM and increase strength to improve the patients ability to restore function    15 min Manual Therapy:  STM to anterior/posterior shoulder, proximal biceps region. [address] post/inf mobilizations GII-III, pec release   Rationale: decrease pain, increase ROM and increase tissue extensibility  to improve the patients ability to restore function          With   [] TE   [] TA   [] neuro   [] other: Patient Education: [x] Review HEP    [] Progressed/Changed HEP based on:   [] positioning   [] body mechanics   [] transfers   [] heat/ice application    [] other:      Other Objective/Functional Measures: --     Pain Level (0-10 scale) post treatment: 2/10    ASSESSMENT/Changes in Function:   Progressing well. No increased pain with patient transport simulation. Advised on proper body mechanics and not pushing through arms and using legs for pushing w/c   Patient will continue to benefit from skilled PT services to modify and progress therapeutic interventions, address functional mobility deficits, address ROM deficits, address strength deficits, analyze and address soft tissue restrictions and analyze and cue movement patterns to attain remaining goals.      []  See Plan of Care  []  See progress note/recertification  []  See Discharge Summary         Progress towards goals / Updated goals:  Short Term Goals: To be accomplished in 3-4 weeks:  1) Pt will be independent in initial HEP MET  2) Pt will improve L shoulder PROM flex to >/=130 deg in order to aid in reaching MET  3) Pt will improve L shoulder PROM ER to >/=70 deg in order to aid in performing ADL's MET  4) Pt will report compliance with ice regimen MET     Long Term Goals: To be accomplished in 8-12 weeks:  1) Pt will improve L shoulder AROM flex to >/=130 deg in order to aid in reaching MET  2) Pt will report being able to don/doff clothing with </=2/10 L shoulder pain PROGRESSING  3) Pt will demonstrate >/= 4/5 L shoulder flex, scap strength in order to aid in lifting PROGRESSING  4) Pt will demonstrate >/= 4/5 L shoulder ER and IR strength in order to aid in performing [address]  [x]  Upgrade activities as tolerated     [x]  Continue plan of care  []  Update interventions per flow sheet       []  Discharge due to:_  []  Other:_      Sj Shepard, PT 8/5/2019

## 2019-08-08 ENCOUNTER — HOSPITAL ENCOUNTER (OUTPATIENT)
Dept: PHYSICAL THERAPY | Age: 64
Discharge: HOME | End: 2019-08-08
Payer: MEDICARE

## 2019-08-08 PROCEDURE — 97140 MANUAL THERAPY 1/> REGIONS: CPT | Performed by: PHYSICAL THERAPIST

## 2019-08-08 PROCEDURE — 97110 THERAPEUTIC EXERCISES: CPT | Performed by: PHYSICAL THERAPIST

## 2019-08-08 NOTE — PROGRESS NOTES
PT DAILY TREATMENT NOTE - [facility] 2-15    Patient Name: Braulio Guzman  Date:2019  : 1955  [x]  Patient  Verified  Payor: [place] MEDICARE / Plan: [place] MEDICARE PART A & B / Product Type: Medicare /    In time:1000a  Out time:5a  Total Treatment Time (min): 55  Total Timed Codes (min): 55  1:1 Treatment Time ( [address] only): 54   Visit #: 25    Treatment Area: Left shoulder pain [M25.512]    SUBJECTIVE  Pain Level (0-10 scale): 3/10  Any medication changes, allergies to medications, adverse drug reactions, diagnosis change, or new procedure performed?: [x] No    [] Yes (see summary sheet for update)  Subjective functional status/changes:   [] No changes reported  Shoulder slowly getting better.      OBJECTIVE             Modality rationale: decrease inflammation and decrease pain to improve the patients ability to restore function     Min Type Additional Details        [] Estim: []Att   []Unatt    []TENS instruct                  []IFC  []Premod   []NMES                     []Other:  []w/US   []w/ice   []w/heat  Position:  Location:        []  Traction: [] Cervical       []Lumbar                       [] Prone          []Supine                       []Intermittent   []Continuous Lbs:  [] before manual  [] after manual  []w/heat     []  Ultrasound: []Continuous   [] Pulsed                       at: []1MHz   []3MHz Location:  W/cm2:     [] Paraffin     Location:   []w/heat   To go [x]  Ice     []  Heat  []  Ice massage Position:  Location: shoulder     []  Laser  []  Other: Position:  Location:        []  Vasopneumatic Device Pressure:       [] lo [] med [] hi   Temperature:       [x] Skin assessment post-treatment:  [x]intact []redness- no adverse reaction    []redness  adverse reaction:      40 min Therapeutic Exercise:  [x] See flow sheet :    Rationale: increase ROM and increase strength to improve the patients ability to restore function     15 min Manual Therapy:  STM to anterior/posterior shoulder, proximal biceps region. [address] post/inf mobilizations GII-III, pec release   Rationale: decrease pain, increase ROM and increase tissue extensibility  to improve the patients ability to restore function                                                                 With   [] TE   [] TA   [] neuro   [] other: Patient Education: [x] Review HEP    [] Progressed/Changed HEP based on:   [] positioning   [] body mechanics   [] transfers   [] heat/ice application    [] other:       Other Objective/Functional Measures: --        Pain Level (0-10 scale) post treatment: 2/10     ASSESSMENT/Changes in Function:   Slowly progress. Will plan on d/c after next 2 sessions. Patient will continue to benefit from skilled PT services to modify and progress therapeutic interventions, address functional mobility deficits, address ROM deficits, address strength deficits, analyze and address soft tissue restrictions and analyze and cue movement patterns to attain remaining goals.      []  See Plan of Care  []  See progress note/recertification  []  See Discharge Summary         Progress towards goals / Updated goals:  Short Term Goals: To be accomplished in 3-4 weeks:  1) Pt will be independent in initial HEP MET  2) Pt will improve L shoulder PROM flex to >/=130 deg in order to aid in reaching MET  3) Pt will improve L shoulder PROM ER to >/=70 deg in order to aid in performing ADL's MET  4) Pt will report compliance with ice regimen MET     Long Term Goals: To be accomplished in 8-12 weeks:  1) Pt will improve L shoulder AROM flex to >/=130 deg in order to aid in reaching MET  2) Pt will report being able to don/doff clothing with </=2/10 L shoulder pain PROGRESSING  3) Pt will demonstrate >/= 4/5 L shoulder flex, scap strength in order to aid in lifting PROGRESSING  4) Pt will demonstrate >/= 4/5 L shoulder ER and IR strength in order to aid in performing [address]  [x]  Upgrade activities as tolerated     [x]  Continue plan of care  []  Update interventions per flow sheet       []  Discharge due to:_  []  Other:_      Keith Garcia, PT 8/8/2019

## 2019-08-09 ENCOUNTER — HOSPITAL ENCOUNTER (OUTPATIENT)
Dept: NUCLEAR MEDICINE | Age: 64
Discharge: HOME | End: 2019-08-09
Attending: SPECIALIST
Payer: MEDICARE

## 2019-08-09 DIAGNOSIS — R11.2 NAUSEA WITH VOMITING, UNSPECIFIED: ICD-10-CM

## 2019-08-09 PROCEDURE — 78264 GASTRIC EMPTYING IMG STUDY: CPT

## 2019-08-12 ENCOUNTER — APPOINTMENT (OUTPATIENT)
Dept: PHYSICAL THERAPY | Age: 64
End: 2019-08-12
Payer: MEDICARE

## 2019-08-12 ENCOUNTER — HOSPITAL ENCOUNTER (OUTPATIENT)
Dept: PHYSICAL THERAPY | Age: 64
Discharge: HOME | End: 2019-08-12
Payer: MEDICARE

## 2019-08-12 PROCEDURE — 97140 MANUAL THERAPY 1/> REGIONS: CPT

## 2019-08-12 PROCEDURE — 97110 THERAPEUTIC EXERCISES: CPT

## 2019-08-12 NOTE — PROGRESS NOTES
[facility] Physical Therapy and Sports Medicine  [address] [facility] [address]  Phone: [phone]  Fax: [phone]    Progress Note    Name: Ophelia Price   : [birth year]   MD: Austyn Hutchinson MD       Treatment Diagnosis: Left shoulder pain [M25.512]  Start of Care: 6/3/19    Visits from Start of Care: 27  Missed Visits: 4    Summary of Care: Ms. Ruiz Pappas has made good progress on ROM. She still c/o of a painful arc through 90-110deg but reports it has gotten better. We have been progressing her shoulder strength well, with an emphasis on restoring scapular mechanics and RTC strength. She feels she is able to manage care on her own, plan to d/c following next 2 sessions. Please advise. Thank you for this referral.     Shoulder AROM:  Flex: 165 deg (145deg)  Abd: 152 deg (140deg)  ER: T4  IR: T7 (T9)    Shoulder Strength  Flex: 4/5  Abd: 4/5  ER: 4+/5   IR: 4+/5    Short Term Goals: To be accomplished in 3-4 weeks:  1) Pt will be independent in initial HEP MET  2) Pt will improve L shoulder PROM flex to >/=130 deg in order to aid in reaching MET  3) Pt will improve L shoulder PROM ER to >/=70 deg in order to aid in performing ADL's MET  4) Pt will report compliance with ice regimen MET     Long Term Goals: To be accomplished in 8-12 weeks:  1) Pt will improve L shoulder AROM flex to >/=130 deg in order to aid in reaching MET  2) Pt will report being able to don/doff clothing with </=2/10 L shoulder pain Partially MET (pain is about a 1)  3) Pt will demonstrate >/= 4/5 L shoulder flex, scap strength in order to aid in lifting MET  4) Pt will demonstrate >/= 4/5 L shoulder ER and IR strength in order to aid in performing ADL's MET      Celeste Hopkins, PT, DPT, ATC, OCS  Nadege Matter, PTA, OPTA 2019 10:08 AM    ________________________________________________________________________  NOTE TO PHYSICIAN:  Please complete the following and fax to:   [facility] Physical [facility]: [phone]  . Retain this original for your records. If you are unable to process this request in 24 hours, please contact our office.        ____ I have read the above report and request that my patient continue therapy with the following changes/special instructions:  ____ I have read the above report and request that my patient be discharged from therapy    Physician's Signature:_________________ Date:___________Time:__________

## 2019-08-12 NOTE — PROGRESS NOTES
PT DAILY TREATMENT NOTE - [facility] 2-15    Patient Name: Power Baldwin  Date:2019  : 1955  [x]  Patient  Verified  Payor: [place] MED[place]ARE / Plan: [place] MEDICARE PART A & B / Product Type: Medicare /    In time: 9:32A Out time: 10:10A  Total Treatment Time (min): 38  Total Timed Codes (min): 38  1:1 Treatment Time ( only): 38  Visit #: 26    Treatment Area: Left shoulder pain [M25.512]    SUBJECTIVE  Pain Level (0-10 scale): 3/10  Any medication changes, allergies to medications, adverse drug reactions, diagnosis change, or new procedure performed?: [x] No    [] Yes (see summary sheet for update)  Subjective functional status/changes:   [] No changes reported  Pt reports that muscle in the back of her shoulder is still sore.  But overall much better      OBJECTIVE             Modality rationale: decrease inflammation and decrease pain to improve the patients ability to restore function     Min Type Additional Details        [] Estim: []Att   []Unatt    []TENS instruct                  []IFC  []Premod   []NMES                     []Other:  []w/US   []w/ice   []w/heat  Position:  Location:        []  Traction: [] Cervical       []Lumbar                       [] Prone          []Supine                       []Intermittent   []Continuous Lbs:  [] before manual  [] after manual  []w/heat     []  Ultrasound: []Continuous   [] Pulsed                       at: []1MHz   []3MHz Location:  W/cm2:     [] Paraffin     Location:   []w/heat   To go [x]  Ice     []  Heat  []  Ice massage Position:  Location: shoulder     []  Laser  []  Other: Position:  Location:        []  Vasopneumatic Device Pressure:       [] lo [] med [] hi   Temperature:       [x] Skin assessment post-treatment:  [x]intact []redness- no adverse reaction    []redness  adverse reaction:      28 min Therapeutic Exercise:  [x] See flow sheet : assessment of current status   Rationale: increase ROM and increase strength to improve the patients ability to restore function     10 min Manual Therapy:  STM to anterior/posterior shoulder, proximal biceps region. [address] post/inf mobilizations GII-III, pec release   Rationale: decrease pain, increase ROM and increase tissue extensibility  to improve the patients ability to restore function                                                                 With   [] TE   [] TA   [] neuro   [] other: Patient Education: [x] Review HEP    [] Progressed/Changed HEP based on:   [] positioning   [] body mechanics   [] transfers   [] heat/ice application    [] other:       Other Objective/Functional Measures:     Shoulder AROM:  Flex: 165 deg (145deg)  Abd: 152 deg (140deg)  ER: T4  IR: T7 (T9)    Shoulder Strength  Flex: 4/5  Abd: 4/5  ER: 4+/5   IR: 4+/5       Pain Level (0-10 scale) post treatment: 2/10     ASSESSMENT/Changes in Function:   Pt's prgram limited secondary to time constrain today. FU with MD tomorrow. Patient will continue to benefit from skilled PT services to modify and progress therapeutic interventions, address functional mobility deficits, address ROM deficits, address strength deficits, analyze and address soft tissue restrictions and analyze and cue movement patterns to attain remaining goals.      [x]  See Plan of Care  []  See progress note/recertification  []  See Discharge Summary         Progress towards goals / Updated goals:  Short Term Goals: To be accomplished in 3-4 weeks:  1) Pt will be independent in initial HEP MET  2) Pt will improve L shoulder PROM flex to >/=130 deg in order to aid in reaching MET  3) Pt will improve L shoulder PROM ER to >/=70 deg in order to aid in performing ADL's MET  4) Pt will report compliance with ice regimen MET     Long Term Goals: To be accomplished in 8-12 weeks:  1) Pt will improve L shoulder AROM flex to >/=130 deg in order to aid in reaching MET  2) Pt will report being able to don/doff clothing with </=2/10 L shoulder pain PROGRESSING  3) Pt will demonstrate >/= 4/5 L shoulder flex, scap strength in order to aid in lifting PROGRESSING  4) Pt will demonstrate >/= 4/5 L shoulder ER and IR strength in order to aid in performing [address]  [x]  Upgrade activities as tolerated     [x]  Continue plan of care  []  Update interventions per flow sheet       []  Discharge due to:_  []  Other:_      Tiney Form, PTA, OPTA 8/12/2019

## 2019-08-15 ENCOUNTER — APPOINTMENT (OUTPATIENT)
Dept: PHYSICAL THERAPY | Age: 64
End: 2019-08-15
Payer: MEDICARE

## 2019-08-19 ENCOUNTER — HOSPITAL ENCOUNTER (OUTPATIENT)
Dept: PHYSICAL THERAPY | Age: 64
Discharge: HOME | End: 2019-08-19
Payer: MEDICARE

## 2019-08-19 PROCEDURE — 97140 MANUAL THERAPY 1/> REGIONS: CPT | Performed by: PHYSICAL THERAPIST

## 2019-08-19 PROCEDURE — 97110 THERAPEUTIC EXERCISES: CPT | Performed by: PHYSICAL THERAPIST

## 2019-08-19 NOTE — PROGRESS NOTES
PT DAILY TREATMENT NOTE - [facility] 2-15    Patient Name: Marjan Berg  Date:2019  : 1955  [x]  Patient  Verified  Payor: [facility] / Plan: [place] MEDICARE PART A & B / Product Type: Medicare /    In time: 1000a  Out time: 1055a  Total Treatment Time (min): 55  Total Timed Codes (min): 55  1:1 Treatment Time ( only): 55   Visit #: 27    Treatment Area: Left shoulder pain [M25.512]    SUBJECTIVE  Pain Level (0-10 scale): 3/10  Any medication changes, allergies to medications, adverse drug reactions, diagnosis change, or new procedure performed?: [x] No    [] Yes (see summary sheet for update)  Subjective functional status/changes:   [] No changes reported  Reports she saw Dr. Satya Peres and told him that she was doing to start to wrap up PT. He was in agreement with this treatment plan.      OBJECTIVE    Modality rationale: decrease inflammation and decrease pain to improve the patients ability to restore function     Min Type Additional Details        [] Estim: []Att   []Unatt    []TENS instruct                  []IFC  []Premod   []NMES                     []Other:  []w/US   []w/ice   []w/heat  Position:  Location:        []  Traction: [] Cervical       []Lumbar                       [] Prone          []Supine                       []Intermittent   []Continuous Lbs:  [] before manual  [] after manual  []w/heat     []  Ultrasound: []Continuous   [] Pulsed                       at: []1MHz   []3MHz Location:  W/cm2:     [] Paraffin     Location:   []w/heat   To go [x]  Ice     []  Heat  []  Ice massage Position:  Location: shoulder     []  Laser  []  Other: Position:  Location:        []  Vasopneumatic Device Pressure:       [] lo [] med [] hi   Temperature:       [x] Skin assessment post-treatment:  [x]intact []redness- no adverse reaction    []redness  adverse reaction:      45 min Therapeutic Exercise:  [x] See flow sheet :    Rationale: increase ROM and increase strength to improve the patients ability to restore function     10 min Manual Therapy:  STM to anterior/posterior shoulder, proximal biceps region. [address] post/inf mobilizations GII-III, pec release   Rationale: decrease pain, increase ROM and increase tissue extensibility  to improve the patients ability to restore function     With   [] TE   [] TA   [] neuro   [] other: Patient Education: [x] Review HEP    [] Progressed/Changed HEP based on:   [] positioning   [] body mechanics   [] transfers   [] heat/ice application    [] other:      Other Objective/Functional Measures: --     Pain Level (0-10 scale) post treatment: 2/10    ASSESSMENT/Changes in Function:   Continuing to make progress. Will emphasize self-management and transition to HEP only after 2 more sessions. Patient will continue to benefit from skilled PT services to modify and progress therapeutic interventions, address functional mobility deficits, address ROM deficits, address strength deficits, analyze and address soft tissue restrictions and analyze and cue movement patterns to attain remaining goals.      []  See Plan of Care  []  See progress note/recertification  []  See Discharge Summary         Progress towards goals / Updated goals:  Short Term Goals: To be accomplished in 3-4 weeks:  1) Pt will be independent in initial HEP MET  2) Pt will improve L shoulder PROM flex to >/=130 deg in order to aid in reaching MET  3) Pt will improve L shoulder PROM ER to >/=70 deg in order to aid in performing ADL's MET  4) Pt will report compliance with ice regimen MET     Long Term Goals: To be accomplished in 8-12 weeks:  1) Pt will improve L shoulder AROM flex to >/=130 deg in order to aid in reaching MET  2) Pt will report being able to don/doff clothing with </=2/10 L shoulder pain Partially MET (pain is about a 1)  3) Pt will demonstrate >/= 4/5 L shoulder flex, scap strength in order to aid in lifting MET  4) Pt will demonstrate >/= 4/5 L shoulder ER and IR strength in order to aid in performing ADL's MET    PLAN  [x]  Upgrade activities as tolerated     [x]  Continue plan of care  []  Update interventions per flow sheet       []  Discharge due to:_  []  Other:_      Keith Garcia, PT 8/19/2019

## 2019-08-22 ENCOUNTER — APPOINTMENT (OUTPATIENT)
Dept: PHYSICAL THERAPY | Age: 64
End: 2019-08-22
Payer: MEDICARE

## 2019-08-26 ENCOUNTER — HOSPITAL ENCOUNTER (OUTPATIENT)
Dept: PHYSICAL THERAPY | Age: 64
Discharge: HOME | End: 2019-08-26
Payer: MEDICARE

## 2019-08-26 PROCEDURE — 97140 MANUAL THERAPY 1/> REGIONS: CPT | Performed by: PHYSICAL THERAPIST

## 2019-08-26 PROCEDURE — 97110 THERAPEUTIC EXERCISES: CPT | Performed by: PHYSICAL THERAPIST

## 2019-08-26 NOTE — PROGRESS NOTES
PT DAILY TREATMENT NOTE - [facility] 2-15    Patient Name: Maximo Adler  Date:2019  : 1955  [x]  Patient  Verified  Payor: [place] MEDICARE / Plan: [place] MEDICARE PART A & B / Product Type: Medicare /    In time: 1000a  Out time: 1055a  Total Treatment Time (min): 55  Total Timed Codes (min): 55  1:1 Treatment Time ( only): 55   Visit #:  29    Treatment Area: Left shoulder pain [M25.512]    SUBJECTIVE  Pain Level (0-10 scale): 4/10  Any medication changes, allergies to medications, adverse drug reactions, diagnosis change, or new procedure performed?: [x] No    [] Yes (see summary sheet for update)  Subjective functional status/changes:   [] No changes reported  Pain has been a little higher the last few days in her anterior shoulder. Not sure why, but has been doing more with her arm.      OBJECTIVE    Modality rationale: decrease inflammation and decrease pain to improve the patients ability to restore function     Min Type Additional Details        [] Estim: []Att   []Unatt    []TENS instruct                  []IFC  []Premod   []NMES                     []Other:  []w/US   []w/ice   []w/heat  Position:  Location:        []  Traction: [] Cervical       []Lumbar                       [] Prone          []Supine                       []Intermittent   []Continuous Lbs:  [] before manual  [] after manual  []w/heat     []  Ultrasound: []Continuous   [] Pulsed                       at: []1MHz   []3MHz Location:  W/cm2:     [] Paraffin     Location:   []w/heat   To go [x]  Ice     []  Heat  []  Ice massage Position:  Location: shoulder     []  Laser  []  Other: Position:  Location:        []  Vasopneumatic Device Pressure:       [] lo [] med [] hi   Temperature:       [x] Skin assessment post-treatment:  [x]intact []redness- no adverse reaction    []redness  adverse reaction:      40 min Therapeutic Exercise:  [x] See flow sheet :    Rationale: increase ROM and increase strength to improve the patients ability to restore function     15 min Manual Therapy:  STM to anterior/posterior shoulder, proximal biceps region. [address] post/inf mobilizations GII-III, pec release   Rationale: decrease pain, increase ROM and increase tissue extensibility  to improve the patients ability to restore function     With   [] TE   [] TA   [] neuro   [] other: Patient Education: [x] Review HEP    [] Progressed/Changed HEP based on:   [] positioning   [] body mechanics   [] transfers   [] heat/ice application    [] other:       Other Objective/Functional Measures: --        Pain Level (0-10 scale) post treatment: 3/10     ASSESSMENT/Changes in Function:   Tenderness noted along proximal biceps region and over pec muscles. Discussed having her gently stretch using doorway stretch. Still plan on d/c after next session  Patient will continue to benefit from skilled PT services to modify and progress therapeutic interventions, address functional mobility deficits, address ROM deficits, address strength deficits, analyze and address soft tissue restrictions and analyze and cue movement patterns to attain remaining goals.      []  See Plan of Care  []  See progress note/recertification  []  See Discharge Summary         Progress towards goals / Updated goals:  Short Term Goals: To be accomplished in 3-4 weeks:  1) Pt will be independent in initial HEP MET  2) Pt will improve L shoulder PROM flex to >/=130 deg in order to aid in reaching MET  3) Pt will improve L shoulder PROM ER to >/=70 deg in order to aid in performing ADL's MET  4) Pt will report compliance with ice regimen MET     Long Term Goals: To be accomplished in 8-12 weeks:  1) Pt will improve L shoulder AROM flex to >/=130 deg in order to aid in reaching MET  2) Pt will report being able to don/doff clothing with </=2/10 L shoulder pain Partially MET (pain is about a 1)  3) Pt will demonstrate >/= 4/5 L shoulder flex, scap strength in order to aid in lifting MET  4) Pt will demonstrate >/= 4/5 L shoulder ER and IR strength in order to aid in performing ADL's MET      PLAN  [x]  Upgrade activities as tolerated     [x]  Continue plan of care  []  Update interventions per flow sheet       []  Discharge due to:_  []  Other:_      Astrid Cameron PT 8/26/2019

## 2019-08-28 ENCOUNTER — TELEPHONE (OUTPATIENT)
Dept: INTERNAL MEDICINE CLINIC | Age: 64
End: 2019-08-28

## 2019-08-28 DIAGNOSIS — E03.9 ACQUIRED HYPOTHYROIDISM: Primary | ICD-10-CM

## 2019-08-28 NOTE — TELEPHONE ENCOUNTER
Let her know I agree. Lets repeat her thyroid levels. Does she want to  the lab slip or have us mail it to her.

## 2019-08-28 NOTE — TELEPHONE ENCOUNTER
Ms. Mimi Arguelles would like a call because her GI doctor, Iveth Crowley feels that she needs to redo her labs for her thyroid. He feels that her medication might need to be changed again.

## 2019-08-29 ENCOUNTER — APPOINTMENT (OUTPATIENT)
Dept: PHYSICAL THERAPY | Age: 64
End: 2019-08-29
Payer: MEDICARE

## 2019-09-05 ENCOUNTER — HOSPITAL ENCOUNTER (OUTPATIENT)
Dept: PHYSICAL THERAPY | Age: 64
Discharge: HOME | End: 2019-09-05
Payer: MEDICARE

## 2019-09-05 ENCOUNTER — HOSPITAL ENCOUNTER (OUTPATIENT)
Dept: LAB | Age: 64
Discharge: HOME | End: 2019-09-05
Payer: MEDICARE

## 2019-09-05 PROCEDURE — 97140 MANUAL THERAPY 1/> REGIONS: CPT | Performed by: PHYSICAL THERAPIST

## 2019-09-05 PROCEDURE — 36415 COLL VENOUS BLD VENIPUNCTURE: CPT

## 2019-09-05 PROCEDURE — 84439 ASSAY OF FREE THYROXINE: CPT

## 2019-09-05 PROCEDURE — 97110 THERAPEUTIC EXERCISES: CPT | Performed by: PHYSICAL THERAPIST

## 2019-09-05 PROCEDURE — 84443 ASSAY THYROID STIM HORMONE: CPT

## 2019-09-05 NOTE — ANCILLARY DISCHARGE INSTRUCTIONS
[facility] Physical Therapy   [address] [address], [address] [place] [address], [address] [place] [address]  Phone: [phone] Fax: [phone]      Discharge Summary 2-15      Patient name: Adiel Haque  :   Provider#: [id number]  Referral source: Juan Amezcua MD      Medical/Treatment Diagnosis: Left shoulder pain [M25.512]     Prior Hospitalization: see medical history     Comorbidities: see evaluation  Prior Level of Function:see evaluation  Medications: Verified on Patient Summary List    Start of Care: 19      Onset Date:3/22/18   Visits from Start of Care: 30     Missed Visits: 4  Reporting Period : 19 to 19      Assessment/Summary of care: Ms. Clayton Tucker has made good progress over the course of her rehabilitation s/p L RTC repair. She has good ROM restoration, though there is still a painful arc present, particularly with abduction ROM. This is due in part to poor scapular mechanics. Her function and activity tolerance have improved significantly in the last month as well. She is compliant with her HEP and will continue to progress working independently. No further formal PT necessary at this time.      Shoulder AROM  Flex 160deg  Abd: 150 (mild p!)  ER: T3  IR: T9    Short Term Goals: To be accomplished in 3-4 weeks:  1) Pt will be independent in initial HEP MET  2) Pt will improve L shoulder PROM flex to >/=130 deg in order to aid in reaching MET  3) Pt will improve L shoulder PROM ER to >/=70 deg in order to aid in performing ADL's MET  4) Pt will report compliance with ice regimen MET     Long Term Goals: To be accomplished in 8-12 weeks:  1) Pt will improve L shoulder AROM flex to >/=130 deg in order to aid in reaching MET  2) Pt will report being able to don/doff clothing with </=2/10 L shoulder pain Partially MET (pain is about a 1)  3) Pt will demonstrate >/= 4/5 L shoulder flex, scap strength in order to aid in lifting MET  4) Pt will demonstrate >/= 4/5 L shoulder ER and IR strength in order to aid in performing ADL's MET        RECOMMENDATIONS:  [x]Discontinue therapy: [x]Patient has reached or is progressing toward set goals     []Patient is non-compliant or has abdicated     []Due to lack of appreciable progress towards set [address] Merchant, PT 9/5/2019

## 2019-09-05 NOTE — PROGRESS NOTES
PT DAILY TREATMENT NOTE - [facility] 2-15    Patient Name: Marli Allred  Date:2019  : 1955  [x]  Patient  Verified  Payor: [place] MEDICARE / Plan: [place] MEDICARE PART A & B / Product Type: Medicare /    In time:950a  Out time: 1035a  Total Treatment Time (min): 45  Total Timed Codes (min): 45  1:1 Treatment Time ( [address] only): 39   Visit #: 30    Treatment Area: Left shoulder pain [M25.512]    SUBJECTIVE  Pain Level (0-10 scale): 3/10  Any medication changes, allergies to medications, adverse drug reactions, diagnosis change, or new procedure performed?: [x] No    [] Yes (see summary sheet for update)  Subjective functional status/changes:   [] No changes reported  Shoulder is doing okay. Says that her R shoulder and back are actually bothering her more today.      OBJECTIVE           Modality rationale: decrease inflammation and decrease pain to improve the patients ability to restore function     Min Type Additional Details        [] Estim: []Att   []Unatt    []TENS instruct                  []IFC  []Premod   []NMES                     []Other:  []w/US   []w/ice   []w/heat  Position:  Location:        []  Traction: [] Cervical       []Lumbar                       [] Prone          []Supine                       []Intermittent   []Continuous Lbs:  [] before manual  [] after manual  []w/heat     []  Ultrasound: []Continuous   [] Pulsed                       at: []1MHz   []3MHz Location:  W/cm2:     [] Paraffin     Location:   []w/heat   To go [x]  Ice     []  Heat  []  Ice massage Position:  Location: shoulder     []  Laser  []  Other: Position:  Location:        []  Vasopneumatic Device Pressure:       [] lo [] med [] hi   Temperature:       [x] Skin assessment post-treatment:  [x]intact []redness- no adverse reaction    []redness  adverse reaction:      30 min Therapeutic Exercise:  [x] See flow sheet : assessment of current status, review of HEP   Rationale: increase ROM and increase strength to improve the patients ability to restore function     15 min Manual Therapy:  STM to anterior/posterior shoulder, proximal biceps region. [facility] post/inf mobilizations GII-III, pec release   Rationale: decrease pain, increase ROM and increase tissue extensibility  to improve the patients ability to restore function          With   [] TE   [] TA   [] neuro   [] other: Patient Education: [x] Review HEP    [] Progressed/Changed HEP based on:   [] positioning   [] body mechanics   [] transfers   [] heat/ice application    [] other:      Other Objective/Functional Measures:  FOTO: 62     Pain Level (0-10 scale) post treatment: 1/10    ASSESSMENT/Changes in Function:   []  See Plan of Care  []  See progress note/recertification  [x]  See Discharge Summary      PLAN  []  Upgrade activities as tolerated     []  Continue plan of care  []  Update interventions per flow sheet       [x]  Discharge due to: ability to self-manage care, maximum PT benefit  []  Other:_      Na Tapia, PT 9/5/2019

## 2019-09-06 ENCOUNTER — TELEPHONE (OUTPATIENT)
Dept: INTERNAL MEDICINE CLINIC | Age: 64
End: 2019-09-06

## 2019-09-06 DIAGNOSIS — E03.9 ACQUIRED HYPOTHYROIDISM: Primary | ICD-10-CM

## 2019-09-06 LAB
T4 FREE SERPL-MCNC: 1.68 NG/DL (ref 0.82–1.77)
TSH SERPL DL<=0.005 MIU/L-ACNC: 0.06 UIU/ML (ref 0.45–4.5)

## 2019-09-06 NOTE — TELEPHONE ENCOUNTER
Thyroid still suppressed. Decrease to levothyroxine 100mcg only 6 days a week. Repeat labs in 6 weeks.

## 2019-09-09 ENCOUNTER — APPOINTMENT (OUTPATIENT)
Dept: PHYSICAL THERAPY | Age: 64
End: 2019-09-09
Payer: MEDICARE

## 2019-09-11 RX ORDER — VALSARTAN 160 MG/1
TABLET ORAL
Qty: 30 TAB | Refills: 11 | Status: SHIPPED | OUTPATIENT
Start: 2019-09-11 | End: 2020-07-03

## 2019-09-12 ENCOUNTER — APPOINTMENT (OUTPATIENT)
Dept: PHYSICAL THERAPY | Age: 64
End: 2019-09-12
Payer: MEDICARE

## 2019-09-13 ENCOUNTER — TELEPHONE (OUTPATIENT)
Dept: INTERNAL MEDICINE CLINIC | Age: 64
End: 2019-09-13

## 2019-09-16 ENCOUNTER — APPOINTMENT (OUTPATIENT)
Dept: PHYSICAL THERAPY | Age: 64
End: 2019-09-16
Payer: MEDICARE

## 2019-10-18 DIAGNOSIS — E03.9 ACQUIRED HYPOTHYROIDISM: ICD-10-CM

## 2019-10-25 ENCOUNTER — HOSPITAL ENCOUNTER (OUTPATIENT)
Dept: LAB | Age: 64
Discharge: HOME | End: 2019-10-25
Payer: MEDICARE

## 2019-10-25 PROCEDURE — 84443 ASSAY THYROID STIM HORMONE: CPT

## 2019-10-25 PROCEDURE — 36415 COLL VENOUS BLD VENIPUNCTURE: CPT

## 2019-10-25 PROCEDURE — 84439 ASSAY OF FREE THYROXINE: CPT

## 2019-10-26 LAB
T4 FREE SERPL-MCNC: 1.52 NG/DL (ref 0.82–1.77)
TSH SERPL DL<=0.005 MIU/L-ACNC: 2.17 UIU/ML (ref 0.45–4.5)

## 2019-11-04 ENCOUNTER — HOSPITAL ENCOUNTER (OUTPATIENT)
Dept: GENERAL RADIOLOGY | Age: 64
Discharge: HOME | End: 2019-11-04
Attending: SPECIALIST
Payer: MEDICARE

## 2019-11-04 DIAGNOSIS — R63.0 LOSS OF APPETITE: ICD-10-CM

## 2019-11-04 DIAGNOSIS — R10.84 GENERALIZED ABDOMINAL PAIN: ICD-10-CM

## 2019-11-04 DIAGNOSIS — R19.7 DIARRHEA: ICD-10-CM

## 2019-11-04 DIAGNOSIS — R10.819 ABDOMINAL TENDERNESS, UNSPECIFIED SITE: ICD-10-CM

## 2019-11-04 DIAGNOSIS — R63.4 ABNORMAL WEIGHT LOSS: ICD-10-CM

## 2019-11-04 PROCEDURE — 74245 XR UPPER GI/SMALL BOWEL: CPT

## 2019-12-18 ENCOUNTER — TELEPHONE (OUTPATIENT)
Dept: INTERNAL MEDICINE CLINIC | Age: 64
End: 2019-12-18

## 2019-12-18 NOTE — TELEPHONE ENCOUNTER
Patient called with concern about her thyroid,patient states she thinks her thyroid id getting worse.  Patient is requesting to see a endocrinologist. Please call back

## 2019-12-18 NOTE — TELEPHONE ENCOUNTER
I recommend [facility] [phone]. Let her know I like all the providers there.

## 2020-01-07 ENCOUNTER — TELEPHONE (OUTPATIENT)
Dept: INTERNAL MEDICINE CLINIC | Age: 65
End: 2020-01-07

## 2020-01-07 NOTE — TELEPHONE ENCOUNTER
Patient says she has a sore throat and mucus draining. She is requesting something to be called in to her pharmacy.

## 2020-01-07 NOTE — TELEPHONE ENCOUNTER
I recommend Mucinex (plain not D or DM) and Saline nasal spray 2 squirts each nostril 2-3 times a day. Can use otc Delsym as needed for cough syrup. If she feels she needs an abx, she needs to make an appointment.

## 2020-01-09 RX ORDER — LEVOTHYROXINE SODIUM 100 UG/1
TABLET ORAL
Qty: 30 TAB | Refills: 10 | Status: SHIPPED | OUTPATIENT
Start: 2020-01-09 | End: 2020-02-06 | Stop reason: ALTCHOICE

## 2020-01-24 ENCOUNTER — TELEPHONE (OUTPATIENT)
Dept: INTERNAL MEDICINE CLINIC | Age: 65
End: 2020-01-24

## 2020-01-24 NOTE — TELEPHONE ENCOUNTER
Patient says she has been having dizzy spells and light headedness. She believes that the Valsartan 160mg  is too much. Here are a few B/P readings 96/49, 100/58,  100/66.       Please advise

## 2020-02-06 ENCOUNTER — OFFICE VISIT (OUTPATIENT)
Dept: INTERNAL MEDICINE CLINIC | Age: 65
End: 2020-02-06

## 2020-02-06 ENCOUNTER — HOSPITAL ENCOUNTER (OUTPATIENT)
Dept: LAB | Age: 65
Discharge: HOME | End: 2020-02-06

## 2020-02-06 VITALS
TEMPERATURE: 97.4 F | DIASTOLIC BLOOD PRESSURE: 68 MMHG | BODY MASS INDEX: 30.9 KG/M2 | HEIGHT: 64 IN | WEIGHT: 181 LBS | SYSTOLIC BLOOD PRESSURE: 131 MMHG | RESPIRATION RATE: 20 BRPM | OXYGEN SATURATION: 98 % | HEART RATE: 46 BPM

## 2020-02-06 DIAGNOSIS — L65.9 HAIR LOSS: ICD-10-CM

## 2020-02-06 DIAGNOSIS — E03.9 ACQUIRED HYPOTHYROIDISM: ICD-10-CM

## 2020-02-06 DIAGNOSIS — Z23 ENCOUNTER FOR IMMUNIZATION: ICD-10-CM

## 2020-02-06 DIAGNOSIS — R00.1 BRADYCARDIA: Primary | ICD-10-CM

## 2020-02-06 LAB
T4 FREE SERPL-MCNC: 1.4 NG/DL (ref 0.8–1.5)
TSH SERPL DL<=0.05 MIU/L-ACNC: 0.14 UIU/ML (ref 0.36–3.74)

## 2020-02-06 RX ORDER — ASPIRIN 81 MG
100 TABLET, DELAYED RELEASE (ENTERIC COATED) ORAL DAILY
COMMUNITY

## 2020-02-06 RX ORDER — ONDANSETRON 4 MG/1
TABLET, FILM COATED ORAL
COMMUNITY
End: 2020-02-06 | Stop reason: SDUPTHER

## 2020-02-06 RX ORDER — NEOMYCIN SULFATE, POLYMYXIN B SULFATE AND DEXAMETHASONE 3.5; 10000; 1 MG/ML; [USP'U]/ML; MG/ML
SUSPENSION/ DROPS OPHTHALMIC
COMMUNITY
Start: 2020-01-28 | End: 2021-04-19

## 2020-02-06 RX ORDER — OMEPRAZOLE 40 MG/1
40 CAPSULE, DELAYED RELEASE ORAL DAILY
COMMUNITY
Start: 2020-01-28 | End: 2020-11-12 | Stop reason: SDUPTHER

## 2020-02-06 RX ORDER — LEVOTHYROXINE SODIUM 88 UG/1
88 TABLET ORAL
COMMUNITY
Start: 2020-01-02 | End: 2020-02-06 | Stop reason: SDUPTHER

## 2020-02-06 NOTE — PATIENT INSTRUCTIONS
Vaccine Information Statement    Influenza (Flu) Vaccine (Inactivated or Recombinant): What You Need to Know    Many Vaccine Information Statements are available in Nepali and other languages. See www.immunize.org/vis  Hojas de información sobre vacunas están disponibles en español y en muchos otros idiomas. Visite www.immunize.org/vis    1. Why get vaccinated? Influenza vaccine can prevent influenza (flu). Flu is a contagious disease that spreads around the [facility] every year, usually between October and May. Anyone can get the flu, but it is more dangerous for some people. Infants and young children, people 72years of age and older, pregnant women, and people with certain health conditions or a weakened immune system are at greatest risk of flu complications. Pneumonia, bronchitis, sinus infections and ear infections are examples of flu-related complications. If you have a medical condition, such as heart disease, cancer or diabetes, flu can make it worse. Flu can cause fever and chills, sore throat, muscle aches, fatigue, cough, headache, and runny or stuffy nose. Some people may have vomiting and diarrhea, though this is more common in children than adults. Each year thousands of people in the [facility] die from flu, and many more are hospitalized. Flu vaccine prevents millions of illnesses and flu-related visits to the doctor each year. 2. Influenza vaccines     CDC recommends everyone 10months of age and older get vaccinated every flu season. Children 6 months through 6years of age may need 2 doses during a single flu season. Everyone else needs only 1 dose each flu season. It takes about 2 weeks for protection to develop after vaccination. There are many flu viruses, and they are always changing. Each year a new flu vaccine is made to protect against three or four viruses that are likely to cause disease in the upcoming flu season.  Even when the vaccine doesnt exactly match these viruses, it may still provide some protection. Influenza vaccine does not cause flu. Influenza vaccine may be given at the same time as other vaccines. 3. Talk with your health care provider    Tell your vaccine provider if the person getting the vaccine:   Has had an allergic reaction after a previous dose of influenza vaccine, or has any severe, life-threatening allergies.  Has ever had Guillain-Barré Syndrome (also called GBS). In some cases, your health care provider may decide to postpone influenza vaccination to a future visit. People with minor illnesses, such as a cold, may be vaccinated. People who are moderately or severely ill should usually wait until they recover before getting influenza vaccine. Your health care provider can give you more information. 4. Risks of a reaction     Soreness, redness, and swelling where shot is given, fever, muscle aches, and headache can happen after influenza vaccine.  There may be a very small increased risk of Guillain-Barré Syndrome (GBS) after inactivated influenza vaccine (the flu shot). [employer] children who get the flu shot along with pneumococcal vaccine (PCV13), and/or DTaP vaccine at the same time might be slightly more likely to have a seizure caused by fever. Tell your health care provider if a child who is getting flu vaccine has ever had a seizure. People sometimes faint after medical procedures, including vaccination. Tell your provider if you feel dizzy or have vision changes or ringing in the ears. As with any medicine, there is a very remote chance of a vaccine causing a severe allergic reaction, other serious injury, or death. 5. What if there is a serious problem? An allergic reaction could occur after the vaccinated person leaves the clinic.  If you see signs of a severe allergic reaction (hives, swelling of the face and throat, difficulty breathing, a fast heartbeat, dizziness, or weakness), call 9-1-1 and get the person to the nearest hospital.    For other signs that concern you, call your health care provider. Adverse reactions should be reported to the Vaccine Adverse Event Reporting System (VAERS). Your health care provider will usually file this report, or you can do it yourself. Visit the VAERS website at www.vaers. [facility].gov or call 1-166.715.5553. VAERS is only for reporting reactions, and VAERS staff do not give medical advice. 6. The National Vaccine Injury Compensation Program    The [facility] Vaccine Injury Compensation Program (VICP) is a federal program that was created to compensate people who may have been injured by certain vaccines. Visit the VICP website at www.[facility].gov/vaccinecompensation or call 3-677.867.3370 to learn about the program and about filing a claim. There is a time limit to file a claim for compensation. 7. How can I learn more?  Ask your health care provider.  Call your local or state health department.  Contact the Centers for Disease Control and Prevention (CDC):  - Call 8-452.808.9333 (1-800-CDC-INFO) or  - Visit CDCs influenza website at www.cdc.gov/flu    Vaccine Information Statement (Interim)  Inactivated Influenza Vaccine   8/15/2019  42 Charlotte NorrisNahed Ghee [id number]-[id number]   Department of Health and Human Services  Centers for Disease Control and Prevention    Office Use Only

## 2020-02-06 NOTE — PROGRESS NOTES
Chief Complaint   Patient presents with    Hypertension    Thyroid Problem     she is a 71y.o. year old female who presents for evaluation. Patient presents to the office today for a number of concerns. She reports that she has been monitoring her blood pressure at home and has been getting some very low readings. She reports just yesterday she had a blood pressure reading of 99/60. The patient also reports that she has been having some episodes where she feels like she may faint. She states that the last time she saw Dr. Petar Andujar her valsartan was just switched from 160 mg down to 80 mg. She reports that she saw the endocrinologist back in November and he changed her medication to Unithroid. She reports that he called and told her that her labs were normal.  He supposed to see her back in February. The patient is concerned because she feels as though she still having thyroid type symptoms. She reports that her skin is still dry and she is losing hair daily. Also reports that she has had some muscle cramps as well as some chest pain that she is not sure is related to her reflux. The patient does have a cardiologist but she has not seen her in over a year. Reviewed PmHx, RxHx, FmHx, SocHx, AllgHx and updated and dated in the chart.     Review of Systems - negative except as listed above    Objective:     Vitals:    02/06/20 0939 02/06/20 0958   BP: 143/71 131/68   Pulse: (!) 48 (!) 46   Resp: 20    Temp: 97.4 °F (36.3 °C)    TempSrc: Oral    SpO2: 98%    Weight: 181 lb (82.1 kg)    Height: 5' 4\" (1.626 m)      Physical Examination: General appearance - alert, well appearing, and in no distress and overweight  Mental status - normal mood, behavior, speech, dress, motor activity, and thought processes  Chest - clear to auscultation, no wheezes, rales or rhonchi, symmetric air entry  Heart - bradycardic, regular rhythm   Skin - HAIR: balding noted around the edges    Assessment/ Plan:   Diagnoses and all orders for this visit:    1. Bradycardia  -     REFERRAL TO CARDIOLOGY    2. Hair loss  -     REFERRAL TO DERMATOLOGY    3. Encounter for immunization  -     ADMIN INFLUENZA VIRUS VAC  -     INFLUENZA VIRUS VACCINE, HIGH DOSE SEASONAL, PRESERVATIVE FREE    4. Acquired hypothyroidism  -     T4, FREE; Future  -     TSH 3RD GENERATION; Future       The patient I spoke at length today about her symptoms that she was having. I have asked her to contact her cardiologist today to see if she can get in for an appointment. The patient is bradycardic and this could possibly be related to why she is feeling faint at times. The patient's blood pressure here in the office today was 131/68. This is much better than what she has been getting at home. I would like for the patient to bring in her blood pressure cuff with her when she sees Dr. Juan R Garsia the next time. For now the patient is going to stay on the same medication regimen. Also I will get some thyroid labs today just to see where we are with the change in her medication. I explained to the patient that perhaps her hair loss is not related to her thyroid and that she should consider seeing a dermatologist for further evaluation. The patient is open to this suggestion and I have given her a referral to see Dr. Gomez Hoffman. She will be contacted with the results of her labs once they are back. I will speak to Dr. Juan R Garsia about this patient when she returns. Total encounter time was 25 minutes; over 50% of the time was spent counseling and coordinating care regarding thyroid symptoms, hair loss, bradycardia and medication changes. I have discussed the diagnosis with the patient and the intended plan as seen in the above orders. The patient has received an after-visit summary and questions were answered concerning future plans.      Medication Side Effects and Warnings were discussed with patient: yes  Patient Labs were reviewed and or requested: yes  Patient Past Records were reviewed and or requested  yes    Carol Wild PA-C

## 2020-02-06 NOTE — PROGRESS NOTES
Patient to get flu shot today, BP, Thyroid. Viviane Wilson is a 71 y.o. female who presents for routine immunizations. She denies any symptoms , reactions or allergies that would exclude them from being immunized today. Risks and adverse reactions were discussed and the VIS was given to them. All questions were addressed. She was observed for 2 min post injection. There were no reactions observed.     Belia Rojas LPN

## 2020-02-07 ENCOUNTER — TELEPHONE (OUTPATIENT)
Dept: INTERNAL MEDICINE CLINIC | Age: 65
End: 2020-02-07

## 2020-02-07 NOTE — TELEPHONE ENCOUNTER
Reason for call: Note for the flu   Callback required yes/no and why: Yes   Best contact number(s): [phone]        Pt stated that she was seen on today Thursday, February 6, 2020 09:45 AM. Pt wants to know if she can get a note stating that she got her flu shot for her job tomorrow.  Pt would like to know if she can get that note sent to her phone.        zuri

## 2020-02-07 NOTE — TELEPHONE ENCOUNTER
Caller's first and last name: N/A   Reason for call: N/A   Callback required yes/no and why: Yes   Best contact number(s): [phone]   Details to clarify the request: Pt stated that she would like her medical records faxed to her heart doctor.  Dr. Sandy Ho at [facility] . Pt also needs lab work mailed to her from yesterday Thursday, February 06, 2020 09:45 AM.      Maria Damon

## 2020-02-07 NOTE — PROGRESS NOTES
Writer contacted patient to inform of lab results and instruction/information per David Churchill and Dr. Jonah Pedraza, patient verbalized understanding and has appointment with Cardiology next Tuesday.

## 2020-02-07 NOTE — PROGRESS NOTES
Please let the patient know her TSH was just a little low but her t4 was normal. Let her know these labs were shared with Dr. Katherin Hill. She has advised to keep everything the same and follow up wit endocrinologist. Also she agrees with seeing dermatology. When is her cardiology appointment?

## 2020-04-17 ENCOUNTER — VIRTUAL VISIT (OUTPATIENT)
Dept: INTERNAL MEDICINE CLINIC | Age: 65
End: 2020-04-17

## 2020-04-17 ENCOUNTER — TELEPHONE (OUTPATIENT)
Dept: INTERNAL MEDICINE CLINIC | Age: 65
End: 2020-04-17

## 2020-04-17 VITALS — DIASTOLIC BLOOD PRESSURE: 74 MMHG | SYSTOLIC BLOOD PRESSURE: 125 MMHG | HEART RATE: 66 BPM

## 2020-04-17 DIAGNOSIS — R07.9 CHEST PAIN, UNSPECIFIED TYPE: Primary | ICD-10-CM

## 2020-04-17 PROBLEM — L76.34 POSTOPERATIVE SEROMA OF SKIN AFTER NON-DERMATOLOGIC PROCEDURE: Status: ACTIVE | Noted: 2017-08-28

## 2020-04-17 PROBLEM — B37.9 CANDIDIASIS: Status: ACTIVE | Noted: 2018-05-24

## 2020-04-17 PROBLEM — R10.31 RIGHT LOWER QUADRANT PAIN: Status: ACTIVE | Noted: 2017-01-12

## 2020-04-17 PROBLEM — N32.81 OVERACTIVE BLADDER: Status: ACTIVE | Noted: 2018-01-22

## 2020-04-17 PROBLEM — N89.8 VAGINAL ODOR: Status: ACTIVE | Noted: 2017-01-12

## 2020-04-17 PROBLEM — N39.41 URGE INCONTINENCE OF URINE: Status: ACTIVE | Noted: 2018-01-22

## 2020-04-17 RX ORDER — LEVOTHYROXINE SODIUM 100 UG/1
TABLET ORAL
COMMUNITY
Start: 2020-01-02 | End: 2020-04-17 | Stop reason: DRUGHIGH

## 2020-04-17 RX ORDER — LEVOTHYROXINE SODIUM 88 UG/1
88 TABLET ORAL
COMMUNITY
Start: 2020-03-30 | End: 2021-09-23 | Stop reason: SDUPTHER

## 2020-04-17 NOTE — TELEPHONE ENCOUNTER
----- Message from Dayami Guerrero sent at 4/17/2020 12:23 PM EDT -----  Regarding: Dr. [facility]: [phone]    Caller's first and last name: n/a  Reason for call: Pain under left breast for two weeks  Callback required yes/no and why: yes  Best contact number(s): [phone]  Details to clarify the request: n/a

## 2020-04-17 NOTE — PROGRESS NOTES
Clarke Figueroa is a 71 y.o. female evaluated via telephone on 4/17/2020. Consent:  She and/or health care decision maker is aware that that she may receive a bill for this telephone service, depending on her insurance coverage, and has provided verbal consent to proceed: Yes      Documentation:  I communicated with the patient and/or health care decision maker about chest pain. Details of this discussion including any medical advice provided: see below      I affirm this is a Patient Initiated Episode with an Established Patient who has not had a related appointment within my department in the past 7 days or scheduled within the next 24 hours. Total Time: minutes: 11-20 minutes    Note: not billable if this call serves to triage the patient into an appointment for the relevant concern      Renetta Anglin MD     HISTORY OF PRESENT ILLNESS  Clarke Figueroa is a 71 y.o. female. HPI  Having knife like pain under her left breast.  Called her cardiologist who felt this was not her anginal pain (pressure like pain). Not worse with activity but tends to occur more when leaning to the left. Newer bras, occurs with or without bras. Pain lasts less that 10 minutes. Occurring once a day for 2 weeks. Gets heartburn but that is more of a pressure pain. Not worse with cough. Tender to touch. Mild allergies with mild cough. Pain is not worse with a cough. Current Outpatient Medications:     Unithroid 88 mcg tablet, Take 88 mcg by mouth. Once daily, Disp: , Rfl:     loratadine 10 mg cap, Indications: prn, Disp: , Rfl:     omeprazole (PRILOSEC) 40 mg capsule, Take 40 mg by mouth daily. , Disp: , Rfl:     docusate sodium 100 mg tab, As needed, Disp: , Rfl:     neomycin-polymyxin-dexamethasone (MAXITROL) ophthalmic suspension, Indications: prn, Disp: , Rfl:     valsartan (DIOVAN) 160 mg tablet, TAKE ONE TABLET BY MOUTH DAILY (Patient taking differently: Take 80 mg by mouth daily.  TAKE ONE TABLET BY MOUTH DAILY), Disp: 30 Tab, Rfl: 11    mometasone (ELOCON) 0.1 % topical cream, Apply  to affected area daily. , Disp: 30 g, Rfl: 0    hydrocortisone (PROCTOSOL HC) 2.5 % rectal cream, Insert  into rectum four (4) times daily. , Disp: 30 g, Rfl: 0    ALPRAZolam (XANAX) 0.5 mg tablet, Take 0.5-1 Tabs by mouth two (2) times daily as needed for Anxiety. Max Daily Amount: 1 mg., Disp: 30 Tab, Rfl: 2    ondansetron (ZOFRAN ODT) 4 mg disintegrating tablet, Take 2 Tabs by mouth every eight (8) hours as needed for Nausea., Disp: 12 Tab, Rfl: 0    dicyclomine (BENTYL) 10 mg capsule, Take 10 mg by mouth three (3) times daily as needed. , Disp: , Rfl:     diclofenac EC (VOLTAREN) 75 mg EC tablet, Take 75 mg by mouth two (2) times daily as needed. , Disp: , Rfl:     NIFEdipine ER (PROCARDIA XL) 60 mg ER tablet, Take 60 mg by mouth daily. , Disp: , Rfl:     prednisoLONE acetate (PRED FORTE) 1 % ophthalmic suspension, Administer 1 Drop to both eyes three (3) times daily as needed. , Disp: , Rfl:     calcium carbonate (TUMS) 200 mg calcium (500 mg) chew, Take 1 Tab by mouth as needed. , Disp: , Rfl:     ketorolac (ACULAR) 0.5 % ophthalmic solution, Administer 2 Drops to both eyes four (4) times daily as needed. , Disp: 1 Bottle, Rfl: 0    Visit Vitals  /74   Pulse 66         ROS  See above  Physical Exam  Per patient tender to touch area. No masses in area. No pain with reaching overhead, leaning or twisting to left or right. Nodular breasts but area of breast is not tender. ASSESSMENT and PLAN  Chest pain - appears muscular. Will restart Diclofenac 75mg bid. Stretching exercises. Heat to area.     Orders Placed This Encounter    Unithroid 88 mcg tablet    DISCONTD: levothyroxine (SYNTHROID) 100 mcg tablet

## 2020-04-17 NOTE — TELEPHONE ENCOUNTER
----- Message from Janean Leventhal sent at 4/17/2020  2:04 PM EDT -----  Regarding: RONNY Wild/Telephone  Pt returning a call from Zulma Crow.  Contact is [phone]

## 2020-06-02 NOTE — PROGRESS NOTES
Gracie Adan is a 76 y.o. female  Chief Complaint   Patient presents with    Ear Pressure     fluttering for the last 4 days     Visit Vitals  /74 (BP 1 Location: Left arm, BP Patient Position: At rest)   Pulse 67   Wt 190 lb (86.2 kg)   SpO2 99%   BMI 32.61 kg/m²      Health Maintenance Due   Topic Date Due    Hepatitis C Screening  1955    Shingrix Vaccine Age 50> (1 of 2) 05/16/2004    Medicare Yearly Exam  05/13/2019    GLAUCOMA SCREENING Q2Y  05/16/2019    Bone Densitometry (Dexa) Screening  05/16/2019    Pneumococcal 65+ years (1 of 1 - PPSV23) 05/16/2019    Colonoscopy  05/25/2019       HPI  Sherita Novak MD's patient in to see me today for an acute visit. \"Fluttering\" in ears (L>R) x 4 days. No fever, no pain. Nasal congestion x several weeks, and has hx seasonal allergies, so has been using Claritin/Zyrtec daily x weeks. No improvement. ROS  Review of Systems   Constitutional: Positive for malaise/fatigue. Negative for fever. HENT: Positive for congestion, sinus pain and tinnitus. Negative for ear discharge and ear pain. Respiratory: Negative for cough, sputum production and shortness of breath. Cardiovascular: Negative for chest pain and palpitations. Neurological: Positive for headaches. Negative for dizziness, loss of consciousness and weakness. Endo/Heme/Allergies: Negative for environmental allergies. Psychiatric/Behavioral: Negative for depression. EXAM  Physical Exam  Vitals signs and nursing note reviewed. Constitutional:       General: She is not in acute distress. Appearance: She is well-developed. HENT:      Head: Normocephalic and atraumatic. Comments: B TMs with fluid. No erythema. Nose: Congestion and rhinorrhea present. Comments: Red, inflamed nasal mucosa. Mouth/Throat:      Pharynx: No oropharyngeal exudate. Eyes:      Conjunctiva/sclera: Conjunctivae normal.   Neck:      Musculoskeletal: Neck supple. Cardiovascular:      Rate and Rhythm: Normal rate and regular rhythm. Heart sounds: Normal heart sounds. Pulmonary:      Effort: Pulmonary effort is normal.      Breath sounds: Normal breath sounds. Lymphadenopathy:      Cervical: No cervical adenopathy. Skin:     General: Skin is warm and dry. Neurological:      Mental Status: She is alert and oriented to person, place, and time. Psychiatric:         Mood and Affect: Mood normal.         Behavior: Behavior normal.         Thought Content: Thought content normal.         Judgment: Judgment normal.         ASSESSMENT/PLAN  1. Acute non-recurrent sinusitis, unspecified location  - amoxicillin (AMOXIL) 875 mg tablet; Take 1 Tab by mouth two (2) times a day. Dispense: 20 Tab; Refill: 0  - pseudoephedrine (Sudafed) 30 mg tablet; Take 1 Tab by mouth daily as needed for Congestion. Dispense: 30 Tab;  Refill: 0

## 2020-06-03 ENCOUNTER — OFFICE VISIT (OUTPATIENT)
Dept: INTERNAL MEDICINE CLINIC | Age: 65
End: 2020-06-03

## 2020-06-03 VITALS
OXYGEN SATURATION: 99 % | SYSTOLIC BLOOD PRESSURE: 132 MMHG | DIASTOLIC BLOOD PRESSURE: 74 MMHG | HEART RATE: 67 BPM | WEIGHT: 190 LBS | BODY MASS INDEX: 32.61 KG/M2

## 2020-06-03 DIAGNOSIS — J01.90 ACUTE NON-RECURRENT SINUSITIS, UNSPECIFIED LOCATION: Primary | ICD-10-CM

## 2020-06-03 RX ORDER — PSEUDOEPHEDRINE HCL 30 MG
30 TABLET ORAL
Qty: 30 TAB | Refills: 0 | Status: SHIPPED | OUTPATIENT
Start: 2020-06-03 | End: 2020-09-23 | Stop reason: ALTCHOICE

## 2020-06-03 RX ORDER — AMOXICILLIN 875 MG/1
875 TABLET, FILM COATED ORAL 2 TIMES DAILY
Qty: 20 TAB | Refills: 0 | Status: SHIPPED | OUTPATIENT
Start: 2020-06-03 | End: 2020-09-23 | Stop reason: ALTCHOICE

## 2020-06-19 ENCOUNTER — TELEPHONE (OUTPATIENT)
Dept: INTERNAL MEDICINE CLINIC | Age: 65
End: 2020-06-19

## 2020-06-19 NOTE — TELEPHONE ENCOUNTER
Patient is calling she did a virtual visit with Tae Ortez on 6/3/2020 and was put on Amoxicillin, pt has finish the medication and it has helped but she stills feel a light fluttering in her ear and would like to know if more Amoxcillin can be sent her pharmacy, please advise [phone]

## 2020-06-19 NOTE — TELEPHONE ENCOUNTER
No more amoxicillin is needed. I recommend Flonase nasal spray 2 sprays each nostril daily. This will open up her eustacian tube and allow the ear to drain.

## 2020-08-14 ENCOUNTER — TELEPHONE (OUTPATIENT)
Dept: INTERNAL MEDICINE CLINIC | Age: 65
End: 2020-08-14

## 2020-08-14 NOTE — TELEPHONE ENCOUNTER
Ongoing work up by Dr. [facility] for symptomatic bradycardia. Holter with median HR in 60s - range . Triggered events related to sinus or sinus trinity. Recently had ECHO and ? CT scan through Dr. [facility]. Stopped nifedipine, HR is still dropping to 40s. Will contact Dr. Lisa Santiago office for test results and notes.

## 2020-08-14 NOTE — TELEPHONE ENCOUNTER
Patient called with concern about heart  rate dropping to 43 with light headiness requesting a call back saw Dr. Troy Cohen and was told to contact PCP no findings by heart doctor, patient states maybe it's thyroid Please advise

## 2020-08-17 ENCOUNTER — TELEPHONE (OUTPATIENT)
Dept: INTERNAL MEDICINE CLINIC | Age: 65
End: 2020-08-17

## 2020-08-28 ENCOUNTER — HOSPITAL ENCOUNTER (OUTPATIENT)
Dept: MAMMOGRAPHY | Age: 65
Discharge: HOME | End: 2020-08-28
Attending: INTERNAL MEDICINE

## 2020-08-28 DIAGNOSIS — Z12.31 VISIT FOR SCREENING MAMMOGRAM: ICD-10-CM

## 2020-09-02 ENCOUNTER — TELEPHONE (OUTPATIENT)
Dept: INTERNAL MEDICINE CLINIC | Age: 65
End: 2020-09-02

## 2020-09-02 ENCOUNTER — HOSPITAL ENCOUNTER (OUTPATIENT)
Dept: NUCLEAR MEDICINE | Age: 65
Discharge: HOME | End: 2020-09-02
Attending: ORTHOPAEDIC SURGERY
Payer: MEDICARE

## 2020-09-02 DIAGNOSIS — R00.1 BRADYCARDIA: ICD-10-CM

## 2020-09-02 DIAGNOSIS — E03.9 ACQUIRED HYPOTHYROIDISM: Primary | ICD-10-CM

## 2020-09-02 DIAGNOSIS — E03.9 ACQUIRED HYPOTHYROIDISM: ICD-10-CM

## 2020-09-02 DIAGNOSIS — M25.562 LEFT KNEE PAIN: ICD-10-CM

## 2020-09-02 PROCEDURE — 78315 BONE IMAGING 3 PHASE: CPT

## 2020-09-02 NOTE — TELEPHONE ENCOUNTER
Called to follow up on bradycardia. Procardia and Nifedipine were stopped. Currently on 80mg of Valsartan. Had w/u with Dr. Nadiya Ibarra holter and cardiac CT. BP running 102/60-70s - up to 149/70s. PUlse usually in 60s but can drop into 40s and cause dizziness    Will check Thyroid levels. Encouraged pt to stay hydrated, wear compression hose.

## 2020-09-04 ENCOUNTER — HOSPITAL ENCOUNTER (OUTPATIENT)
Dept: LAB | Age: 65
Discharge: HOME | End: 2020-09-04
Payer: MEDICARE

## 2020-09-04 PROCEDURE — 84439 ASSAY OF FREE THYROXINE: CPT

## 2020-09-04 PROCEDURE — 36415 COLL VENOUS BLD VENIPUNCTURE: CPT

## 2020-09-04 PROCEDURE — 84443 ASSAY THYROID STIM HORMONE: CPT

## 2020-09-05 LAB
T4 FREE SERPL-MCNC: 1.44 NG/DL (ref 0.82–1.77)
TSH SERPL DL<=0.005 MIU/L-ACNC: 2.99 UIU/ML (ref 0.45–4.5)

## 2020-09-14 ENCOUNTER — HOSPITAL ENCOUNTER (OUTPATIENT)
Dept: MAMMOGRAPHY | Age: 65
Discharge: HOME | End: 2020-09-14
Attending: INTERNAL MEDICINE
Payer: MEDICARE

## 2020-09-14 DIAGNOSIS — N63.21 MASS OF UPPER OUTER QUADRANT OF LEFT BREAST: ICD-10-CM

## 2020-09-14 PROCEDURE — 76642 ULTRASOUND BREAST LIMITED: CPT

## 2020-09-14 PROCEDURE — 77062 BREAST TOMOSYNTHESIS BI: CPT

## 2020-09-22 ENCOUNTER — HOSPITAL ENCOUNTER (OUTPATIENT)
Dept: MAMMOGRAPHY | Age: 65
Discharge: HOME | End: 2020-09-22
Attending: INTERNAL MEDICINE
Payer: MEDICARE

## 2020-09-22 DIAGNOSIS — N63.21 MASS OF UPPER OUTER QUADRANT OF LEFT BREAST: ICD-10-CM

## 2020-09-22 PROCEDURE — 19083 BX BREAST 1ST LESION US IMAG: CPT

## 2020-09-22 PROCEDURE — 88305 TISSUE EXAM BY PATHOLOGIST: CPT

## 2020-09-22 PROCEDURE — 74011000250 HC RX REV CODE- 250: Performed by: RADIOLOGY

## 2020-09-22 RX ORDER — LIDOCAINE HYDROCHLORIDE 10 MG/ML
15 INJECTION INFILTRATION; PERINEURAL
Status: COMPLETED | OUTPATIENT
Start: 2020-09-22 | End: 2020-09-22

## 2020-09-22 RX ORDER — LIDOCAINE HYDROCHLORIDE AND EPINEPHRINE 10; 10 MG/ML; UG/ML
10 INJECTION, SOLUTION INFILTRATION; PERINEURAL ONCE
Status: COMPLETED | OUTPATIENT
Start: 2020-09-22 | End: 2020-09-22

## 2020-09-22 RX ADMIN — LIDOCAINE HYDROCHLORIDE 15 ML: 10 INJECTION, SOLUTION INFILTRATION; PERINEURAL at 08:15

## 2020-09-22 RX ADMIN — LIDOCAINE HYDROCHLORIDE,EPINEPHRINE BITARTRATE 100 MG: 10; .01 INJECTION, SOLUTION INFILTRATION; PERINEURAL at 08:15

## 2020-09-23 ENCOUNTER — VIRTUAL VISIT (OUTPATIENT)
Dept: INTERNAL MEDICINE CLINIC | Age: 65
End: 2020-09-23
Payer: MEDICARE

## 2020-09-23 DIAGNOSIS — N30.00 ACUTE CYSTITIS WITHOUT HEMATURIA: Primary | ICD-10-CM

## 2020-09-23 DIAGNOSIS — K51.90 ULCERATIVE COLITIS WITHOUT COMPLICATIONS, UNSPECIFIED LOCATION (HCC): ICD-10-CM

## 2020-09-23 PROCEDURE — 99442 PR PHYS/QHP TELEPHONE EVALUATION 11-20 MIN: CPT | Performed by: INTERNAL MEDICINE

## 2020-09-23 RX ORDER — NITROFURANTOIN 25; 75 MG/1; MG/1
100 CAPSULE ORAL 2 TIMES DAILY
Qty: 14 CAP | Refills: 0 | Status: SHIPPED | OUTPATIENT
Start: 2020-09-23 | End: 2020-09-30

## 2020-09-23 NOTE — PROGRESS NOTES
HISTORY OF PRESENT ILLNESS  Lyssa Payton is a 76 y.o. female. HPI  Seen today for urinary incontinence. 2 weeks of urgency, frequency and leakage. Mild discomfort when urinating but no burning. Some loose stool recently. No blood or mucous in urine. Mild chronic back pain but also pain bilat. No fevers or chills. Had breast biopsy yesterday after patient found a lump and had subsequent mammogram and US. Pathology not yet resulted. Current Outpatient Medications:     nitrofurantoin, macrocrystal-monohydrate, (MACROBID) 100 mg capsule, Take 1 Cap by mouth two (2) times a day for 7 days. , Disp: 14 Cap, Rfl: 0    mometasone (ELOCON) 0.1 % topical cream, APPLY TO AFFECTED AREA(S) TOPICALLY DAILY, Disp: 30 g, Rfl: 3    valsartan (DIOVAN) 160 mg tablet, TAKE ONE TABLET BY MOUTH DAILY, Disp: 90 Tab, Rfl: 3    Unithroid 88 mcg tablet, Take 88 mcg by mouth. Once daily, Disp: , Rfl:     loratadine 10 mg cap, Indications: prn, Disp: , Rfl:     omeprazole (PRILOSEC) 40 mg capsule, Take 40 mg by mouth daily. , Disp: , Rfl:     neomycin-polymyxin-dexamethasone (MAXITROL) ophthalmic suspension, Indications: prn, Disp: , Rfl:     ALPRAZolam (XANAX) 0.5 mg tablet, Take 0.5-1 Tabs by mouth two (2) times daily as needed for Anxiety. Max Daily Amount: 1 mg., Disp: 30 Tab, Rfl: 2    ondansetron (ZOFRAN ODT) 4 mg disintegrating tablet, Take 2 Tabs by mouth every eight (8) hours as needed for Nausea., Disp: 12 Tab, Rfl: 0    dicyclomine (BENTYL) 10 mg capsule, Take 10 mg by mouth three (3) times daily as needed. , Disp: , Rfl:     diclofenac EC (VOLTAREN) 75 mg EC tablet, Take 75 mg by mouth two (2) times daily as needed. , Disp: , Rfl:     prednisoLONE acetate (PRED FORTE) 1 % ophthalmic suspension, Administer 1 Drop to both eyes three (3) times daily as needed. , Disp: , Rfl:     calcium carbonate (TUMS) 200 mg calcium (500 mg) chew, Take 1 Tab by mouth as needed. , Disp: , Rfl:     ketorolac (ACULAR) 0.5 % ophthalmic solution, Administer 2 Drops to both eyes four (4) times daily as needed. , Disp: 1 Bottle, Rfl: 0    docusate sodium 100 mg tab, As needed, Disp: , Rfl:     hydrocortisone (PROCTOSOL HC) 2.5 % rectal cream, Insert  into rectum four (4) times daily. , Disp: 30 g, Rfl: 0    Patient-Reported Vitals 9/23/2020   Patient-Reported Weight 188lb   Patient-Reported Pulse 59   Patient-Reported Temperature 98.4   Patient-Reported Systolic  668   Patient-Reported Diastolic 74      ROS  See above  Physical Exam  Vitals signs reviewed. Neurological:      Mental Status: She is alert and oriented to person, place, and time. telephone only    ASSESSMENT and PLAN  UTI - new onset frequency, urgency and incontinence. Will treat empirically with macrobid. If no improvement would need [facility] for UA. UC  - recent flare with diarrhea. May have contributed to above.      Orders Placed This Encounter    nitrofurantoin, macrocrystal-monohydrate, (MACROBID) 100 mg capsule

## 2020-09-23 NOTE — PROGRESS NOTES
1. Have you been to the ER, urgent care clinic since your last visit? Hospitalized since your last visit? No    2. Have you seen or consulted any other health care providers outside of the [address] [place] [address] since your last visit? Include any pap smears or colon screening.  No   Chief Complaint   Patient presents with    Urinary Frequency     and urgency       Phone call only [phone]

## 2020-09-24 NOTE — PROGRESS NOTES
Pathology approved by Dr. Hayder Franklin. Called patient and relayed benign results. Advised patient that she should continue her annual mammogram schedule. She reports that her biopsy site is healing well with no concerns. Encouraged her to call with any question or concerns.

## 2020-10-15 RX ORDER — VALSARTAN 160 MG/1
TABLET ORAL
Qty: 30 TAB | Refills: 11 | Status: SHIPPED | OUTPATIENT
Start: 2020-10-15 | End: 2021-12-02

## 2020-11-11 ENCOUNTER — TELEPHONE (OUTPATIENT)
Dept: INTERNAL MEDICINE CLINIC | Age: 65
End: 2020-11-11

## 2020-11-11 DIAGNOSIS — E03.9 ACQUIRED HYPOTHYROIDISM: Primary | ICD-10-CM

## 2020-11-11 DIAGNOSIS — R00.1 BRADYCARDIA: ICD-10-CM

## 2020-11-11 DIAGNOSIS — E03.9 ACQUIRED HYPOTHYROIDISM: ICD-10-CM

## 2020-11-11 NOTE — TELEPHONE ENCOUNTER
Will repeat thyroid levels.   If bradycardia and dizziness continues will need to return to see her cardiologist.

## 2020-11-11 NOTE — TELEPHONE ENCOUNTER
Patient offered appointment for today but declined. Patient states she would like a call back,patient states she is experiencing low heart rate and dizziness. Patient states that it could be the new thyroid medication.  Please advise

## 2020-11-12 RX ORDER — OMEPRAZOLE 40 MG/1
40 CAPSULE, DELAYED RELEASE ORAL DAILY
Qty: 90 CAP | Refills: 3 | Status: SHIPPED | OUTPATIENT
Start: 2020-11-12 | End: 2022-01-23

## 2020-11-13 ENCOUNTER — TELEPHONE (OUTPATIENT)
Dept: INTERNAL MEDICINE CLINIC | Age: 65
End: 2020-11-13

## 2020-11-13 DIAGNOSIS — E03.9 ACQUIRED HYPOTHYROIDISM: Primary | ICD-10-CM

## 2020-11-13 LAB
ALBUMIN SERPL-MCNC: 4.5 G/DL (ref 3.8–4.8)
ALBUMIN/GLOB SERPL: 1.9 {RATIO} (ref 1.2–2.2)
ALP SERPL-CCNC: 81 IU/L (ref 39–117)
ALT SERPL-CCNC: 9 IU/L (ref 0–32)
AST SERPL-CCNC: 19 IU/L (ref 0–40)
BILIRUB SERPL-MCNC: 1 MG/DL (ref 0–1.2)
BUN SERPL-MCNC: 16 MG/DL (ref 8–27)
BUN/CREAT SERPL: 18 (ref 12–28)
CALCIUM SERPL-MCNC: 9.8 MG/DL (ref 8.7–10.3)
CHLORIDE SERPL-SCNC: 102 MMOL/L (ref 96–106)
CO2 SERPL-SCNC: 28 MMOL/L (ref 20–29)
CREAT SERPL-MCNC: 0.88 MG/DL (ref 0.57–1)
GLOBULIN SER CALC-MCNC: 2.4 G/DL (ref 1.5–4.5)
GLUCOSE SERPL-MCNC: 84 MG/DL (ref 65–99)
POTASSIUM SERPL-SCNC: 4 MMOL/L (ref 3.5–5.2)
PROT SERPL-MCNC: 6.9 G/DL (ref 6–8.5)
SODIUM SERPL-SCNC: 141 MMOL/L (ref 134–144)
T4 FREE SERPL-MCNC: 1.2 NG/DL (ref 0.82–1.77)
TSH SERPL DL<=0.005 MIU/L-ACNC: 4.73 UIU/ML (ref 0.45–4.5)

## 2020-11-27 ENCOUNTER — TRANSCRIBE ORDER (OUTPATIENT)
Dept: SCHEDULING | Age: 65
End: 2020-11-27

## 2020-11-27 DIAGNOSIS — R11.0 NAUSEA: Primary | ICD-10-CM

## 2020-11-27 DIAGNOSIS — R10.84 ABDOMINAL PAIN, GENERALIZED: ICD-10-CM

## 2020-11-27 DIAGNOSIS — R19.5 LOOSE BOWEL MOVEMENTS: ICD-10-CM

## 2020-11-27 DIAGNOSIS — R10.9 STOMACH ACHE: ICD-10-CM

## 2020-11-27 DIAGNOSIS — R10.819 ABDOMINAL TENDERNESS, UNSPECIFIED SITE: ICD-10-CM

## 2020-11-27 DIAGNOSIS — R10.32 ABDOMINAL PAIN, LEFT LOWER QUADRANT: ICD-10-CM

## 2020-12-02 DIAGNOSIS — F41.9 ANXIETY: ICD-10-CM

## 2020-12-02 RX ORDER — ALPRAZOLAM 0.5 MG/1
.25-.5 TABLET ORAL
Qty: 30 TAB | Refills: 2 | Status: SHIPPED | OUTPATIENT
Start: 2020-12-02 | End: 2021-08-23

## 2020-12-02 NOTE — TELEPHONE ENCOUNTER
Alprazlom\" dosage unknown , prescribed in past     Is patient out of this medication (yes/no): Y     Pharmacy name: [address] [place] [address] / [address] listed in chart? (yes/no):  Y     Pharmacy phone number: [phone]     Date of last visit: 9/23/20     Details to clarify the request: Patient having an issue sleeping and relaxing and high anxiety       envera

## 2020-12-11 ENCOUNTER — TELEPHONE (OUTPATIENT)
Dept: INTERNAL MEDICINE CLINIC | Age: 65
End: 2020-12-11

## 2020-12-11 RX ORDER — CIPROFLOXACIN 250 MG/1
250 TABLET, FILM COATED ORAL 2 TIMES DAILY
Qty: 6 TAB | Refills: 0 | Status: SHIPPED | OUTPATIENT
Start: 2020-12-11 | End: 2020-12-14

## 2020-12-11 NOTE — TELEPHONE ENCOUNTER
Please let her know I have sent in Cipro 250mg bid x 3 days. Needs ov next week if still symptomatic.

## 2020-12-11 NOTE — TELEPHONE ENCOUNTER
Pt had UTI a few months ago and is having recurrent symptoms of severe urgency and frequency with low urine output.  Urine is also very slightly cloudy.  Pt would like to have something called in if at all possible.  If urine sample is required outside of office, she prefers to use [facility] at [address] [place] [address] prefers the [facility] in [place] [phone]. Brittany Cain would to have something called in for presumptive UTI so she can start to get some relief over the weekend.      envera

## 2020-12-14 ENCOUNTER — HOSPITAL ENCOUNTER (OUTPATIENT)
Dept: CT IMAGING | Age: 65
Discharge: HOME | End: 2020-12-14
Attending: NURSE PRACTITIONER
Payer: MEDICARE

## 2020-12-14 DIAGNOSIS — R19.5 LOOSE BOWEL MOVEMENTS: ICD-10-CM

## 2020-12-14 DIAGNOSIS — R10.819 ABDOMINAL TENDERNESS, UNSPECIFIED SITE: ICD-10-CM

## 2020-12-14 DIAGNOSIS — R10.9 STOMACH ACHE: ICD-10-CM

## 2020-12-14 DIAGNOSIS — R10.32 ABDOMINAL PAIN, LEFT LOWER QUADRANT: ICD-10-CM

## 2020-12-14 DIAGNOSIS — R11.0 NAUSEA: ICD-10-CM

## 2020-12-14 DIAGNOSIS — R10.84 ABDOMINAL PAIN, GENERALIZED: ICD-10-CM

## 2020-12-14 PROCEDURE — 74177 CT ABD & PELVIS W/CONTRAST: CPT

## 2020-12-14 PROCEDURE — 74011000636 HC RX REV CODE- 636: Performed by: NURSE PRACTITIONER

## 2020-12-14 PROCEDURE — 74011000258 HC RX REV CODE- 258: Performed by: NURSE PRACTITIONER

## 2020-12-14 RX ORDER — SODIUM CHLORIDE 0.9 % (FLUSH) 0.9 %
10 SYRINGE (ML) INJECTION
Status: COMPLETED | OUTPATIENT
Start: 2020-12-14 | End: 2020-12-14

## 2020-12-14 RX ADMIN — IOPAMIDOL 100 ML: 755 INJECTION, SOLUTION INTRAVENOUS at 09:27

## 2020-12-14 RX ADMIN — SODIUM CHLORIDE 100 ML: 900 INJECTION, SOLUTION INTRAVENOUS at 09:27

## 2020-12-14 RX ADMIN — Medication 10 ML: at 09:27

## 2020-12-17 ENCOUNTER — TELEPHONE (OUTPATIENT)
Dept: INTERNAL MEDICINE CLINIC | Age: 65
End: 2020-12-17

## 2020-12-17 NOTE — TELEPHONE ENCOUNTER
the neurologist that was recommended. Callback required yes/no and why: Yes, for information.    Best contact number(s): [phone]          HOLLIS

## 2020-12-25 DIAGNOSIS — E03.9 ACQUIRED HYPOTHYROIDISM: ICD-10-CM

## 2021-01-12 ENCOUNTER — OFFICE VISIT (OUTPATIENT)
Dept: NEUROLOGY | Age: 66
End: 2021-01-12
Payer: MEDICARE

## 2021-01-12 VITALS
WEIGHT: 190 LBS | RESPIRATION RATE: 18 BRPM | HEIGHT: 64 IN | HEART RATE: 67 BPM | BODY MASS INDEX: 32.44 KG/M2 | OXYGEN SATURATION: 100 % | DIASTOLIC BLOOD PRESSURE: 80 MMHG | SYSTOLIC BLOOD PRESSURE: 144 MMHG

## 2021-01-12 DIAGNOSIS — R43.2: ICD-10-CM

## 2021-01-12 DIAGNOSIS — F22 DELUSION OF FOUL ODOR (HCC): ICD-10-CM

## 2021-01-12 DIAGNOSIS — R40.4 NONSPECIFIC PAROXYSMAL SPELL: Primary | ICD-10-CM

## 2021-01-12 PROCEDURE — 1090F PRES/ABSN URINE INCON ASSESS: CPT | Performed by: PSYCHIATRY & NEUROLOGY

## 2021-01-12 PROCEDURE — G8417 CALC BMI ABV UP PARAM F/U: HCPCS | Performed by: PSYCHIATRY & NEUROLOGY

## 2021-01-12 PROCEDURE — G8427 DOCREV CUR MEDS BY ELIG CLIN: HCPCS | Performed by: PSYCHIATRY & NEUROLOGY

## 2021-01-12 PROCEDURE — G8510 SCR DEP NEG, NO PLAN REQD: HCPCS | Performed by: PSYCHIATRY & NEUROLOGY

## 2021-01-12 PROCEDURE — G8536 NO DOC ELDER MAL SCRN: HCPCS | Performed by: PSYCHIATRY & NEUROLOGY

## 2021-01-12 PROCEDURE — G9899 SCRN MAM PERF RSLTS DOC: HCPCS | Performed by: PSYCHIATRY & NEUROLOGY

## 2021-01-12 PROCEDURE — 1101F PT FALLS ASSESS-DOCD LE1/YR: CPT | Performed by: PSYCHIATRY & NEUROLOGY

## 2021-01-12 PROCEDURE — G8400 PT W/DXA NO RESULTS DOC: HCPCS | Performed by: PSYCHIATRY & NEUROLOGY

## 2021-01-12 PROCEDURE — 99205 OFFICE O/P NEW HI 60 MIN: CPT | Performed by: PSYCHIATRY & NEUROLOGY

## 2021-01-12 PROCEDURE — 3017F COLORECTAL CA SCREEN DOC REV: CPT | Performed by: PSYCHIATRY & NEUROLOGY

## 2021-01-12 NOTE — LETTER
1/12/2021 Patient: Coco Ni YOB: 1955 Date of Visit: 1/12/2021 Alexa Jorge MD 
[id number] [id number] [address] [employer] Dear Alexa Jorge MD, Thank you for referring Ms. Kodak Paiz to [address] [place] [address] for evaluation. My notes for this consultation are attached. If you have questions, please do not hesitate to call me. I look forward to following your patient along with you. Sincerely, Robina Herman MD

## 2021-01-12 NOTE — PATIENT INSTRUCTIONS
[facility]   Statement to Patients  April 1, 2014      In an effort to ensure the large volume of patient prescription refills is processed in the most efficient and expeditious manner, we are asking our patients to assist us by calling your Pharmacy for all prescription refills, this will include also your  Mail Order Pharmacy. The pharmacy will contact our office electronically to continue the refill process. Please do not wait until the last minute to call your pharmacy. We need at least 48 hours (2days) to fill prescriptions. We also encourage you to call your pharmacy before going to  your prescription to make sure it is ready. With regard to controlled substance prescription refill requests (narcotic refills) that need to be picked up at our office, we ask your cooperation by providing us with at least 72 hours (3days) notice that you will need a refill. We will not refill narcotic prescription refill requests after 4:00pm on any weekday, Monday through Thursday, or after 2:00pm on Fridays, or on the weekends. We encourage everyone to explore another way of getting your prescription refill request processed using [employer], our patient web portal through our electronic medical record system. [employer] is an efficient and effective way to communicate your medication request directly to the office and  downloadable as an phil on your smart phone . [employer] also features a review functionality that allows you to view your medication list as well as leave messages for your physician. Are you ready to get connected? If so please review the attatched instructions or speak to any of our staff to get you set up right away! Thank you so much for your cooperation. Should you have any questions please contact our Practice Administrator.     The Physicians and Staff,  [facility]

## 2021-01-28 ENCOUNTER — TELEPHONE (OUTPATIENT)
Dept: NEUROLOGY | Age: 66
End: 2021-01-28

## 2021-01-28 ENCOUNTER — HOSPITAL ENCOUNTER (OUTPATIENT)
Dept: NEUROLOGY | Age: 66
Discharge: HOME | End: 2021-01-28
Attending: PSYCHIATRY & NEUROLOGY
Payer: MEDICARE

## 2021-01-28 ENCOUNTER — APPOINTMENT (OUTPATIENT)
Dept: MRI IMAGING | Age: 66
End: 2021-01-28
Attending: PSYCHIATRY & NEUROLOGY
Payer: MEDICARE

## 2021-01-28 ENCOUNTER — HOSPITAL ENCOUNTER (OUTPATIENT)
Dept: MRI IMAGING | Age: 66
Discharge: HOME | End: 2021-01-28
Attending: PSYCHIATRY & NEUROLOGY
Payer: MEDICARE

## 2021-01-28 DIAGNOSIS — F22 DELUSION OF FOUL ODOR (HCC): ICD-10-CM

## 2021-01-28 DIAGNOSIS — R40.4 NONSPECIFIC PAROXYSMAL SPELL: Primary | ICD-10-CM

## 2021-01-28 DIAGNOSIS — R40.4 NONSPECIFIC PAROXYSMAL SPELL: ICD-10-CM

## 2021-01-28 DIAGNOSIS — M48.02 CERVICAL STENOSIS OF SPINAL CANAL: ICD-10-CM

## 2021-01-28 DIAGNOSIS — R43.2: ICD-10-CM

## 2021-01-28 PROCEDURE — 70553 MRI BRAIN STEM W/O & W/DYE: CPT

## 2021-01-28 PROCEDURE — 95816 EEG AWAKE AND DROWSY: CPT | Performed by: PSYCHIATRY & NEUROLOGY

## 2021-01-28 PROCEDURE — 95816 EEG AWAKE AND DROWSY: CPT

## 2021-01-28 PROCEDURE — A9575 INJ GADOTERATE MEGLUMI 0.1ML: HCPCS | Performed by: PSYCHIATRY & NEUROLOGY

## 2021-01-28 PROCEDURE — 74011250636 HC RX REV CODE- 250/636: Performed by: PSYCHIATRY & NEUROLOGY

## 2021-01-28 RX ORDER — GADOTERATE MEGLUMINE 376.9 MG/ML
18 INJECTION INTRAVENOUS
Status: COMPLETED | OUTPATIENT
Start: 2021-01-28 | End: 2021-01-28

## 2021-01-28 RX ADMIN — GADOTERATE MEGLUMINE 18 ML: 376.9 INJECTION INTRAVENOUS at 12:05

## 2021-01-28 NOTE — TELEPHONE ENCOUNTER
----- Message from Lowell Shelley sent at 1/28/2021  3:28 PM EST -----  Regarding: Dr. Ar Babb General Message/Vendor Calls    Caller's first and last name:      Reason for call: Yandel Brown Dr she had her EEG, MRI done, stated she is still having the same symptoms when she first came in.        Call back required yes/no and why: No       Best contact number(s): [phone]      Details to clarify the request:      Lowell Shelley

## 2021-01-29 NOTE — PROCEDURES
PROCEDURE: ROUTINE OUTPATIENT EEG  NAME:   Coco Ni  ACCOUNT NUMBER : [de-identified]  MRN:   [id number]  DATE OF SERVICE: 1/28/21    HISTORY/INDICATION: Pt is a 72yo RH female with onset of spells in 11/2019 that start with an overwhelming smell that makes her nauseated, then has a \"wavy thing\" in her head and feels like she is going to pass out. These are occurring almost daily, can occur several times a day, may last over an hour, possible ELISABETH or LOC on one occasion. EEG is performed to assess for evidence of epilepsy. MEDICATIONS:   Current Outpatient Medications   Medication Sig Dispense Refill    ALPRAZolam (XANAX) 0.5 mg tablet Take 0.5-1 Tabs by mouth two (2) times daily as needed for Anxiety. Max Daily Amount: 1 mg. 30 Tab 2    omeprazole (PRILOSEC) 40 mg capsule Take 1 Cap by mouth daily. 90 Cap 3    valsartan (DIOVAN) 160 mg tablet TAKE ONE TABLET BY MOUTH DAILY 30 Tab 11    mometasone (ELOCON) 0.1 % topical cream APPLY TO AFFECTED AREA(S) TOPICALLY DAILY 30 g 3    Unithroid 88 mcg tablet Take 88 mcg by mouth. Once daily      loratadine 10 mg cap Indications: prn      docusate sodium 100 mg tab As needed      neomycin-polymyxin-dexamethasone (MAXITROL) ophthalmic suspension Indications: prn      hydrocortisone (PROCTOSOL HC) 2.5 % rectal cream Insert  into rectum four (4) times daily. 30 g 0    ondansetron (ZOFRAN ODT) 4 mg disintegrating tablet Take 2 Tabs by mouth every eight (8) hours as needed for Nausea. 12 Tab 0    dicyclomine (BENTYL) 10 mg capsule Take 10 mg by mouth three (3) times daily as needed.  diclofenac EC (VOLTAREN) 75 mg EC tablet Take 75 mg by mouth two (2) times daily as needed.  prednisoLONE acetate (PRED FORTE) 1 % ophthalmic suspension Administer 1 Drop to both eyes three (3) times daily as needed.  calcium carbonate (TUMS) 200 mg calcium (500 mg) chew Take 1 Tab by mouth as needed.       ketorolac (ACULAR) 0.5 % ophthalmic solution Administer 2 Drops to both eyes four (4) times daily as needed. 1 Bottle 0       CONDITIONS OF RECORDING: This is a routine 21-channel EEG recording performed in accordance with the international 10-20 system with one channel devoted to limited EKG. This study was done during a state of wakefulness. Photic stimulation was performed as an activating procedure. DESCRIPTION:   Upon maximal arousal, there is a low voltage, fast frequency posterior dominant rhythm that is symmetric over the bilateral posterior derivations. Photic stimulation did not significantly alter the tracing. No sleep is seen. The pt reported mild foul odors during the EEG, but no typical events. There were no EEG correlates associated with these mild odors. There are no focal abnormalities, epileptiform discharges, or electrographic seizures seen. INTERPRETATION: Normal awake EEG    CLINICAL CORRELATION: A normal EEG does not definitively exclude a diagnosis of epilepsy if clinical suspicion is high consider sleep deprived EEG or more prolonged monitoring.      Geo Barth MD

## 2021-01-29 NOTE — PROGRESS NOTES
MRI brain w/wo contrast 1/28/21 is unremarkable. Incidental severe cervical stenosis seen on visualized portion of C-spine. See phone note.

## 2021-01-29 NOTE — TELEPHONE ENCOUNTER
Patient called, verified and notified. Patient denies being made away of any prior issues in regards to her C spine and \"states no one has ever mentioned anything to me about it. \" Patient made aware of the 24 hour ambulatory EEG and C Spine MRI ordered. Patient inquire \"Is there another specialist Gia Soria wants me to see for this C spine issue? \"

## 2021-01-29 NOTE — TELEPHONE ENCOUNTER
----- Message from Roshni Gomez Page sent at 1/29/2021  4:07 PM EST -----  Regarding: Dr. Anne Jung telephone  Patient return call    Caller's first and last name and relationship (if not the patient): n/a      Best contact number(s):[phone]      Whose call is being returned: Gala Palm      Details to clarify the request: Patient just missed a call from the office to receive results      Trudy Fairchild

## 2021-01-29 NOTE — TELEPHONE ENCOUNTER
MRI brain w/wo contrast 1/28/21 is unremarkable. Incidental severe cervical stenosis seen on visualized portion of C-spine. Lenora Pale - Please call pt: EEG 1/28/21 was normal, but I understand that she did not have a typical spell while she was on EEG. MRI brain is unremarkable. I recommend a 24 hour ambulatory EEG in order to capture one of her typical spells, which should be easy since she is having spells daily. Incidentally, on MRI brain the radiologist noted possible severe narrowing of her C-spine. Was she aware of this? Has she seen a spine surgeon in the past for her neck? If not, I will order a dedicated MRI C-spine to further evaluate this finding.

## 2021-02-01 NOTE — TELEPHONE ENCOUNTER
Mabel Barreto - I ordered the MRI of C-spine.  I may refer her to see a surgeon, but will wait until the MRI is completed

## 2021-02-04 NOTE — PROGRESS NOTES
PT DAILY TREATMENT/progress NOTE 2-15- Patient Name: Maria Antonia Raeann Date:2018 : 1955 [x]  Patient  Verified Payor: Nicole Sr / Plan: BSI FAP TIER 3 / Product Type: Sylvia / In time: 1005 AM  Out time: 1115 AM 
Total Treatment Time (min): 70 (70 timed) Visit #: 40 RTMD:  
 
Treatment Area: Right shoulder pain [M25.511] SUBJECTIVE Pain Level (0-10 scale): 5/10 Any medication changes, allergies to medications, adverse drug reactions, diagnosis change, or new procedure performed?: [x] No    [] Yes (see summary sheet for update) Subjective functional status/changes:   [] No changes reported \"I can lift it higher! \". Pt continuing to report \"the anchors pull sometimes, even when I'm not doing anything\" OBJECTIVE Modality rationale: decrease edema, decrease inflammation and decrease pain to improve the patients ability to reach overhead Type Additional Details  
[] Estim: []Att   []Unatt        []TENS instruct []IFC  []Premod   []NMES []Other:  []w/US   []w/ice   []w/heat Position: Location:  
[]  Traction: [] Cervical       []Lumbar 
                     [] Prone          []Supine []Intermittent   []Continuous Lbs: 
[] before manual 
[] after manual 
[]w/heat  
[]  Ultrasound: []Continuous   [] Pulsed at:  
                         []1MHz   []3MHz Location: 
W/cm2:  
[]  Paraffin Location: 
[]w/heat  
[x]  Ice- to go     []  Heat 
[]  Ice massage Position: seated, UE supported Location: R shoulder  
[]  Laser 
[]  Other: Position: Location:  
[]  Vasopneumatic Device Pressure:       [] lo [] med [] hi  
Temperature:   
[x] Skin assessment post-treatment:  [x]intact []redness- no adverse reaction 
  []redness  adverse reaction:  
 
55 min Therapeutic Exercise:  [x] See flow sheet :   
Rationale: increase ROM and increase strength to improve the patients ability to reach overhead, lift objects, perform daily chores 15 min Manual Therapy:  PROM R shoulder flex, abd, IR, ER to tolerance Gentle GH oscillations for pain control Rhythmic stabilization at 90 deg MRE flexion in supine Rationale: decrease pain, increase ROM and increase tissue extensibility  to improve the patients ability to perform daily activities, don/doff clothing With 
 [x] TE 
 [] TA 
 [] neuro 
 [] other: Self Care/Patient Education: [x] Review HEP [x] Progressed/Changed HEP based on: see chart 
[] positioning   [] body mechanics   [] transfers   [] heat/ice application   
[] other:  
 
Other Objective/Functional Measures: 
n/a Pain Level (0-10 scale) post treatment: 5/10 ASSESSMENT/Changes in Function:  
Reviewed home exercise routine. Pt with poor tolerance to progression of exercises due to reports of inc'd \"achors pulling\" Patient will continue to benefit from skilled PT services to modify and progress therapeutic interventions, address functional mobility deficits, address ROM deficits, address strength deficits, analyze and address soft tissue restrictions, analyze and cue movement patterns, analyze and modify body mechanics/ergonomics and assess and modify postural abnormalities to attain remaining goals. []  See Plan of Care 
[]  See progress note/recertification 
[]  See Discharge Summary Progress towards goals / Updated goals: 
Short Term Goals: To be accomplished in 4-6 weeks: 
1) Pt will be independent in initial HEP MET 2) Pt will report good compliance with ice regimen in order to decrease inflammation and manage pain levels MET 3) Pt will improve R shoulder PROM flexion to > or =145 deg in order to progress towards reaching into cabinets not met 4) Pt will improve R shoulder PROM ER to > or =60 deg in order to progress towards donning/doffing clothing with ease MET 
  
Long Term Goals: To be accomplished in 10-12 weeks: 1) Pt will improve R shoulder AROM flexion to > or =145 deg in order to reach into cabinets and perform ADL's with ease 2) Pt will improve R shoulder AROM ER to > or =70 deg in order to 3) Pt will report being able to don/doff clothing without R shoulder pain 4) Pt will demonstrate > or =4/5 R shoulder scaption strength in order to lift objects PLAN 
[]  Upgrade activities as tolerated     [x]  Continue plan of care 
[]  Update interventions per flow sheet      
[]  Discharge due to:_ 
[x]  Other: 
 
Jan Maloney, PT 11/19/2018  10:38 AM 
 
 [Follow-Up: _____] : a [unfilled] follow-up visit

## 2021-02-08 ENCOUNTER — HOSPITAL ENCOUNTER (OUTPATIENT)
Dept: MRI IMAGING | Age: 66
Discharge: HOME | End: 2021-02-08
Attending: PSYCHIATRY & NEUROLOGY
Payer: MEDICARE

## 2021-02-08 DIAGNOSIS — M48.02 CERVICAL STENOSIS OF SPINAL CANAL: ICD-10-CM

## 2021-02-08 PROCEDURE — 72141 MRI NECK SPINE W/O DYE: CPT

## 2021-02-09 ENCOUNTER — HOSPITAL ENCOUNTER (OUTPATIENT)
Dept: NEUROLOGY | Age: 66
Discharge: HOME | End: 2021-02-09
Attending: PSYCHIATRY & NEUROLOGY
Payer: MEDICARE

## 2021-02-09 DIAGNOSIS — F22 DELUSION OF FOUL ODOR (HCC): ICD-10-CM

## 2021-02-09 DIAGNOSIS — R40.4 NONSPECIFIC PAROXYSMAL SPELL: ICD-10-CM

## 2021-02-09 PROCEDURE — 95714 VEEG EA 12-26 HR UNMNTR: CPT

## 2021-02-15 ENCOUNTER — TELEPHONE (OUTPATIENT)
Dept: NEUROLOGY | Age: 66
End: 2021-02-15

## 2021-02-15 NOTE — TELEPHONE ENCOUNTER
Called and spoke with the patient, who stated that she had completed her 24 hour EEG, and has since [place] multiple episodes where I am getting light headed and almost passing out, I am very scared, I am having somebody come stay with me. There is no point in going to the hospital because they will just send me back home so can you please just ask Dr. Felix Lovell what the next step is? I am very worried that this keeps happening. \"

## 2021-02-19 ENCOUNTER — TELEPHONE (OUTPATIENT)
Dept: NEUROLOGY | Age: 66
End: 2021-02-19

## 2021-02-19 RX ORDER — AMITRIPTYLINE HYDROCHLORIDE 10 MG/1
10 TABLET, FILM COATED ORAL
Qty: 90 TAB | Refills: 1 | Status: SHIPPED | OUTPATIENT
Start: 2021-02-19 | End: 2021-04-19

## 2021-02-19 NOTE — TELEPHONE ENCOUNTER
MRI C-spine wo contrast 2/9/21 with Moderate canal stenosis and mild cord flattening C3-C4, NF stenoses: Bilateral C4-C6, right C3-C4, left C6-T2 with progression of NF stenoses from 2012.     24 hour ambulatory EEG 2/9/21 was normal, but riddled with artifact in O1 and O2 throughout the recording during wakefulness. Per pt diary, she had multiple episodes of smelling bad smells, feeling lightheaded, headaches, and having chest fluttering. No EEG correlates seen. Attempted to return call and discuss test results. LMOM. Will attempt to call again.

## 2021-02-19 NOTE — TELEPHONE ENCOUNTER
Returned call pt: Discussed spells, many concerning causes have been ruled out. One consideration is a migraine varient. Pt is not sleeping, worrying at night. Recommend starting amitriptyline 10mg qhs for MHA prevention, side effects reviewed including beneficial s.e. of sleepiness. Pt was asked to bring a log of sxs to appt. Discussed MRI C-spine, mod narrowing of canal at C3-C4 with only mild flattening of cord. Pt is asymptomatic, will not refer to NSG at this time. Will reassess at f/u in April.

## 2021-02-22 ENCOUNTER — TELEPHONE (OUTPATIENT)
Dept: INTERNAL MEDICINE CLINIC | Age: 66
End: 2021-02-22

## 2021-02-22 NOTE — TELEPHONE ENCOUNTER
----- Message from Tona Ramirez sent at 2/22/2021  3:32 PM EST -----  Regarding: telephone  General Message/Vendor Calls    Caller's first and last name: Zeynep Gavin       Reason for call: PT wants to speak with the doctor to see if she needs to increase the dosage of her medication         Callback required yes/no and why: Yes       Best contact number(s): [phone]      Details to clarify the request:      Tona Ramirez

## 2021-02-24 ENCOUNTER — HOSPITAL ENCOUNTER (OUTPATIENT)
Dept: LAB | Age: 66
Discharge: HOME | End: 2021-02-24
Payer: MEDICARE

## 2021-02-24 PROCEDURE — 36415 COLL VENOUS BLD VENIPUNCTURE: CPT

## 2021-02-24 PROCEDURE — 84443 ASSAY THYROID STIM HORMONE: CPT

## 2021-02-24 PROCEDURE — 84439 ASSAY OF FREE THYROXINE: CPT

## 2021-02-25 LAB
T4 FREE SERPL-MCNC: 1.37 NG/DL (ref 0.82–1.77)
TSH SERPL DL<=0.005 MIU/L-ACNC: 2.86 UIU/ML (ref 0.45–4.5)

## 2021-04-09 ENCOUNTER — TRANSCRIBE ORDER (OUTPATIENT)
Dept: SCHEDULING | Age: 66
End: 2021-04-09

## 2021-04-09 DIAGNOSIS — J32.0 CHRONIC MAXILLARY SINUSITIS: Primary | ICD-10-CM

## 2021-04-13 NOTE — TELEPHONE ENCOUNTER
[phone]       Name of medication and dosage if known: Cortizone HC cream, Mometasano furoate cream       Is patient out of this medication (yes/no): yes       Pharmacy name: [address] listed in chart? (yes/no):  No   Pharmacy phone number:  [phone]     [facility]

## 2021-04-19 ENCOUNTER — HOSPITAL ENCOUNTER (OUTPATIENT)
Dept: CT IMAGING | Age: 66
Discharge: HOME | End: 2021-04-19
Attending: OTOLARYNGOLOGY
Payer: MEDICARE

## 2021-04-19 ENCOUNTER — OFFICE VISIT (OUTPATIENT)
Dept: NEUROLOGY | Age: 66
End: 2021-04-19
Payer: MEDICARE

## 2021-04-19 VITALS
RESPIRATION RATE: 18 BRPM | DIASTOLIC BLOOD PRESSURE: 78 MMHG | SYSTOLIC BLOOD PRESSURE: 140 MMHG | HEART RATE: 70 BPM | OXYGEN SATURATION: 98 % | HEIGHT: 64 IN | WEIGHT: 190 LBS | BODY MASS INDEX: 32.44 KG/M2

## 2021-04-19 DIAGNOSIS — M48.02 CERVICAL STENOSIS OF SPINAL CANAL: ICD-10-CM

## 2021-04-19 DIAGNOSIS — R43.2: Primary | ICD-10-CM

## 2021-04-19 DIAGNOSIS — J32.0 CHRONIC MAXILLARY SINUSITIS: ICD-10-CM

## 2021-04-19 DIAGNOSIS — F22 DELUSION OF FOUL ODOR (HCC): ICD-10-CM

## 2021-04-19 PROCEDURE — G8400 PT W/DXA NO RESULTS DOC: HCPCS | Performed by: PSYCHIATRY & NEUROLOGY

## 2021-04-19 PROCEDURE — G8536 NO DOC ELDER MAL SCRN: HCPCS | Performed by: PSYCHIATRY & NEUROLOGY

## 2021-04-19 PROCEDURE — 1101F PT FALLS ASSESS-DOCD LE1/YR: CPT | Performed by: PSYCHIATRY & NEUROLOGY

## 2021-04-19 PROCEDURE — 70486 CT MAXILLOFACIAL W/O DYE: CPT

## 2021-04-19 PROCEDURE — G8427 DOCREV CUR MEDS BY ELIG CLIN: HCPCS | Performed by: PSYCHIATRY & NEUROLOGY

## 2021-04-19 PROCEDURE — G8510 SCR DEP NEG, NO PLAN REQD: HCPCS | Performed by: PSYCHIATRY & NEUROLOGY

## 2021-04-19 PROCEDURE — 1090F PRES/ABSN URINE INCON ASSESS: CPT | Performed by: PSYCHIATRY & NEUROLOGY

## 2021-04-19 PROCEDURE — G9899 SCRN MAM PERF RSLTS DOC: HCPCS | Performed by: PSYCHIATRY & NEUROLOGY

## 2021-04-19 PROCEDURE — 3017F COLORECTAL CA SCREEN DOC REV: CPT | Performed by: PSYCHIATRY & NEUROLOGY

## 2021-04-19 PROCEDURE — 99214 OFFICE O/P EST MOD 30 MIN: CPT | Performed by: PSYCHIATRY & NEUROLOGY

## 2021-04-19 PROCEDURE — G8417 CALC BMI ABV UP PARAM F/U: HCPCS | Performed by: PSYCHIATRY & NEUROLOGY

## 2021-04-19 NOTE — LETTER
4/19/2021 Patient: Minal Macias YOB: 1955 Date of Visit: 4/19/2021 Boom Curiel MD 
[id number] [id number] [address] [employer] Dear Boom Curiel MD, Thank you for referring Ms. Krystina Wade to [address] [place] [address] for evaluation. My notes for this consultation are attached. If you have questions, please do not hesitate to call me. I look forward to following your patient along with you. Sincerely, Carlitos Soto MD

## 2021-04-19 NOTE — PROGRESS NOTES
Neurology Consult Note      HISTORY PROVIDED BY: patient    Chief Complaint:   Chief Complaint   Patient presents with    Neurologic Problem      Subjective:   Pt is a 76 y.o. right handed female initially and last seen in clinic on 1/12/21 with h/o UC and iritis with onset of spells in Nov, 2019 that start with an overwhelming smell that makes her nauseated, then has a \"wavy thing\" in her head and feels like she is going to pass out. These are occurring almost daily, can occur several times a day, may last over an hour. May have had ELISABETH or LOC on one occasion. Additionally, reports a change in taste/smell that started in association with poorly control thyroid disease in 2018, nothing tastes like it should. Exam was non-focal and unremarkable. Paroxysmal spells concerning for possible seizure. Differential diagnosis includes near syncope possibly due to cardiac arrhythmia or severe bradycardia based on patient provided history, though association with foul smell is difficult to explain. Recommended MRI of the brain with and without contrast to evaluate for epileptogenic focus, structural etiology for change in taste and smell, especially given history of autoimmune disease. Recommended EEG to assess for underlying epilepsy. Will request records from Dr. Chiki Gallegos in cardiology. Pending this work-up may need to consider EMU monitoring to capture these spells. She returns for f/u. Routine EEG 1/28/21 was normal, but she did not have a typical spell while she was on EEG. MRI brain w/wo contrast 1/28/21 was unremarkable. Incidental severe cervical stenosis seen on visualized portion of C-spine. MRI C-spine wo contrast 2/9/21 with Moderate canal stenosis and mild cord flattening C3-C4, NF stenoses: Bilateral C4-C6, right C3-C4, left C6-T2 with progression of NF stenoses from 2012, reviewed with pt.  No neck pain radiating into arms, no N/T or weakness in UE.   24 hour ambulatory EEG 2/9/21 was normal, but riddled with artifact in O1 and O2 throughout the recording during wakefulness. Per pt diary, she had multiple episodes of smelling bad smells, feeling lightheaded, headaches, and having chest fluttering. No EEG correlates seen. Discussed spells by phone in February, many concerning causes have been ruled out. One consideration was a migraine varient, pt was not sleeping, worrying at night, so recommended starting amitriptyline 10mg qhs for MHA prevention, side effects reviewed including beneficial s.e. of sleepiness. She has not noticed any change in sxs on amitripytline. Spells continue daily, but notes parosmia chronically, feels she doesn't have normal taste or smell. She has seen ENT and CT sinuses 4/19/21 with mild sinus disease only, suggested she see an allergist. She questions if it could be related to taking nifedpine, timing with start of this medication would fit and she has read that this could be a side effect. Past Medical History:   Diagnosis Date    Arrhythmia 2018    \"PALPATATIONS, FLUTTERING, MEDS PER DR. ZAYAS\"    Arthritis     knees, back, SHOULDERS, R HIP    Chest pain 03/16/2018    PT STATES SHE'S BEEN ASSESSED FOR AND DETERMINED TO BE ESOPHAGEAL SPASMS PER FREYA LUNA AND DR.MCGROARTY ODILON    Chronic pain     LOWER BACK, R HIP, SHOULDERS    Duodenal ulcer 2002    HOSPITALIZED AND TREATED WITH MEDICATION    GERD (gastroesophageal reflux disease)     H/O blood clots 2007    TO RIGHT EYE    Hypertension     Iritis 2007    both eyes    Low back pain     back pain/spasm    Thyroid disease     hypothroid    Ulcerative colitis 2002      Past Surgical History:   Procedure Laterality Date    COLONOSCOPY N/A 5/25/2016    COLONOSCOPY performed by Dick Albright MD at [address] HX ACL RECONSTRUCTION  2003    right    HX BREAST BIOPSY Left 12/30/2016    MARKER     HX CHOLECYSTECTOMY  2008    HX ENDOSCOPY      HX GASTRIC BYPASS  2011    LAP BAND and removal    HX HEENT  2003    benign mass removed from uvula    HX KNEE REPLACEMENT Right 05/01/2013    right    HX KNEE REPLACEMENT  05/31/2017    Left    HX ORTHOPAEDIC  2009    exc mass right elbow - benign    HX ORTHOPAEDIC Right 08/03/2017    Knee reconstruction and excision of fibroma    HX OTHER SURGICAL  2003    growth removed from back of leg - benign    HX ROTATOR CUFF REPAIR Right 03/2018    COMPLETE, HAS 3 ANCHORS    HX ISABELL AND BSO  2004    HX TONSILLECTOMY  2003    done at the time mass removed from uvula    KS LAP, PLACE ADJUST GASTR BAND  2008    fluid removed/band removed      Social History     Socioeconomic History    Marital status: SINGLE     Spouse name: Not on file    Number of children: Not on file    Years of education: Not on file    Highest education level: Not on file   Occupational History    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[address], 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   Social Needs    Financial resource strain: Not on file    Food insecurity     Worry: Not on file     Inability: Not on file   [employer] needs     Medical: Not on file     Non-medical: Not on file   Tobacco Use    Smoking status: Never Smoker    Smokeless tobacco: Never Used   Substance and Sexual Activity    Alcohol use:  Yes     Alcohol/week: 0.0 - 1.0 standard drinks     Comment: 2 drinks per month    Drug use: No    Sexual activity: Never   Lifestyle    Physical activity     Days per week: Not on file     Minutes per session: Not on file    Stress: Not on file   Relationships    Social connections     Talks on phone: Not on file     Gets together: Not on file     Attends Gnosticist service: Not on file     Active member of club or organization: Not on file     Attends meetings of clubs or organizations: Not on file     Relationship status: Not on file    Intimate partner violence     Fear of current or ex partner: Not on file     Emotionally abused: Not on file     Physically abused: Not on file     Forced sexual activity: Not on file   Other Topics Concern    Not on file   Social History Narrative    Lives in [place] alone     Family History   Problem Relation Age of Onset    Stroke Mother         cerebral hemorrhage    Post-op Nausea/Vomiting Mother     Hypertension Mother     Heart Disease Father         PACEMAKER    Heart Attack Father 68    Post-op Nausea/Vomiting Sister     No Known Problems Brother     No Known Problems Sister     Other Brother         PRE-DIABETIC    Cancer Paternal Grandmother         colon    Thyroid Disease Daughter         HYPO    Post-op Cognitive Dysfunction Daughter          Objective:   ROS:  Per HPI o/w reviewed and neg    Allergies   Allergen Reactions    Adhesive Unknown (comments)    Adhesive Tape-Silicones Itching     Blisters, bumps. -long term application    Lidoderm [Lidocaine] Rash     Patch-adhesive patch. Allergic to the adhesive - long term use. Not allergic to lidoderm.  Other Medication Rash     dermabond - blisters        Meds:  Outpatient Medications Prior to Visit   Medication Sig Dispense Refill    ALPRAZolam (XANAX) 0.5 mg tablet Take 0.5-1 Tabs by mouth two (2) times daily as needed for Anxiety. Max Daily Amount: 1 mg. 30 Tab 2    omeprazole (PRILOSEC) 40 mg capsule Take 1 Cap by mouth daily. 90 Cap 3    valsartan (DIOVAN) 160 mg tablet TAKE ONE TABLET BY MOUTH DAILY 30 Tab 11    mometasone (ELOCON) 0.1 % topical cream APPLY TO AFFECTED AREA(S) TOPICALLY DAILY 30 g 3    Unithroid 88 mcg tablet Take 88 mcg by mouth. Once daily      loratadine 10 mg cap Indications: prn      docusate sodium 100 mg tab As needed      hydrocortisone (PROCTOSOL HC) 2.5 % rectal cream Insert  into rectum four (4) times daily. 30 g 0    ondansetron (ZOFRAN ODT) 4 mg disintegrating tablet Take 2 Tabs by mouth every eight (8) hours as needed for Nausea.  12 Tab 0    dicyclomine (BENTYL) 10 mg capsule Take 10 mg by mouth three (3) times daily as needed.  diclofenac EC (VOLTAREN) 75 mg EC tablet Take 75 mg by mouth two (2) times daily as needed.  prednisoLONE acetate (PRED FORTE) 1 % ophthalmic suspension Administer 1 Drop to both eyes three (3) times daily as needed.  calcium carbonate (TUMS) 200 mg calcium (500 mg) chew Take 1 Tab by mouth as needed.  ketorolac (ACULAR) 0.5 % ophthalmic solution Administer 2 Drops to both eyes four (4) times daily as needed. 1 Bottle 0    amitriptyline (ELAVIL) 10 mg tablet Take 1 Tab by mouth nightly. 90 Tab 1    neomycin-polymyxin-dexamethasone (MAXITROL) ophthalmic suspension Indications: prn       No facility-administered medications prior to visit. Imaging:  MRI Results (most recent):  Results from Hospital Encounter encounter on 02/08/21   MRI CERV SPINE WO CONT    Narrative MRI OF THE CERVICAL SPINE WITHOUT CONTRAST: 2/8/2021 7:20 AM    INDICATION: Cervical spinal stenosis. TECHNIQUE: Multiplanar and multisequence MRI was obtained of the cervical spine  without contrast.    COMPARISON: 6/20/2012. FINDINGS: Mild levoconvexity is centered around T4. There is degenerative height  loss in C6, with more mild height loss in C4 and C5. Sclerotic degenerative  marrow changes associated at these levels as well. Bone marrow signal and  vertebral body heights are otherwise normal. The cerebellar tonsils are normal  in position and configuration. Cervical cord signal is normal.. The paraspinal  soft tissues are normal.    C2-C3: No herniation or stenosis. C3-C4: Slight retrolisthesis and disc uncovering cause moderate canal stenosis  and mild cord flattening. Uncovertebral and facet osteoarthritis cause right and  mild left neural foraminal stenosis. Stenoses have increased slightly from 2012. C4-C5: A disc osteophyte complex causes mild canal stenosis. Uncovertebral and  facet osteoarthritis cause left greater than right neural foraminal stenosis.   Neural foraminal stenoses have progressed from 2012. C5-C6: A disc osteophyte complex causes mild canal stenosis. Uncovertebral and  facet osteoarthritis cause bilateral neural foraminal stenosis. Neural foraminal  stenoses have progressed from 2012. C6-C7: A disc osteophyte complex causes mild to moderate canal stenosis. Uncovertebral and facet osteoarthritis cause left neural foraminal stenosis. Neural foraminal stenosis has progressed from 2012. C7-T1: A disc osteophyte complex, greatest in the left lateral recess and  foraminal zone, is new from 2012. It causes mild canal stenosis and left neural  foraminal stenosis. T1-T2: A disc osteophyte complex, greatest in left lateral recess and foraminal  zone, is increased from 2012. It causes narrowing of the left lateral recess and  left neural foraminal stenosis. T2-T3: No herniation or stenosis. Mild disc bulge. T3-T4: No herniation or stenosis. Minimal disc osteophyte complex. T4-T5: Anterior osseous bridging across the disc space. No stenosis. Impression 1. Moderate canal stenosis and mild cord flattening C3-C4. 2. Neural foraminal stenoses: Bilateral C4-C6, right C3-C4, left C6-T2.  3. Progression of neural foraminal stenoses from 2012. CT Results (most recent):  Results from Hospital Encounter encounter on 04/19/21   CT SINUSES WO CONT    Narrative INDICATION: Chronic maxillary sinusitis    EXAMINATION:  CT SINUS, LANDMARK    COMPARISON: None    Technique: Thin slice axial noncontrast CT imaging of the sinuses was performed  per [place] technique. Coronal and sagittal reconstructions were obtained. CT  dose reduction was achieved through use of a standardized protocol tailored for  this examination and automatic exposure control for dose modulation. FINDINGS:    Frontal sinuses: Minimal mucosal thickening in the left frontal sinus. Hypoplastic right frontal sinus is opacified.   Anterior ethmoid sinuses:  Mild peripheral mucosal thickening bilateral.  Maxillary sinuses:  Minimal peripheral mucosal thickening, bilateral.  Osteomeatal units:  clear  Sphenoid sinuses:  Minimal peripheral mucosal thickening right greater than  left. Posterior ethmoid sinuses:  Peripheral mucosal thickening and patchy  opacification, right greater than left. Nasal septum:  midline. Nasal cavity:  clear. Other:  Multilevel degenerative changes throughout the cervical spine. Impression Mild chronic paranasal sinus disease as above, with minimal maxillary  involvement. Reviewed records in [facility] tab today    Lab Review   Results for orders placed or performed in visit on 12/25/20   TSH 3RD GENERATION   Result Value Ref Range    TSH 2.860 0.450 - 4.500 uIU/mL   T4, FREE   Result Value Ref Range    T4, Free 1.37 0.82 - 1.77 ng/dL        Exam:  Visit Vitals  BP (!) 140/78   Pulse 70   Resp 18   Ht 5' 4\" (1.626 m)   Wt 190 lb (86.2 kg)   SpO2 98%   BMI 32.61 kg/m²     General:  Alert, cooperative, no distress. Head:  Normocephalic, without obvious abnormality, atraumatic. Respiratory:  Heart:   Non labored breathing  Regular rate and rhythm, no murmurs   Neck:      Extremities: Warm, no cyanosis or edema. Pulses: 2+ radial pulses. Neurologic:  MS: Alert and oriented x 4, speech intact. Language intact. Attention and fund of knowledge appropriate. Recent and remote memory intact.   Cranial Nerves:  II: visual fields    II: pupils    II: optic disc    III,VII: ptosis none   III,IV,VI: extraocular muscles  EOMI, no nystagmus or diplopia   V: facial light touch sensation     VII: facial muscle function   symmetric   VIII: hearing intact   IX: soft palate elevation     XI: trapezius strength     XI: sternocleidomastoid strength    XII: tongue       Motor: normal bulk and tone, no tremor              Strength: 5/5 throughout, no PD  Sensory: (At initial OV: intact to LT, PP)  Coordination: FTN intact  Gait: (At initial OV: normal gait, able to tandem walk)  Reflexes: 2+ symmetric, (At initial OV: toes downgoing)           Assessment/Plan   Pt is a 76 y.o. right handed female with h/o UC and iritis with onset of spells in Nov, 2019 that start with an overwhelming smell that makes her nauseated, then has a \"wavy thing\" in her head and feels like she is going to pass out. These are occurring daily, can occur several times a day, may last over an hour. May have had ELISABETH or LOC on one occasion. Routine EEG and 24 hour ambulatory EEG during which she had her typical spells, were negative for seizure. MRI brain w/wo contrast 1/28/21 was unremarkable. There has been no change in these spells on amitriptyline. Additionally, reports a change in taste/smell that started in association with poorly control thyroid disease in 2018, nothing tastes or smells like it should. MRI C-spine wo contrast 2/9/21 with moderate canal stenosis and mild cord flattening C3-C4, NF stenoses: Bilateral C4-C6, right C3-C4, left C6-T2 with progression of NF stenoses from 2012,  Exam is non-focal and unremarkable, no evidence of cervical radiculopathy or a myelopathy.   -Stop amitriptyline.   -Referral to [facility]  -Pt will discuss stopping Nifedipine with prescribing physician to see if sxs improve  -F/u in one year for cervical stenosis. Discussed signs and sxs that warrant earlier f/u. ICD-10-CM ICD-9-CM    1. Taste perversion of  R43.2 781.1 REFERRAL TO ENT-OTOLARYNGOLOGY   2. Delusion of foul odor (HCC)  F22 297.9 REFERRAL TO ENT-OTOLARYNGOLOGY   3. Cervical stenosis of spinal canal  M48.02 723.0        Signed:   Natanael Wyman MD  4/19/2021

## 2021-04-19 NOTE — PROGRESS NOTES
Ms. Melissa Han presents today to follow up nonspecific paroxysmal spell. Patient reported that she continues to have these spells daily that involve olfactory hallucinations. The spells may last several minutes. Depression screening done on this patient.

## 2021-04-19 NOTE — PATIENT INSTRUCTIONS
[facility]   Statement to Patients  April 1, 2014      In an effort to ensure the large volume of patient prescription refills is processed in the most efficient and expeditious manner, we are asking our patients to assist us by calling your Pharmacy for all prescription refills, this will include also your  Mail Order Pharmacy. The pharmacy will contact our office electronically to continue the refill process. Please do not wait until the last minute to call your pharmacy. We need at least 48 hours (2days) to fill prescriptions. We also encourage you to call your pharmacy before going to  your prescription to make sure it is ready. With regard to controlled substance prescription refill requests (narcotic refills) that need to be picked up at our office, we ask your cooperation by providing us with at least 72 hours (3days) notice that you will need a refill. We will not refill narcotic prescription refill requests after 4:00pm on any weekday, Monday through Thursday, or after 2:00pm on Fridays, or on the weekends. We encourage everyone to explore another way of getting your prescription refill request processed using [employer], our patient web portal through our electronic medical record system. [employer] is an efficient and effective way to communicate your medication request directly to the office and  downloadable as an phil on your smart phone . [employer] also features a review functionality that allows you to view your medication list as well as leave messages for your physician. Are you ready to get connected? If so please review the attatched instructions or speak to any of our staff to get you set up right away! Thank you so much for your cooperation. Should you have any questions please contact our Practice Administrator.     The Physicians and Staff,  [facility]     I recommend evaluation at the [facility] at [address] [place] [address]. I have placed a referral to:     Leticia Cruz MD   223 Ratings 31 Comments  [phone]  Request an Appointment  Specialty    Otolaryngology  Locations    [facility] [place]  [address] [address]    Department  Otolaryngology

## 2021-04-20 ENCOUNTER — DOCUMENTATION ONLY (OUTPATIENT)
Dept: NEUROLOGY | Age: 66
End: 2021-04-20

## 2021-05-10 RX ORDER — MOMETASONE FUROATE 1 MG/G
CREAM TOPICAL
Qty: 30 G | Refills: 3 | Status: SHIPPED
Start: 2021-05-10 | End: 2021-07-01 | Stop reason: SDUPTHER

## 2021-05-10 RX ORDER — HYDROCORTISONE 25 MG/G
CREAM TOPICAL 4 TIMES DAILY
Qty: 30 G | Refills: 0 | Status: SHIPPED | OUTPATIENT
Start: 2021-05-10 | End: 2021-08-23

## 2021-05-10 RX ORDER — MOMETASONE FUROATE 1 MG/G
CREAM TOPICAL
Qty: 30 G | Refills: 3 | Status: SHIPPED | OUTPATIENT
Start: 2021-05-10 | End: 2022-06-05

## 2021-05-10 NOTE — TELEPHONE ENCOUNTER
Details to clarify the request: pt would like to talk to Dr Marzena Johnson about how upset she is. She has been a pt of Dr Marzena Johnson for a long time and she has been trying to get 2 prescriptions filled (Creams) for three weeks. She has called the office, left messages 3 weeks ago and She would like to discuss this situation ASAPCharlotte keating

## 2021-05-10 NOTE — TELEPHONE ENCOUNTER
Refills has been sent to Dr. Edwige Craft back in April with no response.  Please do send in refills for patient

## 2021-06-10 ENCOUNTER — TELEPHONE (OUTPATIENT)
Dept: NEUROLOGY | Age: 66
End: 2021-06-10

## 2021-06-10 NOTE — TELEPHONE ENCOUNTER
----- Message from Joe Montano sent at 6/9/2021 11:16 AM EDT -----  Regarding: Dr. Coco De Leon  General Message/Vendor Calls    Caller's first and last name: Pt      Reason for call: test again for mini seizures      Callback required yes/no and why: Yes      Best contact number(s): [phone]      Details to clarify the request: Pt would like to know when she would be scheduled for another test after being referred to neurology at [facility]. She said that the neurologist at [facility] believes pt is having mini seizures and would like her to be retested. Please advise. Pt recently received a notice in the mail stating she has been pick to serve for jury duty. Pt states she doesn't feel comfortable being in a court room and is afraid with her high BP and lightheadedness and would like to know if Dr. Ajay Claire could write her a note to excuse here from jury duty.         Joe Montano

## 2021-06-10 NOTE — LETTER
2021 5:16 PM 
 
Ms. Qamar Pierson [facility] [address] [id number] To Whom It May Concern: This letter is concerning jury duty for Qamar Pierson, : 1955, followed at  [address] [facility]. Due to medical problems, this patient must be excused from jury duty at this time. Please contact the office if you need further information or have any questions. Sincerely, Binu Lizarraga MD

## 2021-06-11 ENCOUNTER — TELEPHONE (OUTPATIENT)
Dept: INTERNAL MEDICINE CLINIC | Age: 66
End: 2021-06-11

## 2021-06-11 NOTE — TELEPHONE ENCOUNTER
Pebbles George - I referred the pt to [facility] ENT. I have not received their note yet. Please request a copy directly from their department. Did she also see a neurologist at [facility]? I will provide a jury duty letter given ongoing work up for these spells of unclear etiology, possibly seizures.   (Signed and on your desk.)

## 2021-06-14 ENCOUNTER — TELEPHONE (OUTPATIENT)
Dept: NEUROLOGY | Age: 66
End: 2021-06-14

## 2021-06-14 DIAGNOSIS — F22 DELUSION OF FOUL ODOR (HCC): ICD-10-CM

## 2021-06-14 DIAGNOSIS — R40.4 NONSPECIFIC PAROXYSMAL SPELL: Primary | ICD-10-CM

## 2021-06-14 NOTE — TELEPHONE ENCOUNTER
----- Message from Merian Galeazzi sent at 6/14/2021  4:03 PM EDT -----  Regarding: /telelphone  General Message/Vendor Calls    Caller's first and last name:      Reason for call: The pt is requesting a call back from [employer]. She also stated Dr. Guillermo  wants to retest her for seizures. She provided his office number which is [phone]      Callback required yes/no and why:Yes.       Best contact number(s):[phone]

## 2021-06-14 NOTE — TELEPHONE ENCOUNTER
Called pt. Verified. Inform pt Dr. Amee Rosario provide a jury duty letter pt request to be mailed & confirm address on file ([id number]). Pt states that she did see the neurologist at [facility].

## 2021-06-18 ENCOUNTER — TELEPHONE (OUTPATIENT)
Dept: NEUROLOGY | Age: 66
End: 2021-06-18

## 2021-06-18 NOTE — TELEPHONE ENCOUNTER
Spoke w/ pt. Verified. Inform pt that I did received message w/ Dr. Galaviz Shank information. Inform pt that I faxed over request for notes still awaiting. Pt stated that Dr. Blake Dimas wants to retest her for seizures.  (resent fax to Dr. Blake Dimas fax: [phone])

## 2021-06-18 NOTE — TELEPHONE ENCOUNTER
Pt calling stating that she has been trying to get in contact with Dr. Sara Boles for a week now. Patient states she would like a call back from Dr. Glenn Anguiano.

## 2021-06-29 NOTE — TELEPHONE ENCOUNTER
Patient is calling back regarding the retesting for seizures. She is going to drop off the paperwork she received from Dr. Enma Evans concerning this. She has been having some aura's and is worried she is going to have seizure.

## 2021-06-30 NOTE — TELEPHONE ENCOUNTER
Called pt. Verified. Inform pt that Dr. Chance Garcia received notes from Dr. Trae Cruz and that she will refer her to the EMU for further evaluation. Explain that she will be on a video EEG continuously to capture the spells  And to determine if its seizures or not. And that monitoring may be up to 4 days. Also mention that the [facility] will be contacting her to schedule admission. Pt verbalizes understanding.

## 2021-06-30 NOTE — TELEPHONE ENCOUNTER
Received note from ENT Dr. Mohan Townsend at [facility] office visit 5/18/2021:  Impressions: Allergic rhinitis  Partially controlled GERD  Parosmia, dysosmia  Differential included migraine variant versus seizure activity. His recommendations:  -pursue reflux precautions and treatment more seriously  -Begin Flonase nasal spray  -Return to clinic in 6 months and hopefully smell and taste will resume and they can perform a formal smell and taste test,   -Discussed discontinuation of nifedipine and calcium channel blockers as these may cause smell change and she was advised to present this to her primary care provider at her next visit.  -He also mentions neurology follow-up for these episodes with an attempt to document EEG changes during the episodes of possible. The patient did have her typical spells while on ambulatory EEG but there was a great deal of artifact. Janneth - Please call pt: I received notes from Dr. Mohan Townsend. I will refer her to the EMU for further evaluation. She should expect a call from the [facility] to schedule admission. She will be on Video EEG continuously in an effort to capture these spells and determine definitively if these are seizures or not. Monitoring may be up to 4 days.

## 2021-07-01 ENCOUNTER — OFFICE VISIT (OUTPATIENT)
Dept: INTERNAL MEDICINE CLINIC | Age: 66
End: 2021-07-01
Payer: MEDICARE

## 2021-07-01 ENCOUNTER — DOCUMENTATION ONLY (OUTPATIENT)
Dept: INTERNAL MEDICINE CLINIC | Age: 66
End: 2021-07-01

## 2021-07-01 VITALS
DIASTOLIC BLOOD PRESSURE: 78 MMHG | TEMPERATURE: 98.7 F | HEIGHT: 64 IN | HEART RATE: 47 BPM | SYSTOLIC BLOOD PRESSURE: 130 MMHG | BODY MASS INDEX: 34.31 KG/M2 | WEIGHT: 201 LBS

## 2021-07-01 DIAGNOSIS — R43.0 ANOSMIA: ICD-10-CM

## 2021-07-01 DIAGNOSIS — E03.9 ACQUIRED HYPOTHYROIDISM: Primary | ICD-10-CM

## 2021-07-01 DIAGNOSIS — I10 ESSENTIAL HYPERTENSION: ICD-10-CM

## 2021-07-01 DIAGNOSIS — K51.90 ULCERATIVE COLITIS WITHOUT COMPLICATIONS, UNSPECIFIED LOCATION (HCC): ICD-10-CM

## 2021-07-01 DIAGNOSIS — R00.1 BRADYCARDIA: ICD-10-CM

## 2021-07-01 DIAGNOSIS — Z71.89 ADVANCE DIRECTIVE DISCUSSED WITH PATIENT: ICD-10-CM

## 2021-07-01 DIAGNOSIS — Z78.0 POSTMENOPAUSAL STATE: ICD-10-CM

## 2021-07-01 DIAGNOSIS — Z11.59 NEED FOR HEPATITIS C SCREENING TEST: ICD-10-CM

## 2021-07-01 DIAGNOSIS — Z00.00 MEDICARE ANNUAL WELLNESS VISIT, SUBSEQUENT: ICD-10-CM

## 2021-07-01 DIAGNOSIS — G47.62 NOCTURNAL LEG CRAMPS: ICD-10-CM

## 2021-07-01 DIAGNOSIS — E78.9 BORDERLINE HIGH CHOLESTEROL: ICD-10-CM

## 2021-07-01 PROCEDURE — G8510 SCR DEP NEG, NO PLAN REQD: HCPCS | Performed by: INTERNAL MEDICINE

## 2021-07-01 PROCEDURE — 3017F COLORECTAL CA SCREEN DOC REV: CPT | Performed by: INTERNAL MEDICINE

## 2021-07-01 PROCEDURE — 99214 OFFICE O/P EST MOD 30 MIN: CPT | Performed by: INTERNAL MEDICINE

## 2021-07-01 PROCEDURE — G8536 NO DOC ELDER MAL SCRN: HCPCS | Performed by: INTERNAL MEDICINE

## 2021-07-01 PROCEDURE — G8427 DOCREV CUR MEDS BY ELIG CLIN: HCPCS | Performed by: INTERNAL MEDICINE

## 2021-07-01 PROCEDURE — G0439 PPPS, SUBSEQ VISIT: HCPCS | Performed by: INTERNAL MEDICINE

## 2021-07-01 PROCEDURE — G9899 SCRN MAM PERF RSLTS DOC: HCPCS | Performed by: INTERNAL MEDICINE

## 2021-07-01 PROCEDURE — G8400 PT W/DXA NO RESULTS DOC: HCPCS | Performed by: INTERNAL MEDICINE

## 2021-07-01 PROCEDURE — G0463 HOSPITAL OUTPT CLINIC VISIT: HCPCS | Performed by: INTERNAL MEDICINE

## 2021-07-01 PROCEDURE — 1090F PRES/ABSN URINE INCON ASSESS: CPT | Performed by: INTERNAL MEDICINE

## 2021-07-01 PROCEDURE — G8417 CALC BMI ABV UP PARAM F/U: HCPCS | Performed by: INTERNAL MEDICINE

## 2021-07-01 PROCEDURE — 1101F PT FALLS ASSESS-DOCD LE1/YR: CPT | Performed by: INTERNAL MEDICINE

## 2021-07-01 NOTE — PATIENT INSTRUCTIONS
Medicare Wellness Visit, Female     The best way to live healthy is to have a lifestyle where you eat a well-balanced diet, exercise regularly, limit alcohol use, and quit all forms of tobacco/nicotine, if applicable. Regular preventive services are another way to keep healthy. Preventive services (vaccines, screening tests, monitoring & exams) can help personalize your care plan, which helps you manage your own care. Screening tests can find health problems at the earliest stages, when they are easiest to treat. [facility] follows the current, evidence-based guidelines published by the [facility] Aval[facility] ([facility]) when recommending preventive services for our patients. Because we follow these guidelines, sometimes recommendations change over time as research supports it. (For example, mammograms used to be recommended annually. Even though Medicare will still pay for an annual mammogram, the newer guidelines recommend a mammogram every two years for women of average risk). Of course, you and your doctor may decide to screen more often for some diseases, based on your risk and your co-morbidities (chronic disease you are already diagnosed with). Preventive services for you include:  - Medicare offers their members a free annual wellness visit, which is time for you and your primary care provider to discuss and plan for your preventive service needs. Take advantage of this benefit every year!  -All adults over the age of 72 should receive the recommended pneumonia vaccines. Current USPSTF guidelines recommend a series of two vaccines for the best pneumonia protection.   -All adults should have a flu vaccine yearly and a tetanus vaccine every 10 years.   -All adults age 48 and older should receive the shingles vaccines (series of two vaccines).       -All adults age 38-68 who are overweight should have a diabetes screening test once every three years.   -All adults born between [birth year] and 1965 should be screened once for Hepatitis C.  -Other screening tests and preventive services for persons with diabetes include: an eye exam to screen for diabetic retinopathy, a kidney function test, a foot exam, and stricter control over your cholesterol.   -Cardiovascular screening for adults with routine risk involves an electrocardiogram (ECG) at intervals determined by your doctor.   -Colorectal cancer screenings should be done for adults age 54-65 with no increased risk factors for colorectal cancer. There are a number of acceptable methods of screening for this type of cancer. Each test has its own benefits and drawbacks. Discuss with your doctor what is most appropriate for you during your annual wellness visit. The different tests include: colonoscopy (considered the best screening method), a fecal occult blood test, a fecal DNA test, and sigmoidoscopy.    -A bone mass density test is recommended when a woman turns 65 to screen for osteoporosis. This test is only recommended one time, as a screening. Some providers will use this same test as a disease monitoring tool if you already have osteoporosis. -Breast cancer screenings are recommended every other year for women of normal risk, age 54-69.  -Cervical cancer screenings for women over age 72 are only recommended with certain risk factors. Here is a list of your current Health Maintenance items (your personalized list of preventive services) with a due date:      Health Maintenance Due   Topic Date Due    Hepatitis C Screening  Ordered today    Shingrix Vaccine Age 49> (1 of 2) Never done    Medicare Yearly Exam  07/01/2022    Bone Densitometry (Dexa) Screening  Ordered today    Pneumococcal 65+ years (1 of 1 - PPSV23) Never done    Colorectal Cancer Screening Combo  Up to date       Please check on your Shingrix and Pneumovax vaccines. Decreased Valsartan to 80mg daily.   Bring in list of blood pressures and heart rate. For cramping:  Tonic water 6oz at bedtime.     Pickle Juice  Mustard

## 2021-07-01 NOTE — PROGRESS NOTES
Subjective:     Davon Johnson is a 78 y.o. female who presents for follow up of hypertension ahd hypothyroidism. Diet and Lifestyle: generally follows a low sodium diet, exercises regularly  Home BP Monitoring: at times BP can be 80/50s a couple of times a week. Will take 1/2 Valsartan at those times. Other days running 114/80    Cardiovascular ROS: taking medications as instructed, no medication side effects noted, no TIA's, no chest pain on exertion, no dyspnea on exertion, no swelling of ankles, some chest pain while sitting. Can exercise without pain. Some irreg Heart rate. .     New concerns:   Hypothyroidism - appetite and energy level are down. Obesity -   UC - frequent soft bowels. No bleeding. Had seen Dr. Ulysses Dirk, now seeing another GI in that practice. Having very severe muscle spasm in the last 2 months. Worse at night, occ during the day. Stopped for a while and then restarted. Drinking a lot of regular water and having a lot or urinary frequency. Tried eating bananas, initially helped not not recently. Stopped eating certain foods - potatoes and meat especially as no longer taste good. Feels like something is in both ears. Saw Dr. Kwame Husain secondary to loss of smell and taste but then some phantom smells with lightheadedness. Referred her to Dr. Patrick Diaz. Has additional testing scheduled. ? Mini seizures. Stopped Nifedipine over 4 months because SE include loss of smell and taste, mild improvement only. HR is not dropping into the lower 40s as it had prior to stopping the Nifedipine. Current Outpatient Medications   Medication Sig Dispense Refill    mometasone (ELOCON) 0.1 % topical cream APPLY TO AFFECTED AREA(S) TOPICALLY DAILY 30 g 3    hydrocortisone (Proctosol HC) 2.5 % rectal cream Insert  into rectum four (4) times daily. 30 g 0    ALPRAZolam (XANAX) 0.5 mg tablet Take 0.5-1 Tabs by mouth two (2) times daily as needed for Anxiety. Max Daily Amount: 1 mg. 30 Tab 2    omeprazole (PRILOSEC) 40 mg capsule Take 1 Cap by mouth daily. 90 Cap 3    valsartan (DIOVAN) 160 mg tablet TAKE ONE TABLET BY MOUTH DAILY 30 Tab 11    Unithroid 88 mcg tablet Take 88 mcg by mouth. Once daily      loratadine 10 mg cap Indications: prn      docusate sodium 100 mg tab Take 100 mg by mouth daily. As needed      dicyclomine (BENTYL) 10 mg capsule Take 10 mg by mouth three (3) times daily as needed.  diclofenac EC (VOLTAREN) 75 mg EC tablet Take 75 mg by mouth two (2) times daily as needed.  calcium carbonate (TUMS) 200 mg calcium (500 mg) chew Take 1 Tab by mouth as needed.  ketorolac (ACULAR) 0.5 % ophthalmic solution Administer 2 Drops to both eyes four (4) times daily as needed. 1 Bottle 0    ondansetron (ZOFRAN ODT) 4 mg disintegrating tablet Take 2 Tabs by mouth every eight (8) hours as needed for Nausea. (Patient not taking: Reported on 7/1/2021) 12 Tab 0        Lab Results   Component Value Date/Time    WBC 3.5 06/04/2019 10:56 AM    HGB 12.3 06/04/2019 10:56 AM    HCT 37.5 06/04/2019 10:56 AM    PLATELET 713 86/08/2170 10:56 AM    MCV 90 06/04/2019 10:56 AM     Lab Results   Component Value Date/Time    Hemoglobin A1c 5.2 07/27/2017 02:35 PM    Hemoglobin A1c 5.1 05/15/2017 08:49 AM    Hemoglobin A1c 5.8 04/08/2013 09:10 AM    Glucose 84 11/12/2020 07:05 AM    Glucose (POC) 86 08/03/2017 07:10 AM    LDL, calculated 107 (H) 06/04/2019 10:56 AM    Creatinine 0.88 11/12/2020 07:05 AM      Lab Results   Component Value Date/Time    Cholesterol, total 177 06/04/2019 10:56 AM    HDL Cholesterol 58 06/04/2019 10:56 AM    LDL, calculated 107 (H) 06/04/2019 10:56 AM    Triglyceride 62 06/04/2019 10:56 AM    CHOL/HDL Ratio 3.3 05/14/2010 09:58 AM     Lab Results   Component Value Date/Time    ALT (SGPT) 9 11/12/2020 07:05 AM    Alk.  phosphatase 81 11/12/2020 07:05 AM    Bilirubin, total 1.0 11/12/2020 07:05 AM    Albumin 4.5 11/12/2020 07:05 AM    Protein, total 6.9 11/12/2020 07:05 AM    INR 1.3 (H) 08/05/2017 02:49 AM    INR POC 1.1 08/14/2017 12:33 PM    Prothrombin time 13.0 (H) 08/05/2017 02:49 AM    PLATELET 290 45/08/5627 10:56 AM     Lab Results   Component Value Date/Time    GFR est non-AA 69 11/12/2020 07:05 AM    GFR est AA 79 11/12/2020 07:05 AM    Creatinine 0.88 11/12/2020 07:05 AM    BUN 16 11/12/2020 07:05 AM    Sodium 141 11/12/2020 07:05 AM    Potassium 4.0 11/12/2020 07:05 AM    Chloride 102 11/12/2020 07:05 AM    CO2 28 11/12/2020 07:05 AM     Lab Results   Component Value Date/Time    TSH 2.860 02/24/2021 12:19 PM    T4, Free 1.37 02/24/2021 12:19 PM         Review of Systems, additional:  Pertinent items are noted in HPI. Objective:     Visit Vitals  /78   Pulse (!) 47   Temp 98.7 °F (37.1 °C) (Temporal)   Ht 5' 4\" (1.626 m)   Wt 201 lb (91.2 kg)   BMI 34.50 kg/m²     Appearance: alert, well appearing, and in no distress and oriented to person, place, and time. General exam:   . NECK: supple, no lad, no bruit, no tm  LUNGS: cta bilat  CV rrr, no m/g/r  ABD: soft, nt, nd, nabs  EXT: no c/c/e    Assessment/Plan:     hypertension ? Over controlled. Decrease valsartan to 1/2 tablet daily. Check BP at home and f/u with readings in 2 weeks. .     Hypothyroidism - clinically euthyroid. Check labs  Continue same meds    UC controlled. UTD with GI  Will track down colonoscopy for our records    Nocturnal leg cramps - trial tonic water, pickle juice, mustard  Stretching. Check labs for other causes. obesity - discussed diet and exercise for weight loss. Anosmia - ongoing w/u with Dr. Dorian Ellis and Dr. Aron Bamberger. Borderline cholesterol - reepat labs    Bradycardia - improved but continues. W/u negative by cardiology.       Orders Placed This Encounter    Dexa Bone Density Study Axial ([id number])    MAGNESIUM    TSH 3RD GENERATION    T4, FREE    LIPID PANEL    METABOLIC PANEL, COMPREHENSIVE    HEPATITIS C AB - Future Default Follow-up and Dispositions    · Return in about 2 weeks (around 7/15/2021) for htn. This is the Subsequent Medicare Annual Wellness Exam, performed 12 months or more after the Initial AWV or the last Subsequent AWV    I have reviewed the patient's medical history in detail and updated the computerized patient record. Assessment/Plan   Education and counseling provided:  Are appropriate based on today's review and evaluation   Advanced directive discussed with patient. 1. Acquired hypothyroidism  -     TSH 3RD GENERATION; Future  -     T4, FREE; Future  2. Medicare annual wellness visit, subsequent  3. Need for hepatitis C screening test  -     HEPATITIS C AB; Future  4. Postmenopausal state  -     DEXA BONE DENSITY STUDY AXIAL; Future  5. Advance directive discussed with patient  6. Ulcerative colitis without complications, unspecified location ([facility] [address]  7. Essential hypertension  -     MAGNESIUM; Future  -     LIPID PANEL; Future  -     METABOLIC PANEL, COMPREHENSIVE; Future  8. Bradycardia  9. Anosmia  10. Nocturnal leg cramps  -     MAGNESIUM; Future  -     METABOLIC PANEL, COMPREHENSIVE; Future  11. Borderline high cholesterol  -     LIPID PANEL; Future  -     METABOLIC PANEL, COMPREHENSIVE; Future       Depression Risk Factor Screening     3 most recent PHQ Screens 7/1/2021   Little interest or pleasure in doing things Not at all   Feeling down, depressed, irritable, or hopeless Not at all   Total Score PHQ 2 0       Alcohol Risk Screen    Do you average more than 1 drink per night or more than 7 drinks a week:  No    On any one occasion in the past three months have you have had more than 3 drinks containing alcohol:  No        Functional Ability and Level of Safety    Hearing: Hearing is good. Activities of Daily Living:   The home contains: handrails and grab bars  Patient does total self care      Ambulation: with no difficulty     Fall Risk:  Fall Risk Assessment, last 12 mths 7/1/2021   Able to walk? Yes   Fall in past 12 months? 0   Do you feel unsteady? 0   Are you worried about falling -      Abuse Screen:  Patient is not abused       Cognitive Screening    Has your family/caregiver stated any concerns about your memory: no     Cognitive Screening: Normal -      Health Maintenance Due     Health Maintenance Due   Topic Date Due    Hepatitis C Screening  Ordered today    Shingrix Vaccine Age 49> (1 of 2) Never done    Medicare Yearly Exam  07/01/2022    Bone Densitometry (Dexa) Screening  Ordered today    Pneumococcal 65+ years (1 of 1 - PPSV23) Never done    Colorectal Cancer Screening Combo  Up to date       Patient Care Team   Patient Care Team:  Van Guardado MD as PCP - General (Internal Medicine)  Vna Guardado MD as PCP - [facility] Empaneled Provider    History     Patient Active Problem List   Diagnosis Code    Blisters of multiple sites R23.8    Right knee DJD M17.11    Abdominal pain, left upper quadrant R10.12    Dysphagia R13.10    Hypothyroid E03.9    UC (ulcerative colitis) ([facility] [address] K51.90    Iritis H20.9    Advance directive discussed with patient Z70.80    Primary osteoarthritis of left knee M17.12    Obesity E66.9    Mechanical complication of knee prosthesis ([address] T84.098A, Z96.659    Obesity (BMI 30.0-34. 9) E66.9    Complete tear of right rotator cuff M75.121    Complete tear of left rotator cuff M75.122    Acute vaginitis N76.0    Candidiasis B37.9    Family history of malignant neoplasm of colon Z80.0    Increased frequency of urination R35.0    Overactive bladder N32.81    Pain in pelvis R10.2    Postoperative seroma of skin after non-dermatologic procedure L76.34    Right lower quadrant pain R10.31    Urge incontinence of urine N39.41    Vaginal odor N89.8     Past Medical History:   Diagnosis Date    Arrhythmia 2018    \"PALPATATIONS, FLUTTERING, MEDS PER DR. ZAYAS\"    Arthritis     knees, back, SHOULDERS, R HIP    Chest pain 03/16/2018    PT STATES SHE'S BEEN ASSESSED FOR AND DETERMINED TO BE ESOPHAGEAL SPASMS PER FREYA LUNA AND DR.MCGROARTY ODILON    Chronic pain     LOWER BACK, R HIP, SHOULDERS    Duodenal ulcer 2002    HOSPITALIZED AND TREATED WITH MEDICATION    GERD (gastroesophageal reflux disease)     H/O blood clots 2007    TO RIGHT EYE    Hypertension     Iritis 2007    both eyes    Low back pain     back pain/spasm    Thyroid disease     hypothroid    Ulcerative colitis 2002      Past Surgical History:   Procedure Laterality Date    COLONOSCOPY N/A 5/25/2016    COL[facility] performed [facility] ENDOSCOPY    HX ACL RECONSTRUCTION  2003    right    HX BREAST BIOPSY Left 12/30/2016    MARKER     HX CHOLECYSTECTOMY  2008    HX ENDOSCOPY      HX GASTRIC BYPASS  2011    LAP BAND and removal    HX HEENT  2003    benign mass removed from uvula    HX KNEE REPLACEMENT Right 05/01/2013    right    HX KNEE REPLACEMENT  05/31/2017    Left    HX ORTHOPAEDIC  2009    exc mass right elbow - benign    HX ORTHOPAEDIC Right 08/03/2017    Knee reconstruction and excision of fibroma    HX OTHER SURGICAL  2003    growth removed from back of leg - benign    HX ROTATOR CUFF REPAIR Right 03/2018    COMPLETE, HAS 3 ANCHORS    HX ISABELL AND BSO  2004    HX TONSILLECTOMY  2003    done at the time mass removed from uvula    VA LAP, PLACE ADJUST GASTR BAND  2008    fluid removed/band removed     Current Outpatient Medications   Medication Sig Dispense Refill    mometasone (ELOCON) 0.1 % topical cream APPLY TO AFFECTED AREA(S) TOPICALLY DAILY 30 g 3    hydrocortisone (Proctosol HC) 2.5 % rectal cream Insert  into rectum four (4) times daily. 30 g 0    ALPRAZolam (XANAX) 0.5 mg tablet Take 0.5-1 Tabs by mouth two (2) times daily as needed for Anxiety. Max Daily Amount: 1 mg. 30 Tab 2    omeprazole (PRILOSEC) 40 mg capsule Take 1 Cap by mouth daily.  90 Cap 3    valsartan (DIOVAN) 160 mg tablet TAKE ONE TABLET BY MOUTH DAILY 30 Tab 11    Unithroid 88 mcg tablet Take 88 mcg by mouth. Once daily      loratadine 10 mg cap Indications: prn      docusate sodium 100 mg tab Take 100 mg by mouth daily. As needed      dicyclomine (BENTYL) 10 mg capsule Take 10 mg by mouth three (3) times daily as needed.  diclofenac EC (VOLTAREN) 75 mg EC tablet Take 75 mg by mouth two (2) times daily as needed.  calcium carbonate (TUMS) 200 mg calcium (500 mg) chew Take 1 Tab by mouth as needed.  ketorolac (ACULAR) 0.5 % ophthalmic solution Administer 2 Drops to both eyes four (4) times daily as needed. 1 Bottle 0    ondansetron (ZOFRAN ODT) 4 mg disintegrating tablet Take 2 Tabs by mouth every eight (8) hours as needed for Nausea. (Patient not taking: Reported on 7/1/2021) 12 Tab 0     Allergies   Allergen Reactions    Adhesive Unknown (comments)    Adhesive Tape-Silicones Itching     Blisters, bumps. -long term application    Lidoderm [Lidocaine] Rash     Patch-adhesive patch. Allergic to the adhesive - long term use. Not allergic to lidoderm.  Other Medication Rash     dermabond - blisters       Family History   Problem Relation Age of Onset    Stroke Mother         cerebral hemorrhage    Post-op Nausea/Vomiting Mother     Hypertension Mother     Heart Disease Father         PACEMAKER    Heart Attack Father 68    Post-op Nausea/Vomiting Sister     No Known Problems Brother     No Known Problems Sister     Other Brother         PRE-DIABETIC    Cancer Paternal Grandmother         colon    Thyroid Disease Daughter         HYPO    Post-op Cognitive Dysfunction Daughter      Social History     Tobacco Use    Smoking status: Never Smoker    Smokeless tobacco: Never Used   Substance Use Topics    Alcohol use:  Yes     Alcohol/week: 0.0 - 1.0 standard drinks     Comment: 2 drinks per month         Perez Dixon MD

## 2021-07-01 NOTE — PROGRESS NOTES
Identified pt with two pt identifiers(name and ). Reviewed record in preparation for visit and have obtained necessary documentation. Chief Complaint   Patient presents with    Follow-up    Hypertension    Spasms    Urinary Frequency        Vitals:    21 1303   BP: (!) 149/72   Pulse: (!) 47   Temp: 98.7 °F (37.1 °C)   TempSrc: Temporal   Weight: 201 lb (91.2 kg)   Height: 5' 4\" (1.626 m)   PainSc:   6   PainLoc: Chest       Health Maintenance Due   Topic    Hepatitis C Screening     Shingrix Vaccine Age 49> (1 of 2)    Medicare Yearly Exam     Bone Densitometry (Dexa) Screening     Pneumococcal 65+ years (1 of 1 - PPSV23)    Colorectal Cancer Screening Combo        Coordination of Care Questionnaire:  :   1) Have you been to an emergency room, urgent care, or hospitalized since your last visit? If yes, where when, and reason for visit? No      2. Have seen or consulted any other health care provider since your last visit? If yes, where when, and reason for visit?   yes

## 2021-07-02 ENCOUNTER — TELEPHONE (OUTPATIENT)
Dept: NEUROLOGY | Age: 66
End: 2021-07-02

## 2021-07-02 NOTE — TELEPHONE ENCOUNTER
----- Message from Bubba Contreras sent at 7/2/2021  2:25 PM EDT -----  Regarding: Dr. Leonie Quevedo  General Message/Vendor Calls    Caller's first and last name: Patient       Reason for call: Would like to have a phone number to schedule the Seizure test and also the name of the test, she has not heard anything concerning scheduling the test.      Callback required yes/no and why: Yes      Best contact number(s):[phone]      Details to clarify the request:      Bubba Contreras

## 2021-07-02 NOTE — TELEPHONE ENCOUNTER
Spoke w/ pt. Verified. Inform pt that referral has been sent to [facility]. [facility] will be calling her regards to scheduling her admission.

## 2021-07-08 ENCOUNTER — TELEPHONE (OUTPATIENT)
Dept: NEUROLOGY | Age: 66
End: 2021-07-08

## 2021-07-08 NOTE — TELEPHONE ENCOUNTER
Patient is calling because she hasn't heard from anyone about the 3 day stay in the hospital.  It has been a week and she wanted to make sure someone was going to call her.

## 2021-07-08 NOTE — TELEPHONE ENCOUNTER
Called pt. Verified. Inform pt that [facility] is on vocation and I will send [facility] a friendly reminder to contact pt regarding scheduling her admission to EMU.

## 2021-07-22 ENCOUNTER — TELEPHONE (OUTPATIENT)
Dept: NEUROLOGY | Age: 66
End: 2021-07-22

## 2021-07-22 NOTE — TELEPHONE ENCOUNTER
Called pt. Verified. Pt c/o dizziness, wavy feeling in head and overwhelming odor. She states its not bad right now. She states that she has been having these symptoms since she started seeing Dr. Wily Cantu and Dr. Leeroy Pompa. Pt is asking if there anything she can do about the feeling or the smell or is there anything that Dr. Wily Cantu can prescribe to her. Pt states that she feels this way everyday.

## 2021-07-22 NOTE — TELEPHONE ENCOUNTER
Pt calling stating she is having dizziness, she described it as a \"wavy feeling\" in her head and an overwhelming odor she keeps smelling that is making her nauseous. She is concerned and would like to know if there is anything she can do.  Please call

## 2021-07-23 NOTE — TELEPHONE ENCOUNTER
Called pt. Verified. Inform pt that Dr. Delisa Barr do not know what to give her for the spells since she do not know what they are yet. Also inform pt that she might want to contact [facility] on Monday to see if she had any cancellation for an earlier spot for EMU admission. Pt verbalizes understanding.

## 2021-07-23 NOTE — TELEPHONE ENCOUNTER
Peña Tolliver - I do not know what I could give her for these spells since we do not know what they are yet. I believe she has admission to the EMU scheduled for 8/16/21. She might give [facility] a call on Monday at [phone] to see if she has had a cancellation for an earlier spot.

## 2021-08-02 ENCOUNTER — TELEPHONE (OUTPATIENT)
Dept: NEUROLOGY | Age: 66
End: 2021-08-02

## 2021-08-02 NOTE — TELEPHONE ENCOUNTER
Patient calling to let the doctor know she feels like something is crawling across her forehead and head, sometimes it is on her face as well. It feels like ants or something. She doesn't want people to think she is crazy but it just feels like something moving over her.

## 2021-08-09 NOTE — TELEPHONE ENCOUNTER
Called pt. Verified. Pt states that she is still having crawling feeling. It goes on throughout the day. Pt notice this feeling mostly early morning. It's been going on for a long time but pt states that she never mentioned it to Dr. Driss Davis and that she wished that she have.

## 2021-08-11 NOTE — TELEPHONE ENCOUNTER
Janneth - Please call pt: Thanks for the update. I look forward to seeing what her EMU monitoring next week reveals.

## 2021-08-12 NOTE — TELEPHONE ENCOUNTER
Called pt. Verified. Inform pt that Dr. Murdock [facility] states thanks for the update and that she look forward to seeing what her EMU monitoring next week reveals. Pt verbalizes understanding.

## 2021-08-16 ENCOUNTER — TELEPHONE (OUTPATIENT)
Dept: INTERNAL MEDICINE CLINIC | Age: 66
End: 2021-08-16

## 2021-08-16 NOTE — TELEPHONE ENCOUNTER
Attempted to call patient in regard to her finger infection.  No answer, LMOM advising patient that she would need to come in to be seen as we need to see the affected finger and may need to send out a culture for abx sensitivity and would eval.

## 2021-09-07 ENCOUNTER — HOSPITAL ENCOUNTER (INPATIENT)
Age: 66
LOS: 2 days | Discharge: HOME | DRG: 103 | End: 2021-09-09
Attending: PSYCHIATRY & NEUROLOGY | Admitting: PSYCHIATRY & NEUROLOGY
Payer: MEDICARE

## 2021-09-07 ENCOUNTER — APPOINTMENT (OUTPATIENT)
Dept: NEUROLOGY | Age: 66
DRG: 103 | End: 2021-09-07
Payer: MEDICARE

## 2021-09-07 DIAGNOSIS — R40.4 NONSPECIFIC PAROXYSMAL SPELL: ICD-10-CM

## 2021-09-07 LAB
ALBUMIN SERPL-MCNC: 3.3 G/DL (ref 3.5–5)
ALBUMIN/GLOB SERPL: 1 {RATIO} (ref 1.1–2.2)
ALP SERPL-CCNC: 66 U/L (ref 45–117)
ALT SERPL-CCNC: 20 U/L (ref 12–78)
AMPHET UR QL SCN: NEGATIVE
ANION GAP SERPL CALC-SCNC: 5 MMOL/L (ref 5–15)
AST SERPL-CCNC: 44 U/L (ref 15–37)
BARBITURATES UR QL SCN: NEGATIVE
BASOPHILS # BLD: 0 K/UL (ref 0–0.1)
BASOPHILS NFR BLD: 1 % (ref 0–1)
BENZODIAZ UR QL: NEGATIVE
BILIRUB SERPL-MCNC: 1.1 MG/DL (ref 0.2–1)
BUN SERPL-MCNC: 14 MG/DL (ref 6–20)
BUN/CREAT SERPL: 17 (ref 12–20)
CALCIUM SERPL-MCNC: 8.4 MG/DL (ref 8.5–10.1)
CANNABINOIDS UR QL SCN: NEGATIVE
CHLORIDE SERPL-SCNC: 108 MMOL/L (ref 97–108)
CO2 SERPL-SCNC: 26 MMOL/L (ref 21–32)
COCAINE UR QL SCN: NEGATIVE
CREAT SERPL-MCNC: 0.82 MG/DL (ref 0.55–1.02)
DIFFERENTIAL METHOD BLD: ABNORMAL
DRUG SCRN COMMENT,DRGCM: NORMAL
EOSINOPHIL # BLD: 0.1 K/UL (ref 0–0.4)
EOSINOPHIL NFR BLD: 4 % (ref 0–7)
ERYTHROCYTE [DISTWIDTH] IN BLOOD BY AUTOMATED COUNT: 13.6 % (ref 11.5–14.5)
GLOBULIN SER CALC-MCNC: 3.3 G/DL (ref 2–4)
GLUCOSE SERPL-MCNC: 97 MG/DL (ref 65–100)
HCT VFR BLD AUTO: 39.2 % (ref 35–47)
HGB BLD-MCNC: 12.4 G/DL (ref 11.5–16)
IMM GRANULOCYTES # BLD AUTO: 0 K/UL (ref 0–0.04)
IMM GRANULOCYTES NFR BLD AUTO: 0 % (ref 0–0.5)
LYMPHOCYTES # BLD: 1.9 K/UL (ref 0.8–3.5)
LYMPHOCYTES NFR BLD: 50 % (ref 12–49)
MCH RBC QN AUTO: 30.7 PG (ref 26–34)
MCHC RBC AUTO-ENTMCNC: 31.6 G/DL (ref 30–36.5)
MCV RBC AUTO: 97 FL (ref 80–99)
METHADONE UR QL: NEGATIVE
MONOCYTES # BLD: 0.3 K/UL (ref 0–1)
MONOCYTES NFR BLD: 8 % (ref 5–13)
NEUTS SEG # BLD: 1.3 K/UL (ref 1.8–8)
NEUTS SEG NFR BLD: 37 % (ref 32–75)
NRBC # BLD: 0 K/UL (ref 0–0.01)
NRBC BLD-RTO: 0 PER 100 WBC
OPIATES UR QL: NEGATIVE
PCP UR QL: NEGATIVE
PLATELET # BLD AUTO: 189 K/UL (ref 150–400)
PMV BLD AUTO: 10 FL (ref 8.9–12.9)
POTASSIUM SERPL-SCNC: 6 MMOL/L (ref 3.5–5.1)
PROT SERPL-MCNC: 6.6 G/DL (ref 6.4–8.2)
RBC # BLD AUTO: 4.04 M/UL (ref 3.8–5.2)
RBC MORPH BLD: ABNORMAL
SODIUM SERPL-SCNC: 139 MMOL/L (ref 136–145)
WBC # BLD AUTO: 3.6 K/UL (ref 3.6–11)

## 2021-09-07 PROCEDURE — 74011250636 HC RX REV CODE- 250/636: Performed by: PSYCHIATRY & NEUROLOGY

## 2021-09-07 PROCEDURE — 65660000000 HC RM CCU STEPDOWN

## 2021-09-07 PROCEDURE — 36415 COLL VENOUS BLD VENIPUNCTURE: CPT

## 2021-09-07 PROCEDURE — 80307 DRUG TEST PRSMV CHEM ANLYZR: CPT

## 2021-09-07 PROCEDURE — 95714 VEEG EA 12-26 HR UNMNTR: CPT | Performed by: PSYCHIATRY & NEUROLOGY

## 2021-09-07 PROCEDURE — 85025 COMPLETE CBC W/AUTO DIFF WBC: CPT

## 2021-09-07 PROCEDURE — 80053 COMPREHEN METABOLIC PANEL: CPT

## 2021-09-07 PROCEDURE — 99223 1ST HOSP IP/OBS HIGH 75: CPT | Performed by: PSYCHIATRY & NEUROLOGY

## 2021-09-07 PROCEDURE — 95720 EEG PHY/QHP EA INCR W/VEEG: CPT | Performed by: PSYCHIATRY & NEUROLOGY

## 2021-09-07 PROCEDURE — 74011250637 HC RX REV CODE- 250/637: Performed by: PSYCHIATRY & NEUROLOGY

## 2021-09-07 RX ORDER — SODIUM CHLORIDE 0.9 % (FLUSH) 0.9 %
5-40 SYRINGE (ML) INJECTION EVERY 8 HOURS
Status: DISCONTINUED | OUTPATIENT
Start: 2021-09-07 | End: 2021-09-09 | Stop reason: HOSPADM

## 2021-09-07 RX ORDER — ENOXAPARIN SODIUM 100 MG/ML
40 INJECTION SUBCUTANEOUS EVERY 24 HOURS
Status: DISCONTINUED | OUTPATIENT
Start: 2021-09-07 | End: 2021-09-09 | Stop reason: HOSPADM

## 2021-09-07 RX ORDER — DOCUSATE SODIUM 100 MG/1
100 CAPSULE, LIQUID FILLED ORAL
Status: DISCONTINUED | OUTPATIENT
Start: 2021-09-07 | End: 2021-09-09 | Stop reason: HOSPADM

## 2021-09-07 RX ORDER — DICYCLOMINE HYDROCHLORIDE 10 MG/1
10 CAPSULE ORAL
Status: DISCONTINUED | OUTPATIENT
Start: 2021-09-07 | End: 2021-09-09 | Stop reason: HOSPADM

## 2021-09-07 RX ORDER — SODIUM CHLORIDE 0.9 % (FLUSH) 0.9 %
5-40 SYRINGE (ML) INJECTION AS NEEDED
Status: DISCONTINUED | OUTPATIENT
Start: 2021-09-07 | End: 2021-09-09 | Stop reason: HOSPADM

## 2021-09-07 RX ORDER — LORAZEPAM 2 MG/ML
2 INJECTION INTRAMUSCULAR
Status: DISCONTINUED | OUTPATIENT
Start: 2021-09-07 | End: 2021-09-09 | Stop reason: HOSPADM

## 2021-09-07 RX ORDER — LOSARTAN POTASSIUM 50 MG/1
100 TABLET ORAL DAILY
Status: DISCONTINUED | OUTPATIENT
Start: 2021-09-08 | End: 2021-09-09 | Stop reason: HOSPADM

## 2021-09-07 RX ORDER — IBUPROFEN 600 MG/1
600 TABLET ORAL
Status: DISCONTINUED | OUTPATIENT
Start: 2021-09-07 | End: 2021-09-09 | Stop reason: HOSPADM

## 2021-09-07 RX ORDER — PANTOPRAZOLE SODIUM 40 MG/1
40 TABLET, DELAYED RELEASE ORAL
Status: DISCONTINUED | OUTPATIENT
Start: 2021-09-08 | End: 2021-09-09 | Stop reason: HOSPADM

## 2021-09-07 RX ORDER — LEVOTHYROXINE SODIUM 88 UG/1
88 TABLET ORAL
Status: DISCONTINUED | OUTPATIENT
Start: 2021-09-08 | End: 2021-09-09 | Stop reason: HOSPADM

## 2021-09-07 RX ORDER — ACETAMINOPHEN 325 MG/1
650 TABLET ORAL
Status: DISCONTINUED | OUTPATIENT
Start: 2021-09-07 | End: 2021-09-09 | Stop reason: HOSPADM

## 2021-09-07 RX ADMIN — Medication 10 ML: at 13:22

## 2021-09-07 RX ADMIN — IBUPROFEN 600 MG: 600 TABLET, FILM COATED ORAL at 12:56

## 2021-09-07 RX ADMIN — ACETAMINOPHEN 650 MG: 325 TABLET ORAL at 20:41

## 2021-09-07 RX ADMIN — ACETAMINOPHEN 650 MG: 325 TABLET ORAL at 16:25

## 2021-09-07 RX ADMIN — ENOXAPARIN SODIUM 40 MG: 40 INJECTION SUBCUTANEOUS at 13:00

## 2021-09-07 RX ADMIN — Medication 10 ML: at 21:41

## 2021-09-07 NOTE — PROGRESS NOTES
Medicare pt has received, reviewed, and signed 1st IM letter informing them of their right to appeal the discharge. Signed copy has been placed on pt bedside chart.       Slick Sheets RN, CRM

## 2021-09-08 PROCEDURE — 95714 VEEG EA 12-26 HR UNMNTR: CPT | Performed by: PSYCHIATRY & NEUROLOGY

## 2021-09-08 PROCEDURE — 99232 SBSQ HOSP IP/OBS MODERATE 35: CPT | Performed by: PSYCHIATRY & NEUROLOGY

## 2021-09-08 PROCEDURE — 74011250637 HC RX REV CODE- 250/637: Performed by: PSYCHIATRY & NEUROLOGY

## 2021-09-08 PROCEDURE — 65660000000 HC RM CCU STEPDOWN

## 2021-09-08 PROCEDURE — 74011250636 HC RX REV CODE- 250/636: Performed by: PSYCHIATRY & NEUROLOGY

## 2021-09-08 PROCEDURE — 95720 EEG PHY/QHP EA INCR W/VEEG: CPT | Performed by: PSYCHIATRY & NEUROLOGY

## 2021-09-08 RX ADMIN — ENOXAPARIN SODIUM 40 MG: 40 INJECTION SUBCUTANEOUS at 12:32

## 2021-09-08 RX ADMIN — IBUPROFEN 600 MG: 600 TABLET, FILM COATED ORAL at 01:15

## 2021-09-08 RX ADMIN — IBUPROFEN 600 MG: 600 TABLET, FILM COATED ORAL at 10:06

## 2021-09-08 RX ADMIN — Medication 10 ML: at 22:00

## 2021-09-08 RX ADMIN — PANTOPRAZOLE SODIUM 40 MG: 40 TABLET, DELAYED RELEASE ORAL at 07:18

## 2021-09-08 RX ADMIN — LEVOTHYROXINE SODIUM 88 MCG: 88 TABLET ORAL at 07:18

## 2021-09-08 RX ADMIN — LOSARTAN POTASSIUM 100 MG: 50 TABLET, FILM COATED ORAL at 09:57

## 2021-09-08 RX ADMIN — Medication 10 ML: at 06:45

## 2021-09-08 NOTE — H&P
[address] [facility]  HISTORY AND PHYSICAL    Name:  Chantell Lovell  MR#:  [id number]  :  1955  ACCOUNT #:  [de-identified]  ADMIT DATE:  2021      HISTORY OF PRESENT ILLNESS:  This is a 50-year-old right-handed female with spells that began in 2019 in which she gets an overwhelming smell that makes her nauseated, then has a wavy feeling in her head and feels like she might pass out, and a crawling feeling in her scalp and face. She is unaware of any loss of awareness or loss of consciousness, but her sister told her that she will stop talking during some of these spells and her children have told her that she will talk funny during them. They can occur multiple times a day and now occurring every 20-30 minutes. Her last spell prior to admission was last night and this morning. In addition, she has headaches, which may or may not occur in association with these spells. No clear triggers. She also notes for the last 2 years she has had a change in taste and smell without obvious etiology found. She has also had intermittent bradycardia into the 40s associated with the wavy feeling, but not in association with the smells. MRI of the brain with and without contrast 2021 was unremarkable. EEG 2021 was normal, but she did not have typical spells during the EEG, therefore a 24-hour ambulatory EEG was ordered and performed on 2021, had significant artifact in O1 and O2 throughout the recording. According to the patient's diary, she had multiple episodes of smelling bad smells, feeling lightheaded, headaches and chest fluttering and no EEG correlates were seen. She was started on amitriptyline for possible migraines and had no change. She was referred to the [address] [place] [address] and [facility] and their assessment was that she should be reassessed for seizures and she was referred to the EMU. PAST MEDICAL HISTORY:  1. Arrhythmia. 2.  Osteoarthritis.   3. Esophageal spasms. 4.  Chronic low back pain. 5.  Duodenal ulcer. 6.  Gastroesophageal reflux disease. 7.  History of blood clot to her right eye.  8.  Hypertension. 9.  Iritis in both eyes. 10.  Hypothyroidism. 11.  Ulcerative colitis. 12. ACL reconstruction  13. Cholecystectomy. 14.  Gastric bypass, lap band removal.  15.  Bilateral knee replacements. 16.  Excision of benign masses from her right elbow and from her uvula. 17.  Rotator cuff repair. 18.  Total abdominal hysterectomy and bilateral salpingo-oophorectomy. 19.  Tonsillectomy. REVIEW OF SYSTEMS:  As per past medical history, HPI, otherwise reviewed and negative. MEDICATIONS AT HOME:  Bentyl, Prilosec, valsartan 80 mg a day, Unithroid 88 mcg a day, loratadine, docusate, Zofran, diclofenac, Tums, ketorolac eyedrops. ALLERGIES:  ADHESIVE TAPE, LIDODERM. SOCIAL HISTORY:  The patient lives in [facility]. 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 She drinks 2 alcoholic beverages a month. No smoking. No drug use. FAMILY HISTORY:  Mom with history of cerebral hemorrhage, hypertension. Dad with heart disease, pacemaker. Brother who is prediabetic. Daughter with hypothyroidism. PHYSICAL EXAMINATION:  VITAL SIGNS:  Blood pressure 144/75, pulse 51, respiratory rate 15, saturating 100% on room air, temperature is 98.1, BMI of 34.5. GENERAL:  She is a well-nourished, well-developed, healthy-appearing female, lying in bed, in no distress. HEART:  Has a regular rate and rhythm, no murmurs. EXTREMITIES:  Warm. No edema. 2+ radial pulses. NEUROLOGIC EXAMINATION:  Mental Status:  She is alert, oriented x4. Speech and language are intact. Attention, memory, and fund of knowledge appropriate. Cranial Nerves:  No asymmetry. No ptosis. Extraocular eye movements intact without diplopia or nystagmus. Visual fields full. Pupils round and reactive.   Motor exam is 5/5 throughout. No pronator drift. No tremor. Sensory exam is intact to light touch throughout. Reflexes are symmetric. Toes downgoing. Coordination:  Finger-to-nose, rapid alternating movements intact. Gait not assessed at this time. ASSESSMENT AND PLAN:  This is a 25-year-old right-handed female with onset of spells in 11/2019, start with overwhelming smell that makes her nauseated followed by a wavy feeling in her head and she feels like she might pass out and has a crawling sensation in her scalp and face. These are occurring multiple times a day every 20-30 minutes with reports of the patient not talking during these spells or saying something funny during these spells. Her workup has included MRI of the brain with and without contrast, routine EEG, and a 24-hour ambulatory EEG without etiology found. The patient is admitted to the EMU for continuous video EEG to help make a definitive diagnosis, possible seizure versus nonepileptic spells of psychogenic origin versus nonepileptic spells of other etiologies such as bradycardia or arrhythmia. Will continue the patient on her home medications. Will hold off on sleep depriving her since they are occurring so frequently it might not be necessary. Will check her CMP, CBC and an urine drug screen.         MD MAUREEN [id number]  D:  09/07/2021 21:57  T:  09/08/2021 2:32  JOB #:  [id number]

## 2021-09-08 NOTE — PROGRESS NOTES
Bedside and Verbal shift change report given to Colby Grajeda RN (oncoming nurse) by Ashish Lee RN (offgoing nurse). Report included the following information SBAR, Kardex, Intake/Output, MAR, Recent Results, Med Rec Status, Cardiac Rhythm SR and Dual Neuro Assessment. I agree with Calixto Edmond RN's documentation as her preceptor.

## 2021-09-08 NOTE — PROGRESS NOTES
Bedside shift change report given to Noemy Cortez (oncoming nurse) by Carol Granda (offgoing nurse). Report included the following information SBAR, Kardex, Intake/Output, MAR, Cardiac Rhythm NSR, Quality Measures and Dual Neuro Assessment.

## 2021-09-08 NOTE — PROGRESS NOTES
Bedside and Verbal shift change report given to Danni N Sarai Bryan (oncoming nurse) by Zackery Manuel RN (offgoing nurse). Report included the following information SBAR, Kardex, Intake/Output, MAR, Recent Results, Cardiac Rhythm NSR-SB and Dual Neuro Assessment.

## 2021-09-09 VITALS
WEIGHT: 200.6 LBS | DIASTOLIC BLOOD PRESSURE: 84 MMHG | RESPIRATION RATE: 18 BRPM | HEART RATE: 47 BPM | BODY MASS INDEX: 34.43 KG/M2 | SYSTOLIC BLOOD PRESSURE: 159 MMHG | OXYGEN SATURATION: 100 % | TEMPERATURE: 98 F

## 2021-09-09 PROCEDURE — 95720 EEG PHY/QHP EA INCR W/VEEG: CPT | Performed by: PSYCHIATRY & NEUROLOGY

## 2021-09-09 PROCEDURE — 74011250637 HC RX REV CODE- 250/637: Performed by: PSYCHIATRY & NEUROLOGY

## 2021-09-09 PROCEDURE — 74011250636 HC RX REV CODE- 250/636: Performed by: PSYCHIATRY & NEUROLOGY

## 2021-09-09 PROCEDURE — 99238 HOSP IP/OBS DSCHRG MGMT 30/<: CPT | Performed by: PSYCHIATRY & NEUROLOGY

## 2021-09-09 PROCEDURE — 95714 VEEG EA 12-26 HR UNMNTR: CPT | Performed by: PSYCHIATRY & NEUROLOGY

## 2021-09-09 RX ORDER — TOPIRAMATE 50 MG/1
TABLET, FILM COATED ORAL
Qty: 180 TABLET | Refills: 1 | Status: SHIPPED | OUTPATIENT
Start: 2021-09-09 | End: 2021-12-10

## 2021-09-09 RX ADMIN — Medication 10 ML: at 07:10

## 2021-09-09 RX ADMIN — LEVOTHYROXINE SODIUM 88 MCG: 88 TABLET ORAL at 07:08

## 2021-09-09 RX ADMIN — Medication 10 ML: at 14:27

## 2021-09-09 RX ADMIN — PANTOPRAZOLE SODIUM 40 MG: 40 TABLET, DELAYED RELEASE ORAL at 07:08

## 2021-09-09 RX ADMIN — LOSARTAN POTASSIUM 100 MG: 50 TABLET, FILM COATED ORAL at 08:38

## 2021-09-09 RX ADMIN — ENOXAPARIN SODIUM 40 MG: 40 INJECTION SUBCUTANEOUS at 11:27

## 2021-09-09 NOTE — PROGRESS NOTES
Bedside and Verbal shift change report given to Danni Bryan (oncoming nurse) by Henry Meredith RN (offgoing nurse). Report included the following information SBAR, Kardex, Intake/Output, MAR, Recent Results, Cardiac Rhythm NSR-SB and Dual Neuro Assessment.

## 2021-09-09 NOTE — DISCHARGE SUMMARY
Epilepsy Monitoring Unit Neurology Discharge Summary     Patient ID:  Boris Tesfaye  [id number]  36 y.o.  1955    Admit Date: 9/7/2021    Discharge Date: 9/9/2021      Admission Diagnoses: Nonspecific paroxysmal spells     Discharge Diagnoses: Same    Disposition: home    Discharged Condition: good    Hospital Summary:  Pt is a 69yo RH female with onset of spells in 11/2019, that start with overwhelming smell that makes her nauseated followed by a wavy feeling in her head and she feels like she might pass out, but never has, and has a crawling sensation in her scalp and face. These are occurring multiple times a day every 20-30 minutes with reports of the patient not talking during these spells or saying something funny during these spells. Her workup has included MRI of the brain with and without contrast, routine EEG, and a 24-hour ambulatory EEG without etiology found. The patient is admitted to the EMU for continuous video EEG to help make a definitive diagnosis, possible seizure versus HARDIK of psychogenic origin versus HARDIK of other etiology, such as bradycardia or arrhythmia. She was continued on home medications. She had multiple spells of smelling an obnoxious smell associated with nausea, but did not have the wavy feeling in her head associated with these events until day 3. There were no EEG correlates associated with these events, no interictal epileptiform discharges, or electrographic seizures seen. Given that she has headaches, at times associated with these spells, she was started on Topamax titrating to 50 mg twice daily to see if perhaps her symptoms are related to migraine. Patient remained stable throughout her stay in the EMU.     Patient Active Problem List    Diagnosis Date Noted    Nonspecific paroxysmal spell 09/07/2021    Complete tear of left rotator cuff 03/22/2019    Candidiasis 05/24/2018    Complete tear of right rotator cuff 03/16/2018    Overactive bladder 01/22/2018  Urge incontinence of urine 01/22/2018    Postoperative seroma of skin after non-dermatologic procedure 08/28/2017    Obesity (BMI 30.0-34.9) 08/03/2017    Mechanical complication of knee prosthesis ([facility] [address] 08/02/2017    Obesity 06/01/2017    Primary osteoarthritis of left knee 05/31/2017    Advance directive discussed with patient 04/04/2017    Right lower quadrant pain 01/12/2017    Vaginal odor 01/12/2017    Pain in pelvis 03/03/2016    Acute vaginitis 01/27/2016    Family history of malignant neoplasm of colon 08/27/2015    Increased frequency of urination 08/27/2015    Iritis 06/03/2015    UC (ulcerative colitis) ([facility] [address] 01/16/2015    Hypothyroid 05/15/2014    Abdominal pain, left upper quadrant 04/07/2014    Dysphagia 04/07/2014    Right knee DJD 04/30/2013    Blisters of multiple sites 10/25/2011     Past Medical History:   Diagnosis Date    Arrhythmia 2018    \"PALPATATIONS, FLUTTERING, MEDS PER DR. ZAYAS\"    Arthritis     knees, back, SHOULDERS, R HIP    Chest pain 03/16/2018    PT STATES SHE'S BEEN ASSESSED FOR AND DETERMINED TO BE ESOPHAGEAL SPASMS PER FREYA LUNA AND DR.MCGROARTY ODILON    Chronic pain     LOWER BACK, R HIP, SHOULDERS    Duodenal ulcer 2002    HOSPITALIZED AND TREATED WITH MEDICATION    GERD (gastroesophageal reflux disease)     H/O blood clots 2007    TO RIGHT EYE    Hypertension     Iritis 2007    both eyes    Low back pain     back pain/spasm    Thyroid disease     hypothroid    Ulcerative colitis 2002      Family History   Problem Relation Age of Onset    Stroke Mother         cerebral hemorrhage    Post-op Nausea/Vomiting Mother     Hypertension Mother     Heart Disease Father         PACEMAKER    Heart Attack Father 68    Post-op Nausea/Vomiting Sister     No Known Problems Brother     No Known Problems Sister     Other Brother         PRE-DIABETIC    Cancer Paternal Grandmother         colon    Thyroid Disease Daughter         HYPO    Post-op Cognitive Dysfunction Daughter       Social History     Tobacco Use    Smoking status: Never Smoker    Smokeless tobacco: Never Used   Substance Use Topics    Alcohol use: Yes     Alcohol/week: 0.0 - 1.0 standard drinks     Comment: 2 drinks per month     Past Surgical History:   Procedure Laterality Date    COLONOSCOPY N/A 5/25/2016    COLONOSCOPY performed by Ramana Hicks MD at [address] HX ACL RECONSTRUCTION  2003    right    HX BREAST BIOPSY Left 12/30/2016    MARKER     HX CHOLECYSTECTOMY  2008    HX ENDOSCOPY      HX GASTRIC BYPASS  2011    LAP BAND and removal    HX HEENT  2003    benign mass removed from uvula    HX KNEE REPLACEMENT Right 05/01/2013    right    HX KNEE REPLACEMENT  05/31/2017    Left    HX ORTHOPAEDIC  2009    exc mass right elbow - benign    HX ORTHOPAEDIC Right 08/03/2017    Knee reconstruction and excision of fibroma    HX OTHER SURGICAL  2003    growth removed from back of leg - benign    HX ROTATOR CUFF REPAIR Right 03/2018    COMPLETE, HAS 3 ANCHORS    HX ISABELL AND BSO  2004    HX TONSILLECTOMY  2003    done at the time mass removed from uvula    KS LAP, PLACE ADJUST GASTR BAND  2008    fluid removed/band removed      Prior to Admission medications    Medication Sig Start Date End Date Taking? Authorizing Provider   topiramate (TOPAMAX) 50 mg tablet Take 1 tab by mouth in PM x 2 weeks, then take 1 tab twice a day 9/9/21  Yes Iliana Sofia MD   ALPRAZolam Evert Goldsmith) 0.5 mg tablet TAKE ONE-HALF TO ONE TABLET BY MOUTH TWICE A DAY AS NEEDED FOR ANXIETY *DO NOT EXCEED TWO TABLETS PER DAY*  Patient not taking: Reported on 9/7/2021 8/23/21   Sandi Winkler MD   hydrocortisone (ANUSOL-HC) 2.5 % rectal cream INSERT A THIN LAYER INTO RECTUM FOUR TIMES A DAY  Patient not taking: Reported on 9/7/2021 8/23/21   Sandi Winkler MD   dicyclomine (BENTYL) 10 mg capsule Take 1 Capsule by mouth three (3) times daily as needed for Abdominal Cramps.  8/18/21 Mitali Valente MD   mometasone (ELOCON) 0.1 % topical cream APPLY TO AFFECTED AREA(S) TOPICALLY DAILY  Patient not taking: Reported on 9/7/2021 5/10/21   Antonietta Gomez MD   omeprazole (PRILOSEC) 40 mg capsule Take 1 Cap by mouth daily. 11/12/20   Antonietta Gomez MD   valsartan (DIOVAN) 160 mg tablet TAKE ONE TABLET BY MOUTH DAILY  Patient taking differently: 80 mg. TAKE ONE TABLET BY MOUTH DAILY 10/15/20   Mitali Valente MD   Unithroid 88 mcg tablet Take 88 mcg by mouth. Once daily 3/30/20   Provider, Historical   loratadine 10 mg cap Indications: prn  Patient not taking: No sig reported    Provider, Historical   docusate sodium 100 mg tab Take 100 mg by mouth daily. As needed    Provider, Historical   ondansetron (ZOFRAN ODT) 4 mg disintegrating tablet Take 2 Tabs by mouth every eight (8) hours as needed for Nausea. Patient not taking: Reported on 7/1/2021 3/28/19   Shira Marcial PA-C   diclofenac EC (VOLTAREN) 75 mg EC tablet Take 75 mg by mouth two (2) times daily as needed. Provider, Historical   calcium carbonate (TUMS) 200 mg calcium (500 mg) chew Take 1 Tab by mouth as needed. Provider, Historical   ketorolac (ACULAR) 0.5 % ophthalmic solution Administer 2 Drops to both eyes four (4) times daily as needed. 6/3/15   Mick Alvares MD     Allergies   Allergen Reactions    Adhesive Unknown (comments)    Adhesive Tape-Silicones Itching     Blisters, bumps. -long term application    Lidoderm [Lidocaine] Rash     Patch-adhesive patch. Allergic to the adhesive - long term use. Not allergic to lidoderm.     Other Medication Rash     dermabond - blisters        Discharge Exam:  Exam:  Visit Vitals  BP (!) 159/84   Pulse (!) 47   Temp 98 °F (36.7 °C)   Resp 18   Wt 200 lb 9.6 oz (91 kg)   SpO2 100%   BMI 34.43 kg/m²     Gen: WNWD obese female in NAD  CV: RRR  Lungs: non labored breathing  Neuro: A&O x 3, no dysarthria or aphasia  CN II-XII: PERRL, EOMI, face symmetric, no ptosis    Patient Instructions:   Current Discharge Medication List      START taking these medications    Details   topiramate (TOPAMAX) 50 mg tablet Take 1 tab by mouth in PM x 2 weeks, then take 1 tab twice a day  Qty: 180 Tablet, Refills: 1         CONTINUE these medications which have NOT CHANGED    Details   ALPRAZolam (XANAX) 0.5 mg tablet TAKE ONE-HALF TO ONE TABLET BY MOUTH TWICE A DAY AS NEEDED FOR ANXIETY *DO NOT EXCEED TWO TABLETS PER DAY*  Qty: 30 Tablet, Refills: 1    Associated Diagnoses: Anxiety      hydrocortisone (ANUSOL-HC) 2.5 % rectal cream INSERT A THIN LAYER INTO RECTUM FOUR TIMES A DAY  Qty: 30 g, Refills: 1      dicyclomine (BENTYL) 10 mg capsule Take 1 Capsule by mouth three (3) times daily as needed for Abdominal Cramps. Qty: 30 Capsule, Refills: 0      mometasone (ELOCON) 0.1 % topical cream APPLY TO AFFECTED AREA(S) TOPICALLY DAILY  Qty: 30 g, Refills: 3      omeprazole (PRILOSEC) 40 mg capsule Take 1 Cap by mouth daily. Qty: 90 Cap, Refills: 3      valsartan (DIOVAN) 160 mg tablet TAKE ONE TABLET BY MOUTH DAILY  Qty: 30 Tab, Refills: 11      Unithroid 88 mcg tablet Take 88 mcg by mouth. Once daily      loratadine 10 mg cap Indications: prn      docusate sodium 100 mg tab Take 100 mg by mouth daily. As needed      ondansetron (ZOFRAN ODT) 4 mg disintegrating tablet Take 2 Tabs by mouth every eight (8) hours as needed for Nausea. Qty: 12 Tab, Refills: 0      diclofenac EC (VOLTAREN) 75 mg EC tablet Take 75 mg by mouth two (2) times daily as needed. calcium carbonate (TUMS) 200 mg calcium (500 mg) chew Take 1 Tab by mouth as needed. ketorolac (ACULAR) 0.5 % ophthalmic solution Administer 2 Drops to both eyes four (4) times daily as needed. Qty: 1 Bottle, Refills: 0           Activity: Activity as tolerated  Diet: Regular Diet    Follow-up with Dr. Fiona Dash in 2-3 months. Signed:   Jarrett Nelson MD  9/9/2021  5:14 PM

## 2021-09-09 NOTE — PROGRESS NOTES
I have reviewed discharge instructions with the patient. The patient verbalized understanding. Signed copy placed on chart. PIV removed. Pt requested to walk themselves out.

## 2021-09-09 NOTE — PROGRESS NOTES
Problem: Falls - Risk of  Goal: *Absence of Falls  Description: Document Favio Stubbs Fall Risk and appropriate interventions in the flowsheet.   Outcome: Resolved/Met  Note: Fall Risk Interventions:            Medication Interventions: Teach patient to arise slowly, Patient to call before getting OOB                   Problem: Patient Education: Go to Patient Education Activity  Goal: Patient/Family Education  Outcome: Resolved/Met

## 2021-09-09 NOTE — PROGRESS NOTES
Neurology Progress Note     NAME: Ephraim Hill   :     MRN:  [id number]   DATE:  2021    Assessment:     Active Problems:    Nonspecific paroxysmal spell (2021)      Pt is a 67yo RH female with onset of spells in 2019, start as overwhelming smell that makes her nauseated followed by a wavy feeling in her head and she feels like she might pass out and a crawling sensation in her scalp and face. These are occurring multiple times a day every 20-30 minutes. Pt has had unresponsiveness during these spells or may say something abnormal during these spells. MRI of the brain with and without contrast, routine EEG, and a 24-hour ambulatory EEG without etiology found. CMP, CBC, and UDS are unremarkable. No typical events on Day 1 in the EMU. Had several spells of smelling something noxious, on one occasion associated with HA, and on another, crawling sensation, but has not had a full spell. Plan:   -Continue video EEG monitoring.   -Pt is asked to stay up though dinner this evening    Subjective:   Pt reports multiple spells overnight, but none with all of her typical sxs.      Objective:   Chart reviewed since last seen    Current Facility-Administered Medications   Medication Dose Route Frequency    sodium chloride (NS) flush 5-40 mL  5-40 mL IntraVENous Q8H    sodium chloride (NS) flush 5-40 mL  5-40 mL IntraVENous PRN    LORazepam (ATIVAN) injection 2 mg  2 mg IntraVENous Q5MIN PRN    LORazepam (ATIVAN) injection 2 mg  2 mg IntraMUSCular Q5MIN PRN    acetaminophen (TYLENOL) tablet 650 mg  650 mg Oral Q4H PRN    enoxaparin (LOVENOX) injection 40 mg  40 mg SubCUTAneous Q24H    dicyclomine (BENTYL) capsule 10 mg  10 mg Oral TID PRN    ibuprofen (MOTRIN) tablet 600 mg  600 mg Oral Q8H PRN    docusate sodium (COLACE) capsule 100 mg  100 mg Oral BID PRN    pantoprazole (PROTONIX) tablet 40 mg  40 mg Oral ACB    levothyroxine (Unithroid) tablet 88 mcg (Patient Supplied)  88 mcg Oral ACB    losartan (COZAAR) tablet 100 mg  100 mg Oral DAILY       Visit Vitals  BP (!) 143/72 (BP 1 Location: Right arm, BP Patient Position: At rest)   Pulse (!) 54   Temp 98.1 °F (36.7 °C)   Resp 14   Wt 200 lb 11.2 oz (91 kg)   SpO2 100%   BMI 34.45 kg/m²     Temp (24hrs), Av.9 °F (36.6 °C), Min:97.4 °F (36.3 °C), Max:98.3 °F (36.8 °C)      No intake/output data recorded. No intake/output data recorded. Physical Exam:  General: Well developed well nourished patient in no apparent distress. Cardiac: Regular rate and rhythm with no murmurs. Extremities: 2+ Radial pulses, no cyanosis or edema    Neurological Exam:  Mental Status: Oriented to time, place and person. Speech and language intact. Attention and fund of knowledge appropriate. Normal recent and remote memory. Cranial Nerves:   EOMI, no nystagmus, no ptosis. Facial movement is symmetric. Hearing is intact. Motor:     Reflexes:      Sensory:      Gait:     Cerebellar:           Lab Review No results found for this or any previous visit (from the past 24 hour(s)). Additional comments:  I have reviewed the patient's new clinical lab test results. I have personally reviewed the patient's radiographs. MRI  MRI Results (most recent):  Results from [place] encounter on 21    MRI CERV SPINE WO CONT    Narrative  MRI OF THE CERVICAL SPINE WITHOUT CONTRAST: 2021 7:20 AM    INDICATION: Cervical spinal stenosis. TECHNIQUE: Multiplanar and multisequence MRI was obtained of the cervical spine  without contrast.    COMPARISON: 2012. FINDINGS: Mild levoconvexity is centered around T4. There is degenerative height  loss in C6, with more mild height loss in C4 and C5. Sclerotic degenerative  marrow changes associated at these levels as well.  Bone marrow signal and  vertebral body heights are otherwise normal. The cerebellar tonsils are normal  in position and configuration. Cervical cord signal is normal.. The paraspinal  soft tissues are normal.    C2-C3: No herniation or stenosis. C3-C4: Slight retrolisthesis and disc uncovering cause moderate canal stenosis  and mild cord flattening. Uncovertebral and facet osteoarthritis cause right and  mild left neural foraminal stenosis. Stenoses have increased slightly from 2012. C4-C5: A disc osteophyte complex causes mild canal stenosis. Uncovertebral and  facet osteoarthritis cause left greater than right neural foraminal stenosis. Neural foraminal stenoses have progressed from 2012. C5-C6: A disc osteophyte complex causes mild canal stenosis. Uncovertebral and  facet osteoarthritis cause bilateral neural foraminal stenosis. Neural foraminal  stenoses have progressed from 2012. C6-C7: A disc osteophyte complex causes mild to moderate canal stenosis. Uncovertebral and facet osteoarthritis cause left neural foraminal stenosis. Neural foraminal stenosis has progressed from 2012. C7-T1: A disc osteophyte complex, greatest in the left lateral recess and  foraminal zone, is new from 2012. It causes mild canal stenosis and left neural  foraminal stenosis. T1-T2: A disc osteophyte complex, greatest in left lateral recess and foraminal  zone, is increased from 2012. It causes narrowing of the left lateral recess and  left neural foraminal stenosis. T2-T3: No herniation or stenosis. Mild disc bulge. T3-T4: No herniation or stenosis. Minimal disc osteophyte complex. T4-T5: Anterior osseous bridging across the disc space. No stenosis. Impression  1. Moderate canal stenosis and mild cord flattening C3-C4. 2. Neural foraminal stenoses: Bilateral C4-C6, right C3-C4, left C6-T2.  3. Progression of neural foraminal stenoses from 2012.     Care Plan discussed with:  Patient x   Family    RN x   Care Manager    Consultant/Specialist: Signed: Barbie Koch MD

## 2021-09-10 ENCOUNTER — TELEPHONE (OUTPATIENT)
Dept: NEUROLOGY | Age: 66
End: 2021-09-10

## 2021-09-10 NOTE — TELEPHONE ENCOUNTER
Patient is calling to see when Dr. Craft Neighbours would like her to follow up after the hospitalization. She said she was told to call the office. Please advise of when to schedule.

## 2021-09-13 NOTE — TELEPHONE ENCOUNTER
Janneth - Please put on wait list for 2-3 months, let pt know, that will give Topamax a chance to take effect, takes 6-8 weeks.

## 2021-09-13 NOTE — PROCEDURES
[address]   EMU EEG REPORT    Name:  Stephanie Oneill  MR#:  [id number]  :  1955  ACCOUNT #:  [de-identified]  DATE OF SERVICE:  2021    PROCEDURE:  Continuous video EEG monitoring. HISTORY OF PRESENT ILLNESS:  Briefly, the patient is a 69-year right-handed female with onset of spells in 2019. This started with an overwhelming smell that makes her nauseated, followed by a wavy feeling in her head and she feels like she may pass out, but never has and has a crawling sensation on her scalp and face. These are occurring multiple times a day every 20-30 minutes, in the past, they lasted all day long. The patient has been told she may stop responding during these spells or may say something that sounds funny. Her workup has included MRI of the brain with and without contrast, routine EEG and a 24-hour ambulatory EEG without etiology found. The patient is admitted to the EMU for continuous video EEG in order to make a definitive diagnosis. MEDICATIONS:  Bentyl, Prilosec, valsartan, Unithroid, Loratadine, docusate, Zofran, Voltaren, Tums, ketorolac eye drops. CONDITIONS:  This represents day 1 of EMU monitoring with continuous video EEG for the purposes of spell detection, characterization and localization. This study is performed in accordance with International 10-20 system with one channel devoted to limited EKG. No activating procedures were performed on day one. This study was done during states of wakefulness and sleep. This study began on 2021 at 09:09 and ended on 2021 at [facility]. I have reviewed this record in its entirety. DESCRIPTION:  Upon maximal arousal, the posterior dominant rhythm has a frequency of 11 Hz with an amplitude of 20 mcV, this activity is symmetric in bilateral posterior derivations and attenuated with eye opening.   Normal sleep architecture is seen with stage II sleep recognized by the presence of symmetric vertex waves and sleep spindles. There were no focal abnormalities, epileptiform discharges, or electrographic seizures seen. EVENT ANALYSIS:  1.  At 10:31:06, the patient reports smelling a chemical smell. There were no EEG correlates associated with this event. 2.  At 10:47:16, the patient again noted smelling a bad smell - there were no EEG correlates associated with this event. 3.  At 12:01:16, again the patient reported a noxious smell - there were no EEG correlates associated with this event. 4.  At 16:17:32, the patient reported smelling a cleaning fluid type smell - there were no EEG correlates associated with this event. 5.  At 01:45:13, the patient again noted a bad smell waking her from sleep and the patient complained of a headache - there were no EEG correlates associated with this event. INTERPRETATION:  This is a normal continuous video EEG recording with multiple episodes of the patient noting a bad smell and with one headache but no spells in which she has a wavy feeling or crawling sensation. The patient will continue to be monitored.       Leigh Ga MD      [id number]  D:  09/13/2021 7:27  T:  09/13/2021 12:34  JOB #:  [id number]

## 2021-09-13 NOTE — PROCEDURES
[address] [place]   EMU EEG REPORT    Name:  Robson Ramirez  MR#:  [id number]  :  1955  ACCOUNT #:  [de-identified]  DATE OF SERVICE:  2021    PROCEDURE:  Continuous video EEG monitoring. HISTORY OF PRESENT ILLNESS:  Briefly, the patient is a 78-year-old right-handed female with onset of spells in 2019 in which she has an overwhelming noxious smell that makes her nauseated, followed by a wavy feeling in her head as if she might pass out but she never has and has a crawling sensation in her scalp and face. These events are occurring multiple times a day, currently every 20-30 minutes. The patient's workup has included MRI of the brain with and without contrast, routine EEG, and 24-hour ambulatory EEG without etiology found. The patient is admitted to the EMU for continuous video EEG in order to make a definitive diagnosis. MEDICATIONS AT HOME:  Bentyl, Prilosec, Diovan, Unithroid, loratadine, docusate, Zofran, Voltaren, Tums and ketorolac eye drops. CONDITIONS:  This represents day 2 of EMU monitoring with continuous video EEG for the purposes of spell detection, characterization and localization. This study was performed in accordance with the International 10-20 system with one channel devoted to limited EKG. Photic stimulation was performed as an activating procedure. This study was done during states of wakefulness and sleep. This study began on 2021 at [facility] and ended on 2021 at [facility]. I have reviewed this record in its entirety. DESCRIPTION:  Upon maximal arousal, the posterior dominant rhythm has frequency of 11 Hz and an amplitude of 20 mcV and is symmetric over the bilateral posterior derivations and attenuated with eye opening. Photic stimulation did not significantly alter the tracing. Normal sleep architecture is seen with stage II sleep recognized by the presence of symmetric vertex waves and sleep spindles.   There were no focal abnormalities, epileptiform discharges, or electrographic seizures seen. Of note, the patient's heart rate was in the 40s to 50s throughout the recording, but there was no clear association with any spells. EVENT ANALYSIS:  1.  At 07:16:18, the patient complains of a bad smell and her head feeling heavy. There is no EEG correlate associated with this event. 2.  At 09:45:31 during photic stimulation, the patient noted a noxious smell - there is no EEG correlate associated with this event. 3.  At 13:03:48, the patient complained of a bad smell - no EEG correlate associated with this event. 4.  At 14:22:20, the patient complained of smelling a bad smell and having a wavy feeling in her head - there is no EEG correlate associated with this event. 5.  At 00:16:41, the patient again complained of a bad smell - there was no EEG correlate associated with this event. 6. At 05:53:27, the patient complained of a bad smell, a crawling feeling and lightheadedness - there was no EEG correlate associated with this spell. INTERPRETATION:  This is a normal continuous video EEG recording with several partial events captured, all consisting of a bad smell with various other symptoms including one with the wavy feeling in her head without any EEG correlate or changes on EKG seen in association with these events. The patient will continue to be monitored through day three.       MD MAUREEN [id number]  D:  09/13/2021 7:33  T:  09/13/2021 12:48  JOB #:  [id number]

## 2021-09-13 NOTE — PROCEDURES
[address]   EMU EEG REPORT    Name:  Mariam Pereira  MR#:  [id number]  :  1955  ACCOUNT #:  [de-identified]  DATE OF SERVICE:  2021    PROCEDURE:  Continuous video EEG. HISTORY OF PRESENT ILLNESS:  Briefly, the patient is a 79-year-old right-handed female with onset of spells in 2019 that are concerning for seizure versus nonepileptic spells. They start with an overwhelming smell that makes her nauseated followed by a wavy feeling in her head and she feels like she may pass out but never has, then has a crawling sensation in her scalp and face. These are occurring daily multiple times a day. With some of these events, she has been told she stops responding or may say something that sounds funny. Her workup has included an MRI of the brain with and without contrast, routine EEG, and a 24-hour ambulatory EEG without etiology found. The patient is admitted to the EMU for continuous video EEG to help make a definitive diagnosis. MEDICATIONS:  Bentyl, Prilosec, Diovan, Unithroid, loratadine, docusate, Zofran, Voltaren, Tums, and ketorolac eyedrops. CONDITIONS:  This represents day 3 of EMU monitoring with continuous video EEG for the purposes of spell detection, characterization, and localization. This study was performed in accordance with International 10-20 system with one channel devoted to limited EKG. No activating procedures were performed on day 3. This study was done during states of wakefulness and sleep. This study began on 2021 at [facility] and ended on 2021 at 18:02. I have reviewed this record in its entirety. DESCRIPTION:  Upon maximal arousal, the posterior dominant rhythm has a frequency of 11 Hz with amplitude of 10uV. This activity is symmetric in the bilateral posterior derivations attenuating with eye opening. Normal sleep architecture is seen with stage II sleep recognized by the presence of symmetric vertex waves and sleep spindles. There were no focal abnormalities, epileptiform discharges, or electrographic seizures seen. EVENT ANALYSIS:  1.  12:50:46, the patient complained of a bad smell with wavy feeling in her head and a crawling sensation on her scalp and face - there was no EEG correlate associated with this event. 2.  At 17:10:53, the patient complained of a bad smell - there was no EEG correlate associated with this event. INTERPRETATION:  This is a normal continuous video EEG with a typical spell captured consisting of a noxious smell, wavy feeling in her head like she might pass out, and the crawling sensation on her scalp and face and there was no EEG correlate or EKG changes associated. The patient will be discharged today.         Yamilet Bell MD      [id number]  D:  09/13/2021 7:37  T:  09/13/2021 12:59  JOB #:  [id number]

## 2021-09-14 NOTE — PROGRESS NOTES
Physician Progress Note      PATIENT:               Tavon Lowe  CSN #:                  [id number]  :                       1955  ADMIT DATE:       2021 7:30 AM  Brandon Merchant DATE:        2021 7:05 PM  RESPONDING  PROVIDER #:        Michelle Elliott MD          QUERY TEXT:    Dear Attending,    Patient admitted to EMU,  noted to have nonspecific paroxysmal spells with associated HA and crawling feeling in her scalp and face. If possible, please document in progress notes and discharge summary if you are evaluating and/or treating any of the following: The medical record reflects the following:  Risk Factors: HTN  Clinical Indicators: admitted to EMU for eval of poss seizures vs nonepileptic spells of psychogenic origin vs nonepileptic spells of other etiologies such as bradycardia or arrhythmia  -  PN- No typical events on Day 1 in the EMU. Had several spells of smelling something noxious, on one occasion associated with HA, and on another, crawling sensation, but has not had a full spell. - DCS- She had multiple spells of smelling an obnoxious smell associated with nausea, but did not have the wavy feeling in her head associated with these events until day 3. There were no EEG correlates associated with these events, no interictal epileptiform discharges, or electrographic seizures seen. Given that she has headaches, at times associated with these spells, she was started on Topamax titrating to 50 mg twice daily to see if perhaps her symptoms are related to migraine.   Treatment: EMU monitoring, Topamax 50 mg at discharge      Thank you,    Leif Willams RN  [facility]  [phone]  Options provided:  -- Nonspecific paroxysmal spells due to headache  -- Nonspecific paroxysmal spells due to convulsion  -- Nonspecific paroxysmal spells of unknown etiology  -- Other - I will add my own diagnosis  -- Disagree - Not applicable / Not valid  -- Disagree - Clinically unable to determine / Unknown  -- Refer to Clinical Documentation Reviewer    PROVIDER RESPONSE TEXT:    The nonspecific paroxysmal spells are of unknown etiology.     Query created by: Brandon Dillard on 9/13/2021 11:58 AM      Electronically signed by:  Chucho Crane MD 9/14/2021 11:17 AM

## 2021-09-14 NOTE — TELEPHONE ENCOUNTER
Called pt. Verified. Inform pt that Dr. Yazmin Guardado wants her to been seen in office in 2 to 3 months. Inform pt that she will be added to cancellation list and will be notified when a appt become available. Also stated that Dr. Yazmin Guardado states that will give Topamax a chance to take effect which takes 6-8 weeks. Pt verbalizes understanding.  (Pt placed on cancellation list for appt around 12/13)

## 2021-09-23 RX ORDER — LEVOTHYROXINE SODIUM 88 UG/1
88 TABLET ORAL
Qty: 90 TABLET | Refills: 1 | Status: SHIPPED | OUTPATIENT
Start: 2021-09-23 | End: 2021-12-13 | Stop reason: SDUPTHER

## 2021-09-23 NOTE — TELEPHONE ENCOUNTER
Patient would like to speak with Dr. Linares Fairly regarding Dr. Yazmin Hanson; Unithroid [facility] tabs.        Callback required yes/no and why: Yes, to confirm       Best contact number(s): [phone]       [email or web address]

## 2021-10-04 ENCOUNTER — TELEPHONE (OUTPATIENT)
Dept: NEUROLOGY | Age: 66
End: 2021-10-04

## 2021-10-04 NOTE — TELEPHONE ENCOUNTER
Called pt. No answer left detail message on personal VM regarding scheduling appt in Dec. Pt is on cancellation list for a Dec appt w/ Dr. Elise Echeverria. Stated that there is times available on Dec 9th and Dec 10th and to call office to schedule. Awaiting on pt to call office to schedule appt in Dec w/ Dr. Elise Echeverria.

## 2021-10-11 RX ORDER — DICYCLOMINE HYDROCHLORIDE 10 MG/1
CAPSULE ORAL
Qty: 30 CAPSULE | Refills: 0 | Status: SHIPPED | OUTPATIENT
Start: 2021-10-11 | End: 2021-12-02

## 2021-11-19 ENCOUNTER — TELEPHONE (OUTPATIENT)
Dept: INTERNAL MEDICINE CLINIC | Age: 66
End: 2021-11-19

## 2021-11-19 DIAGNOSIS — E03.9 ACQUIRED HYPOTHYROIDISM: Primary | ICD-10-CM

## 2021-11-24 LAB
T4 FREE SERPL-MCNC: 1.32 NG/DL (ref 0.82–1.77)
TSH SERPL DL<=0.005 MIU/L-ACNC: 4.95 UIU/ML (ref 0.45–4.5)

## 2021-12-02 DIAGNOSIS — E03.9 ACQUIRED HYPOTHYROIDISM: Primary | ICD-10-CM

## 2021-12-02 RX ORDER — VALSARTAN 160 MG/1
80 TABLET ORAL DAILY
Qty: 45 TABLET | Refills: 3 | Status: SHIPPED | OUTPATIENT
Start: 2021-12-02

## 2021-12-02 RX ORDER — DICYCLOMINE HYDROCHLORIDE 10 MG/1
CAPSULE ORAL
Qty: 30 CAPSULE | Refills: 0 | Status: SHIPPED | OUTPATIENT
Start: 2021-12-02 | End: 2022-01-27

## 2021-12-10 ENCOUNTER — OFFICE VISIT (OUTPATIENT)
Dept: NEUROLOGY | Age: 66
End: 2021-12-10
Payer: MEDICARE

## 2021-12-10 ENCOUNTER — TELEPHONE (OUTPATIENT)
Dept: INTERNAL MEDICINE CLINIC | Age: 66
End: 2021-12-10

## 2021-12-10 VITALS
HEIGHT: 65 IN | DIASTOLIC BLOOD PRESSURE: 80 MMHG | HEART RATE: 58 BPM | BODY MASS INDEX: 33.42 KG/M2 | WEIGHT: 200.6 LBS | SYSTOLIC BLOOD PRESSURE: 130 MMHG | OXYGEN SATURATION: 100 %

## 2021-12-10 DIAGNOSIS — M48.02 CERVICAL STENOSIS OF SPINAL CANAL: ICD-10-CM

## 2021-12-10 DIAGNOSIS — R43.2: ICD-10-CM

## 2021-12-10 DIAGNOSIS — R40.4 NONSPECIFIC PAROXYSMAL SPELL: Primary | ICD-10-CM

## 2021-12-10 PROCEDURE — G8417 CALC BMI ABV UP PARAM F/U: HCPCS | Performed by: PSYCHIATRY & NEUROLOGY

## 2021-12-10 PROCEDURE — 1090F PRES/ABSN URINE INCON ASSESS: CPT | Performed by: PSYCHIATRY & NEUROLOGY

## 2021-12-10 PROCEDURE — 99215 OFFICE O/P EST HI 40 MIN: CPT | Performed by: PSYCHIATRY & NEUROLOGY

## 2021-12-10 PROCEDURE — G8536 NO DOC ELDER MAL SCRN: HCPCS | Performed by: PSYCHIATRY & NEUROLOGY

## 2021-12-10 PROCEDURE — 3017F COLORECTAL CA SCREEN DOC REV: CPT | Performed by: PSYCHIATRY & NEUROLOGY

## 2021-12-10 PROCEDURE — 1101F PT FALLS ASSESS-DOCD LE1/YR: CPT | Performed by: PSYCHIATRY & NEUROLOGY

## 2021-12-10 PROCEDURE — G9899 SCRN MAM PERF RSLTS DOC: HCPCS | Performed by: PSYCHIATRY & NEUROLOGY

## 2021-12-10 PROCEDURE — G8510 SCR DEP NEG, NO PLAN REQD: HCPCS | Performed by: PSYCHIATRY & NEUROLOGY

## 2021-12-10 PROCEDURE — G8400 PT W/DXA NO RESULTS DOC: HCPCS | Performed by: PSYCHIATRY & NEUROLOGY

## 2021-12-10 PROCEDURE — G8427 DOCREV CUR MEDS BY ELIG CLIN: HCPCS | Performed by: PSYCHIATRY & NEUROLOGY

## 2021-12-10 RX ORDER — NIFEDIPINE 60 MG/1
TABLET, EXTENDED RELEASE ORAL
COMMUNITY
Start: 2021-10-11 | End: 2021-12-10

## 2021-12-10 RX ORDER — SCOLOPAMINE TRANSDERMAL SYSTEM 1 MG/1
PATCH, EXTENDED RELEASE TRANSDERMAL
COMMUNITY
Start: 2021-12-02

## 2021-12-10 NOTE — PATIENT INSTRUCTIONS
We have ruled out a serious neurological cause for these abnormal smells. Per notes from your visit with ENT Dr. Caroline Chinchilla at [facility] 5/18/2021:  Impressions: Allergic rhinitis  Partially controlled GERD  Parosmia, dysosmia  Differential included migraine variant versus seizure activity.     His recommendations:  -pursue reflux precautions and treatment more seriously  -Begin Flonase nasal spray  -Return to clinic in 6 months and hopefully smell and taste will resume and they can perform a formal smell and taste test,   -Discussed discontinuation of nifedipine and calcium channel blockers as these may cause smell change and she was advised to present this to her primary care provider at her next visit.  -He also mentions neurology follow-up for these episodes and attempt to capture these spells on EEG, which we accomplished in the EMU and no seizures were seen.     -You have cervical stenosis. Please return for follow up if you notice new numbness or weakness.

## 2021-12-10 NOTE — PROGRESS NOTES
Neurology Consult Note      HISTORY PROVIDED BY: patient    Chief Complaint:   Chief Complaint   Patient presents with    Follow-up    Neurologic Problem      Subjective:   Pt is a 78 y.o. right handed female last seen in clinic on 4/19/21 in f/u. She has h/o UC and iritis with onset of spells in Nov, 2019 that start with an overwhelming smell that makes her nauseated, then has a \"wavy thing\" in her head and feels like she is going to pass out. These are occurring daily, can occur several times a day, may last over an hour. May have had ELISABETH or LOC on one occasion. Routine EEG and 24 hour ambulatory EEG during which she had her typical spells, were negative for seizure. MRI brain w/wo contrast 1/28/21 was unremarkable. There has been no change in these spells on amitriptyline. Additionally, reports a change in taste/smell that started in association with poorly control thyroid disease in 2018, nothing tastes or smells like it should. MRI C-spine wo contrast 2/9/21 with moderate canal stenosis and mild cord flattening C3-C4, NF stenoses: Bilateral C4-C6, right C3-C4, left C6-T2 with progression of NF stenoses from 2012,  Exam is non-focal and unremarkable, no evidence of cervical radiculopathy or a myelopathy.   -Stopped amitriptyline.   -Referred to [facility]  -Pt will discuss stopping Nifedipine with prescribing physician to see if sxs improve    She returns for f/u. She was seen in ENT at [facility] by Dr. Bi Casanova 5/18/21 who suggested stopping nifidipine, starting flonase, aggressive tx of GERD, further eval for sz. She was referred to [facility] in June, admitted in September, had multiple spells without seizures seen. She was started on Topamax 50mg bid for MHA prevention at discharge.  She is no longer taking Topamax, she took it for 4 weeks, stopped on her own b/c she had HTN, heart palpatations, and lightheadedness and felt presyncopal. Does note she had these sxs already, but they got worse so she stopped it. She did not call. She continues to smell obnoxious smells and nothing tastes right. She has stopped nifedipine. She called in June c/o crawling feeling on her face and scalp, this has lessened. Mentions \"stupid headaches\". She also mentions vision changes, has not seen her eye doctor yet. Mentions being very scared when she has these smells. Previous Testing:  -Routine EEG 1/28/21 was normal, but she did not have a typical spell while she was on EEG. -MRI brain w/wo contrast 1/28/21 was unremarkable. Incidental severe cervical stenosis seen on visualized portion of C-spine. -MRI C-spine wo contrast 2/9/21 with Moderate canal stenosis and mild cord flattening C3-C4, NF stenoses: Bilateral C4-C6, right C3-C4, left C6-T2 with progression of NF stenoses from 2012  -24 hour ambulatory EEG 2/9/21 was normal, but riddled with artifact in O1 and O2 throughout the recording during wakefulness. Per pt diary, she had multiple episodes of smelling bad smells, feeling lightheaded, headaches, and having chest fluttering. No EEG correlates seen. -ENT eval and CT sinuses 4/19/21 with mild sinus disease only, suggested she see an allergist.     Past Medical History:   Diagnosis Date    Arrhythmia 2018    \"PALPATATIONS, FLUTTERING, MEDS PER DR. ZAYAS\"    Arthritis     knees, back, SHOULDERS, R HIP    Chest pain 03/16/2018    PT STATES SHE'S BEEN ASSESSED FOR AND DETERMINED TO BE ESOPHAGEAL SPASMS PER FREYA LUNA AND DR.MCGROARTY ODILON    Chronic pain     LOWER BACK, R HIP, SHOULDERS    Duodenal ulcer 2002    HOSPITALIZED AND TREATED WITH MEDICATION    GERD (gastroesophageal reflux disease)     H/O blood clots 2007    TO RIGHT EYE    Hypertension     Iritis 2007    both eyes    Low back pain     back pain/spasm    Thyroid disease     hypothroid    Ulcerative colitis 2002      Past Surgical History:   Procedure Laterality Date    COLONOSCOPY N/A 5/25/2016    COLONOSCOPY performed by Regino Moreno Kori Cardoza MD at [facility] ENDOSCOPY    HX ACL RECONSTRUCTION  2003    right    HX BREAST BIOPSY Left 12/30/2016    MARKER     HX CHOLECYSTECTOMY  2008    HX ENDOSCOPY      HX GASTRIC BYPASS  2011    LAP BAND and removal    HX HEENT  2003    benign mass removed from uvula    HX KNEE REPLACEMENT Right 05/01/2013    right    HX KNEE REPLACEMENT  05/31/2017    Left    HX ORTHOPAEDIC  2009    exc mass right elbow - benign    HX ORTHOPAEDIC Right 08/03/2017    Knee reconstruction and excision of fibroma    HX OTHER SURGICAL  2003    growth removed from back of leg - benign    HX ROTATOR CUFF REPAIR Right 03/2018    COMPLETE, HAS 3 ANCHORS    HX ISABELL AND BSO  2004    HX TONSILLECTOMY  2003    done at the time mass removed from uvula    IL LAP, PLACE ADJUST GASTR BAND  2008    fluid removed/band removed      Social History     Socioeconomic History    Marital status: SINGLE     Spouse name: Not on file    Number of children: Not on file    Years of education: Not on file    Highest education level: Not on file   Occupational History    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[employer], 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   Tobacco Use    Smoking status: Never Smoker    Smokeless tobacco: Never Used   Vaping Use    Vaping Use: Never used   Substance and Sexual Activity    Alcohol use: Yes     Alcohol/week: 0.0 - 1.0 standard drinks     Comment: 2 drinks per month    Drug use: No    Sexual activity: Never   Other Topics Concern    Not on file   Social History Narrative    Lives in [facility]     Social Determinants of Health     Financial Resource Strain:     Difficulty of Paying Living Expenses: Not on file   Food Insecurity:     Worried About Running Out of Food in the Last Year: Not on file    Miguel Angel of Food in the Last Year: Not on file   Transportation Needs:     Lack of Transportation (Medical): Not on file    Lack of Transportation (Non-Medical):  Not on file   Physical Activity:     Days of Exercise per Week: Not on file    Minutes of Exercise per Session: Not on file   Stress:     Feeling of Stress : Not on file   Social Connections:     Frequency of Communication with Friends and Family: Not on file    Frequency of Social Gatherings with Friends and Family: Not on file    Attends Temple Services: Not on file    Active Member of [address] [place] [address] [employer] or Organizations: Not on file    Attends Club or Organization Meetings: Not on file    Marital Status: Not on file   Intimate Partner Violence:     Fear of Current or Ex-Partner: Not on file    Emotionally Abused: Not on file    Physically Abused: Not on file    Sexually Abused: Not on file   Housing Stability:     Unable to Pay for Housing in the Last Year: Not on file    Number of [place] in the Last Year: Not on file    Unstable Housing in the Last Year: Not on file     Family History   Problem Relation Age of Onset    Stroke Mother         cerebral hemorrhage    Post-op Nausea/Vomiting Mother     Hypertension Mother     Heart Disease Father         PACEMAKER    Heart Attack Father 68    Post-op Nausea/Vomiting Sister     No Known Problems Brother     No Known Problems Sister     Other Brother         PRE-DIABETIC    Cancer Paternal Grandmother         colon    Thyroid Disease Daughter         HYPO    Post-op Cognitive Dysfunction Daughter          Objective:   ROS:  Per HPI o/w reviewed and neg    Allergies   Allergen Reactions    Adhesive Unknown (comments)    Adhesive Tape-Silicones Itching     Blisters, bumps. -long term application    Lidoderm [Lidocaine] Rash     Patch-adhesive patch. Allergic to the adhesive - long term use. Not allergic to lidoderm.     Other Medication Rash     dermabond - blisters        Meds:  Outpatient Medications Prior to Visit   Medication Sig Dispense Refill    NIFEdipine ER (PROCARDIA XL) 60 mg ER tablet       scopolamine (TRANSDERM-SCOP) 1 mg over 3 days pt3d       dicyclomine (BENTYL) 10 mg capsule TAKE ONE CAPSULE BY MOUTH THREE TIMES A DAY AS NEEDED FOR ABDOMINAL CRAMPS 30 Capsule 0    valsartan (DIOVAN) 160 mg tablet Take 0.5 Tablets by mouth daily. 45 Tablet 3    Unithroid 88 mcg tablet Take 1 Tablet by mouth Daily (before breakfast). Once daily 90 Tablet 1    ALPRAZolam (XANAX) 0.5 mg tablet TAKE ONE-HALF TO ONE TABLET BY MOUTH TWICE A DAY AS NEEDED FOR ANXIETY *DO NOT EXCEED TWO TABLETS PER DAY* 30 Tablet 1    hydrocortisone (ANUSOL-HC) 2.5 % rectal cream INSERT A THIN LAYER INTO RECTUM FOUR TIMES A DAY 30 g 1    mometasone (ELOCON) 0.1 % topical cream APPLY TO AFFECTED AREA(S) TOPICALLY DAILY 30 g 3    omeprazole (PRILOSEC) 40 mg capsule Take 1 Cap by mouth daily. 90 Cap 3    loratadine 10 mg cap Indications: prn      docusate sodium 100 mg tab Take 100 mg by mouth daily. As needed      ondansetron (ZOFRAN ODT) 4 mg disintegrating tablet Take 2 Tabs by mouth every eight (8) hours as needed for Nausea. 12 Tab 0    diclofenac EC (VOLTAREN) 75 mg EC tablet Take 75 mg by mouth two (2) times daily as needed.  calcium carbonate (TUMS) 200 mg calcium (500 mg) chew Take 1 Tab by mouth as needed.  ketorolac (ACULAR) 0.5 % ophthalmic solution Administer 2 Drops to both eyes four (4) times daily as needed. 1 Bottle 0    topiramate (TOPAMAX) 50 mg tablet Take 1 tab by mouth in PM x 2 weeks, then take 1 tab twice a day (Patient not taking: Reported on 12/10/2021) 180 Tablet 1     No facility-administered medications prior to visit. Imaging:  MRI Results (most recent):  Results from Hospital Encounter encounter on 02/08/21    MRI CERV SPINE WO CONT    Narrative  MRI OF THE CERVICAL SPINE WITHOUT CONTRAST: 2/8/2021 7:20 AM    INDICATION: Cervical spinal stenosis. TECHNIQUE: Multiplanar and multisequence MRI was obtained of the cervical spine  without contrast.    COMPARISON: 6/20/2012. FINDINGS: Mild levoconvexity is centered around T4.  There is degenerative height  loss in C6, with more mild height loss in C4 and C5. Sclerotic degenerative  marrow changes associated at these levels as well. Bone marrow signal and  vertebral body heights are otherwise normal. The cerebellar tonsils are normal  in position and configuration. Cervical cord signal is normal.. The paraspinal  soft tissues are normal.    C2-C3: No herniation or stenosis. C3-C4: Slight retrolisthesis and disc uncovering cause moderate canal stenosis  and mild cord flattening. Uncovertebral and facet osteoarthritis cause right and  mild left neural foraminal stenosis. Stenoses have increased slightly from 2012. C4-C5: A disc osteophyte complex causes mild canal stenosis. Uncovertebral and  facet osteoarthritis cause left greater than right neural foraminal stenosis. Neural foraminal stenoses have progressed from 2012. C5-C6: A disc osteophyte complex causes mild canal stenosis. Uncovertebral and  facet osteoarthritis cause bilateral neural foraminal stenosis. Neural foraminal  stenoses have progressed from 2012. C6-C7: A disc osteophyte complex causes mild to moderate canal stenosis. Uncovertebral and facet osteoarthritis cause left neural foraminal stenosis. Neural foraminal stenosis has progressed from 2012. C7-T1: A disc osteophyte complex, greatest in the left lateral recess and  foraminal zone, is new from 2012. It causes mild canal stenosis and left neural  foraminal stenosis. T1-T2: A disc osteophyte complex, greatest in left lateral recess and foraminal  zone, is increased from 2012. It causes narrowing of the left lateral recess and  left neural foraminal stenosis. T2-T3: No herniation or stenosis. Mild disc bulge. T3-T4: No herniation or stenosis. Minimal disc osteophyte complex. T4-T5: Anterior osseous bridging across the disc space. No stenosis. Impression  1. Moderate canal stenosis and mild cord flattening C3-C4.   2. Neural foraminal stenoses: Bilateral C4-C6, right C3-C4, left C6-T2.  3. Progression of neural foraminal stenoses from 2012. CT Results (most recent):  Results from Hospital Encounter encounter on 04/19/21    CT SINUSES WO CONT    Narrative  INDICATION: Chronic maxillary sinusitis    EXAMINATION:  CT SINUS, LANDMARK    COMPARISON: None    Technique: Thin slice axial noncontrast CT imaging of the sinuses was performed  per [place] technique. Coronal and sagittal reconstructions were obtained. CT  dose reduction was achieved through use of a standardized protocol tailored for  this examination and automatic exposure control for dose modulation. FINDINGS:    Frontal sinuses: Minimal mucosal thickening in the left frontal sinus. Hypoplastic right frontal sinus is opacified. Anterior ethmoid sinuses:  Mild peripheral mucosal thickening bilateral.  Maxillary sinuses:  Minimal peripheral mucosal thickening, bilateral.  Osteomeatal units:  clear  Sphenoid sinuses:  Minimal peripheral mucosal thickening right greater than  left. Posterior ethmoid sinuses:  Peripheral mucosal thickening and patchy  opacification, right greater than left. Nasal septum:  midline. Nasal cavity:  clear. Other:  Multilevel degenerative changes throughout the cervical spine. Impression  Mild chronic paranasal sinus disease as above, with minimal maxillary  involvement. Reviewed records in [employer] and [employer] tab today    Lab Review   Results for orders placed or performed in visit on 11/19/21   TSH 3RD GENERATION   Result Value Ref Range    TSH 4.950 (H) 0.450 - 4.500 uIU/mL   T4, FREE   Result Value Ref Range    T4, Free 1.32 0.82 - 1.77 ng/dL        Exam:  Visit Vitals  /80   Pulse (!) 58   Ht 5' 5\" (1.651 m)   Wt 200 lb 9.6 oz (91 kg)   SpO2 100%   BMI 33.38 kg/m²     General:  Alert, cooperative, no distress. Head:  Normocephalic, without obvious abnormality, atraumatic.    Respiratory:  Heart:   Non labored breathing  Regular rate and rhythm, no murmurs Neck:      Extremities: Warm, no cyanosis or edema. Pulses: 2+ radial pulses. Neurologic:  MS: Alert and oriented x 4, speech intact. Language intact. Attention and fund of knowledge appropriate. Recent and remote memory intact. Cranial Nerves:  II: visual fields VFF   II: pupils PERRL   II: optic disc    III,VII: ptosis none   III,IV,VI: extraocular muscles  EOMI, no nystagmus or diplopia   V: facial light touch sensation     VII: facial muscle function   symmetric   VIII: hearing intact   IX: soft palate elevation     XI: trapezius strength     XI: sternocleidomastoid strength    XII: tongue       Motor: normal bulk and tone, no tremor              Strength: 5/5 throughout, no PD  Sensory: (At initial OV: intact to LT, PP)  Coordination: FTN intact  Gait: normal gait, able to tandem walk  Reflexes: 2+ symmetric, (At initial OV: toes downgoing)           Assessment/Plan   Pt is a 78 y.o. right handed female with h/o UC and iritis with onset of spells in Nov, 2019 that start with an overwhelming smell that makes her nauseated, then has a \"wavy thing\" in her head and feels like she is going to pass out. These were occurring daily, can occur several times a day, may last over an hour. May have had ELISABETH or LOC on one occasion. Routine EEG, 24 hour ambulatory EEG during which she had her typical spells, were negative for seizure, and now EMU admission with spells captured and no EEG correlates seen. MRI brain w/wo contrast 1/28/21 was unremarkable. There was no change in these spells on amitriptyline for MHA prevention. Additionally, reports a change in taste/smell that started in association with poorly control thyroid disease in 2018, nothing tastes or smells like it should. MRI brain was unremarkable except for incidental severe cervical stenosis seen.   MRI C-spine wo contrast 2/9/21 with moderate canal stenosis and mild cord flattening C3-C4, NF stenoses: Bilateral C4-C6, right C3-C4, left C6-T2 with progression of NF stenoses from 2012, Pt was non-compliant with Topamax for MHA prevention. Symptoms have not changed. Exam remains non-focal and unremarkable, no evidence of cervical radiculopathy or a myelopathy. No neurological etiology for spells of dysosmia. Reviewed recommendations from ENT at [facility] and Taste disorders clinic and pt given a copy of these recommendations. She may have migraine headaches, but non-compliant with prevention med. Discussed asymptomatic cervical stenosis and signs and symptoms to monitor for and that should prompt reevaluation. F/u as needed in neurology. ICD-10-CM ICD-9-CM    1. Nonspecific paroxysmal spell  R40.4 780.09    2. Taste perversion of  R43.2 781.1    3. Cervical stenosis of spinal canal  M48.02 723.0      I spent a total of 45 minutes in both face-to-face activities and non-face-to-face activities for the visit on the date of this encounter. Signed:   Robina Herman MD  12/10/2021

## 2021-12-10 NOTE — TELEPHONE ENCOUNTER
Patient would like to speak with Provider regarding lab work results from 11/19/2021 as well as the following symptoms: hair loss, slow and low heart rate, lightheadedness, memory, fatigued, overheated. Is thyroid medicattion working properly? Does she need an appt or referral for the symptoms?

## 2021-12-10 NOTE — LETTER
12/10/2021    Patient: Kashif Delgadillo   YOB: 1955   Date of Visit: 12/10/2021     Christian Perry MD  [id number]  [facility]    Dear Christian Perry MD,      Thank you for referring Ms. Ria Piedra to [address] [place] [address] for evaluation. My notes for this consultation are attached. If you have questions, please do not hesitate to call me. I look forward to following your patient along with you.       Sincerely,    Magan Velázquez MD

## 2021-12-13 RX ORDER — LEVOTHYROXINE SODIUM 88 UG/1
88 TABLET ORAL
Qty: 115 TABLET | Refills: 1 | Status: SHIPPED | OUTPATIENT
Start: 2021-12-13 | End: 2022-08-31

## 2021-12-13 NOTE — TELEPHONE ENCOUNTER
Yes, she should take her levothyroxine once daily except 2 Sunday for a total of 8 tablets per week. Repeat labs in 6-8 weeks after starting increased dose.   Labs ordered for [facility]

## 2021-12-13 NOTE — TELEPHONE ENCOUNTER
Writer contacted patient per Dr. Theodora Banegas to inform her to take the levothyroxine once daily except 2 on Sunday for a total of 8 tablets per week. Repeat labs in 6-8 weeks after starting increased dose. Labs ordered for [facility], patient verbalized understanding. Patient will need a refill on her levothyroxine.

## 2021-12-13 NOTE — TELEPHONE ENCOUNTER
----- Message from Marc Katz sent at 12/13/2021 10:45 AM EST -----  Subject: Message to Provider    QUESTIONS  Information for Provider? Patient is requesting a call back from Dr. Nico Valera nurse, regarding increasing her thyroid medication, she would   like verify if Sunday the only day she takes two, and 1 pill the rest of   the days. Also, she would like to know when she should have bloodwork done   again.  ---------------------------------------------------------------------------  --------------  CALL BACK INFO  What is the best way for the office to contact you? OK to leave message on   voicemail  Preferred Call Back Phone Number? [phone]  ---------------------------------------------------------------------------  --------------  SCRIPT ANSWERS  Relationship to Patient?  Self

## 2022-01-18 ENCOUNTER — TELEPHONE (OUTPATIENT)
Dept: INTERNAL MEDICINE CLINIC | Age: 67
End: 2022-01-18

## 2022-01-19 ENCOUNTER — TELEPHONE (OUTPATIENT)
Dept: INTERNAL MEDICINE CLINIC | Age: 67
End: 2022-01-19

## 2022-01-19 NOTE — TELEPHONE ENCOUNTER
Spoke with patient, states that she tested positive for COVID complaint of cough , drainage,headache and ear ache . Patient also states that her mucous is clear. Patient also states that [place] jernigan her throat.  Patient is requesting to know if there is anything she could take to help with symptoms

## 2022-01-19 NOTE — TELEPHONE ENCOUNTER
Patient is requesting a call back from Dr. Guerline Hyman patient has information about COVID test results. Patient states it is urgent that she get a call back.

## 2022-01-20 NOTE — TELEPHONE ENCOUNTER
Started feeling poorly 1/12. Though initially allergies. Got worse. Tested through [facility] Tuesday, called yesterday that + for COVID. Has had COVID vaccine and booster. Currently with post nasal drainage and cough. Chest hurting because of coughing. Has improved a little in the last 2 days. No sob. No fevers but some hot spells. Taking benedryl, had been taking Claritin. Recommended Mucinex and Saline ns.

## 2022-01-25 RX ORDER — AMITRIPTYLINE HYDROCHLORIDE 10 MG/1
TABLET, FILM COATED ORAL
Qty: 90 TABLET | Refills: 1 | OUTPATIENT
Start: 2022-01-25

## 2022-01-27 RX ORDER — DICYCLOMINE HYDROCHLORIDE 10 MG/1
CAPSULE ORAL
Qty: 30 CAPSULE | Refills: 0 | Status: SHIPPED | OUTPATIENT
Start: 2022-01-27

## 2022-03-15 NOTE — PROGRESS NOTES
PT DAILY TREATMENT/progress NOTE 2-15- Patient Name: Debi Luis Date:2018 : 1955 [x]  Patient  Verified Payor: Vincent Singleton / Plan: [employer]SI FAP TIER 3 / Product Type: Sylvia / In time: 950 AM  Out time: 11:00 AM 
Total Treatment Time (min): 70 (70 timed) Visit #: 37 
RTMD:  
 
Treatment Area: Right shoulder pain [M25.511] SUBJECTIVE Pain Level (0-10 scale): 5/10 Any medication changes, allergies to medications, adverse drug reactions, diagnosis change, or new procedure performed?: [x] No    [] Yes (see summary sheet for update) Subjective functional status/changes:   [] No changes reported \"I feel like the new exercises are really helping! \" She feels she can lift her arm higher overhead OBJECTIVE Modality rationale: decrease edema, decrease inflammation and decrease pain to improve the patients ability to reach overhead Type Additional Details  
[] Estim: []Att   []Unatt        []TENS instruct []IFC  []Premod   []NMES []Other:  []w/US   []w/ice   []w/heat Position: Location:  
[]  Traction: [] Cervical       []Lumbar 
                     [] Prone          []Supine []Intermittent   []Continuous Lbs: 
[] before manual 
[] after manual 
[]w/heat  
[]  Ultrasound: []Continuous   [] Pulsed at:  
                         []1MHz   []3MHz Location: 
W/cm2:  
[]  Paraffin Location: 
[]w/heat  
[x]  Ice- to go     []  Heat 
[]  Ice massage Position: seated, UE supported Location: R shoulder  
[]  Laser 
[]  Other: Position: Location:  
[]  Vasopneumatic Device Pressure:       [] lo [] med [] hi  
Temperature:   
[x] Skin assessment post-treatment:  [x]intact []redness- no adverse reaction 
  []redness  adverse reaction:  
 
55 min Therapeutic Exercise:  [x] See flow sheet : added supine AROM PNF D1 and D2 Rationale: increase ROM and increase strength to improve the patients ability to reach overhead, lift objects, perform daily chores 15 min Manual Therapy:  PROM R shoulder flex, abd, IR, ER to tolerance Gentle GH oscillations for pain control Rhythmic stabilization at 90 deg MRE flexion in supine Rationale: decrease pain, increase ROM and increase tissue extensibility  to improve the patients ability to perform daily activities, don/doff clothing With 
 [x] TE 
 [] TA 
 [] neuro 
 [] other: Self Care/Patient Education: [x] Review HEP [x] Progressed/Changed HEP based on: see chart 
[] positioning   [] body mechanics   [] transfers   [] heat/ice application   
[] other:  
 
Other Objective/Functional Measures: 
n/a Pain Level (0-10 scale) post treatment: 5/10 ASSESSMENT/Changes in Function:  
Overall hannah therapy session well. Progressing with strengthening exercises as hannah. Continues to req cueing for proper sequencing during HEP exercises Patient will continue to benefit from skilled PT services to modify and progress therapeutic interventions, address functional mobility deficits, address ROM deficits, address strength deficits, analyze and address soft tissue restrictions, analyze and cue movement patterns, analyze and modify body mechanics/ergonomics and assess and modify postural abnormalities to attain remaining goals. []  See Plan of Care 
[]  See progress note/recertification 
[]  See Discharge Summary Progress towards goals / Updated goals: 
Short Term Goals: To be accomplished in 4-6 weeks: 
1) Pt will be independent in initial HEP MET 2) Pt will report good compliance with ice regimen in order to decrease inflammation and manage pain levels MET 3) Pt will improve R shoulder PROM flexion to > or =145 deg in order to progress towards reaching into cabinets not met 4) Pt will improve R shoulder PROM ER to > or =60 deg in order to progress towards donning/doffing clothing with ease MET 
  
 Long Term Goals: To be accomplished in 10-12 weeks: 
1) Pt will improve R shoulder AROM flexion to > or =145 deg in order to reach into cabinets and perform ADL's with ease 2) Pt will improve R shoulder AROM ER to > or =70 deg in order to 3) Pt will report being able to don/doff clothing without R shoulder pain 4) Pt will demonstrate > or =4/5 R shoulder scaption strength in order to lift objects PLAN 
[]  Upgrade activities as tolerated     [x]  Continue plan of care 
[]  Update interventions per flow sheet      
[]  Discharge due to:_ 
[x]  Other: 
 
Akua Donis, PT 11/12/2018  9:53 AM 
 
 Clear

## 2022-03-18 PROBLEM — N39.41 URGE INCONTINENCE OF URINE: Status: ACTIVE | Noted: 2018-01-22

## 2022-03-18 PROBLEM — M17.12 PRIMARY OSTEOARTHRITIS OF LEFT KNEE: Status: ACTIVE | Noted: 2017-05-31

## 2022-03-18 PROBLEM — N32.81 OVERACTIVE BLADDER: Status: ACTIVE | Noted: 2018-01-22

## 2022-03-18 PROBLEM — L76.34 POSTOPERATIVE SEROMA OF SKIN AFTER NON-DERMATOLOGIC PROCEDURE: Status: ACTIVE | Noted: 2017-08-28

## 2022-03-18 PROBLEM — Z71.89 ADVANCE DIRECTIVE DISCUSSED WITH PATIENT: Status: ACTIVE | Noted: 2017-04-04

## 2022-03-19 PROBLEM — Z96.659 MECHANICAL COMPLICATION OF KNEE PROSTHESIS (HCC): Status: ACTIVE | Noted: 2017-08-02

## 2022-03-19 PROBLEM — E66.9 OBESITY: Status: ACTIVE | Noted: 2017-06-01

## 2022-03-19 PROBLEM — R40.4 NONSPECIFIC PAROXYSMAL SPELL: Status: ACTIVE | Noted: 2021-09-07

## 2022-03-19 PROBLEM — T84.098A MECHANICAL COMPLICATION OF KNEE PROSTHESIS (HCC): Status: ACTIVE | Noted: 2017-08-02

## 2022-03-19 PROBLEM — B37.9 CANDIDIASIS: Status: ACTIVE | Noted: 2018-05-24

## 2022-03-19 PROBLEM — N89.8 VAGINAL ODOR: Status: ACTIVE | Noted: 2017-01-12

## 2022-03-19 PROBLEM — M75.121 COMPLETE TEAR OF RIGHT ROTATOR CUFF: Status: ACTIVE | Noted: 2018-03-16

## 2022-03-19 PROBLEM — R10.31 RIGHT LOWER QUADRANT PAIN: Status: ACTIVE | Noted: 2017-01-12

## 2022-03-19 PROBLEM — E66.9 OBESITY (BMI 30.0-34.9): Status: ACTIVE | Noted: 2017-08-03

## 2022-03-19 PROBLEM — E66.811 OBESITY (BMI 30.0-34.9): Status: ACTIVE | Noted: 2017-08-03

## 2022-03-20 PROBLEM — M75.122 COMPLETE TEAR OF LEFT ROTATOR CUFF: Status: ACTIVE | Noted: 2019-03-22

## 2022-04-08 ENCOUNTER — TELEPHONE (OUTPATIENT)
Dept: INTERNAL MEDICINE CLINIC | Age: 67
End: 2022-04-08

## 2022-04-08 RX ORDER — HYDROCORTISONE 25 MG/G
CREAM TOPICAL
Qty: 30 G | Refills: 1 | Status: SHIPPED | OUTPATIENT
Start: 2022-04-08

## 2022-04-14 ENCOUNTER — OFFICE VISIT (OUTPATIENT)
Dept: OBGYN CLINIC | Age: 67
End: 2022-04-14
Payer: MEDICARE

## 2022-04-14 VITALS
DIASTOLIC BLOOD PRESSURE: 84 MMHG | WEIGHT: 205 LBS | SYSTOLIC BLOOD PRESSURE: 140 MMHG | HEIGHT: 65 IN | BODY MASS INDEX: 34.16 KG/M2

## 2022-04-14 DIAGNOSIS — G89.29 CHRONIC RLQ PAIN: ICD-10-CM

## 2022-04-14 DIAGNOSIS — R35.0 URINARY FREQUENCY: ICD-10-CM

## 2022-04-14 DIAGNOSIS — F33.0 MAJOR DEPRESSIVE DISORDER, RECURRENT, MILD (HCC): ICD-10-CM

## 2022-04-14 DIAGNOSIS — F33.1 MAJOR DEPRESSIVE DISORDER, RECURRENT, MODERATE (HCC): ICD-10-CM

## 2022-04-14 DIAGNOSIS — N64.4 BREAST PAIN: ICD-10-CM

## 2022-04-14 DIAGNOSIS — R10.31 CHRONIC RLQ PAIN: ICD-10-CM

## 2022-04-14 DIAGNOSIS — N95.1 HOT FLASHES DUE TO MENOPAUSE: Primary | ICD-10-CM

## 2022-04-14 PROBLEM — F33.9 MAJOR DEPRESSIVE DISORDER, RECURRENT, UNSPECIFIED (HCC): Status: ACTIVE | Noted: 2022-04-14

## 2022-04-14 PROCEDURE — G8536 NO DOC ELDER MAL SCRN: HCPCS | Performed by: OBSTETRICS & GYNECOLOGY

## 2022-04-14 PROCEDURE — G8400 PT W/DXA NO RESULTS DOC: HCPCS | Performed by: OBSTETRICS & GYNECOLOGY

## 2022-04-14 PROCEDURE — G8432 DEP SCR NOT DOC, RNG: HCPCS | Performed by: OBSTETRICS & GYNECOLOGY

## 2022-04-14 PROCEDURE — 1101F PT FALLS ASSESS-DOCD LE1/YR: CPT | Performed by: OBSTETRICS & GYNECOLOGY

## 2022-04-14 PROCEDURE — 99204 OFFICE O/P NEW MOD 45 MIN: CPT | Performed by: OBSTETRICS & GYNECOLOGY

## 2022-04-14 PROCEDURE — G8417 CALC BMI ABV UP PARAM F/U: HCPCS | Performed by: OBSTETRICS & GYNECOLOGY

## 2022-04-14 PROCEDURE — 3017F COLORECTAL CA SCREEN DOC REV: CPT | Performed by: OBSTETRICS & GYNECOLOGY

## 2022-04-14 PROCEDURE — 1090F PRES/ABSN URINE INCON ASSESS: CPT | Performed by: OBSTETRICS & GYNECOLOGY

## 2022-04-14 PROCEDURE — G8427 DOCREV CUR MEDS BY ELIG CLIN: HCPCS | Performed by: OBSTETRICS & GYNECOLOGY

## 2022-04-14 RX ORDER — VENLAFAXINE HYDROCHLORIDE 75 MG/1
75 CAPSULE, EXTENDED RELEASE ORAL DAILY
Qty: 90 CAPSULE | Refills: 3 | Status: SHIPPED | OUTPATIENT
Start: 2022-04-14

## 2022-04-14 NOTE — PROGRESS NOTES
New Patient Problem Visit    Fiona Julio is a 78 y.o. new patient presenting today with a variety of concerns:    1. RLQ pain, sharp, shooting radiates to her back; she states she has had this for years, thought it was sciatic pain but her orthopedist did not think so, suggested she see gyn    2. Bilateral breast pains - states she has markers in both breasts, this may be the cause of her pain    3. Loose bowels - she is concerned she may give herself a vaginal/urinary infection; she reports history of colitis    4. Severe hot flashes - she has had hot flashes since her 46s, they have varied in intensity over the years but now are occurring daily throughout the day. She has used HRT in the past but is not currently on anything. She will be pouring sweat. 5. Nocturnal enuresis - she has had this for many years, wears pads or protective underwear overnight    6. She also notes thyroid issues: on levothyroxine all her life and then levels rapidly changed before COVID pandemic. Lost her hair, lost sense of smell and taste. She is s/p hysterectomy for heavy vaginal bleeding. Ob/Gyn Hx:   - hx 2 vaginal deliveries  LMP- n/a  Menses- menopause  Contraception-  SA-      Past Medical History:   Diagnosis Date    Arrhythmia     \"PALPATATIONS, FLUTTERING, MEDS PER DR. ZAYAS\"    Arthritis     knees, back, SHOULDERS, R HIP    Chest pain 2018    PT STATES SHE'S BEEN ASSESSED FOR AND DETERMINED TO BE ESOPHAGEAL SPASMS PER FREYA LUNA AND DR.MCGROARTY ODILON    Chronic pain     LOWER BACK, R HIP, SHOULDERS    Duodenal ulcer     HOSPITALIZED AND TREATED WITH MEDICATION    GERD (gastroesophageal reflux disease)     H/O blood clots 2007    TO RIGHT EYE    Hypertension     Iritis 2007    both eyes    Low back pain     back pain/spasm    Thyroid disease     hypothroid    Ulcerative colitis        Past Surgical History:   Procedure Laterality Date    COLONOSCOPY N/A 2016 COLONOSCOPY performed by Linda Rodriguez MD at [address] HX ACL RECONSTRUCTION  2003    right    HX BREAST BIOPSY Left 12/30/2016    MARKER     HX CHOLECYSTECTOMY  2008    HX ENDOSCOPY      HX GASTRIC BYPASS  2011    LAP BAND and removal    HX HEENT  2003    benign mass removed from uvula    HX KNEE REPLACEMENT Right 05/01/2013    right    HX KNEE REPLACEMENT  05/31/2017    Left    HX ORTHOPAEDIC  2009    exc mass right elbow - benign    HX ORTHOPAEDIC Right 08/03/2017    Knee reconstruction and excision of fibroma    HX OTHER SURGICAL  2003    growth removed from back of leg - benign    HX ROTATOR CUFF REPAIR Right 03/2018    COMPLETE, HAS 3 ANCHORS    HX ISABELL AND BSO  2004    HX TONSILLECTOMY  2003    done at the time mass removed from uvula    IL LAP, PLACE ADJUST GASTR BAND  2008    fluid removed/band removed       Family History   Problem Relation Age of Onset    Stroke Mother         cerebral hemorrhage    Post-op Nausea/Vomiting Mother     Hypertension Mother     Heart Disease Father         PACEMAKER    Heart Attack Father 68    Post-op Nausea/Vomiting Sister     No Known Problems Brother     No Known Problems Sister     Other Brother         PRE-DIABETIC    Cancer Paternal Grandmother         colon    Thyroid Disease Daughter         HYPO    Post-op Cognitive Dysfunction Daughter        Social History     Socioeconomic History    Marital status: SINGLE     Spouse name: Not on file    Number of children: Not on file    Years of education: Not on file    Highest education level: Not on file   Occupational History    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[employer], 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   Tobacco Use    Smoking status: Never Smoker    Smokeless tobacco: Never Used   Vaping Use    Vaping Use: Never used   Substance and Sexual Activity    Alcohol use:  Yes     Alcohol/week: 0.0 - 1.0 standard drinks     Comment: 2 drinks per month    Drug use: No    Sexual activity: Never   Other Topics Concern    Not on file   Social History Narrative    Lives in [facility] alone     Social Determinants of Health     Financial Resource Strain:     Difficulty of Paying Living Expenses: Not on file   Food Insecurity:     Worried About Running Out of Food in the Last Year: Not on file    Miguel Angel of Food in the Last Year: Not on file   Transportation Needs:     Lack of Transportation (Medical): Not on file    Lack of Transportation (Non-Medical): Not on file   Physical Activity:     Days of Exercise per Week: Not on file    Minutes of Exercise per Session: Not on file   Stress:     Feeling of Stress : Not on file   Social Connections:     Frequency of Communication with Friends and Family: Not on file    Frequency of Social Gatherings with Friends and Family: Not on file    Attends Bahai Services: Not on file    Active Member of [address] [place] [address] [employer] or Organizations: Not on file    Attends Club or Organization Meetings: Not on file    Marital Status: Not on file   Intimate Partner Violence:     Fear of Current or Ex-Partner: Not on file    Emotionally Abused: Not on file    Physically Abused: Not on file    Sexually Abused: Not on file   Housing Stability:     Unable to Pay for Housing in the Last Year: Not on file    Number of [place] in the Last Year: Not on file    Unstable Housing in the Last Year: Not on file       Current Outpatient Medications   Medication Sig Dispense Refill    venlafaxine-SR (EFFEXOR-XR) 75 mg capsule Take 1 Capsule by mouth daily.  90 Capsule 3    hydrocortisone (ANUSOL-HC) 2.5 % rectal cream APPLY A THIN LAYER INTO RECTUM FOUR TIMES A DAY 30 g 1    dicyclomine (BENTYL) 10 mg capsule TAKE 1 CAPSULE BY MOUTH 3 TIMES A DAY AS NEEDED FOR ABDOMINAL CRAMPS 30 Capsule 0    ALPRAZolam (XANAX) 0.5 mg tablet TAKE 1/2 TO 1 TABLET BY MOUTH TWO TIMES A DAY AS NEEDED FOR ANXIETY ** DO NOT EXCEED 2 TABLETS IN 24 HOURS ** 30 Tablet 1    omeprazole (PRILOSEC) 40 mg capsule TAKE ONE CAPSULE BY MOUTH DAILY 90 Capsule 3    Unithroid 88 mcg tablet Take 1 Tablet by mouth Daily (before breakfast). Monday -Saturday. Take 2 tablets on Sunday for a total of 8 tablet per week. 115 Tablet 1    valsartan (DIOVAN) 160 mg tablet Take 0.5 Tablets by mouth daily. 45 Tablet 3    mometasone (ELOCON) 0.1 % topical cream APPLY TO AFFECTED AREA(S) TOPICALLY DAILY 30 g 3    calcium carbonate (TUMS) 200 mg calcium (500 mg) chew Take 1 Tab by mouth as needed.  scopolamine (TRANSDERM-SCOP) 1 mg over 3 days pt3d  (Patient not taking: Reported on 4/14/2022)      loratadine 10 mg cap Indications: prn (Patient not taking: No sig reported)      docusate sodium 100 mg tab Take 100 mg by mouth daily. As needed (Patient not taking: Reported on 4/14/2022)      ondansetron (ZOFRAN ODT) 4 mg disintegrating tablet Take 2 Tabs by mouth every eight (8) hours as needed for Nausea. 12 Tab 0    diclofenac EC (VOLTAREN) 75 mg EC tablet Take 75 mg by mouth two (2) times daily as needed.  ketorolac (ACULAR) 0.5 % ophthalmic solution Administer 2 Drops to both eyes four (4) times daily as needed. 1 Bottle 0       Allergies   Allergen Reactions    Adhesive Unknown (comments)    Adhesive Tape-Silicones Itching     Blisters, bumps. -long term application    Lidoderm [Lidocaine] Rash     Patch-adhesive patch. Allergic to the adhesive - long term use. Not allergic to lidoderm.     Other Medication Rash     dermabond - blisters       Review of Systems - History obtained from the patient  Constitutional: negative for weight loss, fever, night sweats  HEENT: negative for hearing loss, earache, congestion, snoring, sorethroat  CV: negative for chest pain, palpitations, edema  Resp: negative for cough, shortness of breath, wheezing  GI: negative for change in bowel habits, abdominal pain, black or bloody stools  : negative for frequency, dysuria, hematuria, vaginal discharge  MSK: negative for back pain, joint pain, muscle pain  Breast: negative for breast lumps, nipple discharge, galactorrhea  Skin :negative for itching, rash, hives  Neuro: negative for dizziness, headache, confusion, weakness  Psych: negative for anxiety, depression, change in mood  Heme/lymph: negative for bleeding, bruising, pallor    Physical Exam    Visit Vitals  BP (!) 140/84 (BP 1 Location: Left arm)   Ht 5' 5\" (1.651 m)   Wt 205 lb (93 kg)   BMI 34.11 kg/m²         OBGyn Exam      Constitutional  · Appearance: well-nourished, well developed, alert, in no acute distress    HENT  · Head and Face: appears normal    Neck  · Inspection/Palpation: normal appearance, no masses or tenderness  · Thyroid: gland size normal, nontender    Chest  · Respiratory Effort: non-labored breathing    Cardiovascular  · Extremities: no peripheral edema    Gastrointestinal  · Abdominal Examination: abdomen non-distended, non-tender to palpation, no masses present  · Liver and spleen: no hepatomegaly present, spleen not palpable  · Hernias: no hernias identified    Skin  · General Inspection: no rash, no lesions identified    Neurologic/Psychiatric  · Mental Status:  · Orientation: grossly oriented to person, place and time  · Mood and Affect: mood normal, affect appropriate      Assessment/Plan:    1. Hot flashes due to menopause  We discussed her vasomotor symptoms and their effect on her quality of life. Advised I recommend avoiding HRT given is she >10 year from the initial time of menopause. Discussed lifestyle changes and conservative measures. Offered SSRI and she accepts. Rx effexor 75mg XR sent. 2. Urinary frequency  Check urine culture to rule out infection. If not infectious, discussed limiting fluids before bedtime, and future Urogyn referral.  - CULTURE, URINE; Future  - CULTURE, URINE      3. Breast pain  Refer for a diagnostic bilateral mammogram.   - PETE 3D ANAM W MAMMO BI DX INCL CAD; Future    4.  Chronic RLQ pain  Unclear etiology, do not suspect GYN etiology given she is s.p hysterectomy. Unsure if ovaries remain. Return to the office for a pelvic ultrasound and follow-up of her symptoms and how she is doing on the effexor.            Christine Vasquez MD

## 2022-04-14 NOTE — PATIENT INSTRUCTIONS
Hot Flashes During Menopause: Care Instructions  Overview     A hot flash is a sudden feeling of intense body heat. Your head, neck, and chest may get red. Your heartbeat may speed up, and you may feel anxious. You may find that hot flashes occur more often in warm rooms or during stressful times. Hot flashes and other symptoms are a normal response to the hormone changes that occur before your menstrual cycle goes away completely (menopause). Hot flashes often get better and go away with time. Making lifestyle changes or taking medicine may help with symptoms. Follow-up care is a key part of your treatment and safety. Be sure to make and go to all appointments, and call your doctor if you are having problems. It's also a good idea to know your test results and keep a list of the medicines you take. How can you care for yourself at home? · If you decide to take medicine to treat hot flashes, take it exactly as prescribed. Call your doctor if you think you are having a problem with your medicine. You will get more details on the specific medicine your doctor prescribes. · Learn to meditate. Sit quietly and focus on your breathing. Try to practice each day. Books, classes, and tapes can help you start a program.  · Wear natural fabrics, such as cotton and silk. Dress in layers so you can take off clothes as needed. · Keep the room temperature cool, or use a fan. You are more likely to have a hot flash when you are too warm than when you are cool. · Use fewer blankets when you sleep at night. · Drink cold fluids rather than hot ones. · Limit food and drinks that make your symptoms worse. This may include things like caffeine, alcohol, or spicy foods. · Do not smoke. Smoking can make hot flashes worse. If you need help quitting, talk to your doctor about stop-smoking programs and medicines. These can increase your chances of quitting for good.   · Get at least 30 minutes of exercise on most days of the week. Walking is a good choice. You also may want to do other activities, such as running, swimming, cycling, or playing tennis or team sports. Where can you learn more? Go to http://www.gray.com/  Enter F700 in the search box to learn more about \"Hot Flashes During Menopause: Care Instructions. \"  Current as of: November 22, 2021               Content Version: 13.2  © 2639-6966 [employer], [employer]. Care instructions adapted under license by [employer] (which disclaims liability or warranty for this information). If you have questions about a medical condition or this instruction, always ask your healthcare professional. [address] any warranty or liability for your use of this information.

## 2022-04-15 ENCOUNTER — TELEPHONE (OUTPATIENT)
Dept: OBGYN CLINIC | Age: 67
End: 2022-04-15

## 2022-04-15 LAB
BACTERIA SPEC CULT: NORMAL
SERVICE CMNT-IMP: NORMAL

## 2022-04-15 NOTE — TELEPHONE ENCOUNTER
Call received at 3:44PM      78year old patient last seen in the office yesterday    Patient calling about her recent urine culture results and was advised that the results are not yet back for MD to review    Patient verbalized understanding.

## 2022-04-21 ENCOUNTER — HOSPITAL ENCOUNTER (OUTPATIENT)
Dept: MAMMOGRAPHY | Age: 67
Discharge: HOME | End: 2022-04-21
Attending: OBSTETRICS & GYNECOLOGY
Payer: MEDICARE

## 2022-04-21 DIAGNOSIS — N64.4 BREAST PAIN: ICD-10-CM

## 2022-04-21 PROCEDURE — 77062 BREAST TOMOSYNTHESIS BI: CPT

## 2022-04-21 PROCEDURE — 76642 ULTRASOUND BREAST LIMITED: CPT

## 2022-06-05 RX ORDER — MOMETASONE FUROATE 1 MG/G
CREAM TOPICAL
Qty: 30 G | Refills: 3 | Status: SHIPPED | OUTPATIENT
Start: 2022-06-05

## 2022-08-01 ENCOUNTER — TELEPHONE (OUTPATIENT)
Dept: INTERNAL MEDICINE CLINIC | Age: 67
End: 2022-08-01

## 2022-08-01 DIAGNOSIS — E03.9 ACQUIRED HYPOTHYROIDISM: Primary | ICD-10-CM

## 2022-08-01 NOTE — TELEPHONE ENCOUNTER
Patient is having low heart rate, fatigue, and hair loss. States that the cardiologist suggested checking her thyroid with PCP. Patient would like a call when the labs are ordered.

## 2022-08-02 ENCOUNTER — TELEPHONE (OUTPATIENT)
Dept: INTERNAL MEDICINE CLINIC | Age: 67
End: 2022-08-02

## 2022-08-02 NOTE — TELEPHONE ENCOUNTER
Please let patient know -   Thyroid labs ordered for [address] [place] [address] labs. Will adjust thyroid medication based on lab work.

## 2022-08-02 NOTE — TELEPHONE ENCOUNTER
Patient is calling back in regards to getting her labs, stated they have not been ordered yet and would also like to know about increasing her medications.

## 2022-08-03 NOTE — TELEPHONE ENCOUNTER
Left message for patient to call back. Please let patient know -  Thyroid labs ordered for [address] [place] [address] labs. Will adjust thyroid medication based on lab work.

## 2022-08-06 LAB
T4 FREE SERPL-MCNC: 1.59 NG/DL (ref 0.82–1.77)
TSH SERPL DL<=0.005 MIU/L-ACNC: 3.15 UIU/ML (ref 0.45–4.5)

## 2022-09-20 NOTE — TELEPHONE ENCOUNTER
Patient was requesting prescription to be filled for 2/3 months in advance so that she doesn't have to travel frequently to keep refilling prescription.

## 2022-09-20 NOTE — TELEPHONE ENCOUNTER
Future Appointments:  Future Appointments   Date Time Provider Girma Salmeron   10/21/2022  8:15 AM [facility] [address] [facility]   10/21/2022  9:00 AM [facility] [address] [facility]        Last Appointment With Me:  Visit date not found

## 2022-09-21 RX ORDER — LEVOTHYROXINE SODIUM 88 UG/1
88 TABLET ORAL
Qty: 90 TABLET | Refills: 3 | Status: SHIPPED | OUTPATIENT
Start: 2022-09-21

## 2022-09-21 NOTE — TELEPHONE ENCOUNTER
Future Appointments:  Future Appointments   Date Time Provider Girma Salmeron   10/21/2022  8:15 AM [facility] [address] [facility]   10/21/2022  9:00 AM [facility] [address] [facility]        Last Appointment With Me:  Visit date not found     Requested Prescriptions     Pending Prescriptions Disp Refills    Unithroid 88 mcg tablet 90 Tablet 3     Sig: Take 1 Tablet by mouth Daily (before breakfast).

## 2022-10-21 ENCOUNTER — HOSPITAL ENCOUNTER (OUTPATIENT)
Dept: MAMMOGRAPHY | Age: 67
Discharge: HOME | End: 2022-10-21
Payer: MEDICARE

## 2022-10-21 DIAGNOSIS — N63.0 BREAST NODULE: ICD-10-CM

## 2022-10-21 DIAGNOSIS — R92.8 ABNORMAL MAMMOGRAM: ICD-10-CM

## 2022-10-21 DIAGNOSIS — R92.8 ABNORMAL MAMMOGRAM OF LEFT BREAST: ICD-10-CM

## 2022-10-21 PROCEDURE — 77061 BREAST TOMOSYNTHESIS UNI: CPT

## 2022-10-21 PROCEDURE — 76642 ULTRASOUND BREAST LIMITED: CPT

## 2022-11-05 ENCOUNTER — PATIENT MESSAGE (OUTPATIENT)
Dept: INTERNAL MEDICINE CLINIC | Age: 67
End: 2022-11-05

## 2022-11-08 ENCOUNTER — PATIENT MESSAGE (OUTPATIENT)
Dept: INTERNAL MEDICINE CLINIC | Age: 67
End: 2022-11-08

## 2022-11-08 RX ORDER — OMEPRAZOLE 40 MG/1
40 CAPSULE, DELAYED RELEASE ORAL DAILY
Qty: 30 CAPSULE | Refills: 0 | Status: SHIPPED | OUTPATIENT
Start: 2022-11-08

## 2022-11-10 ENCOUNTER — IMMUNIZATION (OUTPATIENT)
Dept: PHARMACY | Age: 67
End: 2022-11-10

## 2023-01-06 RX ORDER — OMEPRAZOLE 40 MG/1
CAPSULE, DELAYED RELEASE ORAL
Qty: 30 CAPSULE | Refills: 0 | Status: SHIPPED | OUTPATIENT
Start: 2023-01-06

## 2023-01-06 RX ORDER — VALSARTAN 160 MG/1
TABLET ORAL
Qty: 15 TABLET | Refills: 0 | Status: SHIPPED | OUTPATIENT
Start: 2023-01-06

## 2023-01-06 RX ORDER — DICYCLOMINE HYDROCHLORIDE 10 MG/1
CAPSULE ORAL
Qty: 30 CAPSULE | Refills: 0 | Status: SHIPPED | OUTPATIENT
Start: 2023-01-06

## 2023-02-10 ENCOUNTER — VIRTUAL VISIT (OUTPATIENT)
Dept: INTERNAL MEDICINE CLINIC | Age: 68
End: 2023-02-10
Payer: MEDICARE

## 2023-02-10 DIAGNOSIS — R00.1 BRADYCARDIA: ICD-10-CM

## 2023-02-10 DIAGNOSIS — I10 ESSENTIAL HYPERTENSION: Primary | ICD-10-CM

## 2023-02-10 DIAGNOSIS — F33.0 MAJOR DEPRESSIVE DISORDER, RECURRENT, MILD (HCC): ICD-10-CM

## 2023-02-10 DIAGNOSIS — K51.90 ULCERATIVE COLITIS WITHOUT COMPLICATIONS, UNSPECIFIED LOCATION (HCC): ICD-10-CM

## 2023-02-10 DIAGNOSIS — E03.9 ACQUIRED HYPOTHYROIDISM: ICD-10-CM

## 2023-02-10 DIAGNOSIS — E78.9 BORDERLINE HIGH CHOLESTEROL: ICD-10-CM

## 2023-02-10 RX ORDER — VALSARTAN 80 MG/1
80 TABLET ORAL DAILY
Qty: 90 TABLET | Refills: 3 | Status: SHIPPED | OUTPATIENT
Start: 2023-02-10

## 2023-02-10 RX ORDER — OMEPRAZOLE 40 MG/1
CAPSULE, DELAYED RELEASE ORAL
Qty: 90 CAPSULE | Refills: 3 | Status: SHIPPED | OUTPATIENT
Start: 2023-02-10

## 2023-02-10 NOTE — PROGRESS NOTES
Adali Soto, who was evaluated through a synchronous (real-time) audio-video encounter, and/or her healthcare decision maker, is aware that it is a billable service, which includes applicable co-pays, with coverage as determined by her insurance carrier. She provided verbal consent to proceed and patient identification was verified. This visit was conducted pursuant to the emergency declaration under the [facility] [address] and the [employer]. A caregiver was present when appropriate. Ability to conduct physical exam was limited. The patient was located at: Home: [phone]  The provider was located at: Home: Dulce Psoada MD on 2/9/2023 at 9:11 PM    Subjective:     Adali Soto is a 75 y.o. female who presents for follow up of hypertension, borderline hyperlipidemia, gerd, hypothyroidism. Diet and Lifestyle: generally follows a low sodium diet, exercises regularly  Home BP Monitoring: is well controlled at home, can be as low as 100/60s. Cardiovascular ROS: taking medications as instructed, no medication side effects noted, no TIA's, no chest pain on exertion, no dyspnea on exertion, no swelling of ankles, no orthostatic dizziness or lightheadedness. Continued bradycardia. Seeing Dr. Justine Lopez. Can have some lightheadedness with HR is low. New concerns:   Has been feeling more tired. Wonders if needs more thyroid medication. Taking Unithyroid 88mcg 1 daily but 2 on Sunday. Has gained weight but able to lose some of the weight. Skin has been very dry. Continues with frequent BM 1-2 times a day sometimes formed sometime loose. Hx of hemorrhoids. Very rare brb spot when wiped. Had coloscopy ? 2019 with Dr. Altagracia Gómez but unable to get records in past.    Lost taste and smell prior to [facility]. Has not returned.       Current Outpatient Medications   Medication Sig Dispense Refill    valsartan (DIOVAN) 160 mg tablet TAKE 1/2 TABLET ONE TIME DAILY **LAST FILL BEFORE OFFICE VISIT** 15 Tablet 0    dicyclomine (BENTYL) 10 mg capsule TAKE ONE CAPSULE BY MOUTH THREE TIMES A DAY AS NEEDED FOR ABDOMINAL CRAMPS **LAST FILL BEFORE OFFICE VISIT** 30 Capsule 0    omeprazole (PRILOSEC) 40 mg capsule TAKE ONE CAPSULE BY MOUTH ONE TIME DAILY **LAST FILL BEFORE OFFICE VISIT** 30 Capsule 0    Unithroid 88 mcg tablet Take 1 Tablet by mouth Daily (before breakfast). 90 Tablet 3    mometasone (ELOCON) 0.1 % topical cream APPLY TOPICALLY TO AFFECTED AREA(S) DAILY AS DIRECTED BY PRESCRIBER 30 g 3    venlafaxine-SR (EFFEXOR-XR) 75 mg capsule Take 1 Capsule by mouth daily. 90 Capsule 3    hydrocortisone (ANUSOL-HC) 2.5 % rectal cream APPLY A THIN LAYER INTO RECTUM FOUR TIMES A DAY 30 g 1    ALPRAZolam (XANAX) 0.5 mg tablet TAKE 1/2 TO 1 TABLET BY MOUTH TWO TIMES A DAY AS NEEDED FOR ANXIETY ** DO NOT EXCEED 2 TABLETS IN 24 HOURS ** 30 Tablet 1    scopolamine (TRANSDERM-SCOP) 1 mg over 3 days pt3d  (Patient not taking: Reported on 4/14/2022)      loratadine 10 mg cap Indications: prn (Patient not taking: No sig reported)      docusate sodium 100 mg tab Take 100 mg by mouth daily. As needed (Patient not taking: Reported on 4/14/2022)      ondansetron (ZOFRAN ODT) 4 mg disintegrating tablet Take 2 Tabs by mouth every eight (8) hours as needed for Nausea. 12 Tab 0    diclofenac EC (VOLTAREN) 75 mg EC tablet Take 75 mg by mouth two (2) times daily as needed. calcium carbonate (TUMS) 200 mg calcium (500 mg) chew Take 1 Tab by mouth as needed. ketorolac (ACULAR) 0.5 % ophthalmic solution Administer 2 Drops to both eyes four (4) times daily as needed.  1 Bottle 0        Lab Results   Component Value Date/Time    Hemoglobin A1c 5.2 07/27/2017 02:35 PM    Hemoglobin A1c 5.1 05/15/2017 08:49 AM    Hemoglobin A1c 5.8 04/08/2013 09:10 AM    Glucose 97 09/07/2021 09:43 AM Glucose (POC) 86 08/03/2017 07:10 AM    LDL, calculated 95.8 07/01/2021 01:57 PM    Creatinine 0.82 09/07/2021 09:43 AM      Lab Results   Component Value Date/Time    Cholesterol, total 186 07/01/2021 01:57 PM    HDL Cholesterol 80 07/01/2021 01:57 PM    LDL, calculated 95.8 07/01/2021 01:57 PM    Triglyceride 51 07/01/2021 01:57 PM    CHOL/HDL Ratio 2.3 07/01/2021 01:57 PM     Lab Results   Component Value Date/Time    ALT (SGPT) 20 09/07/2021 09:43 AM    Alk. phosphatase 66 09/07/2021 09:43 AM    Bilirubin, total 1.1 (H) 09/07/2021 09:43 AM    Albumin 3.3 (L) 09/07/2021 09:43 AM    Protein, total 6.6 09/07/2021 09:43 AM    INR 1.3 (H) 08/05/2017 02:49 AM    INR POC 1.1 08/14/2017 12:33 PM    Prothrombin time 13.0 (H) 08/05/2017 02:49 AM    PLATELET 655 52/99/0532 09:43 AM       Lab Results   Component Value Date/Time    GFR est non-AA >60 09/07/2021 09:43 AM    GFR est AA >60 09/07/2021 09:43 AM    Creatinine 0.82 09/07/2021 09:43 AM    BUN 14 09/07/2021 09:43 AM    Sodium 139 09/07/2021 09:43 AM    Potassium 6.0 (H) 09/07/2021 09:43 AM    Chloride 108 09/07/2021 09:43 AM    CO2 26 09/07/2021 09:43 AM    Magnesium 2.2 07/01/2021 01:57 PM     Lab Results   Component Value Date/Time    TSH 3.150 08/05/2022 07:04 AM    T4, Free 1.59 08/05/2022 07:04 AM         Review of Systems, additional:  Pertinent items are noted in HPI. Objective: There were no vitals taken for this visit. Patient-Reported Vitals 9/23/2020   Patient-Reported Weight 188lb   Patient-Reported Pulse 59   Patient-Reported Temperature 98.4   Patient-Reported Systolic  670   Patient-Reported Diastolic 74             Appearance: alert, well appearing, and in no distress and oriented to person, place, and time. General exam:   NECK - nml appearance  PULM - nml rate and effort       Assessment/Plan:     Diagnoses and all orders for this visit:    1. Essential hypertension - well controlled on lower dose Valsartan. Continue same meds.   Check labs.    -     METABOLIC PANEL, COMPREHENSIVE; Future  -     LIPID PANEL; Future  -     valsartan (DIOVAN) 80 mg tablet; Take 1 Tablet by mouth daily. 2. Major depressive disorder, recurrent, mild (HCC) - controlled with Effexor XR 75mg every day and Xanax prn. Continue same. 3. Ulcerative colitis without complications, unspecified location [facility]) - referred back to [address]. Was having colonoscopies every 3 years, ? Last 2019 the would be overdue. Also with increased stools, ? Flare of UC    4. Acquired hypothyroidism - ? Need to adjust meds. Continue unithyroid 88mcg 8 tabs per week but check labs and adjust dose if needed. -     TSH 3RD GENERATION; Future  -     T4, FREE; Future    5. Borderline high cholesterol - repeat labs. -     METABOLIC PANEL, COMPREHENSIVE; Future  -     LIPID PANEL; Future    6. GERD - controlled, cont omeprazole  -     omeprazole (PRILOSEC) 40 mg capsule; TAKE ONE CAPSULE BY MOUTH ONE TIME DAILY    7. Bradycardia - follow up with cardiologist.  They had suggested caffiene, encouraged patient to start with 1 cup of coffee daily. Follow up 6 months.

## 2023-02-20 ENCOUNTER — TELEPHONE (OUTPATIENT)
Dept: INTERNAL MEDICINE CLINIC | Age: 68
End: 2023-02-20

## 2023-02-20 NOTE — TELEPHONE ENCOUNTER
Patient called about Friday AM bloodwork results - should she increase her medication? Requesting a call back please.

## 2023-02-22 NOTE — TELEPHONE ENCOUNTER
Please let her know her lab work was all normal.  Her thyroid levels are normal.  No need to adjust her medications at this time.

## 2023-06-09 ENCOUNTER — TELEPHONE (OUTPATIENT)
Age: 68
End: 2023-06-09

## 2023-06-09 NOTE — TELEPHONE ENCOUNTER
Patient has allergies and has been generic claritin she was outside on Tues for graduation and has lost her voice. She wants to know what she can take to help with her allergies and to get her voice back.

## 2023-06-09 NOTE — TELEPHONE ENCOUNTER
Patient will test for Covid at home and seek medical attention at [facility].

## 2023-07-19 ENCOUNTER — TELEPHONE (OUTPATIENT)
Age: 68
End: 2023-07-19

## 2023-07-19 NOTE — TELEPHONE ENCOUNTER
Pt would like to receive a refill for the following medication      levothyroxine (UNITHROID) 88 MCG tablet      Pt states that she has called about 3 weeks ago for this medication and is now having complications Hair loss due to not having the medication. Pt is very upset. She would like to receive a call back on this matter. Please send script to [address] [place] [address]. This is where she receives her scripts.

## 2023-07-20 ENCOUNTER — TELEPHONE (OUTPATIENT)
Age: 68
End: 2023-07-20

## 2023-07-20 RX ORDER — LEVOTHYROXINE SODIUM 88 UG/1
88 TABLET ORAL
Qty: 90 TABLET | Refills: 3 | Status: SHIPPED | OUTPATIENT
Start: 2023-07-20

## 2023-07-20 NOTE — TELEPHONE ENCOUNTER
Patient called about unithroid script that was sent over to [address] [place] [address]. she states they did not receive it she checked this morning. Called pharmacy they were able to see it in her profile. Lvm for pt letting her know it was rc'vd by the pharmacy and to contact them to know when she can pick it up.

## 2023-07-21 ENCOUNTER — TELEPHONE (OUTPATIENT)
Age: 68
End: 2023-07-21

## 2023-07-21 NOTE — TELEPHONE ENCOUNTER
[address] is calling about Pt script for the following medication      levothyroxine (UNITHROID) 88 MCG tablet      Donavon Scott was told that the instructions were wrong and would like to know the correct instructions. She can be reached at the follow #   [phone].

## 2023-07-24 RX ORDER — LEVOTHYROXINE SODIUM 88 UG/1
88 TABLET ORAL
Qty: 105 TABLET | Refills: 3 | Status: SHIPPED | OUTPATIENT
Start: 2023-07-24

## 2023-09-27 RX ORDER — DICYCLOMINE HYDROCHLORIDE 10 MG/1
CAPSULE ORAL
Qty: 30 CAPSULE | Refills: 0 | Status: SHIPPED | OUTPATIENT
Start: 2023-09-27

## 2023-10-19 RX ORDER — DICYCLOMINE HYDROCHLORIDE 10 MG/1
CAPSULE ORAL
Qty: 30 CAPSULE | Refills: 2 | Status: SHIPPED | OUTPATIENT
Start: 2023-10-19

## 2023-10-19 NOTE — TELEPHONE ENCOUNTER
Chief Complaint   Patient presents with    Medication Refill       Requested Prescriptions     Pending Prescriptions Disp Refills    dicyclomine (BENTYL) 10 MG capsule [Pharmacy Med Name: DICYCLOMINE HCL 10MG CAPS] 30 capsule 2     Sig: TAKE ONE CAPSULE BY MOUTH THREE TIMES A DAY AS NEEDED FOR ABDOMINAL CRAMPS       Allergies: Allergies   Allergen Reactions    Adhesive Tape Itching     Other reaction(s): Unknown (comments)  Blisters, bumps. -long term application      Lidocaine Rash     Patch-adhesive patch. Allergic to the adhesive - long term use. Not allergic to lidoderm.        Last visit with clinic:  4/14/2023   Next visit with clinic: Visit date not found     Last visit with this provider: 4/14/2023   Next Visit with this provider: Visit date not found    Signed by Dorita POLK  10/19/23  11:42 AM

## 2023-11-07 ENCOUNTER — HOSPITAL ENCOUNTER (OUTPATIENT)
Facility: HOSPITAL | Age: 68
Setting detail: OUTPATIENT SURGERY
Discharge: HOME | End: 2023-11-07
Attending: SPECIALIST | Admitting: SPECIALIST
Payer: MEDICARE

## 2023-11-07 ENCOUNTER — ANESTHESIA (OUTPATIENT)
Facility: HOSPITAL | Age: 68
End: 2023-11-07
Payer: MEDICARE

## 2023-11-07 ENCOUNTER — ANESTHESIA EVENT (OUTPATIENT)
Facility: HOSPITAL | Age: 68
End: 2023-11-07
Payer: MEDICARE

## 2023-11-07 VITALS
BODY MASS INDEX: 30.27 KG/M2 | HEART RATE: 69 BPM | HEIGHT: 65 IN | WEIGHT: 181.7 LBS | OXYGEN SATURATION: 98 % | TEMPERATURE: 98.5 F | RESPIRATION RATE: 15 BRPM | SYSTOLIC BLOOD PRESSURE: 90 MMHG | DIASTOLIC BLOOD PRESSURE: 42 MMHG

## 2023-11-07 PROCEDURE — 6360000002 HC RX W HCPCS: Performed by: NURSE ANESTHETIST, CERTIFIED REGISTERED

## 2023-11-07 PROCEDURE — 3700000000 HC ANESTHESIA ATTENDED CARE: Performed by: SPECIALIST

## 2023-11-07 PROCEDURE — 3600007512: Performed by: SPECIALIST

## 2023-11-07 PROCEDURE — 2709999900 HC NON-CHARGEABLE SUPPLY: Performed by: SPECIALIST

## 2023-11-07 PROCEDURE — 2580000003 HC RX 258: Performed by: SPECIALIST

## 2023-11-07 PROCEDURE — 7100000010 HC PHASE II RECOVERY - FIRST 15 MIN: Performed by: SPECIALIST

## 2023-11-07 PROCEDURE — 3700000001 HC ADD 15 MINUTES (ANESTHESIA): Performed by: SPECIALIST

## 2023-11-07 PROCEDURE — 6370000000 HC RX 637 (ALT 250 FOR IP): Performed by: SPECIALIST

## 2023-11-07 PROCEDURE — 7100000011 HC PHASE II RECOVERY - ADDTL 15 MIN: Performed by: SPECIALIST

## 2023-11-07 PROCEDURE — 88305 TISSUE EXAM BY PATHOLOGIST: CPT

## 2023-11-07 PROCEDURE — 3600007502: Performed by: SPECIALIST

## 2023-11-07 RX ORDER — DICYCLOMINE HYDROCHLORIDE 10 MG/1
10 CAPSULE ORAL
COMMUNITY

## 2023-11-07 RX ORDER — PHENYLEPHRINE HCL IN 0.9% NACL 0.4MG/10ML
SYRINGE (ML) INTRAVENOUS PRN
Status: DISCONTINUED | OUTPATIENT
Start: 2023-11-07 | End: 2023-11-07 | Stop reason: SDUPTHER

## 2023-11-07 RX ORDER — SODIUM CHLORIDE 9 MG/ML
INJECTION, SOLUTION INTRAVENOUS CONTINUOUS PRN
Status: COMPLETED | OUTPATIENT
Start: 2023-11-07 | End: 2023-11-07

## 2023-11-07 RX ORDER — SODIUM CHLORIDE 9 MG/ML
25 INJECTION, SOLUTION INTRAVENOUS PRN
Status: DISCONTINUED | OUTPATIENT
Start: 2023-11-07 | End: 2023-11-07 | Stop reason: HOSPADM

## 2023-11-07 RX ORDER — SODIUM CHLORIDE 0.9 % (FLUSH) 0.9 %
5-40 SYRINGE (ML) INJECTION EVERY 12 HOURS SCHEDULED
Status: DISCONTINUED | OUTPATIENT
Start: 2023-11-07 | End: 2023-11-07 | Stop reason: HOSPADM

## 2023-11-07 RX ORDER — SODIUM CHLORIDE 9 MG/ML
INJECTION, SOLUTION INTRAVENOUS CONTINUOUS
Status: DISCONTINUED | OUTPATIENT
Start: 2023-11-07 | End: 2023-11-07 | Stop reason: HOSPADM

## 2023-11-07 RX ORDER — SIMETHICONE 20 MG/.3ML
EMULSION ORAL PRN
Status: DISCONTINUED | OUTPATIENT
Start: 2023-11-07 | End: 2023-11-07 | Stop reason: ALTCHOICE

## 2023-11-07 RX ORDER — SODIUM CHLORIDE 0.9 % (FLUSH) 0.9 %
5-40 SYRINGE (ML) INJECTION PRN
Status: DISCONTINUED | OUTPATIENT
Start: 2023-11-07 | End: 2023-11-07 | Stop reason: HOSPADM

## 2023-11-07 RX ADMIN — PROPOFOL 50 MG: 10 INJECTION, EMULSION INTRAVENOUS at 14:18

## 2023-11-07 RX ADMIN — PROPOFOL 25 MG: 10 INJECTION, EMULSION INTRAVENOUS at 14:13

## 2023-11-07 RX ADMIN — PROPOFOL 50 MG: 10 INJECTION, EMULSION INTRAVENOUS at 14:22

## 2023-11-07 RX ADMIN — PROPOFOL 50 MG: 10 INJECTION, EMULSION INTRAVENOUS at 14:36

## 2023-11-07 RX ADMIN — PROPOFOL 50 MG: 10 INJECTION, EMULSION INTRAVENOUS at 14:33

## 2023-11-07 RX ADMIN — PROPOFOL 50 MG: 10 INJECTION, EMULSION INTRAVENOUS at 14:27

## 2023-11-07 RX ADMIN — PROPOFOL 50 MG: 10 INJECTION, EMULSION INTRAVENOUS at 14:23

## 2023-11-07 RX ADMIN — PROPOFOL 50 MG: 10 INJECTION, EMULSION INTRAVENOUS at 14:21

## 2023-11-07 RX ADMIN — PROPOFOL 50 MG: 10 INJECTION, EMULSION INTRAVENOUS at 14:31

## 2023-11-07 RX ADMIN — Medication 80 MCG: at 14:16

## 2023-11-07 RX ADMIN — SODIUM CHLORIDE: 9 INJECTION, SOLUTION INTRAVENOUS at 13:55

## 2023-11-07 RX ADMIN — PROPOFOL 25 MG: 10 INJECTION, EMULSION INTRAVENOUS at 14:15

## 2023-11-07 RX ADMIN — PROPOFOL 50 MG: 10 INJECTION, EMULSION INTRAVENOUS at 14:11

## 2023-11-07 RX ADMIN — PROPOFOL 50 MG: 10 INJECTION, EMULSION INTRAVENOUS at 14:17

## 2023-11-07 RX ADMIN — PROPOFOL 50 MG: 10 INJECTION, EMULSION INTRAVENOUS at 14:29

## 2023-11-07 RX ADMIN — PROPOFOL 50 MG: 10 INJECTION, EMULSION INTRAVENOUS at 14:19

## 2023-11-07 RX ADMIN — PROPOFOL 50 MG: 10 INJECTION, EMULSION INTRAVENOUS at 14:25

## 2023-11-07 ASSESSMENT — PAIN - FUNCTIONAL ASSESSMENT: PAIN_FUNCTIONAL_ASSESSMENT: NONE - DENIES PAIN

## 2023-11-07 NOTE — PROGRESS NOTES

## 2023-11-07 NOTE — DISCHARGE INSTRUCTIONS
(COVID-19)? The coronavirus disease (COVID-19) is caused by a virus. It is an illness that was first found in [place], [place], in December 2019. It has since spread worldwide. The virus can cause fever, cough, and trouble breathing. In severe cases, it can cause pneumonia and make it hard to breathe without help. It can cause death. Coronaviruses are a large group of viruses. They cause the common cold. They also cause more serious illnesses like Middle East respiratory syndrome (MERS) and severe acute respiratory syndrome (SARS). COVID-19 is caused by a novel coronavirus. That means it's a new type that has not been seen in people before. This virus spreads person-to-person through droplets from coughing and sneezing. It can also spread when you are close to someone who is infected. And it can spread when you touch something that has the virus on it, such as a doorknob or a tabletop. What can you do to protect yourself from coronavirus (COVID-19)? The best way to protect yourself from getting sick is to: Avoid areas where there is an outbreak. Avoid contact with people who may be infected. Wash your hands often with soap or alcohol-based hand sanitizers. Avoid crowds and try to stay at least 6 feet away from other people. Wash your hands often, especially after you cough or sneeze. Use soap and water, and scrub for at least 20 seconds. If soap and water aren't available, use an alcohol-based hand . Avoid touching your mouth, nose, and eyes. What can you do to avoid spreading the virus to others? To help avoid spreading the virus to others:  Cover your mouth with a tissue when you cough or sneeze. Then throw the tissue in the trash. Use a disinfectant to clean things that you touch often. Stay home if you are sick or have been exposed to the virus. Don't go to school, work, or public areas. And don't use public transportation.   If you are sick:  Leave your home only if you need to get medical

## 2023-11-07 NOTE — OP NOTE
[facility]  [address] [place], [place] [address], [address] [facility]                 Colonoscopy Procedure Note    Indications:   See Preoperative Diagnosis above  Referring Physician: Kiarra Pressley MD  Anesthesia/Sedation: MAC anesthesia Propofol  Endoscopist:  Dr. Randall Mc  Assistant:  Circulator: Katia Urbina RN  Endoscopy Technician: Mo Beaulieu  Preoperative diagnosis: Abdominal pain, unspecified abdominal location [R10.9], diarrhea    Procedure in Detail:  Informed consent was obtained for the procedure, including sedation. Risks of perforation, hemorrhage, adverse drug reaction, and aspiration were discussed. The patient was placed in the left lateral decubitus position. Based on the pre-procedure assessment, including review of the patient's medical history, medications, allergies, and review of systems, she had been deemed to be an appropriate candidate for  sedation; she was therefore sedated with the medications listed above. The patient was monitored continuously with ECG tracing, pulse oximetry, blood pressure monitoring, and direct observations. A rectal examination was performed. The [id number] was inserted into the rectum and advanced under direct vision to the cecum, which was identified by the ileocecal valve and appendiceal orifice. The quality of the colonic preparation was fair. A careful inspection was made as the colonoscope was withdrawn, including a retroflexed view of the rectum; findings and interventions are described below. Appropriate photodocumentation was obtained.     Findings:  Rectum: normal, random biopsies done  Sigmoid: moderate diverticulosis;random biopsies done  Descending Colon: mild diverticulosis; 6 mm sessile polyp removed with hot snare,random biopsies done  Transverse Colon: mild diverticulosis; 4 mm sessile polyp removed with cold snare,random biopsies done  Ascending Colon: mild diverticulosis;random biopsies done  Cecum: normal, random

## 2023-11-07 NOTE — H&P
Pre-endoscopy H and P for Colonoscopy    The patient was seen and examined. Date of last colonoscopy: 2016, Polyps  No      The airway was assessed and documented. The problem list, past medical history, and medications were reviewed. Patient Active Problem List   Diagnosis    Primary osteoarthritis of left knee    Urge incontinence of urine    Postoperative seroma of skin after non-dermatologic procedure    Advance directive discussed with patient    Increased frequency of urination    Overactive bladder    Iritis    Obesity (BMI 30.0-34.9)    UC (ulcerative colitis) (HCC)    Blisters of multiple sites    Obesity    Complete tear of right rotator cuff    Mechanical complication of knee prosthesis (HCC)    Acute vaginitis    Pain in pelvis    Dysphagia    Right knee DJD    Right lower quadrant pain    Vaginal odor    Nonspecific paroxysmal spell    Hypothyroid    Candidiasis    Family history of malignant neoplasm of colon    Abdominal pain, left upper quadrant    Complete tear of left rotator cuff    Major depressive disorder, recurrent, mild (HCC)    Major depressive disorder, recurrent, moderate (HCC)    Major depressive disorder, recurrent, unspecified (HCC)    Bradycardia     Social History     Socioeconomic History    Marital status: Single     Spouse name: Not on file    Number of children: Not on file    Years of education: Not on file    Highest education level: Not on file   Occupational History    Not on file   Tobacco Use    Smoking status: Never    Smokeless tobacco: Never   Substance and Sexual Activity    Alcohol use:  Yes     Alcohol/week: 0.0 - 1.0 standard drinks of alcohol    Drug use: No    Sexual activity: Not on file   Other Topics Concern    Not on file   Social History Narrative    Lives in [facility]     Social Determinants of Health     Financial Resource Strain: Not on file   Food Insecurity: Not on file   Transportation Needs: Not on file   Physical Activity: Not on file   Stress:

## 2024-04-12 RX ORDER — OMEPRAZOLE 40 MG/1
40 CAPSULE, DELAYED RELEASE ORAL DAILY
Qty: 30 CAPSULE | OUTPATIENT
Start: 2024-04-12

## 2024-04-12 NOTE — TELEPHONE ENCOUNTER
Refill request received from [facility] for   Requested Prescriptions     Pending Prescriptions Disp Refills    omeprazole (PRILOSEC) 40 MG delayed release capsule 30 capsule      Sig: Take 1 capsule by mouth daily     Last office visit: 4/14/2023   Next office visit: Visit date not found     Routed to Dr Terrance Fernandez for review.

## 2024-04-18 NOTE — TELEPHONE ENCOUNTER
Pt requesting 90 day supply    Refill request received from [facility] for   Requested Prescriptions     Pending Prescriptions Disp Refills    omeprazole (PRILOSEC) 40 MG delayed release capsule 30 capsule      Sig: Take 1 capsule by mouth daily     Last office visit: 4/14/2023   Next office visit: Visit date not found     Routed to Dr Julian Shin for review.

## 2024-04-19 RX ORDER — OMEPRAZOLE 40 MG/1
40 CAPSULE, DELAYED RELEASE ORAL DAILY
Qty: 30 CAPSULE | Refills: 0 | Status: SHIPPED | OUTPATIENT
Start: 2024-04-19

## 2024-06-05 NOTE — TELEPHONE ENCOUNTER
STAT hemoglobin. Lab called.      Pt calling stating her BP elevated to 206/164 at therapy and she did take her BP medication this morning. However, before leaving it did decrease.  Would like a call [phone].              From [facility]

## 2024-08-14 RX ORDER — OMEPRAZOLE 40 MG/1
40 CAPSULE, DELAYED RELEASE ORAL DAILY
Qty: 30 CAPSULE | Refills: 0 | OUTPATIENT
Start: 2024-08-14

## 2024-08-14 NOTE — TELEPHONE ENCOUNTER
Left vm for patient regarding refill request for omeprazole (PRILOSEC) 40 MG delayed release capsule. Informed her that she must establish care with a new provider before this medication can be refilled. Left number for patient to return call to schedule an appointment.

## 2024-09-13 NOTE — DISCHARGE INSTRUCTIONS
Kim Guerrero  [id number]  1955    EGD DISCHARGE INSTRUCTIONS  Discomfort:  Sore throat- throat lozenges or warm salt water gargle  redness at IV site- apply warm compress to area; if redness or soreness persist- contact your physician  Gaseous discomfort- walking, belching will help relieve any discomfort  You may not operate a vehicle for 12 hours  You may not engage in an occupation involving machinery or appliances for rest of today. You may not drink alcoholic beverages for at least 12 hours  Avoid making any critical decisions for at least 24 hour  DIET  You may resume your regular diet - however -  remember your colon is empty and a heavy meal will produce gas. Avoid these foods:  vegetables, fried / greasy foods, carbonated drinks  ACTIVITY  You may resume your normal daily activities. Spend the remainder of the day resting -  avoid any strenuous activity. CALL M.D. ANY SIGN OF   Increasing pain, nausea, vomiting  Abdominal distension (swelling)  New increased bleeding (oral or rectal)  Fever (chills)  Pain in chest area  Bloody discharge from nose or mouth  Shortness of breath    You may not take any Advil, Aspirin, Ibuprofen, Motrin, Aleve, or Goodys unless MD recommended, ONLY  Tylenol as needed for pain.     Follow-up Instructions:   Call Dr. Mcghee Hearing office to make appointment  Office telephone for problems or questions [phone]  Results of procedure / biopsy in 10-14 days periumbilical/epigastric

## 2024-10-24 RX ORDER — OMEPRAZOLE 40 MG/1
40 CAPSULE, DELAYED RELEASE ORAL DAILY
Qty: 30 CAPSULE | Refills: 0 | OUTPATIENT
Start: 2024-10-24

## 2024-10-24 NOTE — TELEPHONE ENCOUNTER
Dr Harrison patient requesting refill on Levothyroxine, patient was not inform that her provider left the practice 1 year ago, she has a new patient appointment in 5 months but is requesting refill until she can see her new Dr.      Patient voice Jesus MEREDITH

## 2024-10-24 NOTE — TELEPHONE ENCOUNTER
Refill request received from [facility] for   Requested Prescriptions     Pending Prescriptions Disp Refills    levothyroxine (UNITHROID) 88 MCG tablet 105 tablet 3     Sig: Take 1 tablet by mouth every morning (before breakfast) But take 2 tabs on Sunday for total 8 tabs per week.     Refused Prescriptions Disp Refills    omeprazole (PRILOSEC) 40 MG delayed release capsule [Pharmacy Med Name: OMEPRAZOLE 40MG CPDR] 30 capsule 0     Sig: TAKE 1 CAPSULE BY MOUTH ONCE A DAY     Refused By: SERINA SAMANO     Reason for Refusal: Patient no longer under prescriber care     Last office visit: 4/14/2023   Next office visit: Visit date not found     Routed to Dr Juhi Carlin for review.

## 2024-10-25 RX ORDER — LEVOTHYROXINE SODIUM 88 UG/1
88 TABLET ORAL
Qty: 30 TABLET | Refills: 0 | Status: SHIPPED | OUTPATIENT
Start: 2024-10-25

## 2024-10-25 NOTE — TELEPHONE ENCOUNTER
Spoke with patient verify name and        Patient is going to make the appointment next week and has receive the 30 day supply.      Alexsandra MEREDITH

## 2024-10-29 ENCOUNTER — TELEPHONE (OUTPATIENT)
Age: 69
End: 2024-10-29

## 2024-10-29 NOTE — TELEPHONE ENCOUNTER
Called and spoke to a pharmacy rep to inform them that this patient is no longer under the care of her listed PCP and that the patient is in need of a new PCP for medication refills.     Anderson RAMIREZ LPN

## 2025-02-14 ENCOUNTER — OFFICE VISIT (OUTPATIENT)
Age: 70
End: 2025-02-14
Payer: MEDICARE

## 2025-02-14 VITALS
DIASTOLIC BLOOD PRESSURE: 88 MMHG | OXYGEN SATURATION: 99 % | HEIGHT: 65 IN | HEART RATE: 62 BPM | TEMPERATURE: 98 F | RESPIRATION RATE: 18 BRPM | WEIGHT: 169.1 LBS | SYSTOLIC BLOOD PRESSURE: 138 MMHG | BODY MASS INDEX: 28.17 KG/M2

## 2025-02-14 DIAGNOSIS — Z13.220 LIPID SCREENING: ICD-10-CM

## 2025-02-14 DIAGNOSIS — K51.90 ULCERATIVE COLITIS WITHOUT COMPLICATIONS, UNSPECIFIED LOCATION (HCC): ICD-10-CM

## 2025-02-14 DIAGNOSIS — Z76.89 ENCOUNTER TO ESTABLISH CARE: Primary | ICD-10-CM

## 2025-02-14 DIAGNOSIS — E03.9 ACQUIRED HYPOTHYROIDISM: ICD-10-CM

## 2025-02-14 DIAGNOSIS — I10 ESSENTIAL (PRIMARY) HYPERTENSION: ICD-10-CM

## 2025-02-14 DIAGNOSIS — F33.1 MAJOR DEPRESSIVE DISORDER, RECURRENT, MODERATE (HCC): ICD-10-CM

## 2025-02-14 DIAGNOSIS — Z76.89 ENCOUNTER TO ESTABLISH CARE: ICD-10-CM

## 2025-02-14 PROCEDURE — 1126F AMNT PAIN NOTED NONE PRSNT: CPT | Performed by: INTERNAL MEDICINE

## 2025-02-14 PROCEDURE — 3078F DIAST BP <80 MM HG: CPT | Performed by: INTERNAL MEDICINE

## 2025-02-14 PROCEDURE — 3075F SYST BP GE 130 - 139MM HG: CPT | Performed by: INTERNAL MEDICINE

## 2025-02-14 PROCEDURE — 1160F RVW MEDS BY RX/DR IN RCRD: CPT | Performed by: INTERNAL MEDICINE

## 2025-02-14 PROCEDURE — 99204 OFFICE O/P NEW MOD 45 MIN: CPT | Performed by: INTERNAL MEDICINE

## 2025-02-14 PROCEDURE — 1123F ACP DISCUSS/DSCN MKR DOCD: CPT | Performed by: INTERNAL MEDICINE

## 2025-02-14 PROCEDURE — 1159F MED LIST DOCD IN RCRD: CPT | Performed by: INTERNAL MEDICINE

## 2025-02-14 RX ORDER — DICYCLOMINE HYDROCHLORIDE 10 MG/1
10 CAPSULE ORAL
Qty: 120 CAPSULE | Refills: 1 | Status: SHIPPED | OUTPATIENT
Start: 2025-02-14

## 2025-02-14 RX ORDER — LEVOTHYROXINE SODIUM 88 UG/1
88 TABLET ORAL
Qty: 120 TABLET | Refills: 1 | Status: SHIPPED | OUTPATIENT
Start: 2025-02-14

## 2025-02-14 RX ORDER — OMEPRAZOLE 40 MG/1
40 CAPSULE, DELAYED RELEASE ORAL DAILY
Qty: 90 CAPSULE | Refills: 1 | Status: SHIPPED | OUTPATIENT
Start: 2025-02-14

## 2025-02-14 RX ORDER — VALSARTAN 80 MG/1
80 TABLET ORAL DAILY
Qty: 90 TABLET | Refills: 1 | Status: SHIPPED | OUTPATIENT
Start: 2025-02-14

## 2025-02-14 SDOH — ECONOMIC STABILITY: FOOD INSECURITY: WITHIN THE PAST 12 MONTHS, YOU WORRIED THAT YOUR FOOD WOULD RUN OUT BEFORE YOU GOT MONEY TO BUY MORE.: NEVER TRUE

## 2025-02-14 SDOH — ECONOMIC STABILITY: FOOD INSECURITY: WITHIN THE PAST 12 MONTHS, THE FOOD YOU BOUGHT JUST DIDN'T LAST AND YOU DIDN'T HAVE MONEY TO GET MORE.: NEVER TRUE

## 2025-02-14 ASSESSMENT — PATIENT HEALTH QUESTIONNAIRE - PHQ9
2. FEELING DOWN, DEPRESSED OR HOPELESS: NOT AT ALL
8. MOVING OR SPEAKING SO SLOWLY THAT OTHER PEOPLE COULD HAVE NOTICED. OR THE OPPOSITE, BEING SO FIGETY OR RESTLESS THAT YOU HAVE BEEN MOVING AROUND A LOT MORE THAN USUAL: NOT AT ALL
7. TROUBLE CONCENTRATING ON THINGS, SUCH AS READING THE NEWSPAPER OR WATCHING TELEVISION: NOT AT ALL
SUM OF ALL RESPONSES TO PHQ QUESTIONS 1-9: 0
6. FEELING BAD ABOUT YOURSELF - OR THAT YOU ARE A FAILURE OR HAVE LET YOURSELF OR YOUR FAMILY DOWN: NOT AT ALL
SUM OF ALL RESPONSES TO PHQ QUESTIONS 1-9: 0
4. FEELING TIRED OR HAVING LITTLE ENERGY: NOT AT ALL
1. LITTLE INTEREST OR PLEASURE IN DOING THINGS: NOT AT ALL
SUM OF ALL RESPONSES TO PHQ QUESTIONS 1-9: 0
SUM OF ALL RESPONSES TO PHQ QUESTIONS 1-9: 0
3. TROUBLE FALLING OR STAYING ASLEEP: NOT AT ALL
5. POOR APPETITE OR OVEREATING: NOT AT ALL
10. IF YOU CHECKED OFF ANY PROBLEMS, HOW DIFFICULT HAVE THESE PROBLEMS MADE IT FOR YOU TO DO YOUR WORK, TAKE CARE OF THINGS AT HOME, OR GET ALONG WITH OTHER PEOPLE: NOT DIFFICULT AT ALL
SUM OF ALL RESPONSES TO PHQ9 QUESTIONS 1 & 2: 0
9. THOUGHTS THAT YOU WOULD BE BETTER OFF DEAD, OR OF HURTING YOURSELF: NOT AT ALL

## 2025-02-14 ASSESSMENT — ENCOUNTER SYMPTOMS
RESPIRATORY NEGATIVE: 1
ABDOMINAL PAIN: 1
DIARRHEA: 1

## 2025-02-14 NOTE — PROGRESS NOTES
Chief Complaint   Patient presents with    Establish Care     \"Have you been to the ER, urgent care clinic since your last visit?  Hospitalized since your last visit?\"    NO    “Have you seen or consulted any other health care providers outside our system since your last visit?”    Ne patient    Have you had a mammogram?”   NO    Date of last Mammogram: 10/21/2022              
(HCC)  -     omeprazole (PRILOSEC) 40 MG delayed release capsule; Take 1 capsule by mouth daily  -     [facility] - Ethan Sin MD, [facility] ([address]  -     dicyclomine (BENTYL) 10 MG capsule; Take 1 capsule by mouth 4 times daily (before meals and nightly)    Acquired hypothyroidism  -     levothyroxine (UNITHROID) 88 MCG tablet; Take 1 tablet by mouth every morning (before breakfast) But take 2 tabs on Sunday for total 8 tabs per week.  -     TSH; Future    Essential (primary) hypertension  -     valsartan (DIOVAN) 80 MG tablet; Take 1 tablet by mouth daily  -     Comprehensive Metabolic Panel; Future  -     Urinalysis; Future    Lipid screening  -     Lipid Panel; Future    Reviewed with patient medication side effects in detail   I answered all patient questions and concerns  Labs and testing done and/or upcoming labs/test were reviewed and discussed   Reviewed and discussed daily activity, exercise and diet        Return in about 3 months (around 5/14/2025) for follow up for routine visit or before if needed, follow up if symptoms persist.     ANA Ramos - NP      The patient (or guardian, if applicable) and other individuals in attendance with the patient were advised that Artificial Intelligence will be utilized during this visit to record and process the conversation to generate a clinical note. The patient (or guardian, if applicable) and other individuals in attendance at the appointment consented to the use of AI, including the recording.

## 2025-02-14 NOTE — PATIENT INSTRUCTIONS
Start back medications now  Get blood work done in 30-60 days   Make medicare wellness visit for May

## 2025-05-22 ENCOUNTER — OFFICE VISIT (OUTPATIENT)
Age: 70
End: 2025-05-22
Payer: MEDICARE

## 2025-05-22 VITALS
HEIGHT: 65 IN | WEIGHT: 165.2 LBS | SYSTOLIC BLOOD PRESSURE: 147 MMHG | HEART RATE: 61 BPM | BODY MASS INDEX: 27.52 KG/M2 | DIASTOLIC BLOOD PRESSURE: 76 MMHG

## 2025-05-22 DIAGNOSIS — E03.9 ACQUIRED HYPOTHYROIDISM: Primary | ICD-10-CM

## 2025-05-22 PROCEDURE — G2211 COMPLEX E/M VISIT ADD ON: HCPCS | Performed by: INTERNAL MEDICINE

## 2025-05-22 PROCEDURE — 1123F ACP DISCUSS/DSCN MKR DOCD: CPT | Performed by: INTERNAL MEDICINE

## 2025-05-22 PROCEDURE — 99204 OFFICE O/P NEW MOD 45 MIN: CPT | Performed by: INTERNAL MEDICINE

## 2025-05-22 NOTE — PROGRESS NOTES
Chief Complaint   Patient presents with    Thyroid Problem    New Patient       * New Patient Visit     General:  Dx hypothyroidism > 20 years   No PCP     Currently levothyroxine 88mcg (wt based is 120mcg)  Was on brand in the past - wants to go back on (dispense report indicates was on UNITHROID at least '22 to '23; suddenly on generic '25)  Takes in AM with water - eats 60 min later  No iron, calcium  within 4 hours     Take MTV 1 hour after -- will move     No TSH in over a year     Family History of thyroid disease:  MGM, mom and others     Thyroid Symptoms  denies change in energy, denies change in weight, denies change in appetite, denies sleep issues, denies hair changes, no skin changes, denies sweats, denies hot/cold intolerance, denies tremor, denies palpitations, denies change in bowels, no change in menses, denies mood changes    Neck Symptoms  denies dysphagia, denies anterior neck pain, denies neck swelling, notes no nodules    Labs/Studies    Lab Results   Component Value Date/Time    TSH 1.370 02/17/2023 07:09 AM    T4FREE 1.65 02/17/2023 07:09 AM     3/20/23 TSH 0.2  Lab Results   Component Value Date/Time    TSH 1.370 02/17/2023 07:09 AM    TSH 3.150 08/05/2022 07:04 AM    TSH 4.950 11/23/2021 11:55 AM    TSH 3.21 07/01/2021 01:57 PM    TSH 2.860 02/24/2021 12:19 PM    TSH 4.730 11/12/2020 07:05 AM    TSH 2.990 09/04/2020 11:03 AM    TSH 0.14 02/06/2020 10:57 AM    TSH 2.170 10/25/2019 12:23 PM    TSH 0.059 09/05/2019 11:09 AM    TSH 0.041 06/04/2019 10:56 AM        Past Medical History:   Diagnosis Date    Arrhythmia 2018    \"PALPATATIONS, FLUTTERING, MEDS PER DR. ANTHONY\"    Arthritis     knees, back, SHOULDERS, R HIP    Chest pain 03/16/2018    PT STATES SHE'S BEEN ASSESSED FOR AND DETERMINED TO BE ESOPHAGEAL SPASMS PER TRINITY TAYLOR AND ,     Chronic pain     LOWER BACK, R HIP, SHOULDERS    Duodenal ulcer 2002    HOSPITALIZED AND TREATED WITH MEDICATION    GERD (gastroesophageal

## 2025-05-23 ENCOUNTER — RESULTS FOLLOW-UP (OUTPATIENT)
Age: 70
End: 2025-05-23

## 2025-05-23 DIAGNOSIS — E03.9 ACQUIRED HYPOTHYROIDISM: Primary | ICD-10-CM

## 2025-05-23 LAB
T4 FREE SERPL-MCNC: 1.09 NG/DL (ref 0.82–1.77)
TSH SERPL DL<=0.005 MIU/L-ACNC: 35.3 UIU/ML (ref 0.45–4.5)

## 2025-05-28 RX ORDER — LEVOTHYROXINE SODIUM 88 UG/1
TABLET ORAL
Qty: 30 TABLET | Refills: 3 | Status: SHIPPED | OUTPATIENT
Start: 2025-05-28

## 2025-06-03 ENCOUNTER — CLINICAL DOCUMENTATION (OUTPATIENT)
Age: 70
End: 2025-06-03

## 2025-06-03 NOTE — PROGRESS NOTES
Pt called the office informing the office that she received a No Show Letter; Pt was confused as to why she received it & what type of Provider was MICHAELLE Palm; Explained to Pt that she was her PCP & that she no-show her last appt & offered to schedule the Pt at her best convenience; Pt declined the appt due to MICHAELLE Palm not being a Dr. & stated she will look elsewhere for a PCP

## 2025-07-09 ENCOUNTER — TRANSCRIBE ORDERS (OUTPATIENT)
Facility: HOSPITAL | Age: 70
End: 2025-07-09

## 2025-07-09 DIAGNOSIS — R10.31 RLQ ABDOMINAL PAIN: ICD-10-CM

## 2025-07-09 DIAGNOSIS — R19.7 DIARRHEA, UNSPECIFIED TYPE: ICD-10-CM

## 2025-07-09 DIAGNOSIS — R10.32 LLQ PAIN: ICD-10-CM

## 2025-07-09 DIAGNOSIS — R13.10 DYSPHAGIA, UNSPECIFIED TYPE: ICD-10-CM

## 2025-07-09 DIAGNOSIS — R63.4 WEIGHT LOSS: Primary | ICD-10-CM

## 2025-07-23 ENCOUNTER — HOSPITAL ENCOUNTER (OUTPATIENT)
Facility: HOSPITAL | Age: 70
Discharge: HOME | End: 2025-07-26
Payer: MEDICARE

## 2025-07-23 VITALS — HEIGHT: 65 IN | BODY MASS INDEX: 27.51 KG/M2 | WEIGHT: 165.12 LBS

## 2025-07-23 DIAGNOSIS — Z12.31 VISIT FOR SCREENING MAMMOGRAM: ICD-10-CM

## 2025-07-23 PROCEDURE — 77067 SCR MAMMO BI INCL CAD: CPT

## 2025-07-29 ENCOUNTER — HOSPITAL ENCOUNTER (OUTPATIENT)
Facility: HOSPITAL | Age: 70
Discharge: HOME | End: 2025-08-01
Attending: SPECIALIST
Payer: MEDICARE

## 2025-07-29 ENCOUNTER — TELEPHONE (OUTPATIENT)
Age: 70
End: 2025-07-29

## 2025-07-29 DIAGNOSIS — R63.4 WEIGHT LOSS: ICD-10-CM

## 2025-07-29 DIAGNOSIS — R19.7 DIARRHEA, UNSPECIFIED TYPE: ICD-10-CM

## 2025-07-29 DIAGNOSIS — R13.10 DYSPHAGIA, UNSPECIFIED TYPE: ICD-10-CM

## 2025-07-29 DIAGNOSIS — R10.32 LLQ PAIN: ICD-10-CM

## 2025-07-29 DIAGNOSIS — R10.31 RLQ ABDOMINAL PAIN: ICD-10-CM

## 2025-07-29 LAB — CREAT BLD-MCNC: 0.8 MG/DL (ref 0.6–1.3)

## 2025-07-29 PROCEDURE — 74177 CT ABD & PELVIS W/CONTRAST: CPT

## 2025-07-29 PROCEDURE — 82565 ASSAY OF CREATININE: CPT

## 2025-07-29 PROCEDURE — 6360000004 HC RX CONTRAST MEDICATION: Performed by: RADIOLOGY

## 2025-07-29 RX ORDER — IOPAMIDOL 755 MG/ML
100 INJECTION, SOLUTION INTRAVASCULAR
Status: COMPLETED | OUTPATIENT
Start: 2025-07-29 | End: 2025-07-29

## 2025-07-29 RX ADMIN — IOPAMIDOL 100 ML: 755 INJECTION, SOLUTION INTRAVENOUS at 08:40

## 2025-07-29 NOTE — TELEPHONE ENCOUNTER
Areli from [employer] calling regarding patient's order for modified barium swallow. She states the codes being sent over are not covered by the patient's insurance. Areli states Dr. Sin is the ordering physician. I informed her that we do not have a Dr. Sin at this practice. She understood.

## 2025-08-05 ENCOUNTER — ANESTHESIA (OUTPATIENT)
Facility: HOSPITAL | Age: 70
End: 2025-08-05
Payer: MEDICARE

## 2025-08-05 ENCOUNTER — ANESTHESIA EVENT (OUTPATIENT)
Facility: HOSPITAL | Age: 70
End: 2025-08-05
Payer: MEDICARE

## 2025-08-05 ENCOUNTER — HOSPITAL ENCOUNTER (OUTPATIENT)
Facility: HOSPITAL | Age: 70
Setting detail: OUTPATIENT SURGERY
Discharge: HOME | End: 2025-08-05
Attending: SPECIALIST | Admitting: SPECIALIST
Payer: MEDICARE

## 2025-08-05 VITALS
RESPIRATION RATE: 14 BRPM | SYSTOLIC BLOOD PRESSURE: 127 MMHG | OXYGEN SATURATION: 98 % | BODY MASS INDEX: 24.46 KG/M2 | DIASTOLIC BLOOD PRESSURE: 69 MMHG | HEART RATE: 71 BPM | WEIGHT: 147 LBS | TEMPERATURE: 97.8 F

## 2025-08-05 PROCEDURE — 3700000000 HC ANESTHESIA ATTENDED CARE: Performed by: SPECIALIST

## 2025-08-05 PROCEDURE — C1769 GUIDE WIRE: HCPCS | Performed by: SPECIALIST

## 2025-08-05 PROCEDURE — 7100000011 HC PHASE II RECOVERY - ADDTL 15 MIN: Performed by: SPECIALIST

## 2025-08-05 PROCEDURE — 2709999900 HC NON-CHARGEABLE SUPPLY: Performed by: SPECIALIST

## 2025-08-05 PROCEDURE — 3600007512: Performed by: SPECIALIST

## 2025-08-05 PROCEDURE — 88305 TISSUE EXAM BY PATHOLOGIST: CPT

## 2025-08-05 PROCEDURE — 7100000010 HC PHASE II RECOVERY - FIRST 15 MIN: Performed by: SPECIALIST

## 2025-08-05 PROCEDURE — 3600007502: Performed by: SPECIALIST

## 2025-08-05 PROCEDURE — 2580000003 HC RX 258: Performed by: SPECIALIST

## 2025-08-05 PROCEDURE — 6360000002 HC RX W HCPCS: Performed by: NURSE ANESTHETIST, CERTIFIED REGISTERED

## 2025-08-05 PROCEDURE — 3700000001 HC ADD 15 MINUTES (ANESTHESIA): Performed by: SPECIALIST

## 2025-08-05 RX ORDER — PROPOFOL 10 MG/ML
INJECTION, EMULSION INTRAVENOUS
Status: DISCONTINUED | OUTPATIENT
Start: 2025-08-05 | End: 2025-08-05 | Stop reason: SDUPTHER

## 2025-08-05 RX ORDER — DICYCLOMINE HYDROCHLORIDE 10 MG/1
60 CAPSULE ORAL
COMMUNITY
Start: 2025-07-09

## 2025-08-05 RX ORDER — SODIUM CHLORIDE 9 MG/ML
INJECTION, SOLUTION INTRAVENOUS PRN
Status: DISCONTINUED | OUTPATIENT
Start: 2025-08-05 | End: 2025-08-05 | Stop reason: HOSPADM

## 2025-08-05 RX ORDER — SODIUM CHLORIDE 0.9 % (FLUSH) 0.9 %
5-40 SYRINGE (ML) INJECTION PRN
Status: DISCONTINUED | OUTPATIENT
Start: 2025-08-05 | End: 2025-08-05 | Stop reason: HOSPADM

## 2025-08-05 RX ORDER — SODIUM CHLORIDE 0.9 % (FLUSH) 0.9 %
5-40 SYRINGE (ML) INJECTION EVERY 12 HOURS SCHEDULED
Status: DISCONTINUED | OUTPATIENT
Start: 2025-08-05 | End: 2025-08-05 | Stop reason: HOSPADM

## 2025-08-05 RX ORDER — VANCOMYCIN HYDROCHLORIDE 125 MG/1
56 CAPSULE ORAL
COMMUNITY
Start: 2025-07-14

## 2025-08-05 RX ORDER — SODIUM CHLORIDE 9 MG/ML
INJECTION, SOLUTION INTRAVENOUS CONTINUOUS
Status: DISCONTINUED | OUTPATIENT
Start: 2025-08-05 | End: 2025-08-05 | Stop reason: HOSPADM

## 2025-08-05 RX ORDER — LORATADINE 10 MG/1
10 CAPSULE, LIQUID FILLED ORAL DAILY
COMMUNITY

## 2025-08-05 RX ADMIN — PROPOFOL 80 MG: 10 INJECTION, EMULSION INTRAVENOUS at 09:18

## 2025-08-05 RX ADMIN — PROPOFOL 20 MG: 10 INJECTION, EMULSION INTRAVENOUS at 09:21

## 2025-08-05 RX ADMIN — PROPOFOL 20 MG: 10 INJECTION, EMULSION INTRAVENOUS at 09:20

## 2025-08-05 RX ADMIN — SODIUM CHLORIDE: 9 INJECTION, SOLUTION INTRAVENOUS at 08:35

## 2025-08-05 RX ADMIN — PROPOFOL 30 MG: 10 INJECTION, EMULSION INTRAVENOUS at 09:25

## 2025-08-05 RX ADMIN — LIDOCAINE HYDROCHLORIDE 50 MG: 20 INJECTION, SOLUTION EPIDURAL; INFILTRATION; INTRACAUDAL; PERINEURAL at 09:18

## 2025-08-05 ASSESSMENT — PAIN - FUNCTIONAL ASSESSMENT: PAIN_FUNCTIONAL_ASSESSMENT: 0-10

## 2025-08-13 ENCOUNTER — OFFICE VISIT (OUTPATIENT)
Age: 70
End: 2025-08-13
Payer: MEDICARE

## 2025-08-13 VITALS
HEIGHT: 65 IN | DIASTOLIC BLOOD PRESSURE: 68 MMHG | TEMPERATURE: 97.6 F | OXYGEN SATURATION: 99 % | SYSTOLIC BLOOD PRESSURE: 122 MMHG | RESPIRATION RATE: 14 BRPM | BODY MASS INDEX: 23.99 KG/M2 | WEIGHT: 144 LBS | HEART RATE: 67 BPM

## 2025-08-13 DIAGNOSIS — E03.9 ACQUIRED HYPOTHYROIDISM: Primary | ICD-10-CM

## 2025-08-13 DIAGNOSIS — I10 ESSENTIAL (PRIMARY) HYPERTENSION: ICD-10-CM

## 2025-08-13 DIAGNOSIS — E03.9 ACQUIRED HYPOTHYROIDISM: ICD-10-CM

## 2025-08-13 DIAGNOSIS — R19.7 DIARRHEA, UNSPECIFIED TYPE: ICD-10-CM

## 2025-08-13 DIAGNOSIS — R49.0 HOARSENESS, PERSISTENT: ICD-10-CM

## 2025-08-13 DIAGNOSIS — R41.89 COGNITIVE CHANGES: ICD-10-CM

## 2025-08-13 DIAGNOSIS — H20.9 IRITIS: ICD-10-CM

## 2025-08-13 DIAGNOSIS — Z13.220 LIPID SCREENING: ICD-10-CM

## 2025-08-13 DIAGNOSIS — R63.4 UNEXPLAINED WEIGHT LOSS: ICD-10-CM

## 2025-08-13 DIAGNOSIS — J30.9 ALLERGIC RHINITIS, UNSPECIFIED SEASONALITY, UNSPECIFIED TRIGGER: ICD-10-CM

## 2025-08-13 LAB
BASOPHILS # BLD AUTO: 0.1 X10E3/UL (ref 0–0.2)
BASOPHILS NFR BLD AUTO: 2 %
EOSINOPHIL # BLD AUTO: 0.1 X10E3/UL (ref 0–0.4)
EOSINOPHIL NFR BLD AUTO: 2 %
ERYTHROCYTE [DISTWIDTH] IN BLOOD BY AUTOMATED COUNT: 13.1 % (ref 11.7–15.4)
HCT VFR BLD AUTO: 38.8 % (ref 34–46.6)
HGB BLD-MCNC: 12.2 G/DL (ref 11.1–15.9)
IMM GRANULOCYTES # BLD AUTO: 0 X10E3/UL (ref 0–0.1)
IMM GRANULOCYTES NFR BLD AUTO: 0 %
LYMPHOCYTES # BLD AUTO: 1.5 X10E3/UL (ref 0.7–3.1)
LYMPHOCYTES NFR BLD AUTO: 41 %
MCH RBC QN AUTO: 30.7 PG (ref 26.6–33)
MCHC RBC AUTO-ENTMCNC: 31.4 G/DL (ref 31.5–35.7)
MCV RBC AUTO: 98 FL (ref 79–97)
MONOCYTES # BLD AUTO: 0.4 X10E3/UL (ref 0.1–0.9)
MONOCYTES NFR BLD AUTO: 11 %
NEUTROPHILS # BLD AUTO: 1.6 X10E3/UL (ref 1.4–7)
NEUTROPHILS NFR BLD AUTO: 44 %
PLATELET # BLD AUTO: 257 X10E3/UL (ref 150–450)
RBC # BLD AUTO: 3.98 X10E6/UL (ref 3.77–5.28)
WBC # BLD AUTO: 3.6 X10E3/UL (ref 3.4–10.8)

## 2025-08-13 PROCEDURE — 99204 OFFICE O/P NEW MOD 45 MIN: CPT | Performed by: CLINICAL NURSE SPECIALIST

## 2025-08-13 PROCEDURE — 1159F MED LIST DOCD IN RCRD: CPT | Performed by: CLINICAL NURSE SPECIALIST

## 2025-08-13 PROCEDURE — 3078F DIAST BP <80 MM HG: CPT | Performed by: CLINICAL NURSE SPECIALIST

## 2025-08-13 PROCEDURE — 1123F ACP DISCUSS/DSCN MKR DOCD: CPT | Performed by: CLINICAL NURSE SPECIALIST

## 2025-08-13 PROCEDURE — 3074F SYST BP LT 130 MM HG: CPT | Performed by: CLINICAL NURSE SPECIALIST

## 2025-08-13 PROCEDURE — 1126F AMNT PAIN NOTED NONE PRSNT: CPT | Performed by: CLINICAL NURSE SPECIALIST

## 2025-08-13 RX ORDER — L. ACIDOPHILUS/L.BULGARICUS 1MM CELL
TABLET ORAL
Qty: 30 TABLET | Refills: 3 | Status: SHIPPED | OUTPATIENT
Start: 2025-08-13

## 2025-08-13 RX ORDER — LORATADINE 10 MG/1
10 TABLET ORAL DAILY
Qty: 90 TABLET | Refills: 1 | Status: SHIPPED | OUTPATIENT
Start: 2025-08-13

## 2025-08-13 RX ORDER — FLUTICASONE PROPIONATE 50 MCG
2 SPRAY, SUSPENSION (ML) NASAL DAILY
Qty: 16 G | Refills: 5 | Status: SHIPPED | OUTPATIENT
Start: 2025-08-13

## 2025-08-13 ASSESSMENT — ENCOUNTER SYMPTOMS
DIARRHEA: 1
RESPIRATORY NEGATIVE: 1

## 2025-08-14 ENCOUNTER — PATIENT MESSAGE (OUTPATIENT)
Age: 70
End: 2025-08-14

## 2025-08-14 ENCOUNTER — RESULTS FOLLOW-UP (OUTPATIENT)
Age: 70
End: 2025-08-14

## 2025-08-14 DIAGNOSIS — E03.9 ACQUIRED HYPOTHYROIDISM: Primary | ICD-10-CM

## 2025-08-14 DIAGNOSIS — R41.89 COGNITIVE CHANGES: Primary | ICD-10-CM

## 2025-08-14 LAB
ALBUMIN SERPL-MCNC: 4.3 G/DL (ref 3.8–4.8)
ALP SERPL-CCNC: 48 IU/L (ref 44–121)
ALT SERPL-CCNC: 12 IU/L (ref 0–32)
AST SERPL-CCNC: 19 IU/L (ref 0–40)
BILIRUB SERPL-MCNC: 1.2 MG/DL (ref 0–1.2)
BUN SERPL-MCNC: 13 MG/DL (ref 8–27)
BUN/CREAT SERPL: 19 (ref 12–28)
CALCIUM SERPL-MCNC: 9.9 MG/DL (ref 8.7–10.3)
CHLORIDE SERPL-SCNC: 99 MMOL/L (ref 96–106)
CHOLEST SERPL-MCNC: 205 MG/DL (ref 100–199)
CO2 SERPL-SCNC: 28 MMOL/L (ref 20–29)
COMMENT: ABNORMAL
CREAT SERPL-MCNC: 0.68 MG/DL (ref 0.57–1)
EGFRCR SERPLBLD CKD-EPI 2021: 93 ML/MIN/1.73
GLOBULIN SER CALC-MCNC: 2.4 G/DL (ref 1.5–4.5)
GLUCOSE SERPL-MCNC: 76 MG/DL (ref 70–99)
HDLC SERPL-MCNC: 70 MG/DL
LDLC SERPL CALC-MCNC: 123 MG/DL (ref 0–99)
POTASSIUM SERPL-SCNC: 3.7 MMOL/L (ref 3.5–5.2)
PROT SERPL-MCNC: 6.7 G/DL (ref 6–8.5)
SODIUM SERPL-SCNC: 141 MMOL/L (ref 134–144)
T4 FREE SERPL-MCNC: 1.33 NG/DL (ref 0.82–1.77)
THYROPEROXIDASE AB SERPL-ACNC: 171 IU/ML (ref 0–34)
TRIGL SERPL-MCNC: 68 MG/DL (ref 0–149)
TSH SERPL DL<=0.005 MIU/L-ACNC: 17 UIU/ML (ref 0.45–4.5)
VLDLC SERPL CALC-MCNC: 12 MG/DL (ref 5–40)

## 2025-08-15 LAB
APPEARANCE UR: CLEAR
BACTERIA #/AREA URNS HPF: ABNORMAL /[HPF]
BACTERIA UR CULT: NORMAL
BILIRUB UR QL STRIP: ABNORMAL
CASTS URNS QL MICRO: ABNORMAL /LPF
COLOR UR: YELLOW
CRYSTALS URNS MICRO: ABNORMAL
EPI CELLS #/AREA URNS HPF: ABNORMAL /HPF (ref 0–10)
GLUCOSE UR QL STRIP: NEGATIVE
HGB UR QL STRIP: NEGATIVE
IMP & REVIEW OF LAB RESULTS: NORMAL
KETONES UR QL STRIP: ABNORMAL
LEUKOCYTE ESTERASE UR QL STRIP: ABNORMAL
MICRO URNS: ABNORMAL
NITRITE UR QL STRIP: NEGATIVE
PH UR STRIP: 5.5 [PH] (ref 5–7.5)
PROT UR QL STRIP: ABNORMAL
RBC #/AREA URNS HPF: ABNORMAL /HPF (ref 0–2)
SP GR UR STRIP: 1.03 (ref 1–1.03)
UNIDENT CRYS URNS QL MICRO: PRESENT
URINALYSIS REFLEX: ABNORMAL
UROBILINOGEN UR STRIP-MCNC: 1 MG/DL (ref 0.2–1)
WBC #/AREA URNS HPF: ABNORMAL /HPF (ref 0–5)

## 2025-08-18 DIAGNOSIS — K51.90 ULCERATIVE COLITIS WITHOUT COMPLICATIONS, UNSPECIFIED LOCATION (HCC): ICD-10-CM

## 2025-08-18 DIAGNOSIS — I10 ESSENTIAL (PRIMARY) HYPERTENSION: ICD-10-CM

## 2025-08-18 RX ORDER — VALSARTAN 80 MG/1
80 TABLET ORAL DAILY
Qty: 90 TABLET | Refills: 1 | Status: SHIPPED | OUTPATIENT
Start: 2025-08-18

## 2025-08-18 RX ORDER — OMEPRAZOLE 40 MG/1
40 CAPSULE, DELAYED RELEASE ORAL DAILY
Qty: 90 CAPSULE | Refills: 1 | Status: SHIPPED | OUTPATIENT
Start: 2025-08-18

## 2025-08-19 DIAGNOSIS — E03.9 ACQUIRED HYPOTHYROIDISM: ICD-10-CM

## 2025-08-19 DIAGNOSIS — R41.89 COGNITIVE CHANGES: Primary | ICD-10-CM

## 2025-08-19 RX ORDER — LEVOTHYROXINE SODIUM 100 UG/1
TABLET ORAL
Qty: 60 TABLET | Refills: 0 | Status: SHIPPED | OUTPATIENT
Start: 2025-08-19

## 2025-08-25 ENCOUNTER — HOSPITAL ENCOUNTER (OUTPATIENT)
Facility: HOSPITAL | Age: 70
Discharge: HOME | End: 2025-08-28
Attending: SPECIALIST
Payer: MEDICARE

## 2025-08-25 DIAGNOSIS — K21.9 GASTROESOPHAGEAL REFLUX DISEASE WITHOUT ESOPHAGITIS: ICD-10-CM

## 2025-08-25 DIAGNOSIS — R10.13 EPIGASTRIC PAIN: ICD-10-CM

## 2025-08-25 DIAGNOSIS — R13.12 OROPHARYNGEAL DYSPHAGIA: ICD-10-CM

## 2025-08-25 DIAGNOSIS — R13.10 DYSPHAGIA, UNSPECIFIED TYPE: ICD-10-CM

## 2025-08-25 DIAGNOSIS — R12 HEARTBURN: ICD-10-CM

## 2025-08-25 DIAGNOSIS — R63.4 ABNORMAL WEIGHT LOSS: ICD-10-CM

## 2025-08-25 DIAGNOSIS — R07.9 CHEST PAIN, UNSPECIFIED TYPE: ICD-10-CM

## 2025-08-25 PROCEDURE — 92611 MOTION FLUOROSCOPY/SWALLOW: CPT

## 2025-08-25 PROCEDURE — 74230 X-RAY XM SWLNG FUNCJ C+: CPT

## 2025-08-28 DIAGNOSIS — E03.9 ACQUIRED HYPOTHYROIDISM: ICD-10-CM

## (undated) DEVICE — REM POLYHESIVE ADULT PATIENT RETURN ELECTRODE: Brand: VALLEYLAB

## (undated) DEVICE — 3M™ IOBAN™ 2 ANTIMICROBIAL INCISE DRAPE 6651EZ: Brand: IOBAN™ 2

## (undated) DEVICE — T4 HOOD

## (undated) DEVICE — 1200 GUARD II KIT W/5MM TUBE W/O VAC TUBE: Brand: GUARDIAN

## (undated) DEVICE — SYR LR LCK 1ML GRAD NSAF 30ML --

## (undated) DEVICE — KENDALL SCD EXPRESS SLEEVES, KNEE LENGTH, X-LARGE: Brand: KENDALL SCD

## (undated) DEVICE — STERILE POLYISOPRENE POWDER-FREE SURGICAL GLOVES WITH EMOLLIENT COATING: Brand: PROTEXIS

## (undated) DEVICE — CATH IV AUTOGRD BC BLU 22GA 25 -- INSYTE

## (undated) DEVICE — SLIM BODY SKIN STAPLER: Brand: APPOSE ULC

## (undated) DEVICE — SOLUTION IRRIG 3000ML 0.9% SOD CHL FLX CONT 0797208] ICU MEDICAL INC]

## (undated) DEVICE — ABDOMINAL PAD: Brand: DERMACEA

## (undated) DEVICE — SOLIDIFIER FLUID 3000 CC ABSORB

## (undated) DEVICE — INFECTION CONTROL KIT SYS

## (undated) DEVICE — 4-PORT MANIFOLD: Brand: NEPTUNE 2

## (undated) DEVICE — Z CONVERTED USE 2271043 CONTAINER SPEC COLL 4OZ SCR ON LID PEEL PCH

## (undated) DEVICE — SCRUB DRY SURG EZ SCRUB BRUSH PREOPERATIVE GRN

## (undated) DEVICE — KENDALL SCD EXPRESS SLEEVES, KNEE LENGTH, MEDIUM: Brand: KENDALL SCD

## (undated) DEVICE — (D)PREP SKN CHLRAPRP APPL 26ML -- CONVERT TO ITEM 371833

## (undated) DEVICE — ARTHROSCOPY RICHMOND-LF: Brand: MEDLINE INDUSTRIES, INC.

## (undated) DEVICE — 5.5 CM ACROMIOBLASTER STRAIGHT                                    BURRS, POWER/EP-1, BRICK RED, 8000                                    MAXIMUM RPM, PACKAGED 6 PER BOX, STERILE

## (undated) DEVICE — NEEDLE HYPO 18GA L1.5IN PNK S STL HUB POLYPR SHLD REG BVL

## (undated) DEVICE — DRAPE,EXTREMITY,89X128,STERILE: Brand: MEDLINE

## (undated) DEVICE — NEONATAL-ADULT SPO2 SENSOR: Brand: NELLCOR

## (undated) DEVICE — STRYKER PERFORMANCE SERIES SAGITTAL BLADE: Brand: STRYKER PERFORMANCE SERIES

## (undated) DEVICE — SYRINGE MED 20ML STD CLR PLAS LUERLOCK TIP N CTRL DISP

## (undated) DEVICE — TRAY CATH 16F URIN MTR LTX -- CONVERT TO ITEM 363111

## (undated) DEVICE — SOLUTION IRRIG 3000ML LAC R FLX CONT

## (undated) DEVICE — KENDALL RADIOLUCENT FOAM MONITORING ELECTRODE -RECTANGULAR SHAPE: Brand: KENDALL

## (undated) DEVICE — GUIDEWIRE DIS MARKED SPRINGTIP

## (undated) DEVICE — SUTURE VCRL SZ 2-0 L36IN ABSRB UD L40MM CT 1/2 CIR J957H

## (undated) DEVICE — TRAP SURG QUAD PARABOLA SLOT DSGN SFTY SCRN TRAPEASE

## (undated) DEVICE — Device

## (undated) DEVICE — CUSTOM CAST PD STR

## (undated) DEVICE — BAG BELONG PT PERS CLEAR HANDL

## (undated) DEVICE — FORCEPS BX L240CM JAW DIA2.4MM ORNG L CAP W/ NDL DISP RAD

## (undated) DEVICE — Z DISCONTINUED USE 2744636  DRESSING AQUACEL 14 IN ALG W3.5XL14IN POLYUR FLM CVR W/ HYDRCOLL

## (undated) DEVICE — PREP SKN PREVAIL 40ML APPL --

## (undated) DEVICE — NDL SUT TAPR PT MAYO 0.5 CIR 5 --

## (undated) DEVICE — ELECTRODE PT RET AD L9FT HI MOIST COND ADH HYDRGEL CORDED

## (undated) DEVICE — STERILE POLYISOPRENE POWDER-FREE SURGICAL GLOVES: Brand: PROTEXIS

## (undated) DEVICE — ARGYLE FRAZIER SURGICAL SUCTION INSTRUMENT 10 FR/CH (3.3 MM): Brand: ARGYLE

## (undated) DEVICE — 4.5 MM INCISOR PLUS STRAIGHT                                    BLADES, POWER/EP-1, VIOLET, PACKAGED                                    6 PER BOX, STERILE

## (undated) DEVICE — BW-412T DISP COMBO CLEANING BRUSH: Brand: SINGLE USE COMBINATION CLEANING BRUSH

## (undated) DEVICE — SET EXTN TBNG L BOR 4 W STPCOCK ST 32IN PRIMING VOL 6ML

## (undated) DEVICE — CURITY NON-ADHERENT STRIPS: Brand: CURITY

## (undated) DEVICE — BITE BLK ENDOSCP AD 54FR GRN POLYETH ENDOSCP W STRP SLD

## (undated) DEVICE — TUBING HYDR IRR --

## (undated) DEVICE — SOLUTION IRRIG 1000ML H2O STRL BLT

## (undated) DEVICE — HANDPIECE SET WITH BONE CLEANING TIP AND SUCTION TUBE: Brand: INTERPULSE

## (undated) DEVICE — GOWN,BREATHABLE SLV,AURORA,LG,STRL: Brand: MEDLINE

## (undated) DEVICE — SUTURE STRATAFIX SYMMETRIC PDS + SZ 1 L18IN ABSRB VLT L48MM SXPP1A400

## (undated) DEVICE — SUTURE VCRL SZ 2-0 L36IN ABSRB UD L36MM CT-1 1/2 CIR J945H

## (undated) DEVICE — SUTURE VCRL SZ 1 L27IN ABSRB VLT L36MM CT-1 1/2 CIR J341H

## (undated) DEVICE — SUTURE VCRL SZ 1 L36IN ABSRB UD L36MM CT-1 1/2 CIR J947H

## (undated) DEVICE — FORCEPS BX L240CM JAW DIA2.8MM L CAP W/ NDL MIC MESH TOOTH

## (undated) DEVICE — SUTURE VCRL SZ 0 L27IN ABSRB UD L36MM CT-1 1/2 CIR J260H

## (undated) DEVICE — DRAPE,REIN 53X77,STERILE: Brand: MEDLINE

## (undated) DEVICE — GAUZE SPONGES,12 PLY: Brand: CURITY

## (undated) DEVICE — NEEDLE HYPO 22GA L1.5IN BLK S STL HUB POLYPR SHLD REG BVL

## (undated) DEVICE — SNARE VASC L240CM LOOP W10MM SHTH DIA2.4MM RND STIFF CLD

## (undated) DEVICE — SUTURE PROL SZ 3-0 L18IN NONABSORBABLE BLU L19MM PC-5 3/8 8632G

## (undated) DEVICE — SUTURE TIGERTAPE TIGERWIRE SZ 2-0 L30IN NONABSORBABLE AR72377T

## (undated) DEVICE — DEVON™ KNEE AND BODY STRAP 60" X 3" (1.5 M X 7.6 CM): Brand: DEVON

## (undated) DEVICE — IMPLANTABLE DEVICE
Type: IMPLANTABLE DEVICE | Site: KNEE | Status: NON-FUNCTIONAL
Removed: 2017-08-03

## (undated) DEVICE — SUCTION RING WITH TUBING: Brand: NEPTUNE

## (undated) DEVICE — STRIP,CLOSURE,WOUND,MEDI-STRIP,1/2X4: Brand: MEDLINE

## (undated) DEVICE — KIT IV STRT W CHLORAPREP PD 1ML

## (undated) DEVICE — SET ADMIN 16ML TBNG L100IN 2 Y INJ SITE IV PIGGY BK DISP

## (undated) DEVICE — WRISTBAND ID AD W1XL11.5IN RED POLY ALRG PREPRINTED PERM

## (undated) DEVICE — SNARE ENDOSCP POLYP MED STD AD 2.4X27X240 CM 2.8 MM OVL SENS

## (undated) DEVICE — DYONICS 25 INFLOW/OUTFLOW TUBE                                    SET, SINGLE SUCTION, 3 PER BOX

## (undated) DEVICE — NEEDLE SUT PASS FOR ROT CUF LABRAL REP MULTFI SCORPION

## (undated) DEVICE — ROCKER SWITCH PENCIL BLADE ELECTRODE, HOLSTER: Brand: EDGE

## (undated) DEVICE — BANDAGE COMPR SELF ADH 5 YDX4 IN TAN STRL PREMIERPRO LF

## (undated) DEVICE — ENDO CARRY-ON PROCEDURE KIT INCLUDES ENZYMATIC SPONGE, GAUZE, BIOHAZARD LABEL, TRAY, LUBRICANT, DIRTY SCOPE LABEL, WATER LABEL, TRAY, DRAWSTRING PAD, AND DEFENDO 4-PIECE KIT.: Brand: ENDO CARRY-ON PROCEDURE KIT

## (undated) DEVICE — ACROMIOPLASTY ELECTRODE (ELECTRODE ONLY): Brand: CONMED

## (undated) DEVICE — GOWN,SIRUS,FABRNF,XL,20/CS: Brand: MEDLINE

## (undated) DEVICE — HANDLE LT SNAP ON ULT DURABLE LENS FOR TRUMPF ALC DISPOSABLE

## (undated) DEVICE — DISPOSABLE TOURNIQUET CUFF SINGLE BLADDER, DUAL PORT AND QUICK CONNECT CONNECTOR: Brand: COLOR CUFF

## (undated) DEVICE — PIN DRL QUIK HI PERF FOR SIG SYS

## (undated) DEVICE — CANN NASAL O2 CAPNOGRAPHY AD -- FILTERLINE

## (undated) DEVICE — HOOK LOCK LATEX FREE ELASTIC BANDAGE D/L 6INX10YD

## (undated) DEVICE — CONTAINER SPEC 20 ML LID NEUT BUFF FORMALIN 10 % POLYPR STS

## (undated) DEVICE — CARTRIDGE BNE CEM MIX UNIV TWR VAC ROTOR BRK OFF NOZ W/O

## (undated) DEVICE — INTENDED FOR TISSUE SEPARATION, AND OTHER PROCEDURES THAT REQUIRE A SHARP SURGICAL BLADE TO PUNCTURE OR CUT.: Brand: BARD-PARKER ® CARBON RIB-BACK BLADES

## (undated) DEVICE — ZIMMER® STERILE DISPOSABLE TOURNIQUET CUFF WITH PLC, DUAL PORT, SINGLE BLADDER, 34 IN. (86 CM)

## (undated) DEVICE — BAG SPEC BIOHZD LF 2MIL 6X10IN -- CONVERT TO ITEM 357326

## (undated) DEVICE — ZIMMER® STERILE DISPOSABLE TOURNIQUET CUFF WITH PLC, DUAL PORT, SINGLE BLADDER, 42 IN. (107 CM)

## (undated) DEVICE — SURGICAL PROCEDURE PACK BASIN MAJ SET CUST NO CAUT

## (undated) DEVICE — KENDALL SCD EXPRESS SLEEVES, KNEE LENGTH, LARGE: Brand: KENDALL SCD

## (undated) DEVICE — SWAB CULT DBL W/O CHAR RAYON TIP AMIES GEL CLMN FOR COLL

## (undated) DEVICE — DILATOR ESOPHAGEAL CLEANGUIDE DISP OTW 17MM